# Patient Record
Sex: MALE | Race: WHITE | NOT HISPANIC OR LATINO | Employment: FULL TIME | URBAN - NONMETROPOLITAN AREA
[De-identification: names, ages, dates, MRNs, and addresses within clinical notes are randomized per-mention and may not be internally consistent; named-entity substitution may affect disease eponyms.]

---

## 2023-12-21 ENCOUNTER — OFFICE VISIT (OUTPATIENT)
Dept: PRIMARY CARE | Facility: CLINIC | Age: 48
End: 2023-12-21
Payer: COMMERCIAL

## 2023-12-21 VITALS
DIASTOLIC BLOOD PRESSURE: 68 MMHG | SYSTOLIC BLOOD PRESSURE: 122 MMHG | HEART RATE: 82 BPM | BODY MASS INDEX: 29.25 KG/M2 | HEIGHT: 73 IN | OXYGEN SATURATION: 98 % | WEIGHT: 220.7 LBS

## 2023-12-21 DIAGNOSIS — M70.22 OLECRANON BURSITIS, LEFT ELBOW: Primary | ICD-10-CM

## 2023-12-21 PROCEDURE — 1036F TOBACCO NON-USER: CPT | Performed by: FAMILY MEDICINE

## 2023-12-21 PROCEDURE — 99213 OFFICE O/P EST LOW 20 MIN: CPT | Performed by: FAMILY MEDICINE

## 2023-12-21 ASSESSMENT — PATIENT HEALTH QUESTIONNAIRE - PHQ9
1. LITTLE INTEREST OR PLEASURE IN DOING THINGS: NOT AT ALL
SUM OF ALL RESPONSES TO PHQ9 QUESTIONS 1 AND 2: 0
2. FEELING DOWN, DEPRESSED OR HOPELESS: NOT AT ALL

## 2023-12-21 ASSESSMENT — ENCOUNTER SYMPTOMS
WOUND: 0
NUMBNESS: 0
ARTHRALGIAS: 0

## 2023-12-21 NOTE — PROGRESS NOTES
"Subjective   Patient ID: Yon Lawrence is a 48 y.o. male who presents for Elbow Pain (LT [id number] SWELLING).    Elbow Pain  Pertinent negatives include no arthralgias, numbness or rash.      No known injury but he has some swelling is gone down to the last week of the left olecranon bursa.  On exam freely movable with very little fluid mostly soft tissue swelling, no redness.    Since it is going down at this point I do not think we will have him take anything.  Ibuprofen as needed.  If the swelling does get a little bit worse, recommend 400 mg with food twice a day for couple weeks to see if it helps.    Review of Systems   Musculoskeletal:  Negative for arthralgias.   Skin:  Negative for rash and wound.   Neurological:  Negative for numbness.       Objective   /68   Pulse 82   Ht 1.854 m (6' 1\")   Wt 100 kg (220 lb 11.2 oz)   SpO2 98%   BMI 29.12 kg/m²     Physical Exam  Constitutional:       Appearance: Normal appearance.   Skin:     General: Skin is warm and dry.   Neurological:      General: No focal deficit present.      Mental Status: He is alert and oriented to person, place, and time.   Psychiatric:         Mood and Affect: Mood normal.         Behavior: Behavior normal.         Judgment: Judgment normal.         Assessment/Plan   Problem List Items Addressed This Visit    None  Visit Diagnoses         Codes    Olecranon bursitis, left elbow    -  Primary M70.22               "

## 2024-02-19 ENCOUNTER — OFFICE VISIT (OUTPATIENT)
Dept: PRIMARY CARE | Facility: CLINIC | Age: 49
End: 2024-02-19
Payer: COMMERCIAL

## 2024-02-19 VITALS
HEIGHT: 73 IN | HEART RATE: 97 BPM | SYSTOLIC BLOOD PRESSURE: 128 MMHG | OXYGEN SATURATION: 98 % | BODY MASS INDEX: 27.69 KG/M2 | DIASTOLIC BLOOD PRESSURE: 72 MMHG | WEIGHT: 208.9 LBS

## 2024-02-19 DIAGNOSIS — R10.12 LUQ ABDOMINAL PAIN: Primary | ICD-10-CM

## 2024-02-19 PROCEDURE — 1036F TOBACCO NON-USER: CPT | Performed by: FAMILY MEDICINE

## 2024-02-19 PROCEDURE — 99213 OFFICE O/P EST LOW 20 MIN: CPT | Performed by: FAMILY MEDICINE

## 2024-02-19 ASSESSMENT — ENCOUNTER SYMPTOMS
CONSTIPATION: 0
VOMITING: 0
HEMATURIA: 0
PALPITATIONS: 0
SLEEP DISTURBANCE: 0
DIFFICULTY URINATING: 0
DYSURIA: 0
SHORTNESS OF BREATH: 0
NAUSEA: 0
COUGH: 0
BACK PAIN: 0
CHILLS: 0
FEVER: 0
FATIGUE: 0
FREQUENCY: 0
DIARRHEA: 0
BLOOD IN STOOL: 0
ABDOMINAL PAIN: 1

## 2024-02-19 NOTE — PROGRESS NOTES
"Subjective   Patient ID: Yon Lawrence is a 48 y.o. male who presents for Abdominal Pain.    HPI   Started with more of a dull ache in the upper part of his belly right and left side little periumbilical.  That lasted for about a week.  Then a week ago he started getting more of an off-and-on sharp pain in the left upper quadrant abdomen just below the rib cage.  No rash in that area no mid back pain.  No change in the pain with eating urination or bowel movement.  It does hurt a little bit worse if he takes a deep breath, it feels better if he leans forward and his belly and feels worse if he stretches out his back.  Has not tried any over-the-counter medicines to help.  No change in urination or bowel movement.  No dysphagia or heartburn.    On exam lungs are clear no tenderness to palpation in the mid back or the chest wall.  Mild tenderness to palpation mostly in the left upper quadrant, but a little epigastric right upper quadrant and periumbilical.  No rebound pain.  No CVA tenderness bilaterally.    No aggravating or relieving factors, do wonder more about a pulled muscle in the abdominal wall possibly something with the lower part of the rib cage.  With food he will take ibuprofen every day 2 or 3 times a day with a maximum of 4.  See if that gets better over time.  If not better in 2 weeks, he will call.    Review of Systems   Constitutional:  Negative for chills, fatigue and fever.   Respiratory:  Negative for cough and shortness of breath.    Cardiovascular:  Negative for chest pain and palpitations.   Gastrointestinal:  Positive for abdominal pain. Negative for blood in stool, constipation, diarrhea, nausea and vomiting.   Genitourinary:  Negative for difficulty urinating, dysuria, frequency and hematuria.   Musculoskeletal:  Negative for back pain.   Skin:  Negative for rash.   Psychiatric/Behavioral:  Negative for sleep disturbance.        Objective   /72   Pulse 97   Ht 1.854 m (6' 1\")   Wt " 94.8 kg (208 lb 14.4 oz)   SpO2 98%   BMI 27.56 kg/m²     Physical Exam  Constitutional:       Appearance: Normal appearance.   HENT:      Head: Normocephalic and atraumatic.   Cardiovascular:      Rate and Rhythm: Normal rate and regular rhythm.      Heart sounds: Normal heart sounds.   Pulmonary:      Effort: Pulmonary effort is normal.      Breath sounds: Normal breath sounds.   Skin:     General: Skin is warm and dry.   Neurological:      General: No focal deficit present.      Mental Status: He is alert and oriented to person, place, and time.   Psychiatric:         Mood and Affect: Mood normal.         Behavior: Behavior normal.         Thought Content: Thought content normal.         Judgment: Judgment normal.         Assessment/Plan   Problem List Items Addressed This Visit    None  Visit Diagnoses         Codes    LUQ abdominal pain    -  Primary R10.12

## 2024-03-14 ENCOUNTER — OFFICE VISIT (OUTPATIENT)
Dept: PRIMARY CARE | Facility: CLINIC | Age: 49
End: 2024-03-14
Payer: COMMERCIAL

## 2024-03-14 VITALS
OXYGEN SATURATION: 96 % | BODY MASS INDEX: 27.83 KG/M2 | DIASTOLIC BLOOD PRESSURE: 68 MMHG | HEIGHT: 73 IN | HEART RATE: 88 BPM | SYSTOLIC BLOOD PRESSURE: 132 MMHG | WEIGHT: 210 LBS

## 2024-03-14 DIAGNOSIS — R10.12 LUQ ABDOMINAL PAIN: Primary | ICD-10-CM

## 2024-03-14 PROCEDURE — 99213 OFFICE O/P EST LOW 20 MIN: CPT | Performed by: FAMILY MEDICINE

## 2024-03-14 PROCEDURE — 1036F TOBACCO NON-USER: CPT | Performed by: FAMILY MEDICINE

## 2024-03-14 RX ORDER — PREDNISONE 10 MG/1
TABLET ORAL
Qty: 30 TABLET | Refills: 1 | Status: SHIPPED | OUTPATIENT
Start: 2024-03-14 | End: 2024-03-25

## 2024-03-14 ASSESSMENT — ENCOUNTER SYMPTOMS
ABDOMINAL PAIN: 1
CONSTIPATION: 0
HEMATURIA: 0
FREQUENCY: 0
PALPITATIONS: 0
BLOOD IN STOOL: 0
DIARRHEA: 0
SHORTNESS OF BREATH: 0
DIFFICULTY URINATING: 0
COUGH: 0

## 2024-03-14 ASSESSMENT — PATIENT HEALTH QUESTIONNAIRE - PHQ9
1. LITTLE INTEREST OR PLEASURE IN DOING THINGS: NOT AT ALL
2. FEELING DOWN, DEPRESSED OR HOPELESS: NOT AT ALL
SUM OF ALL RESPONSES TO PHQ9 QUESTIONS 1 AND 2: 0

## 2024-03-14 NOTE — PROGRESS NOTES
"Subjective   Patient ID: Yon Lawrence is a 48 y.o. male who presents for Abdominal Pain (LUQ x2mo).    Abdominal Pain  Pertinent negatives include no constipation, diarrhea, frequency or hematuria.   Send pain but things are little bit better with consistently using ibuprofen.  But still every once in a while hurts some when he takes a deep breath and sometimes when he is walking he can feel it.  No change with eating bowel movements or urination.  No blood in the urine no dysuria or urgency, no blood in the stool no persistent constipation or diarrhea.  No rash on the skin or his mid back.    Exam is benign.    Ibuprofen as needed  Prednisone taper with a refill  If he feels the pain is still a problem in the next 2 to 4 weeks, call order some labs CT scan of the abdomen and UA.    Review of Systems   Respiratory:  Negative for cough and shortness of breath.    Cardiovascular:  Negative for chest pain and palpitations.   Gastrointestinal:  Positive for abdominal pain. Negative for blood in stool, constipation and diarrhea.   Genitourinary:  Negative for difficulty urinating, frequency, hematuria and urgency.   Skin:  Negative for rash.       Objective   /68   Pulse 88   Ht 1.854 m (6' 1\")   Wt 95.3 kg (210 lb)   SpO2 96%   BMI 27.71 kg/m²     Physical Exam  Constitutional:       Appearance: Normal appearance.   Abdominal:      Palpations: Abdomen is soft.      Tenderness: There is no abdominal tenderness.   Skin:     General: Skin is warm and dry.   Neurological:      General: No focal deficit present.      Mental Status: He is alert and oriented to person, place, and time.   Psychiatric:         Mood and Affect: Mood normal.         Behavior: Behavior normal.         Judgment: Judgment normal.         Assessment/Plan   Problem List Items Addressed This Visit             ICD-10-CM    LUQ abdominal pain - Primary R10.12    Relevant Medications    predniSONE (Deltasone) 10 mg tablet          "

## 2024-04-22 ENCOUNTER — TELEPHONE (OUTPATIENT)
Dept: PRIMARY CARE | Facility: CLINIC | Age: 49
End: 2024-04-22
Payer: COMMERCIAL

## 2024-04-22 DIAGNOSIS — R10.12 LUQ ABDOMINAL PAIN: Primary | ICD-10-CM

## 2024-04-22 NOTE — TELEPHONE ENCOUNTER
PT NOTIFIED SCHED NURSE VISIT FOR URINE AND WILL GET LABS DONE 4/24/24.  SENT CT TO [facility] FOR SCHEDULING

## 2024-04-22 NOTE — TELEPHONE ENCOUNTER
CALLING IN STATING ABD PAIN IS NOT ANY BETTER.  LAST OV NOTE STATES POSSIBLE LABS, UA, AND CT.  PLEASE ADVISE.

## 2024-04-24 ENCOUNTER — LAB (OUTPATIENT)
Dept: LAB | Facility: LAB | Age: 49
End: 2024-04-24
Payer: COMMERCIAL

## 2024-04-24 ENCOUNTER — CLINICAL SUPPORT (OUTPATIENT)
Dept: PRIMARY CARE | Facility: CLINIC | Age: 49
End: 2024-04-24
Payer: COMMERCIAL

## 2024-04-24 ENCOUNTER — TELEPHONE (OUTPATIENT)
Dept: PRIMARY CARE | Facility: CLINIC | Age: 49
End: 2024-04-24

## 2024-04-24 DIAGNOSIS — R10.12 LUQ ABDOMINAL PAIN: ICD-10-CM

## 2024-04-24 LAB
ALBUMIN SERPL BCP-MCNC: 3.7 G/DL (ref 3.4–5)
ALP SERPL-CCNC: 113 U/L (ref 33–120)
ALT SERPL W P-5'-P-CCNC: 17 U/L (ref 10–52)
ANION GAP SERPL CALC-SCNC: 13 MMOL/L (ref 10–20)
AST SERPL W P-5'-P-CCNC: 21 U/L (ref 9–39)
BILIRUB SERPL-MCNC: 0.8 MG/DL (ref 0–1.2)
BUN SERPL-MCNC: 12 MG/DL (ref 6–23)
CALCIUM SERPL-MCNC: 9.5 MG/DL (ref 8.6–10.3)
CHLORIDE SERPL-SCNC: 100 MMOL/L (ref 98–107)
CO2 SERPL-SCNC: 30 MMOL/L (ref 21–32)
CREAT SERPL-MCNC: 0.83 MG/DL (ref 0.5–1.3)
EGFRCR SERPLBLD CKD-EPI 2021: >90 ML/MIN/1.73M*2
ERYTHROCYTE [DISTWIDTH] IN BLOOD BY AUTOMATED COUNT: 16.3 % (ref 11.5–14.5)
GLUCOSE SERPL-MCNC: 89 MG/DL (ref 74–99)
HCT VFR BLD AUTO: 37.5 % (ref 41–52)
HGB BLD-MCNC: 11.2 G/DL (ref 13.5–17.5)
LIPASE SERPL-CCNC: 22 U/L (ref 9–82)
MCH RBC QN AUTO: 23.6 PG (ref 26–34)
MCHC RBC AUTO-ENTMCNC: 29.9 G/DL (ref 32–36)
MCV RBC AUTO: 79 FL (ref 80–100)
NRBC BLD-RTO: 0 /100 WBCS (ref 0–0)
PLATELET # BLD AUTO: 626 X10*3/UL (ref 150–450)
POC APPEARANCE, URINE: CLEAR
POC BILIRUBIN, URINE: NEGATIVE
POC BLOOD, URINE: NEGATIVE
POC COLOR, URINE: YELLOW
POC GLUCOSE, URINE: NEGATIVE MG/DL
POC KETONES, URINE: NEGATIVE MG/DL
POC LEUKOCYTES, URINE: NEGATIVE
POC NITRITE,URINE: NEGATIVE
POC PH, URINE: 7 PH
POC PROTEIN, URINE: NEGATIVE MG/DL
POC SPECIFIC GRAVITY, URINE: 1.01
POC UROBILINOGEN, URINE: 1 EU/DL
POTASSIUM SERPL-SCNC: 4.7 MMOL/L (ref 3.5–5.3)
PROT SERPL-MCNC: 6.8 G/DL (ref 6.4–8.2)
RBC # BLD AUTO: 4.75 X10*6/UL (ref 4.5–5.9)
SODIUM SERPL-SCNC: 138 MMOL/L (ref 136–145)
WBC # BLD AUTO: 30.7 X10*3/UL (ref 4.4–11.3)

## 2024-04-24 PROCEDURE — 80053 COMPREHEN METABOLIC PANEL: CPT

## 2024-04-24 PROCEDURE — 81003 URINALYSIS AUTO W/O SCOPE: CPT | Performed by: FAMILY MEDICINE

## 2024-04-24 PROCEDURE — 83690 ASSAY OF LIPASE: CPT

## 2024-04-24 PROCEDURE — 36415 COLL VENOUS BLD VENIPUNCTURE: CPT

## 2024-04-24 PROCEDURE — 85027 COMPLETE CBC AUTOMATED: CPT

## 2024-04-24 NOTE — PROGRESS NOTES
Patient brought a urine sample into the office today for LUQ AB PAIN. AASHISH ran urine test strip for POCT UA. Results forwarded to PCP.

## 2024-04-24 NOTE — TELEPHONE ENCOUNTER
----- Message from Pedro Bazzi MD sent at 4/24/2024  9:21 AM EDT -----  Notify urinalysis is clear.  ----- Message -----  From: Iva Prakash MA  Sent: 4/24/2024   9:08 AM EDT  To: Pedro Bazzi MD

## 2024-04-25 ENCOUNTER — TELEPHONE (OUTPATIENT)
Dept: PRIMARY CARE | Facility: CLINIC | Age: 49
End: 2024-04-25
Payer: COMMERCIAL

## 2024-04-25 NOTE — TELEPHONE ENCOUNTER
----- Message from Pedro Bazzi MD sent at 4/25/2024  8:18 AM EDT -----  Notify labs are okay except for some mild anemia.  Will see what the results of the CT scan show.

## 2024-04-30 ENCOUNTER — HOSPITAL ENCOUNTER (OUTPATIENT)
Dept: RADIOLOGY | Facility: HOSPITAL | Age: 49
Discharge: HOME | End: 2024-04-30
Payer: COMMERCIAL

## 2024-04-30 DIAGNOSIS — R10.12 LUQ ABDOMINAL PAIN: ICD-10-CM

## 2024-04-30 PROCEDURE — A9698 NON-RAD CONTRAST MATERIALNOC: HCPCS | Performed by: FAMILY MEDICINE

## 2024-04-30 PROCEDURE — 74160 CT ABDOMEN W/CONTRAST: CPT

## 2024-04-30 PROCEDURE — 2550000001 HC RX 255 CONTRASTS: Performed by: FAMILY MEDICINE

## 2024-04-30 PROCEDURE — 74160 CT ABDOMEN W/CONTRAST: CPT | Performed by: RADIOLOGY

## 2024-04-30 RX ADMIN — IOHEXOL 75 ML: 350 INJECTION, SOLUTION INTRAVENOUS at 17:30

## 2024-04-30 RX ADMIN — IOHEXOL 500 ML: 12 SOLUTION ORAL at 17:30

## 2024-05-02 ENCOUNTER — PREP FOR PROCEDURE (OUTPATIENT)
Dept: SURGERY | Facility: CLINIC | Age: 49
End: 2024-05-02

## 2024-05-02 ENCOUNTER — LAB (OUTPATIENT)
Dept: LAB | Facility: LAB | Age: 49
End: 2024-05-02
Payer: COMMERCIAL

## 2024-05-02 ENCOUNTER — OFFICE VISIT (OUTPATIENT)
Dept: SURGERY | Facility: CLINIC | Age: 49
End: 2024-05-02
Payer: COMMERCIAL

## 2024-05-02 VITALS
WEIGHT: 204 LBS | HEIGHT: 73 IN | DIASTOLIC BLOOD PRESSURE: 80 MMHG | SYSTOLIC BLOOD PRESSURE: 118 MMHG | BODY MASS INDEX: 27.04 KG/M2

## 2024-05-02 DIAGNOSIS — R19.02 ABDOMINAL MASS, LEFT UPPER QUADRANT: ICD-10-CM

## 2024-05-02 DIAGNOSIS — R93.89 ABNORMAL CT SCAN: Primary | ICD-10-CM

## 2024-05-02 PROCEDURE — 36415 COLL VENOUS BLD VENIPUNCTURE: CPT

## 2024-05-02 PROCEDURE — 99203 OFFICE O/P NEW LOW 30 MIN: CPT | Performed by: SURGERY

## 2024-05-02 PROCEDURE — 1036F TOBACCO NON-USER: CPT | Performed by: SURGERY

## 2024-05-02 PROCEDURE — 82378 CARCINOEMBRYONIC ANTIGEN: CPT

## 2024-05-02 RX ORDER — ONDANSETRON HYDROCHLORIDE 2 MG/ML
4 INJECTION, SOLUTION INTRAVENOUS ONCE AS NEEDED
Status: CANCELLED | OUTPATIENT
Start: 2024-05-02

## 2024-05-02 RX ORDER — SODIUM CHLORIDE 9 MG/ML
100 INJECTION, SOLUTION INTRAVENOUS CONTINUOUS
Status: CANCELLED | OUTPATIENT
Start: 2024-05-02

## 2024-05-02 NOTE — H&P (VIEW-ONLY)
General Surgery Consultation    Patient: Yon Lawrence  : 1975  MRN: [id number]  Date of Consultation: 24    Referring Primary Care Provider: Pedro Bazzi MD    Chief Complaint: Abnormal CT scan    History of Present Illness: Yon Lawrence is a 48 y.o. old male seen at the request of Dr. Bazzi for evaluation for colonoscopy.  The patient has been having left upper quadrant abdominal pain over the past couple months.  It is a dull constant pain.  Over the same timeframe he has had a couple episodes of night sweats and unintentional weight loss.  When he saw Dr. Bazzi in December he weighed 220 pounds, today he weighed 204 pounds in our office. He has decreased appetite.  CT scan was obtained in workup for this.  This showed a large mass in the left upper quadrant suspected to be arising from the distal transverse colon.  He also had mesenteric adenopathy as well as hypodensities in the liver concerning for metastatic disease.  He has never had a colonoscopy.  He has no family history of colon or rectal cancer.  He has bowel movements every morning that he describes as soft but formed.  He is not on any stool softeners, fiber supplements, or laxatives.  He denies seeing any blood in the stool or any dark black bowel movements. He is not on any blood thinners or antiplatelet agents.    Medical History:  Abdominal pain    Surgical History:  No previous abdominal surgery  Meniscectomy    Home Medications:  No home medications    Allergies:  No Known Allergies    Family History:   Mother with diabetes.  Father with hypertension.  No family history of colon or rectal cancer or inflammatory bowel disease.  Maternal and paternal aunts with history of breast cancer.    Social History:  .  Non-smoker.  No alcohol or drug use.    ROS:  Constitutional: + Night sweats a couple times over the past few months.  Unintentional weight loss, fatigue  Cardiovascular: No chest pain  Respiratory: No cough  "or shortness of breath  Gastrointestinal: + Left upper quadrant abdominal pain.  No blood in the stool or dark black bowel movements.  Genitourinary: + Regular nighttime urination  Musculoskeletal: no weakness or swelling  Integumentary: no rashes  Neurological: no confusion  Endocrine: no heat or cold intolerance  Heme/Lymph: no easy bruising or bleeding    Objective:  /80   Ht 1.85 m (6' 0.84\")   Wt 92.5 kg (204 lb)   BMI 27.04 kg/m²     Physical Exam:  Constitutional: No acute distress, conversant, pleasant, accompanied by his wife  Neurologic: alert and oriented  Psych: appropriate affect  Ears, Nose, Mouth and Throat: mucus membranes moist  Pulmonary: No labored breathing  Cardiovascular: Regular rate and rhythm  Abdomen: soft, non-distended, BMI 27, mildly tender in left upper quadrant, no surgical scars  Musculoskeletal: Moves all extremities, no edema  Skin: no jaundice    Labs:  Labs from 4/24/2024 reviewed: WBC 30.7, Hgb 11.2, Plts 626, LFTs within normal limits    Imaging:  CT abdomen and pelvis from 4/30/2024 reviewed: Large necrotic mass occupying the left upper quadrant of the abdomen, likely arising from the distal transverse colon although difficult to be certain without oral/rectal contrast.  Further evaluation of the colon either with colonoscopy or barium enema is recommended to evaluate the status of the distal transverse colon in relation to the mass.  Multiple mesenteric nodules, consistent with metastatic lesions.  Stranding of the fat around the mass with nodules.  Multiple liver mass lesions consistent with metastasis.    Assessment and Plan: Yon Lawrence is a 48 y.o. old male with abnormal CT scan, concerning for a mass in the distal transverse colon with metastatic disease to the liver.  I have recommended diagnostic colonoscopy.  We discussed the risks of this procedure.  This included risks of bleeding, perforation, missed polyps, incomplete colonoscopy, and potential need " for additional procedures pending findings.  The patient was agreeable to proceed.  Bowel prep instructions were reviewed and all questions were answered.  The patient is scheduled for colonoscopy on 5/6/24.  We will also obtain a CEA.    Alba Asif MD  5/2/2024

## 2024-05-02 NOTE — LETTER
May 2, 2024     Pedro Bazzi MD  [address]  [facility] [address]    Patient: Vladimir Lawrence   YOB: 1975   Date of Visit: 2024       Dear Dr. Pedro Bazzi MD:    Thank you for referring Vladimir Lawrence to me for evaluation. Below are my notes for this consultation.  If you have questions, please do not hesitate to call me. I look forward to following your patient along with you.       Sincerely,     Alba Asif MD      CC: No Recipients  ______________________________________________________________________________________    General Surgery Consultation    Patient: Yon Lawrence  : 1975  MRN: [id number]  Date of Consultation: 24    Referring Primary Care Provider: Pedro Bazzi MD    Chief Complaint: Abnormal CT scan    History of Present Illness: Yon Lawrence is a 48 y.o. old male seen at the request of Dr. Bazzi for evaluation for colonoscopy.  The patient has been having left upper quadrant abdominal pain over the past couple months.  It is a dull constant pain.  Over the same timeframe he has had a couple episodes of night sweats and unintentional weight loss.  When he saw Dr. Bazzi in December he weighed 220 pounds, today he weighed 204 pounds in our office. He has decreased appetite.  CT scan was obtained in workup for this.  This showed a large mass in the left upper quadrant suspected to be arising from the distal transverse colon.  He also had mesenteric adenopathy as well as hypodensities in the liver concerning for metastatic disease.  He has never had a colonoscopy.  He has no family history of colon or rectal cancer.  He has bowel movements every morning that he describes as soft but formed.  He is not on any stool softeners, fiber supplements, or laxatives.  He denies seeing any blood in the stool or any dark black bowel movements. He is not on any blood thinners or antiplatelet agents.    Medical History:  Abdominal pain    Surgical History:  No  "previous abdominal surgery  Meniscectomy    Home Medications:  No home medications    Allergies:  No Known Allergies    Family History:   Mother with diabetes.  Father with hypertension.  No family history of colon or rectal cancer or inflammatory bowel disease.  Maternal and paternal aunts with history of breast cancer.    Social History:  .  Non-smoker.  No alcohol or drug use.    ROS:  Constitutional: + Night sweats a couple times over the past few months.  Unintentional weight loss, fatigue  Cardiovascular: No chest pain  Respiratory: No cough or shortness of breath  Gastrointestinal: + Left upper quadrant abdominal pain.  No blood in the stool or dark black bowel movements.  Genitourinary: + Regular nighttime urination  Musculoskeletal: no weakness or swelling  Integumentary: no rashes  Neurological: no confusion  Endocrine: no heat or cold intolerance  Heme/Lymph: no easy bruising or bleeding    Objective:  /80   Ht 1.85 m (6' 0.84\")   Wt 92.5 kg (204 lb)   BMI 27.04 kg/m²     Physical Exam:  Constitutional: No acute distress, conversant, pleasant, accompanied by his wife  Neurologic: alert and oriented  Psych: appropriate affect  Ears, Nose, Mouth and Throat: mucus membranes moist  Pulmonary: No labored breathing  Cardiovascular: Regular rate and rhythm  Abdomen: soft, non-distended, BMI 27, mildly tender in left upper quadrant, no surgical scars  Musculoskeletal: Moves all extremities, no edema  Skin: no jaundice    Labs:  Labs from 4/24/2024 reviewed: WBC 30.7, Hgb 11.2, Plts 626, LFTs within normal limits    Imaging:  CT abdomen and pelvis from 4/30/2024 reviewed: Large necrotic mass occupying the left upper quadrant of the abdomen, likely arising from the distal transverse colon although difficult to be certain without oral/rectal contrast.  Further evaluation of the colon either with colonoscopy or barium enema is recommended to evaluate the status of the distal transverse colon in " relation to the mass.  Multiple mesenteric nodules, consistent with metastatic lesions.  Stranding of the fat around the mass with nodules.  Multiple liver mass lesions consistent with metastasis.    Assessment and Plan: Yon Lawrence is a 48 y.o. old male with abnormal CT scan, concerning for a mass in the distal transverse colon with metastatic disease to the liver.  I have recommended diagnostic colonoscopy.  We discussed the risks of this procedure.  This included risks of bleeding, perforation, missed polyps, incomplete colonoscopy, and potential need for additional procedures pending findings.  The patient was agreeable to proceed.  Bowel prep instructions were reviewed and all questions were answered.  The patient is scheduled for colonoscopy on 5/6/24.  We will also obtain a CEA.    Alba Asif MD  5/2/2024

## 2024-05-02 NOTE — PROGRESS NOTES
General Surgery Consultation    Patient: Yon Lawrence  : 1975  MRN: [id number]  Date of Consultation: 24    Referring Primary Care Provider: Pedro Bazzi MD    Chief Complaint: Abnormal CT scan    History of Present Illness: Yon Lawrence is a 48 y.o. old male seen at the request of Dr. Bazzi for evaluation for colonoscopy.  The patient has been having left upper quadrant abdominal pain over the past couple months.  It is a dull constant pain.  Over the same timeframe he has had a couple episodes of night sweats and unintentional weight loss.  When he saw Dr. Bazzi in December he weighed 220 pounds, today he weighed 204 pounds in our office. He has decreased appetite.  CT scan was obtained in workup for this.  This showed a large mass in the left upper quadrant suspected to be arising from the distal transverse colon.  He also had mesenteric adenopathy as well as hypodensities in the liver concerning for metastatic disease.  He has never had a colonoscopy.  He has no family history of colon or rectal cancer.  He has bowel movements every morning that he describes as soft but formed.  He is not on any stool softeners, fiber supplements, or laxatives.  He denies seeing any blood in the stool or any dark black bowel movements. He is not on any blood thinners or antiplatelet agents.    Medical History:  Abdominal pain    Surgical History:  No previous abdominal surgery  Meniscectomy    Home Medications:  No home medications    Allergies:  No Known Allergies    Family History:   Mother with diabetes.  Father with hypertension.  No family history of colon or rectal cancer or inflammatory bowel disease.  Maternal and paternal aunts with history of breast cancer.    Social History:  .  Non-smoker.  No alcohol or drug use.    ROS:  Constitutional: + Night sweats a couple times over the past few months.  Unintentional weight loss, fatigue  Cardiovascular: No chest pain  Respiratory: No cough  "or shortness of breath  Gastrointestinal: + Left upper quadrant abdominal pain.  No blood in the stool or dark black bowel movements.  Genitourinary: + Regular nighttime urination  Musculoskeletal: no weakness or swelling  Integumentary: no rashes  Neurological: no confusion  Endocrine: no heat or cold intolerance  Heme/Lymph: no easy bruising or bleeding    Objective:  /80   Ht 1.85 m (6' 0.84\")   Wt 92.5 kg (204 lb)   BMI 27.04 kg/m²     Physical Exam:  Constitutional: No acute distress, conversant, pleasant, accompanied by his wife  Neurologic: alert and oriented  Psych: appropriate affect  Ears, Nose, Mouth and Throat: mucus membranes moist  Pulmonary: No labored breathing  Cardiovascular: Regular rate and rhythm  Abdomen: soft, non-distended, BMI 27, mildly tender in left upper quadrant, no surgical scars  Musculoskeletal: Moves all extremities, no edema  Skin: no jaundice    Labs:  Labs from 4/24/2024 reviewed: WBC 30.7, Hgb 11.2, Plts 626, LFTs within normal limits    Imaging:  CT abdomen and pelvis from 4/30/2024 reviewed: Large necrotic mass occupying the left upper quadrant of the abdomen, likely arising from the distal transverse colon although difficult to be certain without oral/rectal contrast.  Further evaluation of the colon either with colonoscopy or barium enema is recommended to evaluate the status of the distal transverse colon in relation to the mass.  Multiple mesenteric nodules, consistent with metastatic lesions.  Stranding of the fat around the mass with nodules.  Multiple liver mass lesions consistent with metastasis.    Assessment and Plan: Yon Lawrence is a 48 y.o. old male with abnormal CT scan, concerning for a mass in the distal transverse colon with metastatic disease to the liver.  I have recommended diagnostic colonoscopy.  We discussed the risks of this procedure.  This included risks of bleeding, perforation, missed polyps, incomplete colonoscopy, and potential need " for additional procedures pending findings.  The patient was agreeable to proceed.  Bowel prep instructions were reviewed and all questions were answered.  The patient is scheduled for colonoscopy on 5/6/24.  We will also obtain a CEA.    Alba Asif MD  5/2/2024

## 2024-05-03 ENCOUNTER — DOCUMENTATION (OUTPATIENT)
Dept: SURGERY | Facility: CLINIC | Age: 49
End: 2024-05-03
Payer: COMMERCIAL

## 2024-05-03 LAB — CEA SERPL-MCNC: 6.7 UG/L

## 2024-05-03 NOTE — PROGRESS NOTES
Notified patient of  Colonoscopy   scheduled on 5-6-24 .Instructions reviewed. Advised patient [facility] will contact them 24 hours before surgery with arrival time. Pt voices understanding. Yumiko Patricio MA.

## 2024-05-06 ENCOUNTER — HOSPITAL ENCOUNTER (OUTPATIENT)
Dept: GASTROENTEROLOGY | Facility: CLINIC | Age: 49
Setting detail: OUTPATIENT SURGERY
Discharge: HOME | End: 2024-05-06
Payer: COMMERCIAL

## 2024-05-06 VITALS
WEIGHT: 194.22 LBS | HEIGHT: 73 IN | TEMPERATURE: 97.6 F | BODY MASS INDEX: 25.74 KG/M2 | RESPIRATION RATE: 20 BRPM | SYSTOLIC BLOOD PRESSURE: 123 MMHG | DIASTOLIC BLOOD PRESSURE: 81 MMHG | OXYGEN SATURATION: 99 % | HEART RATE: 87 BPM

## 2024-05-06 DIAGNOSIS — R93.89 ABNORMAL CT SCAN: ICD-10-CM

## 2024-05-06 DIAGNOSIS — K63.89 MASS OF COLON: ICD-10-CM

## 2024-05-06 PROCEDURE — 81288 MLH1 GENE: CPT | Performed by: SURGERY

## 2024-05-06 PROCEDURE — 99152 MOD SED SAME PHYS/QHP 5/>YRS: CPT | Performed by: SURGERY

## 2024-05-06 PROCEDURE — 88305 TISSUE EXAM BY PATHOLOGIST: CPT | Performed by: PATHOLOGY

## 2024-05-06 PROCEDURE — G0452 MOLECULAR PATHOLOGY INTERPR: HCPCS | Performed by: SURGERY

## 2024-05-06 PROCEDURE — 7100000009 HC PHASE TWO TIME - INITIAL BASE CHARGE

## 2024-05-06 PROCEDURE — 99153 MOD SED SAME PHYS/QHP EA: CPT | Performed by: SURGERY

## 2024-05-06 PROCEDURE — 88342 IMHCHEM/IMCYTCHM 1ST ANTB: CPT | Performed by: PATHOLOGY

## 2024-05-06 PROCEDURE — 45380 COLONOSCOPY AND BIOPSY: CPT | Performed by: SURGERY

## 2024-05-06 PROCEDURE — 88341 IMHCHEM/IMCYTCHM EA ADD ANTB: CPT | Performed by: PATHOLOGY

## 2024-05-06 PROCEDURE — 3700000013 HC SEDATION LEVEL 5+ TIME - EACH ADDITIONAL 15 MINUTES

## 2024-05-06 PROCEDURE — 3700000012 HC SEDATION LEVEL 5+ TIME - INITIAL 15 MINUTES 5/> YEARS

## 2024-05-06 PROCEDURE — 2720000007 HC OR 272 NO HCPCS

## 2024-05-06 PROCEDURE — 2500000004 HC RX 250 GENERAL PHARMACY W/ HCPCS (ALT 636 FOR OP/ED): Performed by: SURGERY

## 2024-05-06 PROCEDURE — 7100000010 HC PHASE TWO TIME - EACH INCREMENTAL 1 MINUTE

## 2024-05-06 PROCEDURE — 45381 COLONOSCOPY SUBMUCOUS NJX: CPT | Performed by: SURGERY

## 2024-05-06 PROCEDURE — 88341 IMHCHEM/IMCYTCHM EA ADD ANTB: CPT | Mod: TC,SAMLAB | Performed by: SURGERY

## 2024-05-06 RX ORDER — FENTANYL CITRATE 50 UG/ML
INJECTION, SOLUTION INTRAMUSCULAR; INTRAVENOUS AS NEEDED
Status: COMPLETED | OUTPATIENT
Start: 2024-05-06 | End: 2024-05-06

## 2024-05-06 RX ORDER — MIDAZOLAM HYDROCHLORIDE 5 MG/ML
INJECTION, SOLUTION INTRAMUSCULAR; INTRAVENOUS AS NEEDED
Status: COMPLETED | OUTPATIENT
Start: 2024-05-06 | End: 2024-05-06

## 2024-05-06 RX ORDER — SODIUM CHLORIDE 9 MG/ML
100 INJECTION, SOLUTION INTRAVENOUS CONTINUOUS
Status: DISCONTINUED | OUTPATIENT
Start: 2024-05-06 | End: 2024-05-07 | Stop reason: HOSPADM

## 2024-05-06 RX ADMIN — MIDAZOLAM HYDROCHLORIDE 2 MG: 5 INJECTION, SOLUTION INTRAMUSCULAR; INTRAVENOUS at 09:57

## 2024-05-06 RX ADMIN — FENTANYL CITRATE 50 MCG: 50 INJECTION, SOLUTION INTRAMUSCULAR; INTRAVENOUS at 09:43

## 2024-05-06 RX ADMIN — MIDAZOLAM HYDROCHLORIDE 2 MG: 5 INJECTION, SOLUTION INTRAMUSCULAR; INTRAVENOUS at 09:50

## 2024-05-06 RX ADMIN — MIDAZOLAM HYDROCHLORIDE 4 MG: 5 INJECTION, SOLUTION INTRAMUSCULAR; INTRAVENOUS at 09:43

## 2024-05-06 RX ADMIN — SODIUM CHLORIDE 100 ML/HR: 9 INJECTION, SOLUTION INTRAVENOUS at 09:11

## 2024-05-06 RX ADMIN — FENTANYL CITRATE 25 MCG: 50 INJECTION, SOLUTION INTRAMUSCULAR; INTRAVENOUS at 09:54

## 2024-05-06 RX ADMIN — GLUCAGON HYDROCHLORIDE 1 MG: KIT at 09:45

## 2024-05-06 ASSESSMENT — PAIN - FUNCTIONAL ASSESSMENT
PAIN_FUNCTIONAL_ASSESSMENT: 0-10

## 2024-05-06 ASSESSMENT — PAIN SCALES - GENERAL

## 2024-05-06 ASSESSMENT — COLUMBIA-SUICIDE SEVERITY RATING SCALE - C-SSRS
1. IN THE PAST MONTH, HAVE YOU WISHED YOU WERE DEAD OR WISHED YOU COULD GO TO SLEEP AND NOT WAKE UP?: NO
6. HAVE YOU EVER DONE ANYTHING, STARTED TO DO ANYTHING, OR PREPARED TO DO ANYTHING TO END YOUR LIFE?: NO
2. HAVE YOU ACTUALLY HAD ANY THOUGHTS OF KILLING YOURSELF?: NO

## 2024-05-06 NOTE — DISCHARGE INSTRUCTIONS
Patient Instructions after a Colonoscopy      The anesthetics, sedatives or narcotics which were given to you today will be acting in your body for the next 24 hours, so you might feel a little sleepy or groggy.  This feeling should slowly wear off. Carefully read and follow the instructions.     You received sedation today:  - Do not drive or operate any machinery or power tools of any kind.   - No alcoholic beverages today, not even beer or wine.  - Do not make any important decisions or sign any legal documents.  - No over the counter medications that contain alcohol or that may cause drowsiness.  - Do not make any important decisions or sign any legal documents.    While it is common to experience mild to moderate abdominal distention, gas, or belching after your procedure, if any of these symptoms occur following discharge from the GI Lab or within one week of having your procedure, call the [facility] to be advised whether a visit to your nearest Urgent Care or Emergency Department is indicated.  Take this paper with you if you go.     - If you develop an allergic reaction to the medications that were given during your procedure such as difficulty breathing, rash, hives, severe nausea, vomiting or lightheadedness.  - If you experience chest pain, shortness of breath, severe abdominal pain, fevers and chills.  -If you develop signs and symptoms of bleeding such as blood in your spit, if your stools turn black, tarry, or bloody  - If you have not urinated within 8 hours following your procedure.  - If your IV site becomes painful, red, inflamed, or looks infected.    If you received a biopsy/polypectomy/sphincterotomy the following instructions apply below:    __ Do not use Aspirin containing products, non-steroidal medications or anti-coagulants for one week following your procedure. (Examples of these types of medications are: Advil, Arthrotec, Aleve, Coumadin, Ecotrin, Heparin, Ibuprofen,  Indocin, Motrin, Naprosyn, Nuprin, Plavix, Vioxx, and Voltarin, or their generic forms.  This list is not all-inclusive.  Check with your physician or pharmacist before resuming medications.)   __ Eat a soft diet today.  Avoid foods that are poorly digested for the next 24 hours.  These foods would include: nuts, beans, lettuce, red meats, and fried foods. Start with liquids and advance your diet as tolerated, gradually work up to eating solids.   __ Do not have a Barium Study or Enema for one week.    Your physician recommends the additional following instructions:    -You have a contact number available for emergencies. The signs and symptoms of potential delayed complications were discussed with you. You may return to normal activities tomorrow.  -Resume your previous diet.  -Continue your present medications.   -We are waiting for your pathology results.  -Your physician has recommended a repeat colonoscopy (date to be determined after pending pathology results are reviewed) for surveillance based on pathology results.  -The findings and recommendations have been discussed with you.  -The findings and recommendations were discussed with your family.  - Please see Medication Reconciliation Form for new medication/medications prescribed.       If you experience any problems or have any questions following discharge from the GI Lab, please call:        Nurse Signature                                                                        Date___________________                                                                            Patient/Responsible Party Signature                                        Date___________________

## 2024-05-06 NOTE — PROGRESS NOTES
"CARO Lawrence \"Vladimir\" is a 48 y.o. male who was referred by Dr. Asif for distal transverse colon mass with mets to liver. He was having dull constant LUQ pain for a couple months, which led Dr. Bazzi to request a CT AP scan. He was then referred to Dr. Asif for colon mass with metastatic lesions to liver found on CT. He is s/p colonoscopy with Dr. Asif on 5/6. Path pending.     He had 5/10 abdominal pain in the LUQ. The is pain intermittent daily. He tried Advil and steroids, which did not work. A CT was obtained that showed a mass. He obtained a colonoscopy, which showed a colonic mass. He has a bm daily. No c/o hematochezia or melena. No c/o n/v. Since April he has become more fatigued. He does not have much of an appetite. He does have night sweats occasionally. He has lost weight without trying about 20-30lbs. No c/o dysuria or hematuria.  Has two children, aged 14 and 18.  Eldest child graduating from high school 6/1 and party to follow 6/2.    CEA 5/3/24: 6.7    CT chest 5/7/24: 1. No acute intrathoracic pathology.  2. No evidence to suggest thoracic metastatic disease within limitations of mildly motion degraded examination.    CT a/p 4/22/24: 1. Large necrotic mass, occupying the left upper quadrant of the abdomen, likely arising from the distal transverse colon although difficult to be certain without oral/rectal contrast. Further evaluation of the colon either with colonoscopy or barium enema recommended to evaluate the status of the distal transverse colon in relation to the mass.  2. Multiple mesenteric nodules, consistent with metastatic lesions. Stranding of the fat around the mass with nodules.  3. Multiple liver mass lesions consistent with metastasis.    Colonoscopy 5/6/24 ([facility]): Polyp measuring smaller than 5 mm in the proximal rectum; performed cold forceps biopsy with piecemeal removal  Diverticulosis in the sigmoid colon (very mild)  Malignant-appearing mass measuring 10 cm in " the distal transverse colon, covering the whole circumference; bleeding occurred after intervention; performed cold forceps biopsy  Path Pending.    Non-smoker/No ETOH/No Illicit drug use  PMH: Unremarkable  PSH: Left and Right Menisectomy; no prior abdominal surgical history  No family history of CRC or IBD  Family Hx: Both aunts had breast cancer  Employment:     No past medical history on file.    Past Surgical History:   Procedure Laterality Date    OTHER SURGICAL HISTORY  06/07/2022    Meniscectomy       No Known Allergies    Review of Systems   Constitutional:  Positive for appetite change, fatigue and unexpected weight change. Negative for activity change, chills, diaphoresis and fever.   Respiratory:  Negative for cough, chest tightness and shortness of breath.    Cardiovascular:  Negative for chest pain, palpitations and leg swelling.   Gastrointestinal:  Negative for abdominal pain, anal bleeding, blood in stool, constipation, diarrhea, nausea, rectal pain and vomiting.   Genitourinary:  Negative for difficulty urinating, dysuria and hematuria.   Neurological:  Negative for dizziness, weakness and light-headedness.   All other systems reviewed and are negative.      Physical Exam  Vitals reviewed. Exam conducted with a chaperone present.   Constitutional:       Appearance: Normal appearance.   HENT:      Head: Normocephalic.   Eyes:      Pupils: Pupils are equal, round, and reactive to light.   Cardiovascular:      Rate and Rhythm: Normal rate and regular rhythm.      Pulses: Normal pulses.      Heart sounds: Normal heart sounds. No murmur heard.  Pulmonary:      Effort: Pulmonary effort is normal. No respiratory distress.      Breath sounds: Normal breath sounds. No stridor. No wheezing, rhonchi or rales.   Chest:      Chest wall: No tenderness.   Abdominal:      General: There is no distension.      Palpations: Abdomen is soft. There is no mass.      Tenderness: There is abdominal  tenderness.      Hernia: No hernia is present.      Comments: Tenderness in the epigastrium and LUQ. He has some fullness in the LUQ.  No inguinal adenopathy.   Musculoskeletal:         General: No swelling or deformity. Normal range of motion.      Cervical back: Normal range of motion.      Right lower leg: No edema.      Left lower leg: No edema.   Lymphadenopathy:      Cervical: No cervical adenopathy.   Skin:     General: Skin is warm and dry.      Capillary Refill: Capillary refill takes less than 2 seconds.   Neurological:      General: No focal deficit present.      Mental Status: He is alert and oriented to person, place, and time. Mental status is at baseline.   Psychiatric:         Mood and Affect: Mood normal.         Behavior: Behavior normal.         Thought Content: Thought content normal.         Judgment: Judgment normal.         Assessment and Plan:   #Distal transverse colon mass with associated adenopathy, large, highly concerning for malignancy  #Hepatic lesions concerning for metastatic disease  -  Await biopsy results  -  Referral to medical oncology  -  Referral to genetics  -  Repeat CT A/P with IV contrast  -  ? Diversion prior to initiation of chemotherapy; no current symptoms of obstruction, will await repeat CT A/P results    Gautam Mckeon MD   5/14/2024  10:30 AM

## 2024-05-07 ENCOUNTER — TELEPHONE (OUTPATIENT)
Dept: SURGERY | Facility: CLINIC | Age: 49
End: 2024-05-07
Payer: COMMERCIAL

## 2024-05-07 ENCOUNTER — HOSPITAL ENCOUNTER (OUTPATIENT)
Dept: RADIOLOGY | Facility: HOSPITAL | Age: 49
Discharge: HOME | End: 2024-05-07
Payer: COMMERCIAL

## 2024-05-07 ENCOUNTER — APPOINTMENT (OUTPATIENT)
Dept: RADIOLOGY | Facility: HOSPITAL | Age: 49
End: 2024-05-07
Payer: COMMERCIAL

## 2024-05-07 DIAGNOSIS — K63.89 COLONIC MASS: ICD-10-CM

## 2024-05-07 PROCEDURE — 2550000001 HC RX 255 CONTRASTS: Performed by: SURGERY

## 2024-05-07 PROCEDURE — 71260 CT THORAX DX C+: CPT

## 2024-05-07 RX ADMIN — IOHEXOL 68 ML: 350 INJECTION, SOLUTION INTRAVENOUS at 15:48

## 2024-05-07 NOTE — TELEPHONE ENCOUNTER
Left message to see how patient was doing after colonscopy. Dr. Asif will call patient with pathology in 2-3 weeks.    Spoke to patient after colon. Pt denies fever, chills, nausea, and vomiting. Pt had no questions at this time.  Provided office number to patient for any questions.

## 2024-05-14 ENCOUNTER — OFFICE VISIT (OUTPATIENT)
Dept: SURGERY | Facility: CLINIC | Age: 49
End: 2024-05-14
Payer: COMMERCIAL

## 2024-05-14 VITALS
HEIGHT: 73 IN | WEIGHT: 194 LBS | DIASTOLIC BLOOD PRESSURE: 77 MMHG | BODY MASS INDEX: 25.71 KG/M2 | SYSTOLIC BLOOD PRESSURE: 122 MMHG | HEART RATE: 92 BPM

## 2024-05-14 DIAGNOSIS — K63.89 MASS OF COLON: ICD-10-CM

## 2024-05-14 PROCEDURE — 99204 OFFICE O/P NEW MOD 45 MIN: CPT | Performed by: STUDENT IN AN ORGANIZED HEALTH CARE EDUCATION/TRAINING PROGRAM

## 2024-05-14 ASSESSMENT — ENCOUNTER SYMPTOMS
UNEXPECTED WEIGHT CHANGE: 1
NAUSEA: 0
FATIGUE: 1
DIZZINESS: 0
DIFFICULTY URINATING: 0
DIAPHORESIS: 0
COUGH: 0
RECTAL PAIN: 0
VOMITING: 0
FEVER: 0
CONSTIPATION: 0
ABDOMINAL PAIN: 0
LIGHT-HEADEDNESS: 0
ANAL BLEEDING: 0
ACTIVITY CHANGE: 0
PALPITATIONS: 0
HEMATURIA: 0
DIARRHEA: 0
CHEST TIGHTNESS: 0
DYSURIA: 0
BLOOD IN STOOL: 0
APPETITE CHANGE: 1
WEAKNESS: 0
SHORTNESS OF BREATH: 0
CHILLS: 0

## 2024-05-16 ENCOUNTER — HOSPITAL ENCOUNTER (OUTPATIENT)
Dept: RADIOLOGY | Facility: HOSPITAL | Age: 49
Discharge: HOME | End: 2024-05-16
Payer: COMMERCIAL

## 2024-05-16 DIAGNOSIS — K63.89 MASS OF COLON: ICD-10-CM

## 2024-05-16 PROCEDURE — A9698 NON-RAD CONTRAST MATERIALNOC: HCPCS | Performed by: STUDENT IN AN ORGANIZED HEALTH CARE EDUCATION/TRAINING PROGRAM

## 2024-05-16 PROCEDURE — 74177 CT ABD & PELVIS W/CONTRAST: CPT | Performed by: RADIOLOGY

## 2024-05-16 PROCEDURE — 2550000001 HC RX 255 CONTRASTS: Performed by: STUDENT IN AN ORGANIZED HEALTH CARE EDUCATION/TRAINING PROGRAM

## 2024-05-16 PROCEDURE — 74177 CT ABD & PELVIS W/CONTRAST: CPT

## 2024-05-16 RX ADMIN — IOHEXOL 70 ML: 350 INJECTION, SOLUTION INTRAVENOUS at 14:48

## 2024-05-16 RX ADMIN — IOHEXOL 500 ML: 12 SOLUTION ORAL at 14:56

## 2024-05-20 ENCOUNTER — LAB (OUTPATIENT)
Dept: LAB | Facility: CLINIC | Age: 49
End: 2024-05-20
Payer: COMMERCIAL

## 2024-05-20 ENCOUNTER — OFFICE VISIT (OUTPATIENT)
Dept: HEMATOLOGY/ONCOLOGY | Facility: CLINIC | Age: 49
End: 2024-05-20
Payer: COMMERCIAL

## 2024-05-20 VITALS
OXYGEN SATURATION: 97 % | RESPIRATION RATE: 18 BRPM | TEMPERATURE: 98.6 F | SYSTOLIC BLOOD PRESSURE: 116 MMHG | DIASTOLIC BLOOD PRESSURE: 79 MMHG | WEIGHT: 193.12 LBS | HEART RATE: 101 BPM | BODY MASS INDEX: 25.48 KG/M2

## 2024-05-20 DIAGNOSIS — C18.4 ADENOCARCINOMA OF TRANSVERSE COLON (MULTI): Primary | ICD-10-CM

## 2024-05-20 DIAGNOSIS — K63.89 MASS OF COLON: ICD-10-CM

## 2024-05-20 DIAGNOSIS — C18.4 ADENOCARCINOMA OF TRANSVERSE COLON (MULTI): ICD-10-CM

## 2024-05-20 LAB
ALBUMIN SERPL BCP-MCNC: 3.3 G/DL (ref 3.4–5)
ALP SERPL-CCNC: 223 U/L (ref 33–120)
ALT SERPL W P-5'-P-CCNC: 36 U/L (ref 10–52)
ANION GAP SERPL CALC-SCNC: 14 MMOL/L (ref 10–20)
AST SERPL W P-5'-P-CCNC: 39 U/L (ref 9–39)
BASOPHILS # BLD AUTO: 0.04 X10*3/UL (ref 0–0.1)
BASOPHILS NFR BLD AUTO: 0.1 %
BILIRUB SERPL-MCNC: 0.7 MG/DL (ref 0–1.2)
BUN SERPL-MCNC: 13 MG/DL (ref 6–23)
CALCIUM SERPL-MCNC: 8.7 MG/DL (ref 8.6–10.6)
CEA SERPL-MCNC: 19.1 UG/L
CHLORIDE SERPL-SCNC: 97 MMOL/L (ref 98–107)
CO2 SERPL-SCNC: 27 MMOL/L (ref 21–32)
CREAT SERPL-MCNC: 0.69 MG/DL (ref 0.5–1.3)
EGFRCR SERPLBLD CKD-EPI 2021: >90 ML/MIN/1.73M*2
EOSINOPHIL # BLD AUTO: 0.04 X10*3/UL (ref 0–0.7)
EOSINOPHIL NFR BLD AUTO: 0.1 %
ERYTHROCYTE [DISTWIDTH] IN BLOOD BY AUTOMATED COUNT: 15.5 % (ref 11.5–14.5)
GLUCOSE SERPL-MCNC: 87 MG/DL (ref 74–99)
HBV CORE AB SER QL: NONREACTIVE
HBV SURFACE AB SER-ACNC: 14.8 MIU/ML
HBV SURFACE AG SERPL QL IA: NONREACTIVE
HCT VFR BLD AUTO: 30.9 % (ref 41–52)
HGB BLD-MCNC: 9.8 G/DL (ref 13.5–17.5)
IMM GRANULOCYTES # BLD AUTO: 0.06 X10*3/UL (ref 0–0.7)
IMM GRANULOCYTES NFR BLD AUTO: 0.2 % (ref 0–0.9)
INR PPP: 1.1 (ref 0.9–1.1)
LYMPHOCYTES # BLD AUTO: 10.57 X10*3/UL (ref 1.2–4.8)
LYMPHOCYTES NFR BLD AUTO: 36.1 %
MCH RBC QN AUTO: 24 PG (ref 26–34)
MCHC RBC AUTO-ENTMCNC: 31.7 G/DL (ref 32–36)
MCV RBC AUTO: 76 FL (ref 80–100)
MONOCYTES # BLD AUTO: 1.87 X10*3/UL (ref 0.1–1)
MONOCYTES NFR BLD AUTO: 6.4 %
NEUTROPHILS # BLD AUTO: 16.74 X10*3/UL (ref 1.2–7.7)
NEUTROPHILS NFR BLD AUTO: 57.1 %
NRBC BLD-RTO: ABNORMAL /100{WBCS}
PLATELET # BLD AUTO: 555 X10*3/UL (ref 150–450)
POTASSIUM SERPL-SCNC: 3.8 MMOL/L (ref 3.5–5.3)
PROT SERPL-MCNC: 6.4 G/DL (ref 6.4–8.2)
PROTHROMBIN TIME: 12.4 SECONDS (ref 9.8–12.8)
RBC # BLD AUTO: 4.08 X10*6/UL (ref 4.5–5.9)
SODIUM SERPL-SCNC: 134 MMOL/L (ref 136–145)
WBC # BLD AUTO: 29.3 X10*3/UL (ref 4.4–11.3)

## 2024-05-20 PROCEDURE — 86704 HEP B CORE ANTIBODY TOTAL: CPT

## 2024-05-20 PROCEDURE — 99205 OFFICE O/P NEW HI 60 MIN: CPT | Performed by: INTERNAL MEDICINE

## 2024-05-20 PROCEDURE — 99215 OFFICE O/P EST HI 40 MIN: CPT | Performed by: INTERNAL MEDICINE

## 2024-05-20 PROCEDURE — 36415 COLL VENOUS BLD VENIPUNCTURE: CPT

## 2024-05-20 PROCEDURE — 80053 COMPREHEN METABOLIC PANEL: CPT

## 2024-05-20 PROCEDURE — 87340 HEPATITIS B SURFACE AG IA: CPT

## 2024-05-20 PROCEDURE — 82378 CARCINOEMBRYONIC ANTIGEN: CPT

## 2024-05-20 PROCEDURE — 1036F TOBACCO NON-USER: CPT | Performed by: INTERNAL MEDICINE

## 2024-05-20 PROCEDURE — 85025 COMPLETE CBC W/AUTO DIFF WBC: CPT

## 2024-05-20 PROCEDURE — 85610 PROTHROMBIN TIME: CPT

## 2024-05-20 PROCEDURE — 86706 HEP B SURFACE ANTIBODY: CPT

## 2024-05-20 RX ORDER — HEPARIN SODIUM,PORCINE/PF 10 UNIT/ML
50 SYRINGE (ML) INTRAVENOUS AS NEEDED
Status: CANCELLED | OUTPATIENT
Start: 2024-05-20

## 2024-05-20 RX ORDER — HEPARIN 100 UNIT/ML
500 SYRINGE INTRAVENOUS AS NEEDED
Status: CANCELLED | OUTPATIENT
Start: 2024-05-20

## 2024-05-20 ASSESSMENT — PAIN SCALES - GENERAL: PAINLEVEL: 4

## 2024-05-20 NOTE — PROGRESS NOTES
"MEDICAL ONCOLOGY INITIAL OUTPATIENT CONSULTATION NOTE  [facility], Gastrointestinal Oncology    Patient Name:  Yon Lawrence  MRN:  [id number]  :  1975    Referring Provider: Gautam Mckeon MD  Care Team: Patient Care Team:  Pedro Bazzi MD as PCP - General  Pedro Bazzi MD as PCP - [facility] ACO PCP  Billie Finn MD as Consulting Physician (Hematology and Oncology)  Date of Service: 2024     IDENTIFYING DATA:   Cancer Staging   Adenocarcinoma of transverse colon (Multi)  Staging form: Colon and Rectum, AJCC 8th Edition  - Clinical: Stage IVC (cTX, cNX, cM1c) - Signed by Billie Finn MD on 2024    Yon Lawrence is a 48 y.o.-year-old with metastatic distal transverse colon adenocarcinoma    HISTORY OF PRESENT ILLNESS:  Yon Lawrence \"Vladimir\" is a 48 y.o. 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     The patient reports that he was well until 2024, when he first noted pain in the left upper abdomen. He presented to his PCP in February and was recommended to try ibuprofen, which was not helpful. He was then given a trial of steroids, which helped with the pain transiently but did not resolve the problem. He was then ordered for blood work, urinalysis and CT.    CBC 24 showed leukocytosis with WBC 30.7 and mild anemia with hgb 11.2, thrombocytosis with plts 626. CT abdomen/pelvis 24 showed a large necrotic mass occupying the left upper quadrant of the abdomen, likely arising from the distal transverse colon, as well as multiple mesenteric nodules consistent with metastatic lesions and multiple liver mass lesions consistent with metastasis. CEA 24 was 6.7.    Colonoscopy 24 ([facility]) showed malignant-appearing mass measuring 10cm in the distal transverse colon, covering the whole circumference, as well as polyp measuring smaller than 5mm in the " proximal rectum removed piecemeal and diverticulosis in the sigmoid colon. Surgical pathology demonstrated invasive adenocarcinoma, MMR pending. Rectal polyp was hyperplastic polyp.    At the time of initial consultation on 5/20/24, patient reports he is feeling overall ok, continuing to work full time. He reports significantly decreased appetite and lost 20-25 pounds over the past few months, but no pain/cramping with eating. The previously noted L upper quadrant pain is not worsening. He does note increased fatigue, slept all day on Saturday and naps for longer than usual, though napping is part of his usual routine. He is passing normal bowel movements and reports no blood in stool.    ONCOLOGY HISTORY:  4/30/24: CT abdomen/pelvis showed a large necrotic mass occupying the left upper quadrant of the abdomen, likely arising from the distal transverse colon, as well as multiple mesenteric nodules consistent with metastatic lesions and multiple liver mass lesions consistent with metastasis  5/6/24: colonoscopy with malignant distal transverse colon mass measuring 10cm, invasive adenocarcinoma, MMR IHC pending  5/7/24: CT chest with no intrathoracic metastasis  5/16/24: CT abdomen/pelvis with stable to mild interval enlargement of the distal transverse colonic mass measuring 10.3 x 9.3 x 8.8 cm with invasion of and fistulization with an adjacent loop of jejunum with passage of oral contrast from the jejunum into the colon at the level of the mass. Mass noted to abut the anterior abdominal wall without evidence of direct invasion, mesenteric stranding and nodularity concerning for peritoneal carcinomatosis, increase in size and number of diffuse liver metastases from 4/30/24    PAST MEDICAL HISTORY:  No past medical history on file.    PAST SURGICAL HISTORY:  Past Surgical History:   Procedure Laterality Date    COLONOSCOPY  05/06/2024    DR VALDEZ    OTHER SURGICAL HISTORY  06/07/2022    Meniscectomy        ALLERGIES:  No Known Allergies    MEDICATIONS:  No current outpatient medications    SOCIAL HISTORY:  Social History     Tobacco Use    Smoking status: Never    Smokeless tobacco: Never   Substance Use Topics    Alcohol use: Never     Social History     Social History Narrative    Lives with wife and 2 children. Works as a  at [employer]. Coaches basketball over the summer.          FAMILY HISTORY:  Family History   Problem Relation Name Age of Onset    Diabetes type II Mother      Hypertension Father      Heart disease Father      Other (MCAD) Daughter      Stroke Paternal Grandfather          REVIEW OF SYSTEMS:  10-pt ROS reviewed and negative except as mentioned above.    PERFORMANCE STATUS:  Karnofsky Performance Score/ECO, Able to carry on normal activity; minor signs or symptoms of disease (ECOG equivalent 0)    PHYSICAL EXAMINATION:  /79   Pulse 101   Temp 37 °C (98.6 °F)   Resp 18   Wt 87.6 kg (193 lb 2 oz)   SpO2 97%   BMI 25.48 kg/m²    Wt Readings from Last 5 Encounters:   24 87.6 kg (193 lb 2 oz)   24 88 kg (194 lb)   24 88.1 kg (194 lb 3.6 oz)   24 92.5 kg (204 lb)   24 95.3 kg (210 lb)     GEN: Well-appearing, no acute distress, breathing comfortably on room air.  HEENT: Moist mucous membranes, EOMI intact.   LYMPH: No cervical or supraclavicular adenopathy  LUNGS: Clear to auscultation bilaterally, no wheezing.  CV: Regular rate and rhythm.  ABD: Firm to palpation at L upper quadrant, non-tender, non-distended.    EXT: Warm and well-perfused, no lower extremity edema.  NEURO: Grossly intact. No focal deficits.  SKIN: No rashes  PSYCH: Appropriate mood and affect    PATHOLOGY:     Surgical pathology 24:  FINAL DIAGNOSIS   A. COLON - TRANSVERSE, MASS BIOPSY:   Invasive adenocarcinoma     MMR studies pending; separate report will follow     : The images have been reviewed at the GI consensus  conference on May 15, 2024 and diagnosis of adenocarcinoma is confirmed.     B. RECTUM POLYP, POLYPECTOMY:   Hyperplastic polyp       NGS: pending    IMAGING DATA:   CT abdomen/pelvis 5/16/24:  IMPRESSION:  1.  Stable to mild interval enlargement of the distal transverse colonic mass, measuring 10.3 x 9.3 x 8.8 cm. There is invasion of and fistulization with an adjacent loop of jejunum, with passage of oral contrast from the jejunum into the colon at the level of the mass. The mass abuts the anterior abdominal wall without evidence of direct invasion. No evidence of bowel obstruction.  2. Mesenteric stranding and nodularity adjacent to the mass concerning for peritoneal carcinomatosis.  3. Increase in size and number of diffuse liver metastases from 04/30/2024.    CT chest 5/7/24:  IMPRESSION:  1. No acute intrathoracic pathology.  2. No evidence to suggest thoracic metastatic disease within limitations of mildly motion degraded examination.    LABORATORY DATA:    Last CBC w. Diff.:    Lab Results   Component Value Date/Time    WBC 29.3 (H) 05/20/2024 1117    NRBC  05/20/2024 1117      Comment:      Not Measured    RBC 4.08 (L) 05/20/2024 1117    HGB 9.8 (L) 05/20/2024 1117    HCT 30.9 (L) 05/20/2024 1117    MCV 76 (L) 05/20/2024 1117    MCH 24.0 (L) 05/20/2024 1117    MCHC 31.7 (L) 05/20/2024 1117    RDW 15.5 (H) 05/20/2024 1117     (H) 05/20/2024 1117    NEUTOPHILPCT 57.1 05/20/2024 1117    IGPCT 0.2 05/20/2024 1117    LYMPHOPCT 36.1 05/20/2024 1117    MONOPCT 6.4 05/20/2024 1117    EOSPCT 0.1 05/20/2024 1117    BASOPCT 0.1 05/20/2024 1117    NEUTROABS 16.74 (H) 05/20/2024 1117    IGABSOL 0.06 05/20/2024 1117    LYMPHSABS 10.57 (H) 05/20/2024 1117    MONOSABS 1.87 (H) 05/20/2024 1117    EOSABS 0.04 05/20/2024 1117    BASOSABS 0.04 05/20/2024 1117     Last CMP:     Lab Results   Component Value Date/Time    GLUCOSE 87 05/20/2024 1117     (L) 05/20/2024 1117    K 3.8 05/20/2024 1117    CL 97 (L)  05/20/2024 1117    CO2 27 05/20/2024 1117    ANIONGAP 14 05/20/2024 1117    BUN 13 05/20/2024 1117    CREATININE 0.69 05/20/2024 1117    EGFR >90 05/20/2024 1117    CALCIUM 8.7 05/20/2024 1117    ALBUMIN 3.3 (L) 05/20/2024 1117    ALKPHOS 223 (H) 05/20/2024 1117    PROT 6.4 05/20/2024 1117    AST 39 05/20/2024 1117    BILITOT 0.7 05/20/2024 1117    ALT 36 05/20/2024 1117       Carcinoembryonic AG   Date/Time Value Ref Range Status   05/20/2024 11:17 AM 19.1 ug/L Final   05/02/2024 04:04 PM 6.7 ug/L Final           All pertinent pathology, imaging, and labs were personally reviewed and interpreted in clinic. Findings as per HPI and EMR.    ASSESSMENT:  Yon Lawrence is a 48 y.o. male with Adenocarcinoma of transverse colon (Multi), Clinical: Stage IVC (cTX, cNX, cM1c).      Reviewed the diagnosis of colon adenocarcinoma and the above history with the patient.  Explained that the liver is the most common site of metastatic colorectal cancer involvement and 20 to 34% of patients will present with synchronous liver metastases, which is thought to be a more disseminated disease. I explained that in general, metastatic colon cancer is treatable, not curable. However, we will aim for a long-term response to treatment. I explained that I would recommend starting with systemic therapy and reserve local therapies (radiation, surgery) for symptom control or for management of residual treated disease depending on response to systemic therapy.    With regard to systemic therapy, standard-of-care can include 5-FU and oxaliplatin and/or irinotecan +/- anti-VEGF or anti-EGFR therapy. Although the TRIBE2 study did show prolonged PFS with FOLFOXIRI/bevacizumab compared with FOLFOX/latonia, grade 3-4 diarrhea and neutropenia occurred more frequently in the FOLFOXIRI group. This study showed notably increased toxicity with a 3-drug regimen with only a few months PFS benefit. Given that the patient also has evidence of fistulization  between the colon and jejunum at the level of the mass, I am hesitant to add irinotecan or bevacizumab at this time due to increased risk of diarrhea and bowel perforation, respectively.  Recommend likely starting with FOLFOX, though I would await MMR IHC results. I explained that a small fraction of patients with metastatic colon cancer have mismatch repair deficiency, but in those cases I recommend starting with immune checkpoint blockade.    In addition, reviewed the role of tumor molecular testing with regard to treatment decisions. Based on the reported location by imaging and colonoscopy, this is a distal transverse colon mass, likely right-sided and would likely not be amenable to anti-EGFR therapy. However, will also send molecular profiling, as the presence of SAMIRA/NEYDA mutation would further indicate that EGFR inhibitors are contraindicated.     Regarding FOLFOX, we discussed that this regimen includes 5-FU and oxaliplatin and is given every 2 weeks. It requires placement of a Mediport with use of infusional 5-FU given continuously for 48 hours for each cycle. Side effects of this regimen include, but are not limited to side effects related to Mediport placement, fatigue, anemia, thrombocytopenia, increased risk of infection, increased risk of bleeding, neuropathy, cold sensitivity, allergic reactions, and coronary vasospasm. Neuropathy can be permanent, and oxaliplatin is often discontinued or dose reduced.     Given that the patient has evidence of peritoneal disease, I would not recommend liver-directed therapy with HAIP at this time. We can reconsider based on response to treatment.    PLAN:  #Adenocarcinoma of transverse colon, stage IVC:  - CBC, CMP, CEA, hepatitis B serologies  - Send Tempus xT and xF  - Signatera ctDNA monitoring to help assess response to treatment  - Follow up MMR IHC  - Recommend FOLFOX to start 6/3/24 after mediport placement - will consider addition of bevacizumab or irinotecan  depending on molecular profiling and surgical plan, likely avoid anti-EGFR due to tumor location; await MMR results before starting treatment, as dMMR would indicate role for first-line immune checkpoint blockade   - Restaging after 4 cycles of FOLFOX   - Follow up with Dr. 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

## 2024-05-20 NOTE — PROGRESS NOTES
New patient here who was referred by Dr Mckeon. He did go over pathology results with him. Patient states he has been tired a lot more and since January has lost around 20 lbs. Patient states he is eating, however just not as much as he normally eats. Gave new patient information, phone tree, disease binder,

## 2024-05-22 ENCOUNTER — TUMOR BOARD CONFERENCE (OUTPATIENT)
Dept: HEMATOLOGY/ONCOLOGY | Facility: HOSPITAL | Age: 49
End: 2024-05-22
Payer: COMMERCIAL

## 2024-05-22 DIAGNOSIS — C18.4 ADENOCARCINOMA OF TRANSVERSE COLON (MULTI): Primary | ICD-10-CM

## 2024-05-22 NOTE — TUMOR BOARD NOTE
MULTIDISCIPLINARY GI TUMOR BOARD CONFERENCE NOTE  Yon Lawrence was presented at GI Tumor Board Conference  Conference date: 5/22/2024  Presenting Provider(s): Dr. Gautam Mckeon  Present at Conference: Medical Oncology, Radiation Oncology, Surgical Oncology, Radiology, and Pathology Representatives  Conference Review Type: Radiology Review and Pathology Review     Impression:  Yon Lawrence is a 48 y.o. 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    (05/2024) CT AP - Stable to mild interval enlargement of the distal transverse colonic mass, measuring 10.3 x 9.3 x 8.8 cm. There is invasion of and fistulization with an adjacent loop of jejunum, with passage of oral contrast from the jejunum into the colon at the level of the mass. The mass abuts the anterior abdominal wall without evidence of direct invasion. No evidence of bowel obstruction. Mesenteric stranding and nodularity adjacent to the mass concerning for peritoneal carcinomatosis. Increase in size and number of diffuse liver metastases from 04/30/2024.    (05/2024) Path - Colon Biopsy: Invasive adenocarcinoma. Rectum Polyp: Hyperplastic polyp.    Tumor Board Stage: cTX cNX cM1c   Mismatch Repair Status: pending    National Guidelines discussed: Yes  Recommendations: Proceed with systemic therapy once MMR status in confirmed.         Disclaimer  [facility] tumor board recommendations represent the consensus opinion of physicians present at a weekly patient care conference. The treating [facility] physician is not always present, and many of the physicians formulating the recommendation have not personally seen or examined the patient under discussion. It is understood that the treating [facility] physician considers the expertise of the Tumor Board Recommendation in formulating his/her plan for the patient. However, in many situations, based on individualized patient considerations, a different plan is determined by  the treating physician to be the optimal medical management.    Scribe Attestation  By signing my name below, I, Alta Bhagat   attest that this documentation has been prepared under the direction and in the presence of GASTROINTESTINAL TUMOR BOARD.

## 2024-05-28 ENCOUNTER — PREP FOR PROCEDURE (OUTPATIENT)
Dept: SURGERY | Facility: CLINIC | Age: 49
End: 2024-05-28

## 2024-05-28 ENCOUNTER — OFFICE VISIT (OUTPATIENT)
Dept: SURGERY | Facility: CLINIC | Age: 49
End: 2024-05-28
Payer: COMMERCIAL

## 2024-05-28 VITALS
WEIGHT: 193 LBS | HEART RATE: 96 BPM | DIASTOLIC BLOOD PRESSURE: 70 MMHG | BODY MASS INDEX: 25.58 KG/M2 | HEIGHT: 73 IN | SYSTOLIC BLOOD PRESSURE: 110 MMHG

## 2024-05-28 DIAGNOSIS — C18.4 ADENOCARCINOMA OF TRANSVERSE COLON (MULTI): ICD-10-CM

## 2024-05-28 DIAGNOSIS — C18.4 MALIGNANT NEOPLASM OF TRANSVERSE COLON (MULTI): Primary | ICD-10-CM

## 2024-05-28 PROCEDURE — 99213 OFFICE O/P EST LOW 20 MIN: CPT | Performed by: SURGERY

## 2024-05-28 PROCEDURE — 1036F TOBACCO NON-USER: CPT | Performed by: SURGERY

## 2024-05-28 RX ORDER — CEFAZOLIN SODIUM 2 G/100ML
2 INJECTION, SOLUTION INTRAVENOUS ONCE
Status: CANCELLED | OUTPATIENT
Start: 2024-05-28 | End: 2024-05-28

## 2024-05-28 NOTE — PROGRESS NOTES
"General Surgery Follow-up Visit    Patient: Yon Lawrence  : 1975  MRN: [id number]  Date of Visit: 24    Chief Complaint: Metastatic colon cancer    History of Present Illness: Yon Lawrence is a 48 y.o. old male with metastatic adenocarcinoma of the distal transverse colon.  He has plans to initiate chemotherapy on 6/3/24.  Port placement has been requested.  He has no previous port.  He has no previous central venous line.  He has no history of trauma or radiation to the upper chest or neck.  He is right handed.  He is not on any blood thinners or antiplatelet agents.    Medical History:  Metastatic colon cancer     Surgical History:  Colonoscopy, 24  Meniscectomy     Home Medications:  No home medications     Allergies:  No Known Allergies     Family History:   Mother with diabetes.  Father with hypertension.  No family history of colon or rectal cancer or inflammatory bowel disease.  Maternal and paternal aunts with history of breast cancer.     Social History:  .  Non-smoker.  No alcohol or drug use.     ROS:  Constitutional: + Night sweats a couple times over the past few months.  Unintentional weight loss, fatigue  Cardiovascular: No chest pain  Respiratory: No cough or shortness of breath  Gastrointestinal: + Left upper quadrant abdominal pain.  No blood in the stool or dark black bowel movements.  Genitourinary: + Regular nighttime urination  Musculoskeletal: no weakness or swelling  Integumentary: no rashes  Neurological: no confusion  Endocrine: no heat or cold intolerance  Heme/Lymph: no easy bruising or bleeding    Objective:  /70   Pulse 96   Ht 1.854 m (6' 1\")   Wt 87.5 kg (193 lb)   BMI 25.46 kg/m²     Physical Exam:  Constitutional: No acute distress, conversant, pleasant  Neurologic: alert and oriented  Psych: appropriate affect  Ears, Nose, Mouth and Throat: mucus membranes moist  Pulmonary: No labored breathing  Cardiovascular: Regular rate and rhythm, no " scars on the upper chest or neck  Abdomen: Nondistended, BMI 25  Musculoskeletal: Moves all extremities, no edema  Skin: no jaundice    Labs:  Labs from 5/20/24 reviewed: WBC 29.3, Hgb 9.8, Plts 555, CEA 19.2, INR 1.1     Imaging:  CT abdomen and pelvis from 4/30/2024 reviewed: Large necrotic mass occupying the left upper quadrant of the abdomen, likely arising from the distal transverse colon although difficult to be certain without oral/rectal contrast.  Further evaluation of the colon either with colonoscopy or barium enema is recommended to evaluate the status of the distal transverse colon in relation to the mass.  Multiple mesenteric nodules, consistent with metastatic lesions.  Stranding of the fat around the mass with nodules.  Multiple liver mass lesions consistent with metastasis.    Assessment and Plan: Yon Lawrence is a 48 y.o. old male with metastatic colon cancer in need of port placement for chemotherapy.  Risks, benefits, and alternatives of port placement were discussed with the patient. This included risk of bleeding, infection, pneumothorax, port malfunction, catheter occlusion, DVT, and possible need for subsequent revision or removal. The patient is scheduled for subclavian port placement under MAC on 5/31/24. We will attempt port placement on the left side, but the patient is aware and agreeable that if unsuccessful in acheiving venous access, placement on the contralateral side may be required.    Alba Asif MD  5/28/2024

## 2024-05-28 NOTE — H&P (VIEW-ONLY)
"General Surgery Follow-up Visit    Patient: Yon Lawrence  : 1975  MRN: [id number]  Date of Visit: 24    Chief Complaint: Metastatic colon cancer    History of Present Illness: Yon Lawrence is a 48 y.o. old male with metastatic adenocarcinoma of the distal transverse colon.  He has plans to initiate chemotherapy on 6/3/24.  Port placement has been requested.  He has no previous port.  He has no previous central venous line.  He has no history of trauma or radiation to the upper chest or neck.  He is right handed.  He is not on any blood thinners or antiplatelet agents.    Medical History:  Metastatic colon cancer     Surgical History:  Colonoscopy, 24  Meniscectomy     Home Medications:  No home medications     Allergies:  No Known Allergies     Family History:   Mother with diabetes.  Father with hypertension.  No family history of colon or rectal cancer or inflammatory bowel disease.  Maternal and paternal aunts with history of breast cancer.     Social History:  .  Non-smoker.  No alcohol or drug use.     ROS:  Constitutional: + Night sweats a couple times over the past few months.  Unintentional weight loss, fatigue  Cardiovascular: No chest pain  Respiratory: No cough or shortness of breath  Gastrointestinal: + Left upper quadrant abdominal pain.  No blood in the stool or dark black bowel movements.  Genitourinary: + Regular nighttime urination  Musculoskeletal: no weakness or swelling  Integumentary: no rashes  Neurological: no confusion  Endocrine: no heat or cold intolerance  Heme/Lymph: no easy bruising or bleeding    Objective:  /70   Pulse 96   Ht 1.854 m (6' 1\")   Wt 87.5 kg (193 lb)   BMI 25.46 kg/m²     Physical Exam:  Constitutional: No acute distress, conversant, pleasant  Neurologic: alert and oriented  Psych: appropriate affect  Ears, Nose, Mouth and Throat: mucus membranes moist  Pulmonary: No labored breathing  Cardiovascular: Regular rate and rhythm, no " scars on the upper chest or neck  Abdomen: Nondistended, BMI 25  Musculoskeletal: Moves all extremities, no edema  Skin: no jaundice    Labs:  Labs from 5/20/24 reviewed: WBC 29.3, Hgb 9.8, Plts 555, CEA 19.2, INR 1.1     Imaging:  CT abdomen and pelvis from 4/30/2024 reviewed: Large necrotic mass occupying the left upper quadrant of the abdomen, likely arising from the distal transverse colon although difficult to be certain without oral/rectal contrast.  Further evaluation of the colon either with colonoscopy or barium enema is recommended to evaluate the status of the distal transverse colon in relation to the mass.  Multiple mesenteric nodules, consistent with metastatic lesions.  Stranding of the fat around the mass with nodules.  Multiple liver mass lesions consistent with metastasis.    Assessment and Plan: Yon Lawrence is a 48 y.o. old male with metastatic colon cancer in need of port placement for chemotherapy.  Risks, benefits, and alternatives of port placement were discussed with the patient. This included risk of bleeding, infection, pneumothorax, port malfunction, catheter occlusion, DVT, and possible need for subsequent revision or removal. The patient is scheduled for subclavian port placement under MAC on 5/31/24. We will attempt port placement on the left side, but the patient is aware and agreeable that if unsuccessful in acheiving venous access, placement on the contralateral side may be required.    Alba Asif MD  5/28/2024

## 2024-05-29 ENCOUNTER — LAB (OUTPATIENT)
Dept: LAB | Facility: LAB | Age: 49
End: 2024-05-29
Payer: COMMERCIAL

## 2024-05-29 ENCOUNTER — ANESTHESIA EVENT (OUTPATIENT)
Dept: OPERATING ROOM | Facility: HOSPITAL | Age: 49
End: 2024-05-29
Payer: COMMERCIAL

## 2024-05-29 DIAGNOSIS — C18.4 ADENOCARCINOMA OF TRANSVERSE COLON (MULTI): ICD-10-CM

## 2024-05-29 LAB
ALBUMIN SERPL BCP-MCNC: 3.2 G/DL (ref 3.4–5)
ALP SERPL-CCNC: 255 U/L (ref 33–120)
ALT SERPL W P-5'-P-CCNC: 41 U/L (ref 10–52)
ANION GAP SERPL CALC-SCNC: 11 MMOL/L (ref 10–20)
AST SERPL W P-5'-P-CCNC: 49 U/L (ref 9–39)
BASOPHILS # BLD AUTO: 0.12 X10*3/UL (ref 0–0.1)
BASOPHILS NFR BLD AUTO: 0.4 %
BILIRUB SERPL-MCNC: 0.7 MG/DL (ref 0–1.2)
BUN SERPL-MCNC: 12 MG/DL (ref 6–23)
CALCIUM SERPL-MCNC: 8.3 MG/DL (ref 8.6–10.3)
CHLORIDE SERPL-SCNC: 96 MMOL/L (ref 98–107)
CO2 SERPL-SCNC: 28 MMOL/L (ref 21–32)
CREAT SERPL-MCNC: 0.59 MG/DL (ref 0.5–1.3)
EGFRCR SERPLBLD CKD-EPI 2021: >90 ML/MIN/1.73M*2
EOSINOPHIL # BLD AUTO: 0.2 X10*3/UL (ref 0–0.7)
EOSINOPHIL NFR BLD AUTO: 0.7 %
ERYTHROCYTE [DISTWIDTH] IN BLOOD BY AUTOMATED COUNT: 15.9 % (ref 11.5–14.5)
GLUCOSE SERPL-MCNC: 98 MG/DL (ref 74–99)
HCT VFR BLD AUTO: 27.8 % (ref 41–52)
HGB BLD-MCNC: 8.3 G/DL (ref 13.5–17.5)
IMM GRANULOCYTES # BLD AUTO: 0.14 X10*3/UL (ref 0–0.7)
IMM GRANULOCYTES NFR BLD AUTO: 0.5 % (ref 0–0.9)
LYMPHOCYTES # BLD AUTO: 8.53 X10*3/UL (ref 1.2–4.8)
LYMPHOCYTES NFR BLD AUTO: 31.5 %
MCH RBC QN AUTO: 23.4 PG (ref 26–34)
MCHC RBC AUTO-ENTMCNC: 29.9 G/DL (ref 32–36)
MCV RBC AUTO: 79 FL (ref 80–100)
MONOCYTES # BLD AUTO: 1.43 X10*3/UL (ref 0.1–1)
MONOCYTES NFR BLD AUTO: 5.3 %
NEUTROPHILS # BLD AUTO: 16.67 X10*3/UL (ref 1.2–7.7)
NEUTROPHILS NFR BLD AUTO: 61.6 %
NRBC BLD-RTO: 0 /100 WBCS (ref 0–0)
PLATELET # BLD AUTO: 639 X10*3/UL (ref 150–450)
POTASSIUM SERPL-SCNC: 4.1 MMOL/L (ref 3.5–5.3)
PROT SERPL-MCNC: 6.1 G/DL (ref 6.4–8.2)
RBC # BLD AUTO: 3.54 X10*6/UL (ref 4.5–5.9)
SODIUM SERPL-SCNC: 131 MMOL/L (ref 136–145)
WBC # BLD AUTO: 27.1 X10*3/UL (ref 4.4–11.3)

## 2024-05-29 PROCEDURE — 85025 COMPLETE CBC W/AUTO DIFF WBC: CPT

## 2024-05-29 PROCEDURE — 36415 COLL VENOUS BLD VENIPUNCTURE: CPT

## 2024-05-29 PROCEDURE — 80053 COMPREHEN METABOLIC PANEL: CPT

## 2024-05-30 ENCOUNTER — EDUCATION (OUTPATIENT)
Dept: HEMATOLOGY/ONCOLOGY | Facility: CLINIC | Age: 49
End: 2024-05-30
Payer: COMMERCIAL

## 2024-05-30 ENCOUNTER — TELEPHONE (OUTPATIENT)
Dept: HEMATOLOGY/ONCOLOGY | Facility: CLINIC | Age: 49
End: 2024-05-30
Payer: COMMERCIAL

## 2024-05-30 DIAGNOSIS — C18.4 ADENOCARCINOMA OF TRANSVERSE COLON (MULTI): Primary | ICD-10-CM

## 2024-05-30 LAB
LAB AP ASR DISCLAIMER: NORMAL
LABORATORY COMMENT REPORT: NORMAL
PATH REPORT.ADDENDUM SPEC: NORMAL
PATH REPORT.FINAL DX SPEC: NORMAL
PATH REPORT.GROSS SPEC: NORMAL
PATH REPORT.TOTAL CANCER: NORMAL

## 2024-05-30 PROCEDURE — 88381 MICRODISSECTION MANUAL: CPT | Mod: TC,SAMLAB | Performed by: SURGERY

## 2024-05-30 PROCEDURE — 88381 MICRODISSECTION MANUAL: CPT | Performed by: PATHOLOGY

## 2024-05-30 RX ORDER — DIPHENHYDRAMINE HYDROCHLORIDE 50 MG/ML
50 INJECTION INTRAMUSCULAR; INTRAVENOUS AS NEEDED
Status: CANCELLED | OUTPATIENT
Start: 2024-06-03

## 2024-05-30 RX ORDER — ALBUTEROL SULFATE 0.83 MG/ML
3 SOLUTION RESPIRATORY (INHALATION) AS NEEDED
Status: CANCELLED | OUTPATIENT
Start: 2024-06-03

## 2024-05-30 RX ORDER — FAMOTIDINE 10 MG/ML
20 INJECTION INTRAVENOUS ONCE AS NEEDED
Status: CANCELLED | OUTPATIENT
Start: 2024-06-03

## 2024-05-30 RX ORDER — EPINEPHRINE 0.3 MG/.3ML
0.3 INJECTION SUBCUTANEOUS EVERY 5 MIN PRN
Status: CANCELLED | OUTPATIENT
Start: 2024-06-03

## 2024-05-30 RX ORDER — PROCHLORPERAZINE MALEATE 10 MG
10 TABLET ORAL EVERY 6 HOURS PRN
Qty: 30 TABLET | Refills: 5 | Status: SHIPPED | OUTPATIENT
Start: 2024-05-30

## 2024-05-30 RX ORDER — PROCHLORPERAZINE MALEATE 10 MG
10 TABLET ORAL EVERY 6 HOURS PRN
Status: CANCELLED | OUTPATIENT
Start: 2024-06-03

## 2024-05-30 RX ORDER — PROCHLORPERAZINE EDISYLATE 5 MG/ML
10 INJECTION INTRAMUSCULAR; INTRAVENOUS EVERY 6 HOURS PRN
Status: CANCELLED | OUTPATIENT
Start: 2024-06-03

## 2024-05-30 RX ORDER — LIDOCAINE AND PRILOCAINE 25; 25 MG/G; MG/G
CREAM TOPICAL ONCE
Qty: 30 G | Refills: 1 | Status: SHIPPED | OUTPATIENT
Start: 2024-05-30 | End: 2024-05-30

## 2024-05-30 NOTE — PREPROCEDURE INSTRUCTIONS
No outpatient medications have been marked as taking for the 5/31/24 encounter (Hospital Encounter).                       NPO Instructions:    Nothing to eat or drink after midnight    Additional Instructions:     Will need  home, arrive on 2nd floor at hospital at 6 am Friday may 31

## 2024-05-30 NOTE — TELEPHONE ENCOUNTER
Spoke with patient to review updated pathology results. 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 Based on this finding, I would recommend first-line immune checkpoint blockade rather than chemotherapy with FOLFOX. I explained that we are still awaiting further testing (MLH1 promoter hypermethylation, BRAF status) that will help determine whether he is likely to have an underlying genetic predisposition (Rashid syndrome) to colon cancer. He has already been referred to genetics and will require genetic counseling regardless of the above results.    Reviewed side effects of pembrolizumab in detail, including but not limited to diarrhea, skin rash, colitis, thyroiditis, and rare but life-threatening immune-related adverse events including but not limited to myocarditis, pneumonitis, encephalitis, hypophysitis. Emphasized recommendation to call with any new symptoms.    Reviewed recent laboratory results demonstrating hemoglobin 8.3, down from 9.8 approximately 10 days ago. Explained that he is likely having ongoing blood loss even without overt blood in the stool and that he may require a transfusion in the coming weeks. Recommend repeat blood count in 1-2 weeks - ordered for 6/10/24 with type and screen. Patient confirms he would accept transfusion if needed, though would need consent signed at that time. Reviewed signs/symptoms of anemia including shortness of breath, chest tightness, fatigue, pallor.    Patient expressed understanding of the above discussion and recommendations. He is in agreement with proceeding with Pembrolizumab on Monday 6/3/24.

## 2024-05-31 ENCOUNTER — APPOINTMENT (OUTPATIENT)
Dept: RADIOLOGY | Facility: HOSPITAL | Age: 49
End: 2024-05-31
Payer: COMMERCIAL

## 2024-05-31 ENCOUNTER — ANESTHESIA (OUTPATIENT)
Dept: OPERATING ROOM | Facility: HOSPITAL | Age: 49
End: 2024-05-31
Payer: COMMERCIAL

## 2024-05-31 ENCOUNTER — HOSPITAL ENCOUNTER (OUTPATIENT)
Facility: HOSPITAL | Age: 49
Setting detail: OUTPATIENT SURGERY
Discharge: HOME | End: 2024-05-31
Attending: SURGERY | Admitting: SURGERY
Payer: COMMERCIAL

## 2024-05-31 VITALS
RESPIRATION RATE: 17 BRPM | OXYGEN SATURATION: 97 % | HEIGHT: 73 IN | DIASTOLIC BLOOD PRESSURE: 71 MMHG | BODY MASS INDEX: 25.39 KG/M2 | HEART RATE: 77 BPM | WEIGHT: 191.58 LBS | TEMPERATURE: 97.4 F | SYSTOLIC BLOOD PRESSURE: 110 MMHG

## 2024-05-31 DIAGNOSIS — C18.4 MALIGNANT NEOPLASM OF TRANSVERSE COLON (MULTI): Primary | ICD-10-CM

## 2024-05-31 PROCEDURE — 2500000005 HC RX 250 GENERAL PHARMACY W/O HCPCS: Performed by: ANESTHESIOLOGY

## 2024-05-31 PROCEDURE — 3600000007 HC OR TIME - EACH INCREMENTAL 1 MINUTE - PROCEDURE LEVEL TWO: Performed by: SURGERY

## 2024-05-31 PROCEDURE — 71045 X-RAY EXAM CHEST 1 VIEW: CPT | Mod: 59

## 2024-05-31 PROCEDURE — 2500000005 HC RX 250 GENERAL PHARMACY W/O HCPCS: Performed by: SURGERY

## 2024-05-31 PROCEDURE — 2500000004 HC RX 250 GENERAL PHARMACY W/ HCPCS (ALT 636 FOR OP/ED): Mod: JZ | Performed by: SURGERY

## 2024-05-31 PROCEDURE — 7100000009 HC PHASE TWO TIME - INITIAL BASE CHARGE: Performed by: SURGERY

## 2024-05-31 PROCEDURE — 3700000002 HC GENERAL ANESTHESIA TIME - EACH INCREMENTAL 1 MINUTE: Performed by: SURGERY

## 2024-05-31 PROCEDURE — 71045 X-RAY EXAM CHEST 1 VIEW: CPT | Performed by: RADIOLOGY

## 2024-05-31 PROCEDURE — 7100000010 HC PHASE TWO TIME - EACH INCREMENTAL 1 MINUTE: Performed by: SURGERY

## 2024-05-31 PROCEDURE — 2780000003 HC OR 278 NO HCPCS: Performed by: SURGERY

## 2024-05-31 PROCEDURE — C1788 PORT, INDWELLING, IMP: HCPCS | Performed by: SURGERY

## 2024-05-31 PROCEDURE — 36561 INSERT TUNNELED CV CATH: CPT | Performed by: SURGERY

## 2024-05-31 PROCEDURE — 3600000002 HC OR TIME - INITIAL BASE CHARGE - PROCEDURE LEVEL TWO: Performed by: SURGERY

## 2024-05-31 PROCEDURE — 3700000001 HC GENERAL ANESTHESIA TIME - INITIAL BASE CHARGE: Performed by: SURGERY

## 2024-05-31 PROCEDURE — 2720000007 HC OR 272 NO HCPCS: Performed by: SURGERY

## 2024-05-31 PROCEDURE — 2500000004 HC RX 250 GENERAL PHARMACY W/ HCPCS (ALT 636 FOR OP/ED): Performed by: ANESTHESIOLOGY

## 2024-05-31 DEVICE — POWERPORT CLEARVUE IMPLANTABLE PORT WITH ATTACHABLE 8F POLYURETHANE OPEN-ENDED SINGLE-LUMEN VENOUS CATHETER INTERMEDIATE KIT
Type: IMPLANTABLE DEVICE | Site: CHEST | Status: FUNCTIONAL
Brand: POWERPORT CLEARVUE

## 2024-05-31 RX ORDER — SODIUM CHLORIDE, SODIUM LACTATE, POTASSIUM CHLORIDE, CALCIUM CHLORIDE 600; 310; 30; 20 MG/100ML; MG/100ML; MG/100ML; MG/100ML
50 INJECTION, SOLUTION INTRAVENOUS CONTINUOUS
Status: DISCONTINUED | OUTPATIENT
Start: 2024-05-31 | End: 2024-05-31 | Stop reason: HOSPADM

## 2024-05-31 RX ORDER — BUPIVACAINE HYDROCHLORIDE 2.5 MG/ML
INJECTION, SOLUTION INFILTRATION; PERINEURAL AS NEEDED
Status: DISCONTINUED | OUTPATIENT
Start: 2024-05-31 | End: 2024-05-31 | Stop reason: HOSPADM

## 2024-05-31 RX ORDER — LIDOCAINE HYDROCHLORIDE 10 MG/ML
INJECTION INFILTRATION; PERINEURAL AS NEEDED
Status: DISCONTINUED | OUTPATIENT
Start: 2024-05-31 | End: 2024-05-31 | Stop reason: HOSPADM

## 2024-05-31 RX ORDER — MIDAZOLAM HYDROCHLORIDE 1 MG/ML
INJECTION INTRAMUSCULAR; INTRAVENOUS AS NEEDED
Status: DISCONTINUED | OUTPATIENT
Start: 2024-05-31 | End: 2024-05-31

## 2024-05-31 RX ORDER — CEFAZOLIN SODIUM 2 G/100ML
2 INJECTION, SOLUTION INTRAVENOUS ONCE
Status: COMPLETED | OUTPATIENT
Start: 2024-05-31 | End: 2024-05-31

## 2024-05-31 RX ORDER — MIDAZOLAM HYDROCHLORIDE 1 MG/ML
2 INJECTION INTRAMUSCULAR; INTRAVENOUS ONCE
Status: COMPLETED | OUTPATIENT
Start: 2024-05-31 | End: 2024-05-31

## 2024-05-31 RX ORDER — OXYCODONE HYDROCHLORIDE 5 MG/1
5 TABLET ORAL EVERY 4 HOURS PRN
Status: DISCONTINUED | OUTPATIENT
Start: 2024-05-31 | End: 2024-05-31 | Stop reason: HOSPADM

## 2024-05-31 RX ORDER — ONDANSETRON HYDROCHLORIDE 2 MG/ML
4 INJECTION, SOLUTION INTRAVENOUS ONCE
Status: COMPLETED | OUTPATIENT
Start: 2024-05-31 | End: 2024-05-31

## 2024-05-31 RX ORDER — FAMOTIDINE 10 MG/ML
20 INJECTION INTRAVENOUS ONCE
Status: COMPLETED | OUTPATIENT
Start: 2024-05-31 | End: 2024-05-31

## 2024-05-31 RX ORDER — FENTANYL CITRATE 50 UG/ML
INJECTION, SOLUTION INTRAMUSCULAR; INTRAVENOUS AS NEEDED
Status: DISCONTINUED | OUTPATIENT
Start: 2024-05-31 | End: 2024-05-31

## 2024-05-31 RX ORDER — PROPOFOL 10 MG/ML
INJECTION, EMULSION INTRAVENOUS CONTINUOUS PRN
Status: DISCONTINUED | OUTPATIENT
Start: 2024-05-31 | End: 2024-05-31

## 2024-05-31 RX ORDER — SODIUM CHLORIDE 9 MG/ML
INJECTION INTRAMUSCULAR; INTRAVENOUS; SUBCUTANEOUS AS NEEDED
Status: DISCONTINUED | OUTPATIENT
Start: 2024-05-31 | End: 2024-05-31 | Stop reason: HOSPADM

## 2024-05-31 RX ORDER — HEPARIN SODIUM (PORCINE) LOCK FLUSH IV SOLN 100 UNIT/ML 100 UNIT/ML
SOLUTION INTRAVENOUS AS NEEDED
Status: DISCONTINUED | OUTPATIENT
Start: 2024-05-31 | End: 2024-05-31 | Stop reason: HOSPADM

## 2024-05-31 RX ORDER — ONDANSETRON HYDROCHLORIDE 2 MG/ML
4 INJECTION, SOLUTION INTRAVENOUS ONCE AS NEEDED
Status: DISCONTINUED | OUTPATIENT
Start: 2024-05-31 | End: 2024-05-31 | Stop reason: HOSPADM

## 2024-05-31 RX ORDER — SODIUM CHLORIDE, SODIUM LACTATE, POTASSIUM CHLORIDE, CALCIUM CHLORIDE 600; 310; 30; 20 MG/100ML; MG/100ML; MG/100ML; MG/100ML
100 INJECTION, SOLUTION INTRAVENOUS CONTINUOUS
Status: DISCONTINUED | OUTPATIENT
Start: 2024-05-31 | End: 2024-05-31 | Stop reason: HOSPADM

## 2024-05-31 RX ORDER — ACETAMINOPHEN 325 MG/1
975 TABLET ORAL ONCE
Status: COMPLETED | OUTPATIENT
Start: 2024-05-31 | End: 2024-05-31

## 2024-05-31 RX ADMIN — FENTANYL CITRATE 100 MCG: 50 INJECTION, SOLUTION INTRAMUSCULAR; INTRAVENOUS at 07:38

## 2024-05-31 RX ADMIN — PROPOFOL 75 MCG/KG/MIN: 10 INJECTION, EMULSION INTRAVENOUS at 07:38

## 2024-05-31 RX ADMIN — ACETAMINOPHEN 975 MG: 325 TABLET ORAL at 06:47

## 2024-05-31 RX ADMIN — CEFAZOLIN SODIUM 2 G: 2 INJECTION, SOLUTION INTRAVENOUS at 07:15

## 2024-05-31 RX ADMIN — Medication 10 MG: at 07:38

## 2024-05-31 RX ADMIN — MIDAZOLAM HYDROCHLORIDE 1 MG: 1 INJECTION, SOLUTION INTRAMUSCULAR; INTRAVENOUS at 07:46

## 2024-05-31 RX ADMIN — FAMOTIDINE 20 MG: 10 INJECTION, SOLUTION INTRAVENOUS at 06:49

## 2024-05-31 RX ADMIN — SODIUM CHLORIDE, POTASSIUM CHLORIDE, SODIUM LACTATE AND CALCIUM CHLORIDE 50 ML/HR: 600; 310; 30; 20 INJECTION, SOLUTION INTRAVENOUS at 06:44

## 2024-05-31 RX ADMIN — ONDANSETRON 4 MG: 2 INJECTION INTRAMUSCULAR; INTRAVENOUS at 06:49

## 2024-05-31 RX ADMIN — MIDAZOLAM HYDROCHLORIDE 1 MG: 1 INJECTION, SOLUTION INTRAMUSCULAR; INTRAVENOUS at 07:38

## 2024-05-31 RX ADMIN — MIDAZOLAM HYDROCHLORIDE 2 MG: 1 INJECTION, SOLUTION INTRAMUSCULAR; INTRAVENOUS at 07:14

## 2024-05-31 RX ADMIN — Medication 10 MG: at 07:47

## 2024-05-31 ASSESSMENT — PAIN SCALES - GENERAL
PAINLEVEL_OUTOF10: 0 - NO PAIN
PAINLEVEL_OUTOF10: 2
PAINLEVEL_OUTOF10: 0 - NO PAIN
PAIN_LEVEL: 0
PAINLEVEL_OUTOF10: 0 - NO PAIN

## 2024-05-31 ASSESSMENT — PAIN - FUNCTIONAL ASSESSMENT
PAIN_FUNCTIONAL_ASSESSMENT: 0-10

## 2024-05-31 NOTE — DISCHARGE INSTRUCTIONS
"How to care for a port    The Basics  Written by the doctors and editors at [facility]  What is a port? -- A port is a type of central line, which is a special kind of IV. Sample brand names for ports include Port-a-Cath, BardPort, PowerPort, Sowmbv-c-Dhnk, and Mediport. The medical term for a port is a \"totally implanted venous access device.\"  Ports have a small, hollow \"reservoir\" that is attached to a thin, flexible tube called a \"catheter\" (figure 1). Both the reservoir and the catheter are under the skin. The catheter goes into a large blood vessel that leads to the heart. The reservoir is a round disc that is covered with a special silicone. The silicone allows the port to be used over and over.  The medical staff can \"access\" the port using a special type of needle. The needle goes through your skin and into the reservoir (figure 2). The needle can stay in for a week at a time and will be covered with a clear bandage and taped in place. When the port is not being accessed, all you will see is a small bump under your skin.  A port can stay in place for months or years until your treatment is no longer needed.  How do I care for myself at home? -- Ask the doctor or nurse what you should do when you go home. Make sure that you understand exactly what you need to do to care for yourself. Ask questions if there is anything you do not understand.  You should also:  ? Know what kind of port you have and how to care for it. Before you go home, your doctor or nurse will talk to you about this. They will teach you how to change the dressing, flush the port, \"access\" the port (put a needle in), and \"de-access\" the port (take a needle out). They will also make sure that you have the supplies you need. Having a port increases your risk of infection. This is why it's so important to take care of it.  ? Ask a family member or friend to help you care for the port, if needed. Some people have a home health nurse come to their " home to help with this.  Will the port always be accessed? -- When you are at home, there might be times when the port is still accessed. This means that it still has a needle in place, for example, to give medicine.  When the port is accessed:  ? Do not get the port wet while the needle is in place. Take a sponge bath or keep the dressing covered when showering.  ? Keep the dressing clean and dry. This helps keep the needle secure and in place.  When the port is not accessed:  ? You can shower, take a bath, or swim in a pool.  ? You do not need to have a dressing over the port. It will look like a small bump under your skin.  ? Wear clothes that do not rub on the port.  ? You can do many of your normal activities. Talk to your doctor about what activities are safe for you.  ? Flush the port before and after giving medicines and blood draws. Some doctors want you to flush the port at least 1 time each month. Other doctors might want you to flush the port at least every 3 to 4 months. Talk to your doctor about how often you need to do this.  How do I change the dressing on the port? -- When the port is accessed, it will have a clear dressing to hold the needle in place. Always change the dressing right away if it is wet, loose, or dirty. This is to help prevent infections.  To change the dressing:  ? Gather the supplies, and place them on a clean workspace. Your supplies might come in a kit. You need clean gloves, sterile gloves, masks, chlorhexidine swab sticks or wipes, tape, and sterile dressings. Your doctor might also give you skin preparation wipes.  ? Always wash your hands well with soap and water before touching the needle and tubing. This will help avoid spreading germs. Everyone should wear a mask during the dressing change, including the person with the port. Another option is to have the person with the port keep their head turned away while the dressing is being changed. Wear clean gloves when taking  off the old dressing.  ? Take off the old dressing. Do not use scissors or sharp tools. They could cut the extension tubing that is connected to the needle. Do not pull on the tubing when taking off the dressing.  ? Check the skin where the needle enters for swelling, drainage, or redness.  ? Wash your hands again. Put on sterile gloves.  ? Clean the skin with a chlorhexidine swab or wipe using a back-and-forth scrubbing motion for 30 seconds. Start where the needle enters the skin, and clean away from this point. Clean the outside of the tubing and the entire area where the new dressing will go, about a 4-inch (10 cm) square. Let the cleaned area air dry for at least 30 seconds or until completely dry. Do not fan, wave, or blow on the skin to help it dry faster.  ? Your doctor might give you a small sponge to go around the needle where it goes into the port. Put this sponge on the skin so it circles the needle. Make sure that it is right-side up as marked on the sponge. In some cases, the sponge is a part of the dressing.  ? You might have been given skin preparation wipes to use. If so, once the cleaned area is dry, use the wipe around the port where the new dressing will go. Let the skin dry completely before putting on the dressing. This protects the skin by helping prevent irritation from changing the dressing. It also helps the dressing stick.  ? Lay the tubing on the skin in a loose [place] if it is long, or in a straight line if it is short. It should not lie on top of where the port is under the skin or lie on itself. Put the dressing on all sides of the tubing. Make sure that you cover where the needle goes into the skin. The dressing should be snug but not stretched or it will pull on the skin. Most dressings are clear and will stick to the skin. If it is not sticking, put medical tape on the edges only to hold it in place. If you often have trouble with the dressing not sticking to your skin, talk to your  doctor or nurse about the dressing you use or the way you are changing your dressing. They will help you get the dressing to stick better.  ? Throw away the old dressing, mask, and used swabs and wipes.  ? Remove the gloves.  ? Wash your hands with soap and water.  How do I access and flush the port? -- The port should be flushed before and after each use with a sterile fluid. Also, be sure to follow your doctor's instructions about flushing the port when it is not being used. This helps keep the port and catheter from getting blocked.  To flush the port, you need to access it. Your doctor will tell you how long you can have the port accessed before you need to change the needle. You might be able to leave an access needle in place for up to a week at a time.  To access and flush the port:  ? Gather the supplies, and place them on a clean workspace. You will need:  ? Clean gloves  ? A mask for the person who is accessing the port, and a mask for the person with the port  ? Chlorhexidine swab sticks  ? Alcohol wipes  ? A special needle to access the port connected to tubing - This needle is bent in the middle and connected to a short piece of clear tubing.  ? 10-mL syringes filled with the fluid to flush the catheter - Your doctor might order heparin, normal saline, or a special flush for you to use. Your doctor might have you use a smaller amount of fluid to flush the line if you are caring for a child with a port.  ? Injection cap  ? Numbing cream, if your doctor gave you this.  ? Wash your hands with soap and water.  ? Everyone should wear a mask when accessing the port. This includes the person with the port. Another option is to have the person with the port keep their head turned away.  ? Put on clean gloves, and take off the old dressing if there is one. Throw it away.  ? Wash your hands with soap and water.  ? If your doctor gave you a numbing cream, patch, or spray, put it on the skin over the port. Follow  the instructions for how long to wait for it to work. Then, remove the cream or patch, and wipe the skin with clean gauze.  ? Put on sterile gloves. Clean the skin over the port with a chlorhexidine swab using a back-and-forth scrubbing motion for 30 seconds. Let the cleaned area air dry for at least 30 seconds or until completely dry. Do not fan, wave, or blow on the skin to help it dry faster.  ? Connect the injection cap to the tubing. Fill the injection cap, tubing, and needle with flush solution. Clamp the tubing, and leave the syringe attached to the tubing.  ? Hold the edges of the port in place with your thumb and index finger of the hand that you do not write with. Do not touch the skin where you will insert the needle, because it is clean.  ? Insert the needle at a 90° angle from the skin in the center of the port (figure 2). Push the needle in firmly until it touches the back of the port. Do not push more once you reach the back of the port.  ? Unclamp the tubing, and pull back on the syringe to check for a blood return. You do not need to fill the syringe with blood. Pull back just enough to check that the blood is there.  ? If there is a blood return, flush slowly and steadily until the syringe is almost empty. Clamp the tubing before you get to the very end of the flush solution, when there is less than 1 mL in the syringe.  ? If there is not a blood return, reinsert the needle and try again. If there is still no blood return, start over with a new needle. If you cannot get a blood return with a new needle, call your doctor.  ? If you are leaving the needle in place, remove the syringe. Cover the needle and tubing with a clear dressing.  ? If you are only flushing the port, remove the needle. Apply gentle pressure with a clean gauze to stop any bleeding.  ? Throw away any used swabs, wipes, or other supplies. Throw away the needle and syringe in a hard plastic container (picture 1).  ? Wash your hands  "with soap and water.  How do I flush the port and de-access it? -- When you do not need to have any medicines or fluids going into the port, the needle can be removed. This is called \"de-accessing\" the port. Flush the port before you do this.  To flush and de-access the port:  ? Gather the supplies, and place them on a clean workspace. You will need:  ? A mask for the person who is accessing the port, and a mask for the person with the port  ? Clean gloves  ? Alcohol wipe  ? 10-mL syringes filled with the fluid to flush the catheter. Your doctor might order heparin, normal saline, or a special flush solution for you to use. Your doctor might have you use a smaller amount of fluid to flush the line if you are caring for a child with a port.  ? Wash your hands with soap and water.  ? Wearing clean gloves, scrub the injection cap with an alcohol wipe for 15 seconds, and let dry. Do not fan, wave, or blow on the cap to help it dry faster.  ? Attach the flush syringe to the injection cap. Unclamp the tubing, and pull back on the syringe to check for a blood return.  ? If there is a blood return, flush slowly and steadily until the syringe is almost empty. Clamp the tubing before you get to the very end of the flush solution, when there is less than 1 mL in the syringe. Remove the syringe.  ? If there is not a blood return, reposition the needle and try again. If there is still no blood return, start over with a new needle. If you are not able to get a blood return with a new needle, call your doctor.  ? After flushing the port, put on the mask. Also, have the person with the port wear a mask or keep their head turned away while the port is de-accessed.  ? Remove the clear dressing.  ? Hold the port steady with your thumb and index fingers of 1 hand. With your other hand, remove the port needle by pulling straight up. Be careful not to stick your fingers or skin with the needle. Most needles have a safety catch or cover. " Your nurse will show you how to cover the needle when it is out.  ? Throw away the needle and syringe in a hard plastic container (picture 1).  ? Apply gentle pressure with a clean gauze to stop any bleeding.  ? Throw away your mask, used swabs, wipes, and other materials.  ? Wash your hands with soap and water.  What else should I know? -- Some tips for while you have a port:  ? Shoulder seatbelts can rub on the port site. Fold a washcloth, and place it between the seatbelt and the port.  ? Avoid contact sports or rough play.  ? If the port is accessed, keep the dressing covered when showering. Change the dressing if it gets wet or dirty, or becomes loose.  ? Check the skin over the port each day for signs of infection.  When should I call the doctor? -- Call for advice if:  ? You have symptoms of infection. These include a fever of 100.4°F (38°C) or higher, chills, or redness, drainage, warmth, stinging, or pain where the port needle goes into your skin.  ? You have problems with the port site:  ? The site starts bleeding and does not stop with gentle pressure.  ? The site becomes more red, or the skin over or around the port breaks open.  ? You develop a rash, bumps, itching, or irritation where the dressing goes onto the skin.  ? There is drainage or pus from the port site.  ? You have problems with the port:  ? The port seems to have moved under your skin.  ? You are not able to get the needle into the port.  ? You have pain, irritation, or swelling around the port site when you flush the port.  ? Your arm is swollen.  ? You are not able to get the medicines or flush solution through the port.  ? You are not able to get a blood return from the port.  ? You have any concerns about the port.  ? You have sudden shortness of breath or chest pain.  ? You hear a swishing sound in 1 of your ears when you flush the port.  ? You notice changes in your face, jaw, neck, chest, or upper arm, like:  ? Swelling  ? Change in  skin color  ? Veins are swelling or sticking out more  All topics are updated as new evidence becomes available and our peer review process is complete.  This topic retrieved from [employer] on: May 30, 2024.  Topic 898470 Version 4.0  Release: 32.5.3 - C32.150  © 2024 UpToDate, Inc. and/or its affiliates. All rights reserved.  figure 1: Port    figure 2: Accessing a port    picture 1: Sharps disposal containers    Consumer Information Use and Disclaimer  Disclaimer: This generalized information is a limited summary of diagnosis, treatment, and/or medication information. It is not meant to be comprehensive and should be used as a tool to help the user understand and/or assess potential diagnostic and treatment options. It does NOT include all information about conditions, treatments, medications, side effects, or risks that may apply to a specific patient. It is not intended to be medical advice or a substitute for the medical advice, diagnosis, or treatment of a health care provider based on the health care provider's examination and assessment of a patient's specific and unique circumstances. Patients must speak with a health care provider for complete information about their health, medical questions, and treatment options, including any risks or benefits regarding use of medications. This information does not endorse any treatments or medications as safe, effective, or approved for treating a specific patient. UpToDate, Inc. and its affiliates disclaim any warranty or liability relating to this information or the use thereof.The use of this information is governed by the Terms of Use, available at https://www.wolterskluwer.com/en/know/clinical-effectiveness-terms. 2024© UpToDate, Inc. and its affiliates and/or licensors. All rights reserved.  © 2024 UpToDate, Inc. and/or its affiliates. All rights reserved.

## 2024-05-31 NOTE — OP NOTE
[facility]   Operative Report    Patient: Yon Lawrence  : 1975  MRN: [id number]    Date of Operation: 24    Pre-Operative Diagnosis: Colon cancer  Post-Operative Diagnosis: Colon cancer  Procedure: Placement of tunneled central venous catheter with subcutaneous port    Surgeon: Alba Asif MD  Assistant: n/a    Anesthesia: MAC  Findings: Normal anatomy  EBL: 10ml  Case Type: Clean  Disposition: PACU    Indication: Patient is a 48 y.o. male with metastatic colon cancer necessitating port placement to initiate chemotherapy. Risks and benefits were discussed and the patient was agreeable to proceed with surgery.    Description: The patient was brought to the operating room and placed in supine position with the left arm tucked and shoulder roll in place. IV sedation was given. The patient was placed in Trendelenburg position. The patient's chest and neck were prepped and draped. Following administration of local anesthetic, an introducer needle was utilized to access the left subclavian vein, confirmed by dark, non-pulsatile venous return.  This was achieved on the first attempt. Using Seldinger technique, a wire was passed and intravenous position was confirmed with fluoroscopy. An incision was made inferior to the left clavicle and a subcutaneous pocket for the port was created by blunt dissection and cautery. A small skin incision was made around the guidewire and an 8-Fr sheath was advanced over the wire under fluoroscopic guidance. The wire and dilator were removed and an 8-Fr vascular catheter was introduced through the sheath. The peel-away sheath was then removed.  The catheter was tunneled to the port site. The depth of the catheter was optimized under fluoroscopic guidance so that the tip was located at the SVC-right atrium junction, after which it was cut at 21 cm and attached to the port with the locking collar. The port was sutured to the floor of the pocket using 2-0  Prolene using three point fixation. The port was accessed with a [place] needle with good venous return. It also flushed easily. Heparin was injected. Skin was closed in two layers using 3-0 and then 4-0 Vicryl. Steri strips and a sterile dressing were placed. The patient tolerated the procedure well and was transferred to PACU in stable condition. Confirmatory x-ray was ordered.    Alba Asif MD  5/31/2024

## 2024-05-31 NOTE — ANESTHESIA PREPROCEDURE EVALUATION
"Patient: Yon Lawrence \"Vladimir\"    Procedure Information       Date/Time: 05/31/24 0730    Procedure: Placement of Central Venous Catheter with Subcutaneous Port (Chest)    Location: [facility] OR 01 / [facility] OR    Surgeons: Alba Asif MD            Relevant Problems   Anesthesia (within normal limits)      Cardiac (within normal limits)      Pulmonary (within normal limits)      Neuro (within normal limits)      GI   (+) Adenocarcinoma of transverse colon (Multi)   (+) Malignant neoplasm of transverse colon (Multi)      /Renal (within normal limits)      Liver   (+) Adenocarcinoma of transverse colon (Multi)   (+) Malignant neoplasm of transverse colon (Multi)      Endocrine (within normal limits)      Hematology (within normal limits)      Musculoskeletal (within normal limits)      HEENT (within normal limits)      ID (within normal limits)      Skin (within normal limits)      GYN (within normal limits)     Problem list reviewed.  Clinical information reviewed:   Tobacco  Allergies  Meds   Med Hx  Surg Hx   Fam Hx  Soc Hx        NPO Detail:  NPO/Void Status  Carbohydrate Drink Given Prior to Surgery? : N  Date of Last Liquid: 05/30/24  Time of Last Liquid: 2130  Date of Last Solid: 05/30/24  Time of Last Solid: 2130  Last Intake Type: Clear fluids; Food  Time of Last Void: 0530         Physical Exam    Airway  Mallampati: II  TM distance: >3 FB  Neck ROM: full     Cardiovascular   Rhythm: regular  Rate: normal     Dental   Comments: Intact   Pulmonary - normal exam     Abdominal            Anesthesia Plan    History of general anesthesia?: yes  History of complications of general anesthesia?: no    ASA 2     MAC     intravenous induction   Anesthetic plan and risks discussed with patient.    MAC discussed with Patient.  Plan and process discussed for anesthesia for procedure.  Risks and benefits discussed.  All questions answered with patient.  The patient understands plan and wishes to proceed. " Discussed use of LA and GA back up.

## 2024-05-31 NOTE — ANESTHESIA POSTPROCEDURE EVALUATION
"Patient: Yon Lawrence \"Vladimir\"    Procedure Summary       Date: 05/31/24 Room / Location: [facility] OR 01 / Virtual [facility] OR    Anesthesia Start: 0734 Anesthesia Stop: 0834    Procedure: Placement of Central Venous Catheter with Subcutaneous Port (Chest) Diagnosis:       Malignant neoplasm of transverse colon (Multi)      (Malignant neoplasm of transverse colon (Multi) [C18.4])    Surgeons: Alba Asif MD Responsible Provider: Magnus Ramirez DO    Anesthesia Type: MAC ASA Status: 2            Anesthesia Type: MAC    Vitals Value Taken Time   /68 05/31/24 0831   Temp 36.3 °C (97.4 °F) 05/31/24 0831   Pulse 80 05/31/24 0831   Resp 16 05/31/24 0831   SpO2 96 % 05/31/24 0831       Anesthesia Post Evaluation    Patient location during evaluation: bedside  Patient participation: complete - patient participated  Level of consciousness: awake  Pain score: 0  Pain management: adequate  Multimodal analgesia pain management approach  Airway patency: patent  Cardiovascular status: acceptable  Respiratory status: acceptable  Hydration status: acceptable  Postoperative Nausea and Vomiting: none  Comments: Easily awakens.  VSS.  Denies pain or nausea.    Awake and tolerated well.  Very thankful for care provided today.    No notable events documented.    "

## 2024-06-01 LAB — UH GNO TEMPUS PORTAL: NORMAL

## 2024-06-03 ENCOUNTER — APPOINTMENT (OUTPATIENT)
Dept: HEMATOLOGY/ONCOLOGY | Facility: CLINIC | Age: 49
End: 2024-06-03
Payer: COMMERCIAL

## 2024-06-03 ENCOUNTER — TELEPHONE (OUTPATIENT)
Dept: SURGERY | Facility: CLINIC | Age: 49
End: 2024-06-03
Payer: COMMERCIAL

## 2024-06-03 ENCOUNTER — INFUSION (OUTPATIENT)
Dept: HEMATOLOGY/ONCOLOGY | Facility: CLINIC | Age: 49
End: 2024-06-03
Payer: COMMERCIAL

## 2024-06-03 VITALS
BODY MASS INDEX: 25.07 KG/M2 | RESPIRATION RATE: 16 BRPM | TEMPERATURE: 97.5 F | SYSTOLIC BLOOD PRESSURE: 105 MMHG | WEIGHT: 190.04 LBS | DIASTOLIC BLOOD PRESSURE: 67 MMHG | HEART RATE: 91 BPM | OXYGEN SATURATION: 95 %

## 2024-06-03 DIAGNOSIS — C18.4 ADENOCARCINOMA OF TRANSVERSE COLON (MULTI): Primary | ICD-10-CM

## 2024-06-03 PROCEDURE — 2500000004 HC RX 250 GENERAL PHARMACY W/ HCPCS (ALT 636 FOR OP/ED): Performed by: INTERNAL MEDICINE

## 2024-06-03 PROCEDURE — 96413 CHEMO IV INFUSION 1 HR: CPT

## 2024-06-03 RX ORDER — HEPARIN 100 UNIT/ML
500 SYRINGE INTRAVENOUS AS NEEDED
Status: CANCELLED | OUTPATIENT
Start: 2024-06-03

## 2024-06-03 RX ORDER — PROCHLORPERAZINE EDISYLATE 5 MG/ML
10 INJECTION INTRAMUSCULAR; INTRAVENOUS EVERY 6 HOURS PRN
Status: DISCONTINUED | OUTPATIENT
Start: 2024-06-03 | End: 2024-06-03 | Stop reason: HOSPADM

## 2024-06-03 RX ORDER — HEPARIN SODIUM,PORCINE/PF 10 UNIT/ML
50 SYRINGE (ML) INTRAVENOUS AS NEEDED
Status: CANCELLED | OUTPATIENT
Start: 2024-06-03

## 2024-06-03 RX ORDER — HEPARIN 100 UNIT/ML
500 SYRINGE INTRAVENOUS AS NEEDED
Status: DISCONTINUED | OUTPATIENT
Start: 2024-06-03 | End: 2024-06-03 | Stop reason: HOSPADM

## 2024-06-03 RX ORDER — PROCHLORPERAZINE MALEATE 5 MG
10 TABLET ORAL EVERY 6 HOURS PRN
Status: DISCONTINUED | OUTPATIENT
Start: 2024-06-03 | End: 2024-06-03 | Stop reason: HOSPADM

## 2024-06-03 RX ADMIN — SODIUM CHLORIDE 200 MG: 9 INJECTION, SOLUTION INTRAVENOUS at 12:36

## 2024-06-03 RX ADMIN — Medication 500 UNITS: at 13:27

## 2024-06-03 ASSESSMENT — ENCOUNTER SYMPTOMS
DEPRESSION: 0
LOSS OF SENSATION IN FEET: 0
OCCASIONAL FEELINGS OF UNSTEADINESS: 0

## 2024-06-03 ASSESSMENT — COLUMBIA-SUICIDE SEVERITY RATING SCALE - C-SSRS
2. HAVE YOU ACTUALLY HAD ANY THOUGHTS OF KILLING YOURSELF?: NO
1. IN THE PAST MONTH, HAVE YOU WISHED YOU WERE DEAD OR WISHED YOU COULD GO TO SLEEP AND NOT WAKE UP?: NO
6. HAVE YOU EVER DONE ANYTHING, STARTED TO DO ANYTHING, OR PREPARED TO DO ANYTHING TO END YOUR LIFE?: NO

## 2024-06-03 ASSESSMENT — PAIN SCALES - GENERAL: PAINLEVEL: 0-NO PAIN

## 2024-06-03 NOTE — TELEPHONE ENCOUNTER
Spoke to patient after Placement of Central Venous Catheter with Subcutaneous Port surgery. Pt denies fever, chills, nausea, and vomiting. Pt had no questions at this time.  Provided office number to patient for any questions.

## 2024-06-05 ENCOUNTER — APPOINTMENT (OUTPATIENT)
Dept: HEMATOLOGY/ONCOLOGY | Facility: CLINIC | Age: 49
End: 2024-06-05
Payer: COMMERCIAL

## 2024-06-05 LAB
ELECTRONICALLY SIGNED BY: NORMAL
MLH1 METHYLATION RESULT: NORMAL

## 2024-06-06 LAB — UH GNO TEMPUS PORTAL: NORMAL

## 2024-06-08 LAB — UH GNO TEMPUS PORTAL: NORMAL

## 2024-06-10 ENCOUNTER — INFUSION (OUTPATIENT)
Dept: HEMATOLOGY/ONCOLOGY | Facility: CLINIC | Age: 49
End: 2024-06-10
Payer: COMMERCIAL

## 2024-06-10 VITALS
HEIGHT: 72 IN | RESPIRATION RATE: 18 BRPM | OXYGEN SATURATION: 99 % | HEART RATE: 99 BPM | TEMPERATURE: 96.8 F | SYSTOLIC BLOOD PRESSURE: 100 MMHG | BODY MASS INDEX: 25.17 KG/M2 | WEIGHT: 185.85 LBS | DIASTOLIC BLOOD PRESSURE: 63 MMHG

## 2024-06-10 DIAGNOSIS — C18.4 ADENOCARCINOMA OF TRANSVERSE COLON (MULTI): ICD-10-CM

## 2024-06-10 LAB
ABO GROUP (TYPE) IN BLOOD: NORMAL
ANTIBODY SCREEN: NORMAL
BASOPHILS # BLD MANUAL: 0 X10*3/UL (ref 0–0.1)
BASOPHILS NFR BLD MANUAL: 0 %
EOSINOPHIL # BLD MANUAL: 0 X10*3/UL (ref 0–0.7)
EOSINOPHIL NFR BLD MANUAL: 0 %
ERYTHROCYTE [DISTWIDTH] IN BLOOD BY AUTOMATED COUNT: 15.4 % (ref 11.5–14.5)
HCT VFR BLD AUTO: 26.3 % (ref 41–52)
HGB BLD-MCNC: 8 G/DL (ref 13.5–17.5)
HYPOCHROMIA BLD QL SMEAR: ABNORMAL
IMM GRANULOCYTES # BLD AUTO: 0.23 X10*3/UL (ref 0–0.7)
IMM GRANULOCYTES NFR BLD AUTO: 0.7 % (ref 0–0.9)
LYMPHOCYTES # BLD MANUAL: 6.3 X10*3/UL (ref 1.2–4.8)
LYMPHOCYTES NFR BLD MANUAL: 20 %
MCH RBC QN AUTO: 23.1 PG (ref 26–34)
MCHC RBC AUTO-ENTMCNC: 30.4 G/DL (ref 32–36)
MCV RBC AUTO: 76 FL (ref 80–100)
MONOCYTES # BLD MANUAL: 0 X10*3/UL (ref 0.1–1)
MONOCYTES NFR BLD MANUAL: 0 %
NEUTROPHILS # BLD MANUAL: 23 X10*3/UL (ref 1.2–7.7)
NEUTS BAND # BLD MANUAL: 0.32 X10*3/UL (ref 0–0.7)
NEUTS BAND NFR BLD MANUAL: 1 %
NEUTS SEG # BLD MANUAL: 22.68 X10*3/UL (ref 1.2–7)
NEUTS SEG NFR BLD MANUAL: 72 %
NRBC BLD-RTO: 0 /100 WBCS (ref 0–0)
OVALOCYTES BLD QL SMEAR: ABNORMAL
PLATELET # BLD AUTO: 629 X10*3/UL (ref 150–450)
RBC # BLD AUTO: 3.46 X10*6/UL (ref 4.5–5.9)
RBC MORPH BLD: ABNORMAL
RH FACTOR (ANTIGEN D): NORMAL
TOTAL CELLS COUNTED BLD: 100
VARIANT LYMPHS # BLD MANUAL: 2.21 X10*3/UL (ref 0–0.5)
VARIANT LYMPHS NFR BLD: 7 %
WBC # BLD AUTO: 31.5 X10*3/UL (ref 4.4–11.3)

## 2024-06-10 PROCEDURE — 85007 BL SMEAR W/DIFF WBC COUNT: CPT

## 2024-06-10 PROCEDURE — 85027 COMPLETE CBC AUTOMATED: CPT

## 2024-06-10 PROCEDURE — 36591 DRAW BLOOD OFF VENOUS DEVICE: CPT

## 2024-06-10 PROCEDURE — 86900 BLOOD TYPING SEROLOGIC ABO: CPT

## 2024-06-10 PROCEDURE — 2500000004 HC RX 250 GENERAL PHARMACY W/ HCPCS (ALT 636 FOR OP/ED): Performed by: INTERNAL MEDICINE

## 2024-06-10 PROCEDURE — 86850 RBC ANTIBODY SCREEN: CPT

## 2024-06-10 RX ORDER — HEPARIN SODIUM,PORCINE/PF 10 UNIT/ML
50 SYRINGE (ML) INTRAVENOUS AS NEEDED
OUTPATIENT
Start: 2024-06-10

## 2024-06-10 RX ORDER — HEPARIN 100 UNIT/ML
500 SYRINGE INTRAVENOUS AS NEEDED
OUTPATIENT
Start: 2024-06-10

## 2024-06-10 RX ORDER — HEPARIN 100 UNIT/ML
500 SYRINGE INTRAVENOUS AS NEEDED
Status: DISCONTINUED | OUTPATIENT
Start: 2024-06-10 | End: 2024-06-10 | Stop reason: HOSPADM

## 2024-06-10 RX ADMIN — Medication 500 UNITS: at 08:28

## 2024-06-10 ASSESSMENT — PAIN SCALES - GENERAL: PAINLEVEL: 0-NO PAIN

## 2024-06-14 ENCOUNTER — APPOINTMENT (OUTPATIENT)
Dept: HEMATOLOGY/ONCOLOGY | Facility: CLINIC | Age: 49
End: 2024-06-14
Payer: COMMERCIAL

## 2024-06-17 ENCOUNTER — LAB (OUTPATIENT)
Dept: LAB | Facility: CLINIC | Age: 49
End: 2024-06-17
Payer: COMMERCIAL

## 2024-06-17 ENCOUNTER — OFFICE VISIT (OUTPATIENT)
Dept: HEMATOLOGY/ONCOLOGY | Facility: CLINIC | Age: 49
End: 2024-06-17
Payer: COMMERCIAL

## 2024-06-17 ENCOUNTER — APPOINTMENT (OUTPATIENT)
Dept: LAB | Facility: LAB | Age: 49
End: 2024-06-17
Payer: COMMERCIAL

## 2024-06-17 ENCOUNTER — DOCUMENTATION (OUTPATIENT)
Dept: CASE MANAGEMENT | Facility: HOSPITAL | Age: 49
End: 2024-06-17

## 2024-06-17 ENCOUNTER — APPOINTMENT (OUTPATIENT)
Dept: HEMATOLOGY/ONCOLOGY | Facility: CLINIC | Age: 49
End: 2024-06-17
Payer: COMMERCIAL

## 2024-06-17 VITALS
HEART RATE: 102 BPM | WEIGHT: 181.44 LBS | BODY MASS INDEX: 24.34 KG/M2 | DIASTOLIC BLOOD PRESSURE: 75 MMHG | SYSTOLIC BLOOD PRESSURE: 114 MMHG | OXYGEN SATURATION: 100 % | TEMPERATURE: 97.5 F | RESPIRATION RATE: 18 BRPM

## 2024-06-17 DIAGNOSIS — R35.0 URINARY FREQUENCY: ICD-10-CM

## 2024-06-17 DIAGNOSIS — C18.4 ADENOCARCINOMA OF TRANSVERSE COLON (MULTI): Primary | ICD-10-CM

## 2024-06-17 DIAGNOSIS — C18.4 ADENOCARCINOMA OF TRANSVERSE COLON (MULTI): ICD-10-CM

## 2024-06-17 LAB
ABO GROUP (TYPE) IN BLOOD: NORMAL
ALBUMIN SERPL BCP-MCNC: 3.1 G/DL (ref 3.4–5)
ALP SERPL-CCNC: 335 U/L (ref 33–120)
ALT SERPL W P-5'-P-CCNC: 61 U/L (ref 10–52)
ANION GAP SERPL CALC-SCNC: 15 MMOL/L (ref 10–20)
ANTIBODY SCREEN: NORMAL
APPEARANCE UR: ABNORMAL
AST SERPL W P-5'-P-CCNC: 60 U/L (ref 9–39)
BASOPHILS # BLD AUTO: 0.07 X10*3/UL (ref 0–0.1)
BASOPHILS NFR BLD AUTO: 0.2 %
BILIRUB SERPL-MCNC: 0.6 MG/DL (ref 0–1.2)
BILIRUB UR STRIP.AUTO-MCNC: ABNORMAL MG/DL
BUN SERPL-MCNC: 12 MG/DL (ref 6–23)
CALCIUM SERPL-MCNC: 8.4 MG/DL (ref 8.6–10.6)
CHLORIDE SERPL-SCNC: 95 MMOL/L (ref 98–107)
CO2 SERPL-SCNC: 27 MMOL/L (ref 21–32)
COLOR UR: YELLOW
CREAT SERPL-MCNC: 0.57 MG/DL (ref 0.5–1.3)
EGFRCR SERPLBLD CKD-EPI 2021: >90 ML/MIN/1.73M*2
EOSINOPHIL # BLD AUTO: 0.09 X10*3/UL (ref 0–0.7)
EOSINOPHIL NFR BLD AUTO: 0.3 %
ERYTHROCYTE [DISTWIDTH] IN BLOOD BY AUTOMATED COUNT: 15.4 % (ref 11.5–14.5)
FERRITIN SERPL-MCNC: 291 NG/ML (ref 20–300)
GLUCOSE SERPL-MCNC: 106 MG/DL (ref 74–99)
GLUCOSE UR STRIP.AUTO-MCNC: NEGATIVE MG/DL
HCT VFR BLD AUTO: 28.9 % (ref 41–52)
HGB BLD-MCNC: 8.9 G/DL (ref 13.5–17.5)
IMM GRANULOCYTES # BLD AUTO: 0.09 X10*3/UL (ref 0–0.7)
IMM GRANULOCYTES NFR BLD AUTO: 0.3 % (ref 0–0.9)
IRON SATN MFR SERPL: 5 % (ref 25–45)
IRON SERPL-MCNC: 12 UG/DL (ref 35–150)
KETONES UR STRIP.AUTO-MCNC: NEGATIVE MG/DL
LEUKOCYTE ESTERASE UR QL STRIP.AUTO: NEGATIVE
LYMPHOCYTES # BLD AUTO: 10.4 X10*3/UL (ref 1.2–4.8)
LYMPHOCYTES NFR BLD AUTO: 35.7 %
MCH RBC QN AUTO: 23.3 PG (ref 26–34)
MCHC RBC AUTO-ENTMCNC: 30.8 G/DL (ref 32–36)
MCV RBC AUTO: 76 FL (ref 80–100)
MONOCYTES # BLD AUTO: 1.52 X10*3/UL (ref 0.1–1)
MONOCYTES NFR BLD AUTO: 5.2 %
NEUTROPHILS # BLD AUTO: 16.94 X10*3/UL (ref 1.2–7.7)
NEUTROPHILS NFR BLD AUTO: 58.3 %
NITRITE UR QL STRIP.AUTO: NEGATIVE
NRBC BLD-RTO: ABNORMAL /100{WBCS}
PH UR STRIP.AUTO: 5 [PH]
PLATELET # BLD AUTO: 791 X10*3/UL (ref 150–450)
POTASSIUM SERPL-SCNC: 4.5 MMOL/L (ref 3.5–5.3)
PROT SERPL-MCNC: 5.8 G/DL (ref 6.4–8.2)
PROT UR STRIP.AUTO-MCNC: ABNORMAL MG/DL
RBC # BLD AUTO: 3.82 X10*6/UL (ref 4.5–5.9)
RBC # UR STRIP.AUTO: NEGATIVE /UL
RBC #/AREA URNS AUTO: NORMAL /HPF
RH FACTOR (ANTIGEN D): NORMAL
SODIUM SERPL-SCNC: 132 MMOL/L (ref 136–145)
SP GR UR STRIP.AUTO: 1.03
TIBC SERPL-MCNC: 243 UG/DL (ref 240–445)
UIBC SERPL-MCNC: 231 UG/DL (ref 110–370)
UROBILINOGEN UR STRIP.AUTO-MCNC: >=8 MG/DL
WBC # BLD AUTO: 29.1 X10*3/UL (ref 4.4–11.3)
WBC #/AREA URNS AUTO: NORMAL /HPF

## 2024-06-17 PROCEDURE — 99214 OFFICE O/P EST MOD 30 MIN: CPT | Performed by: INTERNAL MEDICINE

## 2024-06-17 PROCEDURE — 82746 ASSAY OF FOLIC ACID SERUM: CPT

## 2024-06-17 PROCEDURE — 82728 ASSAY OF FERRITIN: CPT

## 2024-06-17 PROCEDURE — 84443 ASSAY THYROID STIM HORMONE: CPT

## 2024-06-17 PROCEDURE — 84075 ASSAY ALKALINE PHOSPHATASE: CPT

## 2024-06-17 PROCEDURE — 83540 ASSAY OF IRON: CPT

## 2024-06-17 PROCEDURE — 81001 URINALYSIS AUTO W/SCOPE: CPT

## 2024-06-17 PROCEDURE — 82607 VITAMIN B-12: CPT

## 2024-06-17 PROCEDURE — 82533 TOTAL CORTISOL: CPT

## 2024-06-17 PROCEDURE — 86901 BLOOD TYPING SEROLOGIC RH(D): CPT

## 2024-06-17 PROCEDURE — 82024 ASSAY OF ACTH: CPT

## 2024-06-17 PROCEDURE — 85025 COMPLETE CBC W/AUTO DIFF WBC: CPT

## 2024-06-17 PROCEDURE — 82378 CARCINOEMBRYONIC ANTIGEN: CPT

## 2024-06-17 RX ORDER — PROCHLORPERAZINE EDISYLATE 5 MG/ML
10 INJECTION INTRAMUSCULAR; INTRAVENOUS EVERY 6 HOURS PRN
OUTPATIENT
Start: 2024-06-24

## 2024-06-17 RX ORDER — FAMOTIDINE 10 MG/ML
20 INJECTION INTRAVENOUS ONCE AS NEEDED
OUTPATIENT
Start: 2024-06-24

## 2024-06-17 RX ORDER — DIPHENHYDRAMINE HYDROCHLORIDE 50 MG/ML
50 INJECTION INTRAMUSCULAR; INTRAVENOUS AS NEEDED
OUTPATIENT
Start: 2024-06-24

## 2024-06-17 RX ORDER — ALBUTEROL SULFATE 0.83 MG/ML
3 SOLUTION RESPIRATORY (INHALATION) AS NEEDED
OUTPATIENT
Start: 2024-06-24

## 2024-06-17 RX ORDER — EPINEPHRINE 0.3 MG/.3ML
0.3 INJECTION SUBCUTANEOUS EVERY 5 MIN PRN
OUTPATIENT
Start: 2024-06-24

## 2024-06-17 RX ORDER — PROCHLORPERAZINE MALEATE 10 MG
10 TABLET ORAL EVERY 6 HOURS PRN
OUTPATIENT
Start: 2024-06-24

## 2024-06-17 ASSESSMENT — ENCOUNTER SYMPTOMS
DEPRESSION: 0
LOSS OF SENSATION IN FEET: 0
OCCASIONAL FEELINGS OF UNSTEADINESS: 0

## 2024-06-17 ASSESSMENT — PAIN SCALES - GENERAL: PAINLEVEL: 3

## 2024-06-17 NOTE — PROGRESS NOTES
Patient is a 48 year old  male recently diagnosed with Adenocarcinoma Stage IV. During his appointment with MD., patient requested a medical note to cancel his cruise. LETICIA wrote note and sent it to patient via email. Will meet with patient next appointment. LETICIA left message with patient too.

## 2024-06-17 NOTE — PROGRESS NOTES
MEDICAL ONCOLOGY INITIAL OUTPATIENT CONSULTATION NOTE  [facility], Gastrointestinal Oncology    Patient Name:  Yon Lawrence  MRN:  [id number]  :  1975    Referring Provider: Billie Finn MD  Care Team: Patient Care Team:  Pedro Bazzi MD as PCP - General  Pedro Bazzi MD as PCP - [facility] PCP  Billie Finn MD as Consulting Physician (Hematology and Oncology)  Date of Service: 2024     IDENTIFYING DATA:   Cancer Staging   Adenocarcinoma of transverse colon (Multi)  Staging form: Colon and Rectum, AJCC 8th Edition  - Clinical: Stage IVC (cTX, cNX, cM1c) - Signed by Billie Finn MD on 2024    Yon Lawrence is a 48 y.o.-year-old with metastatic distal transverse colon adenocarcinoma, dMMR, MLH1 promoter hypermethylation detected    INTERVAL HISTORY:  Patient was seen for initial consultation on 24, received C1 pembrolizumab on 6/3/24, and presents for follow up in anticipation of C2 scheduled for next week 24.     Patient reports that he has been feeling about the same since last visit. He was able to celebrate his daughter's graduation ~24 prior to starting treatment. He has been feeling more fatigued, particularly after standing or being active for prolonged period of time. However, he was able to do yard work with some breaks.     He continues to have intermittent pain at the left upper quadrant, but since starting treatment he did have at least one day with no pain at all. His appetite is variable such that some days he can eat a lot (like 3 slices of pizza) while other days he does not feel hungry and does not eat much. His weight is 181 lbs today compared with 185 lbs one week ago. His baseline weight is ~215 lbs.    He does report that he is urinating large volumes of urine frequently throughout the night even though he stops drinking liquids before 8pm.    ONCOLOGY HISTORY:  24: CT abdomen/pelvis showed a large necrotic mass  occupying the left upper quadrant of the abdomen, likely arising from the distal transverse colon, as well as multiple mesenteric nodules consistent with metastatic lesions and multiple liver mass lesions consistent with metastasis  5/6/24: colonoscopy with malignant distal transverse colon mass measuring 10cm, invasive adenocarcinoma, MMR IHC pending  5/7/24: CT chest with no intrathoracic metastasis  5/16/24: CT abdomen/pelvis with stable to mild interval enlargement of the distal transverse colonic mass measuring 10.3 x 9.3 x 8.8 cm with invasion of and fistulization with an adjacent loop of jejunum with passage of oral contrast from the jejunum into the colon at the level of the mass. Mass noted to abut the anterior abdominal wall without evidence of direct invasion, mesenteric stranding and nodularity concerning for peritoneal carcinomatosis, increase in size and number of diffuse liver metastases from 4/30/24  REGIMEN #1: pembrolizumab  6/3/24: C1D1 pembrolizumab    PAST MEDICAL HISTORY:  No past medical history on file.    PAST SURGICAL HISTORY:  Past Surgical History:   Procedure Laterality Date    COLONOSCOPY  05/06/2024    DR VALDEZ    OTHER SURGICAL HISTORY  06/07/2022    Meniscectomy    TUNNELED VENOUS PORT PLACEMENT  05/31/2024    PLACEMENT OF TUNNELED CENTR VENOUS CATHETER W/SQ PORT/ DR VALDEZ       ALLERGIES:  No Known Allergies    MEDICATIONS:  Current Outpatient Medications   Medication Instructions    prochlorperazine (COMPAZINE) 10 mg, oral, Every 6 hours PRN       SOCIAL HISTORY:  Social History     Tobacco Use    Smoking status: Never    Smokeless tobacco: Never   Substance Use Topics    Alcohol use: Never     Social History     Social History Narrative    Lives with wife and 2 children. Works as a  at [employer]. Coaches basketball over the summer.          FAMILY HISTORY:  Family History   Problem Relation Name Age of Onset    Diabetes type II Mother       Hypertension Father      Heart disease Father          CABG    Macular degeneration Father      Ulcers Father      Breast cancer Mother's Sister  50 - 59    Cancer Father's Sister          rare cancer unknown type    Cancer Paternal Grandmother      Stroke Paternal Grandfather      Other (MCAD) Daughter      No Known Problems Son          REVIEW OF SYSTEMS:  10-pt ROS reviewed and negative except as mentioned above.    PERFORMANCE STATUS:  Karnofsky Performance Score/ECO, Able to carry on normal activity; minor signs or symptoms of disease (ECOG equivalent 0)    PHYSICAL EXAMINATION:  /75   Pulse 102   Temp 36.4 °C (97.5 °F)   Resp 18   Wt 82.3 kg (181 lb 7 oz)   SpO2 100%   BMI 24.34 kg/m²    Wt Readings from Last 5 Encounters:   24 82.3 kg (181 lb 7 oz)   06/10/24 84.3 kg (185 lb 13.6 oz)   24 86.2 kg (190 lb 0.6 oz)   24 86.9 kg (191 lb 9.3 oz)   24 87.5 kg (193 lb)     GEN: Well-appearing, thin, no acute distress, breathing comfortably on room air.  HEENT: Moist mucous membranes, EOMI intact.   LYMPH: No cervical or supraclavicular adenopathy  ABD: Firm to palpation at L upper quadrant, non-tender, non-distended.    EXT: Warm and well-perfused, no lower extremity edema.  NEURO: Grossly intact. No focal deficits.  SKIN: No rashes  PSYCH: Appropriate mood and affect    PATHOLOGY:     Surgical pathology 24:  FINAL DIAGNOSIS   A. COLON - TRANSVERSE, MASS BIOPSY:   Invasive adenocarcinoma     MMR studies pending; separate report will follow     : The images have been reviewed at the GI consensus conference on May 15, 2024 and diagnosis of adenocarcinoma is confirmed.  Dr. MONROE Asif has been notified via secure chat on 5/15/2024 at 11:51 am     B. RECTUM POLYP, POLYPECTOMY:   Hyperplastic polyp   Electronically signed by Servando Lawton MD on 5/15/2024 at 1155        By the signature on this report, the individual or group listed as making the Final  Interpretation/Diagnosis certifies that they have reviewed this case.    Addendum            MISMATCH REPAIR PROTEIN EXPRESSION                 Protein:  Result                MLH-1:   Expression Absent                                            PMS-2:  Expression Absent                                      MSH-2:  Expression Present                                   MSH-6:  Expression Present                                       Neoplasm with combined loss of MLH-1/PMS-2 mismatch repair protein expression.     Assessment for BRAF mutation or methylation of the MLH1 promoter status has been ordered.     The loss of MLH-2/PMS-2 protein expression has been identified (normal internal controls stain appropriately).  Loss of expression of the MLH-1 gene and hence protein expression, is often associated with the concurrent loss of protein expression in PMS-2.     Reference Range: A result is reported as Expression Present if any tumor nuclei are stained positive.     MLH1 Promoter Methylation Result       Specimen:  FFPE [id number]  Estimated Tumor Content: 40%        RESULT: Positive for MLH1 Promoter Methylation       NGS:   ** Copied from [address] [place] [address] on 18-Jun-2024 12:01PM by Billie Finn **    Test Name: Wiziva (XT.V4)  Laboratory Name: [facility]  Specimen Source: Colon, transverse  Collection Date: 06-May-2024  Cancer Type: Invasive adenocarcinoma  Report Id: [id number]    Result Interpretation:  * Lab Notes: Tumor Percentage: 30%  * Germline Notes: No potential germline variants were found in the limited set of genes on which we report.    Molecular Findings:  * Gene: BRCA2, Variant: Frameshift - LOF, Potentially Actionable, p.I605fs (Allele Frequency - 7.9%)  * Gene: [place], Variant: Frameshift - LOF, Potentially Actionable, p.F357fs (Allele Frequency - 7.6%)  * Gene: PIK3CA, Variant: Missense variant (exon 1) - GOF, Potentially Actionable, p.R88Q (Allele Frequency - 6.5%)  * Gene: PIK3CA, Variant:  Missense variant (exon 20) - GOF, Potentially Actionable, p.S9485L (Allele Frequency - 6.4%)  * Gene: CTNNB1, Variant: Missense variant - GOF, Biologically Relevant, p.T41A (Allele Frequency - 28.5%)  * Gene: MLH1, Variant: Frameshift - LOF, Biologically Relevant, p.R9fs (Allele Frequency - 26.4%)  * Gene: MSH3, Variant: Frameshift - LOF, Biologically Relevant, p.K383fs (Allele Frequency - 16.2%)  * Gene: HNF1A, Variant: Frameshift - LOF, Biologically Relevant, p.P291fs (Allele Frequency - 10.3%)  * Gene: PIK3R2, Variant: Missense variant - GOF, Biologically Relevant, p.G373R (Allele Frequency - 8.8%)  * Gene: PTEN, Variant: Frameshift - LOF, Biologically Relevant, p.K267fs (Allele Frequency - 8.6%)  * Gene: PTEN, Variant: Frameshift - LOF, Biologically Relevant, p.C250fs (Allele Frequency - 6%)  * Gene: FBXW7, Variant: Stop gain - LOF, Biologically Relevant, p.R278*, Nonsense (Allele Frequency - 8.5%)  * Gene: PTCH1, Variant: Frameshift - LOF, Biologically Relevant, p.N4403ik (Allele Frequency - 8.3%)  * Gene: PTCH1, Variant: Stop gain - LOF, Biologically Relevant, p.Q728*, Nonsense (Allele Frequency - 6.6%)  * Gene: CNP0AG4, Variant: Stop gain - LOF, Biologically Relevant, p.R216*, Nonsense (Allele Frequency - 8.1%)  * Gene: CHD2, Variant: Frameshift - LOF, Biologically Relevant, p.V175fs (Allele Frequency - 7.9%)  * Gene: PIK3R1, Variant: Frameshift - LOF, Biologically Relevant, p.S460fs (Allele Frequency - 7.7%)  * Gene: PMS2, Variant: Frameshift - LOF, Biologically Relevant, p.D414fs (Allele Frequency - 7.6%)  * Gene: NOTCH1, Variant: Frameshift - GOF, Biologically Relevant, p.G7501jz (Allele Frequency - 7.6%)  * Gene: APC, Variant: Frameshift - LOF, Biologically Relevant, p.A759fs (Allele Frequency - 7.5%)  * Gene: TP53, Variant: Frameshift - LOF, Biologically Relevant, p.P152fs (Allele Frequency - 7.4%)  * Gene: ARID1A, Variant: Frameshift - LOF, Biologically Relevant, p.M7034ls (Allele Frequency - 7.3%)  *  Gene: ZFHX3, Variant: Frameshift - LOF, Biologically Relevant, p.D121fs (Allele Frequency - 6.8%)  * Gene: INPP4B, Variant: Frameshift - LOF, Biologically Relevant, p.N299fs (Allele Frequency - 6.5%)  * Gene: NBN, Variant: Frameshift - LOF, Biologically Relevant, p.R466fs (Allele Frequency - 6.5%)  * Gene: ACVR1B, Variant: Stop gain - LOF, Biologically Relevant, p.R485*, Nonsense (Allele Frequency - 6.5%)  * Gene: MAP2K4, Variant: Frameshift - LOF, Biologically Relevant, p.N233fs (Allele Frequency - 6.2%)  * Gene: NF1, Variant: Stop gain - LOF, Biologically Relevant, p.*, Nonsense (Allele Frequency - 6.2%)  * Biomarker: Microsatellite Instability, Status: high  * Biomarker: Tumor Mutation Dixon (51.6 Muts/Mb, Percentile: 99)  * Disease associated genes/variants with no reportable findings:     * BRAF    * KRAS    * NRAS  * 80 variants of unknown significance    ** End of copied information **    IMAGING DATA:   CT abdomen/pelvis 5/16/24:  IMPRESSION:  1.  Stable to mild interval enlargement of the distal transverse colonic mass, measuring 10.3 x 9.3 x 8.8 cm. There is invasion of and fistulization with an adjacent loop of jejunum, with passage of oral contrast from the jejunum into the colon at the level of the mass. The mass abuts the anterior abdominal wall without evidence of direct invasion. No evidence of bowel obstruction.  2. Mesenteric stranding and nodularity adjacent to the mass concerning for peritoneal carcinomatosis.  3. Increase in size and number of diffuse liver metastases from 04/30/2024.    LABORATORY DATA:    Last CBC w. Diff.:    Lab Results   Component Value Date/Time    WBC 29.1 (H) 06/17/2024 1245    NRBC  06/17/2024 1245      Comment:      Not Measured    RBC 3.82 (L) 06/17/2024 1245    HGB 8.9 (L) 06/17/2024 1245    HCT 28.9 (L) 06/17/2024 1245    MCV 76 (L) 06/17/2024 1245    MCH 23.3 (L) 06/17/2024 1245    MCHC 30.8 (L) 06/17/2024 1245    RDW 15.4 (H) 06/17/2024 1245     (H)  06/17/2024 1245    NEUTOPHILPCT 58.3 06/17/2024 1245    IGPCT 0.3 06/17/2024 1245    LYMPHOPCT 35.7 06/17/2024 1245    LYMPHOPCT 20.0 06/10/2024 0828    MONOPCT 5.2 06/17/2024 1245    MONOPCT 0.0 06/10/2024 0828    EOSPCT 0.3 06/17/2024 1245    EOSPCT 0.0 06/10/2024 0828    BASOPCT 0.2 06/17/2024 1245    BASOPCT 0.0 06/10/2024 0828    NEUTROABS 16.94 (H) 06/17/2024 1245    IGABSOL 0.09 06/17/2024 1245    LYMPHSABS 10.40 (H) 06/17/2024 1245    MONOSABS 1.52 (H) 06/17/2024 1245    EOSABS 0.09 06/17/2024 1245    EOSABS 0.00 06/10/2024 0828    BASOSABS 0.07 06/17/2024 1245    BASOSABS 0.00 06/10/2024 0828     Last CMP:     Lab Results   Component Value Date/Time    GLUCOSE 106 (H) 06/17/2024 1244     (L) 06/17/2024 1244    K 4.5 06/17/2024 1244    CL 95 (L) 06/17/2024 1244    CO2 27 06/17/2024 1244    ANIONGAP 15 06/17/2024 1244    BUN 12 06/17/2024 1244    CREATININE 0.57 06/17/2024 1244    EGFR >90 06/17/2024 1244    CALCIUM 8.4 (L) 06/17/2024 1244    ALBUMIN 3.1 (L) 06/17/2024 1244    ALKPHOS 335 (H) 06/17/2024 1244    PROT 5.8 (L) 06/17/2024 1244    AST 60 (H) 06/17/2024 1244    BILITOT 0.6 06/17/2024 1244    ALT 61 (H) 06/17/2024 1244       Carcinoembryonic AG   Date/Time Value Ref Range Status   05/20/2024 11:17 AM 19.1 ug/L Final   05/02/2024 04:04 PM 6.7 ug/L Final     Signatera:  5/20/24: 538.67 MTM/mL    All pertinent pathology, imaging, and labs were personally reviewed and interpreted in clinic. Findings as per HPI and EMR.    ASSESSMENT:  Yon Lawrence is a 48 y.o. male with Adenocarcinoma of transverse colon (Multi), Clinical: Stage IVC (cTX, cNX, cM1c).      Reviewed the diagnosis of colon adenocarcinoma and the above history with the patient.  Explained that the liver is the most common site of metastatic colorectal cancer involvement and 20 to 34% of patients will present with synchronous liver metastases, which is thought to be a more disseminated disease. I explained that in general,  metastatic colon cancer is treatable, not curable. However, we will aim for a long-term response to treatment. I explained that I would recommend starting with systemic therapy and reserve local therapies (radiation, surgery) for symptom control or for management of residual treated disease depending on response to systemic therapy.    With regard to systemic therapy, standard-of-care can include 5-FU and oxaliplatin and/or irinotecan +/- anti-VEGF or anti-EGFR therapy. However, since initial consultation, IHC revealed patient's tumor is dMMR with MLH1 and PMS2 loss and this was confirmed by NGS showing microsatellite instability and high tumor mutation burden as above. Spoke with patient in the interim since last visit and recommended single agent PD-1 blockade with pembrolizumab in place of FOLFOX as first-line therapy. Reviewed side effects of pembrolizumab in detail, including but not limited to diarrhea, skin rash, colitis, thyroiditis, and rare but life-threatening immune-related adverse events including but not limited to myocarditis, pneumonitis, encephalitis, hypophysitis. Emphasized recommendation to call with any new symptoms.     Explained at 6/17/24 follow up that we would anticipate it would take at least 2-3 cycles to see symptoms improve from the treatment. Would recommend restaging scan after 4 cycles.     With regard to travel, patient and wife asking about safety of traveling to [place] and [place]. Explained my main concern at this point is the risk of unexpected change in his health due to risk of colonic perforation and existing fistula. Emphasized importance of knowing where to seek emergency medical care if they are traveling out of town. With regard to the treatment with pembrolizumab, there is no specific contraindication to travel.      PLAN:  #Adenocarcinoma of transverse colon, stage IVC, dMMR, MLH1 promoter hypermethylation detected:  - [facility] reviewed as above  - Ian  ctDNA monitoring to help assess response to treatment  - MMR IHC shows dMMR with MLH1 and PMS2 loss, +MLH1 promoter hypermethylation - explained low likelihood of Rashid syndrome but would still recommend genetics referral given young age at diagnosis  - Restaging CT CAP after C4  - If inadequate response after 4 cycles, consider ipilimumab/nivolumab combination immune checkpoint blockade  - If no response after the above treatments, would then consider systemic chemotherapy  - Treatment at [place] in 1 week 6/24/24 for C2 pembrolizumab  - Telemedicine follow up with C3 at [place] 7/15/24    #Anemia:  - Most likely related to chronic ongoing blood loss from primary tumor   - Folate and vitamin B12 normal on 6/17/24  - Transferrin saturation low at 5% with normal ferritin 291, overall likely mixed picture of iron deficiency and anemia of chronic inflammation-- given mixed results and hemoglobin improved to 8.9, hold off on IV iron for     #Elevated alk phos, AST, ALT:  - Given the patient's overall burden of disease, with predominant alk phos elevation suggestive of an infiltrative process, suspect this is related to disease progression as patient only just started treatment 2 weeks ago  - Continue to monitor closely as the differential diagnosis includes immune-related adverse event (less likely based on pattern and timing) and vascular obstruction  - Repeat LFTs in 1 week at time of C2 pembrolizumab    #Frequent nocturnal urination:  - Urinalysis shows no evidence of UTI though significant bilirubin in the urine  - Referral to urology     #Leukocytosis:  - 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 Mckeon and at tumor board    #Early onset colorectal cancer:  - [facility] for germline testing due to young age at diagnosis, already referred for genetic counseling  - Recommend screening of  first-degree relatives with colonoscopy 10 years prior to the patient's age of diagnosis (no later than age 36)  - Patient declined referral to Young Adult Oncology program/oncofertility at this time      Billie Finn MD  Gastrointestinal Medical Oncologist   6/17/2024

## 2024-06-17 NOTE — PROGRESS NOTES
Patient here for FVU prior to cycle 2. Patient states he did well with cycle 1 he had diarrhea for 1 day and it resolved on its own. Will draw signatera today  Brenda KIM

## 2024-06-18 LAB
CEA SERPL-MCNC: 52.4 UG/L
CORTIS SERPL-MCNC: 26.6 UG/DL (ref 2.5–20)
FOLATE SERPL-MCNC: 17.4 NG/ML
HOLD SPECIMEN: NORMAL
SCAN RESULT: NORMAL
TSH SERPL-ACNC: 2.12 MIU/L (ref 0.44–3.98)
VIT B12 SERPL-MCNC: 436 PG/ML (ref 211–911)

## 2024-06-19 ENCOUNTER — APPOINTMENT (OUTPATIENT)
Dept: HEMATOLOGY/ONCOLOGY | Facility: CLINIC | Age: 49
End: 2024-06-19
Payer: COMMERCIAL

## 2024-06-19 LAB — ACTH PLAS-MCNC: 30.8 PG/ML (ref 7.2–63.3)

## 2024-06-20 ENCOUNTER — INFUSION (OUTPATIENT)
Dept: HEMATOLOGY/ONCOLOGY | Facility: CLINIC | Age: 49
End: 2024-06-20
Payer: COMMERCIAL

## 2024-06-20 VITALS
OXYGEN SATURATION: 100 % | RESPIRATION RATE: 18 BRPM | WEIGHT: 179.9 LBS | TEMPERATURE: 97.3 F | BODY MASS INDEX: 24.13 KG/M2 | DIASTOLIC BLOOD PRESSURE: 71 MMHG | HEART RATE: 102 BPM | SYSTOLIC BLOOD PRESSURE: 110 MMHG

## 2024-06-20 DIAGNOSIS — C18.4 ADENOCARCINOMA OF TRANSVERSE COLON (MULTI): Primary | ICD-10-CM

## 2024-06-20 LAB
ALBUMIN SERPL BCP-MCNC: 2.9 G/DL (ref 3.4–5)
ALP SERPL-CCNC: 274 U/L (ref 33–120)
ALT SERPL W P-5'-P-CCNC: 45 U/L (ref 10–52)
ANION GAP SERPL CALC-SCNC: 12 MMOL/L (ref 10–20)
AST SERPL W P-5'-P-CCNC: 50 U/L (ref 9–39)
BASOPHILS # BLD AUTO: 0.11 X10*3/UL (ref 0–0.1)
BASOPHILS NFR BLD AUTO: 0.3 %
BILIRUB DIRECT SERPL-MCNC: 0.2 MG/DL (ref 0–0.3)
BILIRUB SERPL-MCNC: 0.6 MG/DL (ref 0–1.2)
BUN SERPL-MCNC: 11 MG/DL (ref 6–23)
CALCIUM SERPL-MCNC: 8 MG/DL (ref 8.6–10.3)
CHLORIDE SERPL-SCNC: 98 MMOL/L (ref 98–107)
CO2 SERPL-SCNC: 26 MMOL/L (ref 21–32)
CREAT SERPL-MCNC: 0.53 MG/DL (ref 0.5–1.3)
EGFRCR SERPLBLD CKD-EPI 2021: >90 ML/MIN/1.73M*2
EOSINOPHIL # BLD AUTO: 0.08 X10*3/UL (ref 0–0.7)
EOSINOPHIL NFR BLD AUTO: 0.2 %
ERYTHROCYTE [DISTWIDTH] IN BLOOD BY AUTOMATED COUNT: 15.5 % (ref 11.5–14.5)
GLUCOSE SERPL-MCNC: 114 MG/DL (ref 74–99)
HCT VFR BLD AUTO: 27.2 % (ref 41–52)
HGB BLD-MCNC: 8.3 G/DL (ref 13.5–17.5)
IMM GRANULOCYTES # BLD AUTO: 0.24 X10*3/UL (ref 0–0.7)
IMM GRANULOCYTES NFR BLD AUTO: 0.7 % (ref 0–0.9)
LYMPHOCYTES # BLD AUTO: 11.69 X10*3/UL (ref 1.2–4.8)
LYMPHOCYTES NFR BLD AUTO: 36.2 %
MCH RBC QN AUTO: 22.9 PG (ref 26–34)
MCHC RBC AUTO-ENTMCNC: 30.5 G/DL (ref 32–36)
MCV RBC AUTO: 75 FL (ref 80–100)
MONOCYTES # BLD AUTO: 1.64 X10*3/UL (ref 0.1–1)
MONOCYTES NFR BLD AUTO: 5.1 %
NEUTROPHILS # BLD AUTO: 18.54 X10*3/UL (ref 1.2–7.7)
NEUTROPHILS NFR BLD AUTO: 57.5 %
NRBC BLD-RTO: 0 /100 WBCS (ref 0–0)
OVALOCYTES BLD QL SMEAR: NORMAL
PLATELET # BLD AUTO: 761 X10*3/UL (ref 150–450)
POTASSIUM SERPL-SCNC: 4.3 MMOL/L (ref 3.5–5.3)
PROT SERPL-MCNC: 5.5 G/DL (ref 6.4–8.2)
RBC # BLD AUTO: 3.63 X10*6/UL (ref 4.5–5.9)
RBC MORPH BLD: NORMAL
SODIUM SERPL-SCNC: 132 MMOL/L (ref 136–145)
WBC # BLD AUTO: 32.3 X10*3/UL (ref 4.4–11.3)

## 2024-06-20 PROCEDURE — 82248 BILIRUBIN DIRECT: CPT

## 2024-06-20 PROCEDURE — 80053 COMPREHEN METABOLIC PANEL: CPT

## 2024-06-20 PROCEDURE — 36591 DRAW BLOOD OFF VENOUS DEVICE: CPT

## 2024-06-20 PROCEDURE — 85025 COMPLETE CBC W/AUTO DIFF WBC: CPT

## 2024-06-20 PROCEDURE — 2500000004 HC RX 250 GENERAL PHARMACY W/ HCPCS (ALT 636 FOR OP/ED)

## 2024-06-20 RX ORDER — HEPARIN 100 UNIT/ML
500 SYRINGE INTRAVENOUS AS NEEDED
OUTPATIENT
Start: 2024-06-20

## 2024-06-20 RX ORDER — HEPARIN 100 UNIT/ML
500 SYRINGE INTRAVENOUS AS NEEDED
Status: DISCONTINUED | OUTPATIENT
Start: 2024-06-20 | End: 2024-06-20 | Stop reason: HOSPADM

## 2024-06-20 RX ORDER — HEPARIN SODIUM,PORCINE/PF 10 UNIT/ML
50 SYRINGE (ML) INTRAVENOUS AS NEEDED
OUTPATIENT
Start: 2024-06-20

## 2024-06-20 RX ORDER — HEPARIN 100 UNIT/ML
SYRINGE INTRAVENOUS
Status: COMPLETED
Start: 2024-06-20 | End: 2024-06-20

## 2024-06-20 ASSESSMENT — PAIN SCALES - GENERAL: PAINLEVEL: 2

## 2024-06-21 ENCOUNTER — APPOINTMENT (OUTPATIENT)
Dept: HEMATOLOGY/ONCOLOGY | Facility: CLINIC | Age: 49
End: 2024-06-21
Payer: COMMERCIAL

## 2024-06-21 ENCOUNTER — APPOINTMENT (OUTPATIENT)
Dept: HEMATOLOGY/ONCOLOGY | Facility: HOSPITAL | Age: 49
End: 2024-06-21
Payer: COMMERCIAL

## 2024-06-24 ENCOUNTER — INFUSION (OUTPATIENT)
Dept: HEMATOLOGY/ONCOLOGY | Facility: CLINIC | Age: 49
End: 2024-06-24
Payer: COMMERCIAL

## 2024-06-24 VITALS
TEMPERATURE: 97.3 F | SYSTOLIC BLOOD PRESSURE: 114 MMHG | OXYGEN SATURATION: 99 % | WEIGHT: 179.01 LBS | RESPIRATION RATE: 18 BRPM | HEART RATE: 107 BPM | BODY MASS INDEX: 24.01 KG/M2 | DIASTOLIC BLOOD PRESSURE: 74 MMHG

## 2024-06-24 DIAGNOSIS — D50.0 IRON DEFICIENCY ANEMIA DUE TO CHRONIC BLOOD LOSS: ICD-10-CM

## 2024-06-24 DIAGNOSIS — D50.0 IRON DEFICIENCY ANEMIA DUE TO CHRONIC BLOOD LOSS: Primary | ICD-10-CM

## 2024-06-24 DIAGNOSIS — C18.4 ADENOCARCINOMA OF TRANSVERSE COLON (MULTI): ICD-10-CM

## 2024-06-24 PROBLEM — D64.9 ANEMIA: Status: ACTIVE | Noted: 2024-06-24

## 2024-06-24 PROCEDURE — 96413 CHEMO IV INFUSION 1 HR: CPT

## 2024-06-24 PROCEDURE — 2500000004 HC RX 250 GENERAL PHARMACY W/ HCPCS (ALT 636 FOR OP/ED): Mod: JZ | Performed by: INTERNAL MEDICINE

## 2024-06-24 PROCEDURE — 2500000004 HC RX 250 GENERAL PHARMACY W/ HCPCS (ALT 636 FOR OP/ED)

## 2024-06-24 RX ORDER — HEPARIN SODIUM,PORCINE/PF 10 UNIT/ML
50 SYRINGE (ML) INTRAVENOUS AS NEEDED
Status: CANCELLED | OUTPATIENT
Start: 2024-06-24

## 2024-06-24 RX ORDER — FAMOTIDINE 10 MG/ML
20 INJECTION INTRAVENOUS ONCE AS NEEDED
Status: CANCELLED | OUTPATIENT
Start: 2024-06-24

## 2024-06-24 RX ORDER — EPINEPHRINE 0.3 MG/.3ML
0.3 INJECTION SUBCUTANEOUS EVERY 5 MIN PRN
Status: CANCELLED | OUTPATIENT
Start: 2024-06-24

## 2024-06-24 RX ORDER — FAMOTIDINE 10 MG/ML
20 INJECTION INTRAVENOUS ONCE AS NEEDED
Status: CANCELLED | OUTPATIENT
Start: 2024-06-25

## 2024-06-24 RX ORDER — DIPHENHYDRAMINE HYDROCHLORIDE 50 MG/ML
50 INJECTION INTRAMUSCULAR; INTRAVENOUS AS NEEDED
Status: CANCELLED | OUTPATIENT
Start: 2024-06-25

## 2024-06-24 RX ORDER — HEPARIN 100 UNIT/ML
500 SYRINGE INTRAVENOUS AS NEEDED
Status: DISCONTINUED | OUTPATIENT
Start: 2024-06-24 | End: 2024-06-24 | Stop reason: HOSPADM

## 2024-06-24 RX ORDER — HEPARIN 100 UNIT/ML
SYRINGE INTRAVENOUS
Status: COMPLETED
Start: 2024-06-24 | End: 2024-06-24

## 2024-06-24 RX ORDER — HEPARIN 100 UNIT/ML
500 SYRINGE INTRAVENOUS AS NEEDED
Status: CANCELLED | OUTPATIENT
Start: 2024-06-24

## 2024-06-24 RX ORDER — ALBUTEROL SULFATE 0.83 MG/ML
3 SOLUTION RESPIRATORY (INHALATION) AS NEEDED
Status: CANCELLED | OUTPATIENT
Start: 2024-06-24

## 2024-06-24 RX ORDER — ALBUTEROL SULFATE 0.83 MG/ML
3 SOLUTION RESPIRATORY (INHALATION) AS NEEDED
Status: CANCELLED | OUTPATIENT
Start: 2024-06-25

## 2024-06-24 RX ORDER — DIPHENHYDRAMINE HYDROCHLORIDE 50 MG/ML
50 INJECTION INTRAMUSCULAR; INTRAVENOUS AS NEEDED
Status: CANCELLED | OUTPATIENT
Start: 2024-06-24

## 2024-06-24 RX ORDER — EPINEPHRINE 0.3 MG/.3ML
0.3 INJECTION SUBCUTANEOUS EVERY 5 MIN PRN
Status: CANCELLED | OUTPATIENT
Start: 2024-06-25

## 2024-06-24 ASSESSMENT — PAIN SCALES - GENERAL: PAINLEVEL: 0-NO PAIN

## 2024-06-25 ENCOUNTER — INFUSION (OUTPATIENT)
Dept: HEMATOLOGY/ONCOLOGY | Facility: CLINIC | Age: 49
End: 2024-06-25
Payer: COMMERCIAL

## 2024-06-25 VITALS — DIASTOLIC BLOOD PRESSURE: 74 MMHG | HEART RATE: 96 BPM | SYSTOLIC BLOOD PRESSURE: 116 MMHG

## 2024-06-25 DIAGNOSIS — C18.4 ADENOCARCINOMA OF TRANSVERSE COLON (MULTI): ICD-10-CM

## 2024-06-25 DIAGNOSIS — D50.0 IRON DEFICIENCY ANEMIA DUE TO CHRONIC BLOOD LOSS: ICD-10-CM

## 2024-06-25 LAB
AP SUMMARY REPORT: NORMAL
SCAN RESULT: NORMAL

## 2024-06-25 PROCEDURE — 2500000004 HC RX 250 GENERAL PHARMACY W/ HCPCS (ALT 636 FOR OP/ED): Performed by: INTERNAL MEDICINE

## 2024-06-25 PROCEDURE — 96374 THER/PROPH/DIAG INJ IV PUSH: CPT

## 2024-06-25 RX ORDER — HEPARIN 100 UNIT/ML
500 SYRINGE INTRAVENOUS AS NEEDED
Status: DISCONTINUED | OUTPATIENT
Start: 2024-06-25 | End: 2024-06-25 | Stop reason: HOSPADM

## 2024-06-25 RX ORDER — FAMOTIDINE 10 MG/ML
20 INJECTION INTRAVENOUS ONCE AS NEEDED
Status: CANCELLED | OUTPATIENT
Start: 2024-06-26

## 2024-06-25 RX ORDER — HEPARIN 100 UNIT/ML
500 SYRINGE INTRAVENOUS AS NEEDED
Status: CANCELLED | OUTPATIENT
Start: 2024-06-25

## 2024-06-25 RX ORDER — HEPARIN SODIUM,PORCINE/PF 10 UNIT/ML
50 SYRINGE (ML) INTRAVENOUS AS NEEDED
Status: CANCELLED | OUTPATIENT
Start: 2024-06-25

## 2024-06-25 RX ORDER — EPINEPHRINE 0.3 MG/.3ML
0.3 INJECTION SUBCUTANEOUS EVERY 5 MIN PRN
Status: CANCELLED | OUTPATIENT
Start: 2024-06-26

## 2024-06-25 RX ORDER — ALBUTEROL SULFATE 0.83 MG/ML
3 SOLUTION RESPIRATORY (INHALATION) AS NEEDED
Status: CANCELLED | OUTPATIENT
Start: 2024-06-26

## 2024-06-25 RX ORDER — DIPHENHYDRAMINE HYDROCHLORIDE 50 MG/ML
50 INJECTION INTRAMUSCULAR; INTRAVENOUS AS NEEDED
Status: CANCELLED | OUTPATIENT
Start: 2024-06-26

## 2024-06-25 ASSESSMENT — PAIN SCALES - GENERAL: PAINLEVEL: 0-NO PAIN

## 2024-06-27 ENCOUNTER — INFUSION (OUTPATIENT)
Dept: HEMATOLOGY/ONCOLOGY | Facility: CLINIC | Age: 49
End: 2024-06-27
Payer: COMMERCIAL

## 2024-06-27 VITALS
SYSTOLIC BLOOD PRESSURE: 108 MMHG | BODY MASS INDEX: 24.13 KG/M2 | WEIGHT: 179.9 LBS | OXYGEN SATURATION: 100 % | TEMPERATURE: 97.2 F | DIASTOLIC BLOOD PRESSURE: 76 MMHG | RESPIRATION RATE: 18 BRPM | HEART RATE: 96 BPM

## 2024-06-27 DIAGNOSIS — C18.4 ADENOCARCINOMA OF TRANSVERSE COLON (MULTI): ICD-10-CM

## 2024-06-27 DIAGNOSIS — D50.0 IRON DEFICIENCY ANEMIA DUE TO CHRONIC BLOOD LOSS: ICD-10-CM

## 2024-06-27 PROCEDURE — 96374 THER/PROPH/DIAG INJ IV PUSH: CPT

## 2024-06-27 PROCEDURE — 2500000004 HC RX 250 GENERAL PHARMACY W/ HCPCS (ALT 636 FOR OP/ED): Performed by: INTERNAL MEDICINE

## 2024-06-27 PROCEDURE — 2500000004 HC RX 250 GENERAL PHARMACY W/ HCPCS (ALT 636 FOR OP/ED)

## 2024-06-27 RX ORDER — DIPHENHYDRAMINE HYDROCHLORIDE 50 MG/ML
50 INJECTION INTRAMUSCULAR; INTRAVENOUS AS NEEDED
OUTPATIENT
Start: 2024-06-28

## 2024-06-27 RX ORDER — ALBUTEROL SULFATE 0.83 MG/ML
3 SOLUTION RESPIRATORY (INHALATION) AS NEEDED
OUTPATIENT
Start: 2024-06-28

## 2024-06-27 RX ORDER — HEPARIN 100 UNIT/ML
500 SYRINGE INTRAVENOUS AS NEEDED
Status: DISCONTINUED | OUTPATIENT
Start: 2024-06-27 | End: 2024-06-27 | Stop reason: HOSPADM

## 2024-06-27 RX ORDER — EPINEPHRINE 0.3 MG/.3ML
0.3 INJECTION SUBCUTANEOUS EVERY 5 MIN PRN
OUTPATIENT
Start: 2024-06-28

## 2024-06-27 RX ORDER — HEPARIN 100 UNIT/ML
500 SYRINGE INTRAVENOUS AS NEEDED
OUTPATIENT
Start: 2024-06-27

## 2024-06-27 RX ORDER — HEPARIN SODIUM,PORCINE/PF 10 UNIT/ML
50 SYRINGE (ML) INTRAVENOUS AS NEEDED
OUTPATIENT
Start: 2024-06-27

## 2024-06-27 RX ORDER — FAMOTIDINE 10 MG/ML
20 INJECTION INTRAVENOUS ONCE AS NEEDED
OUTPATIENT
Start: 2024-06-28

## 2024-06-27 ASSESSMENT — PAIN SCALES - GENERAL: PAINLEVEL: 0-NO PAIN

## 2024-06-28 ENCOUNTER — APPOINTMENT (OUTPATIENT)
Dept: HEMATOLOGY/ONCOLOGY | Facility: CLINIC | Age: 49
End: 2024-06-28
Payer: COMMERCIAL

## 2024-07-01 ENCOUNTER — INFUSION (OUTPATIENT)
Dept: HEMATOLOGY/ONCOLOGY | Facility: CLINIC | Age: 49
End: 2024-07-01
Payer: COMMERCIAL

## 2024-07-01 ENCOUNTER — OFFICE VISIT (OUTPATIENT)
Dept: HEMATOLOGY/ONCOLOGY | Facility: CLINIC | Age: 49
End: 2024-07-01
Payer: COMMERCIAL

## 2024-07-01 ENCOUNTER — APPOINTMENT (OUTPATIENT)
Dept: HEMATOLOGY/ONCOLOGY | Facility: CLINIC | Age: 49
End: 2024-07-01
Payer: COMMERCIAL

## 2024-07-01 VITALS
HEART RATE: 104 BPM | RESPIRATION RATE: 18 BRPM | OXYGEN SATURATION: 99 % | WEIGHT: 178.79 LBS | TEMPERATURE: 97.9 F | DIASTOLIC BLOOD PRESSURE: 70 MMHG | BODY MASS INDEX: 23.98 KG/M2 | SYSTOLIC BLOOD PRESSURE: 114 MMHG

## 2024-07-01 VITALS
DIASTOLIC BLOOD PRESSURE: 76 MMHG | OXYGEN SATURATION: 97 % | RESPIRATION RATE: 18 BRPM | SYSTOLIC BLOOD PRESSURE: 113 MMHG | BODY MASS INDEX: 23.77 KG/M2 | WEIGHT: 177.25 LBS | TEMPERATURE: 97.9 F | HEART RATE: 98 BPM

## 2024-07-01 DIAGNOSIS — C18.4 ADENOCARCINOMA OF TRANSVERSE COLON (MULTI): Primary | ICD-10-CM

## 2024-07-01 DIAGNOSIS — D50.0 IRON DEFICIENCY ANEMIA DUE TO CHRONIC BLOOD LOSS: ICD-10-CM

## 2024-07-01 DIAGNOSIS — C18.4 ADENOCARCINOMA OF TRANSVERSE COLON (MULTI): ICD-10-CM

## 2024-07-01 LAB — SCAN RESULT: NORMAL

## 2024-07-01 PROCEDURE — 99214 OFFICE O/P EST MOD 30 MIN: CPT | Performed by: INTERNAL MEDICINE

## 2024-07-01 PROCEDURE — 96374 THER/PROPH/DIAG INJ IV PUSH: CPT

## 2024-07-01 PROCEDURE — 2500000004 HC RX 250 GENERAL PHARMACY W/ HCPCS (ALT 636 FOR OP/ED): Performed by: INTERNAL MEDICINE

## 2024-07-01 RX ORDER — DIPHENHYDRAMINE HYDROCHLORIDE 50 MG/ML
50 INJECTION INTRAMUSCULAR; INTRAVENOUS AS NEEDED
Status: CANCELLED | OUTPATIENT
Start: 2024-07-04

## 2024-07-01 RX ORDER — ALBUTEROL SULFATE 0.83 MG/ML
3 SOLUTION RESPIRATORY (INHALATION) AS NEEDED
Status: CANCELLED | OUTPATIENT
Start: 2024-07-04

## 2024-07-01 RX ORDER — DIPHENHYDRAMINE HYDROCHLORIDE 50 MG/ML
50 INJECTION INTRAMUSCULAR; INTRAVENOUS AS NEEDED
OUTPATIENT
Start: 2024-07-12

## 2024-07-01 RX ORDER — PROCHLORPERAZINE EDISYLATE 5 MG/ML
10 INJECTION INTRAMUSCULAR; INTRAVENOUS EVERY 6 HOURS PRN
OUTPATIENT
Start: 2024-07-12

## 2024-07-01 RX ORDER — EPINEPHRINE 0.3 MG/.3ML
0.3 INJECTION SUBCUTANEOUS EVERY 5 MIN PRN
OUTPATIENT
Start: 2024-07-12

## 2024-07-01 RX ORDER — FAMOTIDINE 10 MG/ML
20 INJECTION INTRAVENOUS ONCE AS NEEDED
Status: CANCELLED | OUTPATIENT
Start: 2024-07-04

## 2024-07-01 RX ORDER — FAMOTIDINE 10 MG/ML
20 INJECTION INTRAVENOUS ONCE AS NEEDED
OUTPATIENT
Start: 2024-07-12

## 2024-07-01 RX ORDER — HEPARIN 100 UNIT/ML
500 SYRINGE INTRAVENOUS AS NEEDED
Status: DISCONTINUED | OUTPATIENT
Start: 2024-07-01 | End: 2024-07-01 | Stop reason: HOSPADM

## 2024-07-01 RX ORDER — PROCHLORPERAZINE MALEATE 10 MG
10 TABLET ORAL EVERY 6 HOURS PRN
OUTPATIENT
Start: 2024-07-12

## 2024-07-01 RX ORDER — ALBUTEROL SULFATE 0.83 MG/ML
3 SOLUTION RESPIRATORY (INHALATION) AS NEEDED
OUTPATIENT
Start: 2024-07-12

## 2024-07-01 RX ORDER — EPINEPHRINE 0.3 MG/.3ML
0.3 INJECTION SUBCUTANEOUS EVERY 5 MIN PRN
Status: CANCELLED | OUTPATIENT
Start: 2024-07-04

## 2024-07-01 RX ORDER — HEPARIN 100 UNIT/ML
500 SYRINGE INTRAVENOUS AS NEEDED
Status: CANCELLED | OUTPATIENT
Start: 2024-07-01

## 2024-07-01 RX ORDER — HEPARIN SODIUM,PORCINE/PF 10 UNIT/ML
50 SYRINGE (ML) INTRAVENOUS AS NEEDED
Status: CANCELLED | OUTPATIENT
Start: 2024-07-01

## 2024-07-01 ASSESSMENT — PAIN SCALES - GENERAL
PAINLEVEL: 2
PAINLEVEL: 2

## 2024-07-01 NOTE — PROGRESS NOTES
MEDICAL ONCOLOGY INITIAL OUTPATIENT CONSULTATION NOTE  [facility], Gastrointestinal Oncology    Patient Name:  Yon Lawrence  MRN:  [id number]  :  1975    Referring Provider: Billie Finn MD  Care Team: Patient Care Team:  Pedro Bazzi MD as PCP - General  Pedro Bazzi MD as PCP - [facility] ACO PCP  Billie Finn MD as Consulting Physician (Hematology and Oncology)  Date of Service: 2024     IDENTIFYING DATA:   Cancer Staging   Adenocarcinoma of transverse colon (Multi)  Staging form: Colon and Rectum, AJCC 8th Edition  - Clinical: Stage IVC (cTX, cNX, cM1c) - Signed by Billie Finn MD on 2024    Yon Lawrence is a 48 y.o.-year-old with metastatic distal transverse colon adenocarcinoma, dMMR, MLH1 promoter hypermethylation detected    INTERVAL HISTORY:  Patient received C2 pembrolizumab on 24, and presents for follow up in anticipation of C3 on 24.     Since last visit, started on IV iron supplementation due to mixed iron deficiency anemia/anemia of chronic disease. He states he has been experiencing more energy compared with prior. He did have a few episodes of diarrhea starting on 24, but only once per day, watery/liquid stool. Total of 3-4 times, most recently yesterday and this morning but no more than once/day. He is not sure if it is related to something he ate.    Continues to experience intermittent LUQ pain, same location with slightly increased intensity yesterday 24, but now improved as of this morning without intervention.    ONCOLOGY HISTORY:  24: CT abdomen/pelvis showed a large necrotic mass occupying the left upper quadrant of the abdomen, likely arising from the distal transverse colon, as well as multiple mesenteric nodules consistent with metastatic lesions and multiple liver mass lesions consistent with metastasis  24: colonoscopy with malignant distal transverse colon mass measuring 10cm, invasive  adenocarcinoma, MMR IHC pending  5/7/24: CT chest with no intrathoracic metastasis  5/16/24: CT abdomen/pelvis with stable to mild interval enlargement of the distal transverse colonic mass measuring 10.3 x 9.3 x 8.8 cm with invasion of and fistulization with an adjacent loop of jejunum with passage of oral contrast from the jejunum into the colon at the level of the mass. Mass noted to abut the anterior abdominal wall without evidence of direct invasion, mesenteric stranding and nodularity concerning for peritoneal carcinomatosis, increase in size and number of diffuse liver metastases from 4/30/24    REGIMEN #1: pembrolizumab  6/3/24: C1D1 pembrolizumab  6/24/24: C2D1 pembrolizumab    PAST MEDICAL HISTORY:  No past medical history on file.    PAST SURGICAL HISTORY:  Past Surgical History:   Procedure Laterality Date    COLONOSCOPY  05/06/2024    DR VALDEZ    OTHER SURGICAL HISTORY  06/07/2022    Meniscectomy    TUNNELED VENOUS PORT PLACEMENT  05/31/2024    PLACEMENT OF TUNNELED CENTR VENOUS CATHETER W/SQ PORT/ DR VALDEZ       ALLERGIES:  No Known Allergies    MEDICATIONS:  Current Outpatient Medications   Medication Instructions    prochlorperazine (COMPAZINE) 10 mg, oral, Every 6 hours PRN       SOCIAL HISTORY:  Social History     Tobacco Use    Smoking status: Never    Smokeless tobacco: Never   Substance Use Topics    Alcohol use: Never     Social History     Social History Narrative    Lives with wife and 2 children. Works as a  at [employer]. Coaches basketball over the summer.          FAMILY HISTORY:  Family History   Problem Relation Name Age of Onset    Diabetes type II Mother      Hypertension Father      Heart disease Father          CABG    Macular degeneration Father      Ulcers Father      Breast cancer Mother's Sister  50 - 59    Cancer Father's Sister          rare cancer unknown type    Cancer Paternal Grandmother      Stroke Paternal Grandfather      Other  (MCAD) Daughter      No Known Problems Son          REVIEW OF SYSTEMS:  10-pt ROS reviewed and negative except as mentioned above.    PERFORMANCE STATUS:  Karnofsky Performance Score/ECO, Able to carry on normal activity; minor signs or symptoms of disease (ECOG equivalent 0)    PHYSICAL EXAMINATION:  /76   Pulse 98   Temp 36.6 °C (97.9 °F)   Resp 18   Wt 80.4 kg (177 lb 4 oz)   SpO2 97%   BMI 23.77 kg/m²    Wt Readings from Last 5 Encounters:   24 80.4 kg (177 lb 4 oz)   24 81.6 kg (179 lb 14.3 oz)   24 81.2 kg (179 lb 0.2 oz)   24 81.6 kg (179 lb 14.3 oz)   24 82.3 kg (181 lb 7 oz)     GEN: Well-appearing, thin, no acute distress, breathing comfortably on room air.  HEENT: Moist mucous membranes, EOMI intact.   LYMPH: No cervical or supraclavicular adenopathy  ABD: Firm to palpation at L upper quadrant, non-tender, non-distended.    EXT: Warm and well-perfused, no lower extremity edema.  NEURO: Grossly intact. No focal deficits.  SKIN: No rashes  PSYCH: Appropriate mood and affect    PATHOLOGY:     Surgical pathology 24:  FINAL DIAGNOSIS   A. COLON - TRANSVERSE, MASS BIOPSY:   Invasive adenocarcinoma     MMR studies pending; separate report will follow     : The images have been reviewed at the GI consensus conference on May 15, 2024 and diagnosis of adenocarcinoma is confirmed.  Dr. MONROE Asif has been notified via secure chat on 5/15/2024 at 11:51 am     B. RECTUM POLYP, POLYPECTOMY:   Hyperplastic polyp   Electronically signed by Servando Lawton MD on 5/15/2024 at 1155        By the signature on this report, the individual or group listed as making the Final Interpretation/Diagnosis certifies that they have reviewed this case.    Addendum            MISMATCH REPAIR PROTEIN EXPRESSION                 Protein:  Result                MLH-1:   Expression Absent                                            PMS-2:  Expression Absent                                       MSH-2:  Expression Present                                   MSH-6:  Expression Present                                       Neoplasm with combined loss of MLH-1/PMS-2 mismatch repair protein expression.     Assessment for BRAF mutation or methylation of the MLH1 promoter status has been ordered.     The loss of MLH-2/PMS-2 protein expression has been identified (normal internal controls stain appropriately).  Loss of expression of the MLH-1 gene and hence protein expression, is often associated with the concurrent loss of protein expression in PMS-2.     Reference Range: A result is reported as Expression Present if any tumor nuclei are stained positive.     MLH1 Promoter Methylation Result       Specimen:  FFPE [id number]  Estimated Tumor Content: 40%        RESULT: Positive for MLH1 Promoter Methylation       NGS:   ** Copied from [address] [place] [address] on 18-Jun-2024 12:01PM by Billie Finn **    Test Name: Seeo (XT.V4)  Laboratory Name: [facility]  Specimen Source: Colon, transverse  Collection Date: 06-May-2024  Cancer Type: Invasive adenocarcinoma  Report Id: [id number]    Result Interpretation:  * Lab Notes: Tumor Percentage: 30%  * Germline Notes: No potential germline variants were found in the limited set of genes on which we report.    Molecular Findings:  * Gene: BRCA2, Variant: Frameshift - LOF, Potentially Actionable, p.I605fs (Allele Frequency - 7.9%)  * Gene: [place]STON, Variant: Frameshift - LOF, Potentially Actionable, p.F357fs (Allele Frequency - 7.6%)  * Gene: PIK3CA, Variant: Missense variant (exon 1) - GOF, Potentially Actionable, p.R88Q (Allele Frequency - 6.5%)  * Gene: PIK3CA, Variant: Missense variant (exon 20) - GOF, Potentially Actionable, p.Y1530N (Allele Frequency - 6.4%)  * Gene: CTNNB1, Variant: Missense variant - GOF, Biologically Relevant, p.T41A (Allele Frequency - 28.5%)  * Gene: MLH1, Variant: Frameshift - LOF, Biologically Relevant, p.R9fs (Allele Frequency -  26.4%)  * Gene: MSH3, Variant: Frameshift - LOF, Biologically Relevant, p.K383fs (Allele Frequency - 16.2%)  * Gene: HNF1A, Variant: Frameshift - LOF, Biologically Relevant, p.P291fs (Allele Frequency - 10.3%)  * Gene: PIK3R2, Variant: Missense variant - GOF, Biologically Relevant, p.G373R (Allele Frequency - 8.8%)  * Gene: PTEN, Variant: Frameshift - LOF, Biologically Relevant, p.K267fs (Allele Frequency - 8.6%)  * Gene: PTEN, Variant: Frameshift - LOF, Biologically Relevant, p.C250fs (Allele Frequency - 6%)  * Gene: FBXW7, Variant: Stop gain - LOF, Biologically Relevant, p.R278*, Nonsense (Allele Frequency - 8.5%)  * Gene: PTCH1, Variant: Frameshift - LOF, Biologically Relevant, p.J6150gj (Allele Frequency - 8.3%)  * Gene: PTCH1, Variant: Stop gain - LOF, Biologically Relevant, p.Q728*, Nonsense (Allele Frequency - 6.6%)  * Gene: AGE4AT0, Variant: Stop gain - LOF, Biologically Relevant, p.R216*, Nonsense (Allele Frequency - 8.1%)  * Gene: CHD2, Variant: Frameshift - LOF, Biologically Relevant, p.V175fs (Allele Frequency - 7.9%)  * Gene: PIK3R1, Variant: Frameshift - LOF, Biologically Relevant, p.S460fs (Allele Frequency - 7.7%)  * Gene: PMS2, Variant: Frameshift - LOF, Biologically Relevant, p.D414fs (Allele Frequency - 7.6%)  * Gene: NOTCH1, Variant: Frameshift - GOF, Biologically Relevant, p.Y9731da (Allele Frequency - 7.6%)  * Gene: APC, Variant: Frameshift - LOF, Biologically Relevant, p.A759fs (Allele Frequency - 7.5%)  * Gene: TP53, Variant: Frameshift - LOF, Biologically Relevant, p.P152fs (Allele Frequency - 7.4%)  * Gene: ARID1A, Variant: Frameshift - LOF, Biologically Relevant, p.C5128iv (Allele Frequency - 7.3%)  * Gene: ZFHX3, Variant: Frameshift - LOF, Biologically Relevant, p.D121fs (Allele Frequency - 6.8%)  * Gene: INPP4B, Variant: Frameshift - LOF, Biologically Relevant, p.N299fs (Allele Frequency - 6.5%)  * Gene: NBN, Variant: Frameshift - LOF, Biologically Relevant, p.R466fs (Allele Frequency -  6.5%)  * Gene: ACVR1B, Variant: Stop gain - LOF, Biologically Relevant, p.R485*, Nonsense (Allele Frequency - 6.5%)  * Gene: MAP2K4, Variant: Frameshift - LOF, Biologically Relevant, p.N233fs (Allele Frequency - 6.2%)  * Gene: NF1, Variant: Stop gain - LOF, Biologically Relevant, p.*, Nonsense (Allele Frequency - 6.2%)  * Biomarker: Microsatellite Instability, Status: high  * Biomarker: Tumor Mutation Brutus (51.6 Muts/Mb, Percentile: 99)  * Disease associated genes/variants with no reportable findings:     * BRAF    * KRAS    * NRAS  * 80 variants of unknown significance    ** End of copied information **    IMAGING DATA:   CT abdomen/pelvis 5/16/24:  IMPRESSION:  1.  Stable to mild interval enlargement of the distal transverse colonic mass, measuring 10.3 x 9.3 x 8.8 cm. There is invasion of and fistulization with an adjacent loop of jejunum, with passage of oral contrast from the jejunum into the colon at the level of the mass. The mass abuts the anterior abdominal wall without evidence of direct invasion. No evidence of bowel obstruction.  2. Mesenteric stranding and nodularity adjacent to the mass concerning for peritoneal carcinomatosis.  3. Increase in size and number of diffuse liver metastases from 04/30/2024.    LABORATORY DATA:    Last CBC w. Diff.:    Lab Results   Component Value Date/Time    WBC 32.3 (H) 06/20/2024 0923    NRBC 0.0 06/20/2024 0923    RBC 3.63 (L) 06/20/2024 0923    HGB 8.3 (L) 06/20/2024 0923    HCT 27.2 (L) 06/20/2024 0923    MCV 75 (L) 06/20/2024 0923    MCH 22.9 (L) 06/20/2024 0923    MCHC 30.5 (L) 06/20/2024 0923    RDW 15.5 (H) 06/20/2024 0923     (H) 06/20/2024 0923    NEUTOPHILPCT 57.5 06/20/2024 0923    IGPCT 0.7 06/20/2024 0923    LYMPHOPCT 36.2 06/20/2024 0923    LYMPHOPCT 20.0 06/10/2024 0828    MONOPCT 5.1 06/20/2024 0923    MONOPCT 0.0 06/10/2024 0828    EOSPCT 0.2 06/20/2024 0923    EOSPCT 0.0 06/10/2024 0828    BASOPCT 0.3 06/20/2024 0923    BASOPCT 0.0  06/10/2024 0828    NEUTROABS 18.54 (H) 06/20/2024 0923    IGABSOL 0.24 06/20/2024 0923    LYMPHSABS 11.69 (H) 06/20/2024 0923    MONOSABS 1.64 (H) 06/20/2024 0923    EOSABS 0.08 06/20/2024 0923    EOSABS 0.00 06/10/2024 0828    BASOSABS 0.11 (H) 06/20/2024 0923    BASOSABS 0.00 06/10/2024 0828     Last CMP:     Lab Results   Component Value Date/Time    GLUCOSE 114 (H) 06/20/2024 0923     (L) 06/20/2024 0923    K 4.3 06/20/2024 0923    CL 98 06/20/2024 0923    CO2 26 06/20/2024 0923    ANIONGAP 12 06/20/2024 0923    BUN 11 06/20/2024 0923    CREATININE 0.53 06/20/2024 0923    EGFR >90 06/20/2024 0923    CALCIUM 8.0 (L) 06/20/2024 0923    ALBUMIN 2.9 (L) 06/20/2024 0923    ALKPHOS 274 (H) 06/20/2024 0923    PROT 5.5 (L) 06/20/2024 0923    AST 50 (H) 06/20/2024 0923    BILITOT 0.6 06/20/2024 0923    ALT 45 06/20/2024 0923       Carcinoembryonic AG   Date/Time Value Ref Range Status   06/17/2024 12:44 PM 52.4 ug/L Final   05/20/2024 11:17 AM 19.1 ug/L Final   05/02/2024 04:04 PM 6.7 ug/L Final     Signatera:  5/20/24: 538.67 MTM/mL    All pertinent pathology, imaging, and labs were personally reviewed and interpreted in clinic. Findings as per HPI and EMR.    ASSESSMENT:  Yon Lawrence is a 48 y.o. male with Adenocarcinoma of transverse colon (Multi), Clinical: Stage IVC (cTX, cNX, cM1c).      Reviewed the diagnosis of colon adenocarcinoma and the above history with the patient.  Explained that the liver is the most common site of metastatic colorectal cancer involvement and 20 to 34% of patients will present with synchronous liver metastases, which is thought to be a more disseminated disease. I explained that in general, metastatic colon cancer is treatable, not curable. However, we will aim for a long-term response to treatment. I explained that I would recommend starting with systemic therapy and reserve local therapies (radiation, surgery) for symptom control or for management of residual treated disease  depending on response to systemic therapy.    With regard to systemic therapy, standard-of-care can include 5-FU and oxaliplatin and/or irinotecan +/- anti-VEGF or anti-EGFR therapy. However, since initial consultation, IHC revealed patient's tumor is dMMR with MLH1 and PMS2 loss and this was confirmed by NGS showing microsatellite instability and high tumor mutation burden as above. Spoke with patient in the interim since last visit and recommended single agent PD-1 blockade with pembrolizumab in place of FOLFOX as first-line therapy. Reviewed side effects of pembrolizumab in detail, including but not limited to diarrhea, skin rash, colitis, thyroiditis, and rare but life-threatening immune-related adverse events including but not limited to myocarditis, pneumonitis, encephalitis, hypophysitis. Emphasized recommendation to call with any new symptoms.     Explained at 6/17/24 follow up that we would anticipate it would take at least 2-3 cycles to see symptoms improve from the treatment. Would recommend restaging scan after 4 cycles.     With regard to travel, patient and wife asking about safety of traveling to [place] and [place]. Explained my main concern at this point is the risk of unexpected change in his health due to risk of colonic perforation and existing fistula. Emphasized importance of knowing where to seek emergency medical care if they are traveling out of town. With regard to the treatment with pembrolizumab, there is no specific contraindication to travel.      PLAN:  #Adenocarcinoma of transverse colon, stage IVC, dMMR, MLH1 promoter hypermethylation detected, no BRAF mut detected:  - [facility] reviewed as above  - Sign[facility] ctDNA monitoring to help assess response to treatment  - MMR IHC shows dMMR with MLH1 and PMS2 loss, +MLH1 promoter hypermethylation - explained low likelihood of Rashid syndrome but would still recommend genetics referral given young age at diagnosis  - Telemedicine  follow up with C3 at [place] 7/15/24  - Restaging CT CAP after C4 - schedule for ~8/21/24 with scan review 8/23 or 8/27 with Dr. Carrera  - If inadequate response after 4 cycles, consider ipilimumab/nivolumab combination immune checkpoint blockade  - If no response after single or dual checkpoint blockade, would then consider systemic chemotherapy    #Diarrhea:   - Suspect disease- and/or diet-related given only 1 episode per day  - Discussed signs/symptoms that could be concerning for immune-related colitis (increased frequency of bowel movements per day, associated blood in stool, worsening abdominal pain)  - Discussed signs/symptoms that could be concerning for bowel perforation - fevers, worsening leukocytosis, worsening abdominal pain    #Abdominal pain:  - LUQ pain with slight increased intensity compared with prior, ok to take acetaminophen 1000mg q6-8 hours as needed  - Patient to call if pain worsens    #Anemia:  - Most likely related to chronic ongoing blood loss from primary tumor   - Folate and vitamin B12 normal on 6/17/24  - Transferrin saturation low at 5% with normal ferritin 291, overall likely mixed picture of iron deficiency and anemia of chronic inflammation--although mixed results and initially held off on iron supplementation, hgb back down to 8.3 on 6/20/24, decision made to proceed with IV iron 200mg x5  - Increased energy and improved color as of 7/1/24    #Elevated alk phos, AST, ALT:  - Given the patient's overall burden of disease, with predominant alk phos elevation suggestive of an infiltrative process, suspect this is related to disease progression as patient only just started treatment less than 6 weeks ago  - Continue to monitor closely as the differential diagnosis includes immune-related adverse event (less likely based on pattern and timing) and vascular obstruction  - LFTs improving as of 6/20/24 with alk phos 274 from 335, AST 50 from 60 and ALT 45 from 61    #Frequent  nocturnal urination:  - Urinalysis shows no evidence of UTI though significant bilirubin in the urine  - Referral to urology     #Leukocytosis:  - 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 Mckeon and at tumor board    #Early onset colorectal cancer:  - [facility] for germline testing due to young age at diagnosis, already referred for genetic counseling - virtual October 2024  - Recommend screening of first-degree relatives with colonoscopy 10 years prior to the patient's age of diagnosis (no later than age 36)  - Patient declined referral to Young Adult Oncology program/oncofertility at this time      Billie Finn MD  Gastrointestinal Medical Oncologist   7/1/2024

## 2024-07-03 ENCOUNTER — TELEPHONE (OUTPATIENT)
Dept: HEMATOLOGY/ONCOLOGY | Facility: HOSPITAL | Age: 49
End: 2024-07-03

## 2024-07-03 ENCOUNTER — APPOINTMENT (OUTPATIENT)
Dept: HEMATOLOGY/ONCOLOGY | Facility: CLINIC | Age: 49
End: 2024-07-03
Payer: COMMERCIAL

## 2024-07-03 ENCOUNTER — INFUSION (OUTPATIENT)
Dept: HEMATOLOGY/ONCOLOGY | Facility: CLINIC | Age: 49
End: 2024-07-03
Payer: COMMERCIAL

## 2024-07-03 VITALS
SYSTOLIC BLOOD PRESSURE: 114 MMHG | WEIGHT: 179.45 LBS | RESPIRATION RATE: 18 BRPM | OXYGEN SATURATION: 100 % | TEMPERATURE: 97.7 F | BODY MASS INDEX: 24.07 KG/M2 | DIASTOLIC BLOOD PRESSURE: 77 MMHG | HEART RATE: 100 BPM

## 2024-07-03 DIAGNOSIS — C18.4 ADENOCARCINOMA OF TRANSVERSE COLON (MULTI): ICD-10-CM

## 2024-07-03 DIAGNOSIS — D50.0 IRON DEFICIENCY ANEMIA DUE TO CHRONIC BLOOD LOSS: ICD-10-CM

## 2024-07-03 PROCEDURE — 2500000004 HC RX 250 GENERAL PHARMACY W/ HCPCS (ALT 636 FOR OP/ED)

## 2024-07-03 PROCEDURE — 2500000004 HC RX 250 GENERAL PHARMACY W/ HCPCS (ALT 636 FOR OP/ED): Performed by: INTERNAL MEDICINE

## 2024-07-03 PROCEDURE — 96374 THER/PROPH/DIAG INJ IV PUSH: CPT

## 2024-07-03 RX ORDER — HEPARIN 100 UNIT/ML
500 SYRINGE INTRAVENOUS AS NEEDED
Status: DISCONTINUED | OUTPATIENT
Start: 2024-07-03 | End: 2024-07-03 | Stop reason: HOSPADM

## 2024-07-03 RX ORDER — DIPHENHYDRAMINE HYDROCHLORIDE 50 MG/ML
50 INJECTION INTRAMUSCULAR; INTRAVENOUS AS NEEDED
Status: CANCELLED | OUTPATIENT
Start: 2024-07-04

## 2024-07-03 RX ORDER — HEPARIN 100 UNIT/ML
500 SYRINGE INTRAVENOUS AS NEEDED
Status: CANCELLED | OUTPATIENT
Start: 2024-07-03

## 2024-07-03 RX ORDER — HEPARIN SODIUM,PORCINE/PF 10 UNIT/ML
50 SYRINGE (ML) INTRAVENOUS AS NEEDED
Status: CANCELLED | OUTPATIENT
Start: 2024-07-03

## 2024-07-03 RX ORDER — FAMOTIDINE 10 MG/ML
20 INJECTION INTRAVENOUS ONCE AS NEEDED
Status: CANCELLED | OUTPATIENT
Start: 2024-07-04

## 2024-07-03 RX ORDER — EPINEPHRINE 0.3 MG/.3ML
0.3 INJECTION SUBCUTANEOUS EVERY 5 MIN PRN
Status: CANCELLED | OUTPATIENT
Start: 2024-07-04

## 2024-07-03 RX ORDER — ALBUTEROL SULFATE 0.83 MG/ML
3 SOLUTION RESPIRATORY (INHALATION) AS NEEDED
Status: CANCELLED | OUTPATIENT
Start: 2024-07-04

## 2024-07-03 ASSESSMENT — PAIN SCALES - GENERAL: PAINLEVEL: 0-NO PAIN

## 2024-07-03 NOTE — TELEPHONE ENCOUNTER
Pt called and informed his 7/15 appointment was cancelled per Dr. Finn as it was rescheduled for 7/12.

## 2024-07-03 NOTE — TELEPHONE ENCOUNTER
Pt called regarding his appointment for infusion appointment 7/15 that he thought it would be cancelled. He would like to know if there is a reason that it is still in his MyChart. Please advise and I will call pt back. Thank you.

## 2024-07-05 ENCOUNTER — INFUSION (OUTPATIENT)
Dept: HEMATOLOGY/ONCOLOGY | Facility: CLINIC | Age: 49
End: 2024-07-05
Payer: COMMERCIAL

## 2024-07-05 VITALS
WEIGHT: 176.37 LBS | OXYGEN SATURATION: 99 % | DIASTOLIC BLOOD PRESSURE: 76 MMHG | RESPIRATION RATE: 18 BRPM | SYSTOLIC BLOOD PRESSURE: 116 MMHG | TEMPERATURE: 97 F | HEART RATE: 106 BPM | BODY MASS INDEX: 23.65 KG/M2

## 2024-07-05 DIAGNOSIS — C18.4 ADENOCARCINOMA OF TRANSVERSE COLON (MULTI): ICD-10-CM

## 2024-07-05 DIAGNOSIS — D50.0 IRON DEFICIENCY ANEMIA DUE TO CHRONIC BLOOD LOSS: ICD-10-CM

## 2024-07-05 PROCEDURE — 96374 THER/PROPH/DIAG INJ IV PUSH: CPT

## 2024-07-05 PROCEDURE — 2500000004 HC RX 250 GENERAL PHARMACY W/ HCPCS (ALT 636 FOR OP/ED)

## 2024-07-05 RX ORDER — HEPARIN 100 UNIT/ML
SYRINGE INTRAVENOUS
Status: COMPLETED
Start: 2024-07-05 | End: 2024-07-05

## 2024-07-05 RX ORDER — HEPARIN 100 UNIT/ML
500 SYRINGE INTRAVENOUS AS NEEDED
OUTPATIENT
Start: 2024-07-05

## 2024-07-05 RX ORDER — DIPHENHYDRAMINE HYDROCHLORIDE 50 MG/ML
50 INJECTION INTRAMUSCULAR; INTRAVENOUS AS NEEDED
OUTPATIENT
Start: 2024-07-07

## 2024-07-05 RX ORDER — HEPARIN 100 UNIT/ML
500 SYRINGE INTRAVENOUS AS NEEDED
Status: DISCONTINUED | OUTPATIENT
Start: 2024-07-05 | End: 2024-07-05 | Stop reason: HOSPADM

## 2024-07-05 RX ORDER — FAMOTIDINE 10 MG/ML
20 INJECTION INTRAVENOUS ONCE AS NEEDED
OUTPATIENT
Start: 2024-07-07

## 2024-07-05 RX ORDER — EPINEPHRINE 0.3 MG/.3ML
0.3 INJECTION SUBCUTANEOUS EVERY 5 MIN PRN
OUTPATIENT
Start: 2024-07-07

## 2024-07-05 RX ORDER — ALBUTEROL SULFATE 0.83 MG/ML
3 SOLUTION RESPIRATORY (INHALATION) AS NEEDED
OUTPATIENT
Start: 2024-07-07

## 2024-07-05 RX ORDER — HEPARIN SODIUM,PORCINE/PF 10 UNIT/ML
50 SYRINGE (ML) INTRAVENOUS AS NEEDED
OUTPATIENT
Start: 2024-07-05

## 2024-07-05 ASSESSMENT — PAIN SCALES - GENERAL: PAINLEVEL: 0-NO PAIN

## 2024-07-08 ENCOUNTER — INFUSION (OUTPATIENT)
Dept: HEMATOLOGY/ONCOLOGY | Facility: CLINIC | Age: 49
End: 2024-07-08
Payer: COMMERCIAL

## 2024-07-08 VITALS
HEART RATE: 99 BPM | SYSTOLIC BLOOD PRESSURE: 107 MMHG | OXYGEN SATURATION: 98 % | WEIGHT: 179.68 LBS | TEMPERATURE: 98.4 F | DIASTOLIC BLOOD PRESSURE: 73 MMHG | BODY MASS INDEX: 24.1 KG/M2 | RESPIRATION RATE: 18 BRPM

## 2024-07-08 DIAGNOSIS — C18.4 ADENOCARCINOMA OF TRANSVERSE COLON (MULTI): ICD-10-CM

## 2024-07-08 LAB
ALBUMIN SERPL BCP-MCNC: 2.7 G/DL (ref 3.4–5)
ALP SERPL-CCNC: 165 U/L (ref 33–120)
ALT SERPL W P-5'-P-CCNC: 16 U/L (ref 10–52)
ANION GAP SERPL CALC-SCNC: 12 MMOL/L (ref 10–20)
AST SERPL W P-5'-P-CCNC: 19 U/L (ref 9–39)
BASOPHILS # BLD AUTO: 0.1 X10*3/UL (ref 0–0.1)
BASOPHILS NFR BLD AUTO: 0.5 %
BILIRUB SERPL-MCNC: 0.4 MG/DL (ref 0–1.2)
BUN SERPL-MCNC: 15 MG/DL (ref 6–23)
CALCIUM SERPL-MCNC: 7.7 MG/DL (ref 8.6–10.3)
CHLORIDE SERPL-SCNC: 104 MMOL/L (ref 98–107)
CO2 SERPL-SCNC: 25 MMOL/L (ref 21–32)
CREAT SERPL-MCNC: 0.57 MG/DL (ref 0.5–1.3)
EGFRCR SERPLBLD CKD-EPI 2021: >90 ML/MIN/1.73M*2
EOSINOPHIL # BLD AUTO: 0.1 X10*3/UL (ref 0–0.7)
EOSINOPHIL NFR BLD AUTO: 0.5 %
ERYTHROCYTE [DISTWIDTH] IN BLOOD BY AUTOMATED COUNT: 18.6 % (ref 11.5–14.5)
GLUCOSE SERPL-MCNC: 111 MG/DL (ref 74–99)
HCT VFR BLD AUTO: 29.8 % (ref 41–52)
HGB BLD-MCNC: 9 G/DL (ref 13.5–17.5)
IMM GRANULOCYTES # BLD AUTO: 0.11 X10*3/UL (ref 0–0.7)
IMM GRANULOCYTES NFR BLD AUTO: 0.5 % (ref 0–0.9)
LYMPHOCYTES # BLD AUTO: 8.3 X10*3/UL (ref 1.2–4.8)
LYMPHOCYTES NFR BLD AUTO: 38.7 %
MCH RBC QN AUTO: 23.4 PG (ref 26–34)
MCHC RBC AUTO-ENTMCNC: 30.2 G/DL (ref 32–36)
MCV RBC AUTO: 78 FL (ref 80–100)
MONOCYTES # BLD AUTO: 0.94 X10*3/UL (ref 0.1–1)
MONOCYTES NFR BLD AUTO: 4.4 %
NEUTROPHILS # BLD AUTO: 11.91 X10*3/UL (ref 1.2–7.7)
NEUTROPHILS NFR BLD AUTO: 55.4 %
NRBC BLD-RTO: 0 /100 WBCS (ref 0–0)
PLATELET # BLD AUTO: 629 X10*3/UL (ref 150–450)
POTASSIUM SERPL-SCNC: 3.9 MMOL/L (ref 3.5–5.3)
PROT SERPL-MCNC: 5.2 G/DL (ref 6.4–8.2)
RBC # BLD AUTO: 3.84 X10*6/UL (ref 4.5–5.9)
SODIUM SERPL-SCNC: 137 MMOL/L (ref 136–145)
TSH SERPL-ACNC: 1.83 MIU/L (ref 0.44–3.98)
WBC # BLD AUTO: 21.5 X10*3/UL (ref 4.4–11.3)

## 2024-07-08 PROCEDURE — 82533 TOTAL CORTISOL: CPT | Mod: SAMLAB

## 2024-07-08 PROCEDURE — 36591 DRAW BLOOD OFF VENOUS DEVICE: CPT

## 2024-07-08 PROCEDURE — 82024 ASSAY OF ACTH: CPT

## 2024-07-08 PROCEDURE — 80053 COMPREHEN METABOLIC PANEL: CPT

## 2024-07-08 PROCEDURE — 84443 ASSAY THYROID STIM HORMONE: CPT

## 2024-07-08 PROCEDURE — 2500000004 HC RX 250 GENERAL PHARMACY W/ HCPCS (ALT 636 FOR OP/ED): Performed by: INTERNAL MEDICINE

## 2024-07-08 PROCEDURE — 82378 CARCINOEMBRYONIC ANTIGEN: CPT | Mod: SAMLAB

## 2024-07-08 PROCEDURE — 85025 COMPLETE CBC W/AUTO DIFF WBC: CPT

## 2024-07-08 RX ORDER — HEPARIN SODIUM,PORCINE/PF 10 UNIT/ML
50 SYRINGE (ML) INTRAVENOUS AS NEEDED
Status: CANCELLED | OUTPATIENT
Start: 2024-07-08

## 2024-07-08 RX ORDER — HEPARIN 100 UNIT/ML
500 SYRINGE INTRAVENOUS AS NEEDED
Status: CANCELLED | OUTPATIENT
Start: 2024-07-08

## 2024-07-08 RX ORDER — HEPARIN 100 UNIT/ML
500 SYRINGE INTRAVENOUS AS NEEDED
Status: DISCONTINUED | OUTPATIENT
Start: 2024-07-08 | End: 2024-07-08 | Stop reason: HOSPADM

## 2024-07-08 ASSESSMENT — PAIN SCALES - GENERAL: PAINLEVEL: 0-NO PAIN

## 2024-07-09 ENCOUNTER — TELEMEDICINE (OUTPATIENT)
Dept: HEMATOLOGY/ONCOLOGY | Facility: CLINIC | Age: 49
End: 2024-07-09
Payer: COMMERCIAL

## 2024-07-09 DIAGNOSIS — C18.4 ADENOCARCINOMA OF TRANSVERSE COLON (MULTI): Primary | ICD-10-CM

## 2024-07-09 LAB
CEA SERPL-MCNC: 6 UG/L
CORTIS AM PEAK SERPL-MSCNC: 10.7 UG/DL (ref 5–20)

## 2024-07-09 PROCEDURE — 99214 OFFICE O/P EST MOD 30 MIN: CPT | Performed by: NURSE PRACTITIONER

## 2024-07-09 NOTE — PROGRESS NOTES
MEDICAL ONCOLOGY INITIAL OUTPATIENT CONSULTATION NOTE  [facility], Gastrointestinal Oncology    Patient Name:  Yon Lawrence  MRN:  [id number]  :  1975    Referring Provider: Billie Finn MD  Care Team: Patient Care Team:  Pedro Bazzi MD as PCP - General  Pedro Bazzi MD as PCP - [facility]CO PCP  Billie Finn MD as Consulting Physician (Hematology and Oncology)  Date of Service: 2024     IDENTIFYING DATA:   Cancer Staging   Adenocarcinoma of transverse colon (Multi)  Staging form: Colon and Rectum, AJCC 8th Edition  - Clinical: Stage IVC (cTX, cNX, cM1c) - Signed by Billie Finn MD on 2024    Yon Lawrence is a 48 y.o.-year-old with metastatic distal transverse colon adenocarcinoma, dMMR, MLH1 promoter hypermethylation detected    Telehealth visit completed with patient.  Consent obtained for telephone visit.     INTERVAL HISTORY:    Patient received C2 pembrolizumab on 24, and presents for follow up in anticipation of C3 on 24.     -Reports since he last saw Dr. Finn on  he has not had any other bouts of diarrhea.    -No skin rashes.   -Appetite is much improved.  Wt is stable.   -Overall feeling much better over the last few weeks.  Energy level is up. -Able to cut grass, not needing to take naps.   -Slight pain in RLQ, also improved.     All other ROS reviewed & are negative      ONCOLOGY HISTORY:  24: CT abdomen/pelvis showed a large necrotic mass occupying the left upper quadrant of the abdomen, likely arising from the distal transverse colon, as well as multiple mesenteric nodules consistent with metastatic lesions and multiple liver mass lesions consistent with metastasis  24: colonoscopy with malignant distal transverse colon mass measuring 10cm, invasive adenocarcinoma, MMR IHC pending  24: CT chest with no intrathoracic metastasis  24: CT abdomen/pelvis with stable to mild interval enlargement of the distal  transverse colonic mass measuring 10.3 x 9.3 x 8.8 cm with invasion of and fistulization with an adjacent loop of jejunum with passage of oral contrast from the jejunum into the colon at the level of the mass. Mass noted to abut the anterior abdominal wall without evidence of direct invasion, mesenteric stranding and nodularity concerning for peritoneal carcinomatosis, increase in size and number of diffuse liver metastases from 4/30/24    REGIMEN #1: pembrolizumab  6/3/24: C1D1 pembrolizumab  6/24/24: C2D1 pembrolizumab    PAST MEDICAL HISTORY:  No past medical history on file.    PAST SURGICAL HISTORY:  Past Surgical History:   Procedure Laterality Date    COLONOSCOPY  05/06/2024    DR VALDEZ    OTHER SURGICAL HISTORY  06/07/2022    Meniscectomy    TUNNELED VENOUS PORT PLACEMENT  05/31/2024    PLACEMENT OF TUNNELED CENTR VENOUS CATHETER W/SQ PORT/ DR VALDEZ       ALLERGIES:  No Known Allergies    MEDICATIONS:  Current Outpatient Medications   Medication Instructions    prochlorperazine (COMPAZINE) 10 mg, oral, Every 6 hours PRN       SOCIAL HISTORY:  Social History     Tobacco Use    Smoking status: Never    Smokeless tobacco: Never   Substance Use Topics    Alcohol use: Never     Social History     Social History Narrative    Lives with wife and 2 children. Works as a  at [employer]. Coaches basketball over the summer.          FAMILY HISTORY:  Family History   Problem Relation Name Age of Onset    Diabetes type II Mother      Hypertension Father      Heart disease Father          CABG    Macular degeneration Father      Ulcers Father      Breast cancer Mother's Sister  50 - 59    Cancer Father's Sister          rare cancer unknown type    Cancer Paternal Grandmother      Stroke Paternal Grandfather      Other (MCAD) Daughter      No Known Problems Son          REVIEW OF SYSTEMS:  10-pt ROS reviewed and negative except as mentioned above.    PERFORMANCE STATUS:  Karnofsky  Performance Score/ECO, Able to carry on normal activity; minor signs or symptoms of disease (ECOG equivalent 0)    PHYSICAL EXAMINATION:  There were no vitals taken for this visit.   Wt Readings from Last 5 Encounters:   24 81.5 kg (179 lb 10.8 oz)   24 80 kg (176 lb 5.9 oz)   24 81.4 kg (179 lb 7.3 oz)   24 81.1 kg (178 lb 12.7 oz)   24 80.4 kg (177 lb 4 oz)     PE - deferred, telephone visit.     PATHOLOGY:     Surgical pathology 24:  FINAL DIAGNOSIS   A. COLON - TRANSVERSE, MASS BIOPSY:   Invasive adenocarcinoma     MMR studies pending; separate report will follow     : The images have been reviewed at the GI consensus conference on May 15, 2024 and diagnosis of adenocarcinoma is confirmed.  Dr. MONROE Asif has been notified via secure chat on 5/15/2024 at 11:51 am     B. RECTUM POLYP, POLYPECTOMY:   Hyperplastic polyp   Electronically signed by Servando Lawton MD on 5/15/2024 at 1155        By the signature on this report, the individual or group listed as making the Final Interpretation/Diagnosis certifies that they have reviewed this case.    Addendum            MISMATCH REPAIR PROTEIN EXPRESSION                 Protein:  Result                MLH-1:   Expression Absent                                            PMS-2:  Expression Absent                                      MSH-2:  Expression Present                                   MSH-6:  Expression Present                                       Neoplasm with combined loss of MLH-1/PMS-2 mismatch repair protein expression.     Assessment for BRAF mutation or methylation of the MLH1 promoter status has been ordered.     The loss of MLH-2/PMS-2 protein expression has been identified (normal internal controls stain appropriately).  Loss of expression of the MLH-1 gene and hence protein expression, is often associated with the concurrent loss of protein expression in PMS-2.     Reference Range: A result is  reported as Expression Present if any tumor nuclei are stained positive.     MLH1 Promoter Methylation Result       Specimen:  [id number]  Estimated Tumor Content: 40%        RESULT: Positive for MLH1 Promoter Methylation       NGS:   ** Copied from [address] [place] [address] on 18-Jun-2024 12:01PM by Billie Finn **    Test Name: IWT xT (XT.V4)  Laboratory Name: [facility]  Specimen Source: Colon, transverse  Collection Date: 06-May-2024  Cancer Type: Invasive adenocarcinoma  Report Id: [id number]    Result Interpretation:  * Lab Notes: Tumor Percentage: 30%  * Germline Notes: No potential germline variants were found in the limited set of genes on which we report.    Molecular Findings:  * Gene: BRCA2, Variant: Frameshift - LOF, Potentially Actionable, p.I605fs (Allele Frequency - 7.9%)  * Gene: [place]STON, Variant: Frameshift - LOF, Potentially Actionable, p.F357fs (Allele Frequency - 7.6%)  * Gene: PIK3CA, Variant: Missense variant (exon 1) - GOF, Potentially Actionable, p.R88Q (Allele Frequency - 6.5%)  * Gene: PIK3CA, Variant: Missense variant (exon 20) - GOF, Potentially Actionable, p.E8785X (Allele Frequency - 6.4%)  * Gene: CTNNB1, Variant: Missense variant - GOF, Biologically Relevant, p.T41A (Allele Frequency - 28.5%)  * Gene: MLH1, Variant: Frameshift - LOF, Biologically Relevant, p.R9fs (Allele Frequency - 26.4%)  * Gene: MSH3, Variant: Frameshift - LOF, Biologically Relevant, p.K383fs (Allele Frequency - 16.2%)  * Gene: HNF1A, Variant: Frameshift - LOF, Biologically Relevant, p.P291fs (Allele Frequency - 10.3%)  * Gene: PIK3R2, Variant: Missense variant - GOF, Biologically Relevant, p.G373R (Allele Frequency - 8.8%)  * Gene: PTEN, Variant: Frameshift - LOF, Biologically Relevant, p.K267fs (Allele Frequency - 8.6%)  * Gene: PTEN, Variant: Frameshift - LOF, Biologically Relevant, p.C250fs (Allele Frequency - 6%)  * Gene: FBXW7, Variant: Stop gain - LOF, Biologically Relevant, p.R278*, Nonsense (Allele  Frequency - 8.5%)  * Gene: PTCH1, Variant: Frameshift - LOF, Biologically Relevant, p.T9194rq (Allele Frequency - 8.3%)  * Gene: PTCH1, Variant: Stop gain - LOF, Biologically Relevant, p.Q728*, Nonsense (Allele Frequency - 6.6%)  * Gene: DUB3HD7, Variant: Stop gain - LOF, Biologically Relevant, p.R216*, Nonsense (Allele Frequency - 8.1%)  * Gene: CHD2, Variant: Frameshift - LOF, Biologically Relevant, p.V175fs (Allele Frequency - 7.9%)  * Gene: PIK3R1, Variant: Frameshift - LOF, Biologically Relevant, p.S460fs (Allele Frequency - 7.7%)  * Gene: PMS2, Variant: Frameshift - LOF, Biologically Relevant, p.D414fs (Allele Frequency - 7.6%)  * Gene: NOTCH1, Variant: Frameshift - GOF, Biologically Relevant, p.H9576bu (Allele Frequency - 7.6%)  * Gene: APC, Variant: Frameshift - LOF, Biologically Relevant, p.A759fs (Allele Frequency - 7.5%)  * Gene: TP53, Variant: Frameshift - LOF, Biologically Relevant, p.P152fs (Allele Frequency - 7.4%)  * Gene: ARID1A, Variant: Frameshift - LOF, Biologically Relevant, p.N0132ra (Allele Frequency - 7.3%)  * Gene: ZFHX3, Variant: Frameshift - LOF, Biologically Relevant, p.D121fs (Allele Frequency - 6.8%)  * Gene: INPP4B, Variant: Frameshift - LOF, Biologically Relevant, p.N299fs (Allele Frequency - 6.5%)  * Gene: NBN, Variant: Frameshift - LOF, Biologically Relevant, p.R466fs (Allele Frequency - 6.5%)  * Gene: ACVR1B, Variant: Stop gain - LOF, Biologically Relevant, p.R485*, Nonsense (Allele Frequency - 6.5%)  * Gene: MAP2K4, Variant: Frameshift - LOF, Biologically Relevant, p.N233fs (Allele Frequency - 6.2%)  * Gene: NF1, Variant: Stop gain - LOF, Biologically Relevant, p.*, Nonsense (Allele Frequency - 6.2%)  * Biomarker: Microsatellite Instability, Status: high  * Biomarker: Tumor Mutation Jefferson (51.6 Muts/Mb, Percentile: 99)  * Disease associated genes/variants with no reportable findings:     * BRAF    * KRAS    * NRAS  * 80 variants of unknown significance    ** End of copied  information **    IMAGING DATA:   CT abdomen/pelvis 5/16/24:  IMPRESSION:  1.  Stable to mild interval enlargement of the distal transverse colonic mass, measuring 10.3 x 9.3 x 8.8 cm. There is invasion of and fistulization with an adjacent loop of jejunum, with passage of oral contrast from the jejunum into the colon at the level of the mass. The mass abuts the anterior abdominal wall without evidence of direct invasion. No evidence of bowel obstruction.  2. Mesenteric stranding and nodularity adjacent to the mass concerning for peritoneal carcinomatosis.  3. Increase in size and number of diffuse liver metastases from 04/30/2024.    LABORATORY DATA:    Last CBC w. Diff.:    Lab Results   Component Value Date/Time    WBC 21.5 (H) 07/08/2024 1452    NRBC 0.0 07/08/2024 1452    RBC 3.84 (L) 07/08/2024 1452    HGB 9.0 (L) 07/08/2024 1452    HCT 29.8 (L) 07/08/2024 1452    MCV 78 (L) 07/08/2024 1452    MCH 23.4 (L) 07/08/2024 1452    MCHC 30.2 (L) 07/08/2024 1452    RDW 18.6 (H) 07/08/2024 1452     (H) 07/08/2024 1452    NEUTOPHILPCT 55.4 07/08/2024 1452    IGPCT 0.5 07/08/2024 1452    LYMPHOPCT 38.7 07/08/2024 1452    LYMPHOPCT 20.0 06/10/2024 0828    MONOPCT 4.4 07/08/2024 1452    MONOPCT 0.0 06/10/2024 0828    EOSPCT 0.5 07/08/2024 1452    EOSPCT 0.0 06/10/2024 0828    BASOPCT 0.5 07/08/2024 1452    BASOPCT 0.0 06/10/2024 0828    NEUTROABS 11.91 (H) 07/08/2024 1452    IGABSOL 0.11 07/08/2024 1452    LYMPHSABS 8.30 (H) 07/08/2024 1452    MONOSABS 0.94 07/08/2024 1452    EOSABS 0.10 07/08/2024 1452    EOSABS 0.00 06/10/2024 0828    BASOSABS 0.10 07/08/2024 1452    BASOSABS 0.00 06/10/2024 0828     Last CMP:     Lab Results   Component Value Date/Time    GLUCOSE 111 (H) 07/08/2024 1452     07/08/2024 1452    K 3.9 07/08/2024 1452     07/08/2024 1452    CO2 25 07/08/2024 1452    ANIONGAP 12 07/08/2024 1452    BUN 15 07/08/2024 1452    CREATININE 0.57 07/08/2024 1452    EGFR >90 07/08/2024 1452     CALCIUM 7.7 (L) 07/08/2024 1452    ALBUMIN 2.7 (L) 07/08/2024 1452    ALKPHOS 165 (H) 07/08/2024 1452    PROT 5.2 (L) 07/08/2024 1452    AST 19 07/08/2024 1452    BILITOT 0.4 07/08/2024 1452    ALT 16 07/08/2024 1452       Carcinoembryonic AG   Date/Time Value Ref Range Status   06/17/2024 12:44 PM 52.4 ug/L Final   05/20/2024 11:17 AM 19.1 ug/L Final   05/02/2024 04:04 PM 6.7 ug/L Final     Signatera:  5/20/24: 538.67 MTM/mL  6/17/24: 654.58 MTM/mL    All pertinent pathology, imaging, and labs were personally reviewed and interpreted in clinic. Findings as per HPI and EMR.    ASSESSMENT:  Yon Lawrence is a 48 y.o. male with Adenocarcinoma of transverse colon (Multi), Clinical: Stage IVC (cTX, cNX, cM1c).      Reviewed the diagnosis of colon adenocarcinoma and the above history with the patient.  Explained that the liver is the most common site of metastatic colorectal cancer involvement and 20 to 34% of patients will present with synchronous liver metastases, which is thought to be a more disseminated disease. I explained that in general, metastatic colon cancer is treatable, not curable. However, we will aim for a long-term response to treatment. I explained that I would recommend starting with systemic therapy and reserve local therapies (radiation, surgery) for symptom control or for management of residual treated disease depending on response to systemic therapy.    With regard to systemic therapy, standard-of-care can include 5-FU and oxaliplatin and/or irinotecan +/- anti-VEGF or anti-EGFR therapy. However, since initial consultation, IHC revealed patient's tumor is dMMR with MLH1 and PMS2 loss and this was confirmed by NGS showing microsatellite instability and high tumor mutation burden as above. Spoke with patient in the interim since last visit and recommended single agent PD-1 blockade with pembrolizumab in place of FOLFOX as first-line therapy. Reviewed side effects of pembrolizumab in detail,  including but not limited to diarrhea, skin rash, colitis, thyroiditis, and rare but life-threatening immune-related adverse events including but not limited to myocarditis, pneumonitis, encephalitis, hypophysitis. Emphasized recommendation to call with any new symptoms.     Explained at 6/17/24 follow up that we would anticipate it would take at least 2-3 cycles to see symptoms improve from the treatment. Would recommend restaging scan after 4 cycles.     With regard to travel, patient and wife asking about safety of traveling to [place] and [place]. Explained my main concern at this point is the risk of unexpected change in his health due to risk of colonic perforation and existing fistula. Emphasized importance of knowing where to seek emergency medical care if they are traveling out of town. With regard to the treatment with pembrolizumab, there is no specific contraindication to travel.      PLAN:  #Adenocarcinoma of transverse colon, stage IVC, dMMR, MLH1 promoter hypermethylation detected, no BRAF mut detected:  - [facility] reviewed as above  - Signatera ctDNA monitoring to help assess response to treatment  - MMR IHC shows dMMR with MLH1 and PMS2 loss, +MLH1 promoter hypermethylation - explained low likelihood of Rashid syndrome but would still recommend genetics referral given young age at diagnosis  - Labs OK for  C3 at [place] 7/12/24  Virtual visit prior to C4 on 8/6/24.  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     #Abdominal pain:  - LUQ pain much improved     #Anemia:  -  Most likely related to chronic ongoing blood loss from primary tumor   - Folate and vitamin B12 normal on 6/17/24  - Transferrin saturation low at 5% with normal ferritin 291, overall likely mixed picture of iron deficiency and anemia of chronic inflammation--although mixed results and initially held off on iron supplementation, hgb back down to 8.3 on 6/20/24, decision made to proceed with IV iron 200mg x5  - Increased energy and improved color as of 7/1/24    #Elevated alk phos, AST, ALT:  - Given the patient's overall burden of disease, with predominant alk phos elevation suggestive of an infiltrative process, suspect this is related to disease progression as patient only just started treatment less than 6 weeks ago  - Continue to monitor closely as the differential diagnosis includes immune-related adverse event (less likely based on pattern and timing) and vascular obstruction  - LFTs improving as of 7/12/24     #Frequent nocturnal urination:  - Urinalysis shows no evidence of UTI though significant bilirubin in the urine  - Referral to urology     #Leukocytosis:  - 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 Mckeon and at tumor board    #Early onset colorectal cancer:  - [facility] for germline testing due to young age at diagnosis, already referred for genetic counseling - virtual October 2024  - Recommend screening of first-degree relatives with colonoscopy 10 years prior to the patient's age of diagnosis (no later than age 36)  - Patient declined referral to Young Adult Oncology program/oncofertility at this time      FARIHA Teague-CNP    7/9/2024

## 2024-07-10 LAB — ACTH PLAS-MCNC: 66.9 PG/ML (ref 7.2–63.3)

## 2024-07-12 ENCOUNTER — APPOINTMENT (OUTPATIENT)
Dept: HEMATOLOGY/ONCOLOGY | Facility: CLINIC | Age: 49
End: 2024-07-12
Payer: COMMERCIAL

## 2024-07-12 ENCOUNTER — INFUSION (OUTPATIENT)
Dept: HEMATOLOGY/ONCOLOGY | Facility: CLINIC | Age: 49
End: 2024-07-12
Payer: COMMERCIAL

## 2024-07-12 VITALS
OXYGEN SATURATION: 99 % | TEMPERATURE: 97.3 F | HEART RATE: 83 BPM | BODY MASS INDEX: 23.92 KG/M2 | WEIGHT: 178.35 LBS | SYSTOLIC BLOOD PRESSURE: 98 MMHG | RESPIRATION RATE: 16 BRPM | DIASTOLIC BLOOD PRESSURE: 64 MMHG

## 2024-07-12 DIAGNOSIS — C18.4 ADENOCARCINOMA OF TRANSVERSE COLON (MULTI): ICD-10-CM

## 2024-07-12 PROCEDURE — 96413 CHEMO IV INFUSION 1 HR: CPT

## 2024-07-12 PROCEDURE — 2500000004 HC RX 250 GENERAL PHARMACY W/ HCPCS (ALT 636 FOR OP/ED): Mod: JZ | Performed by: INTERNAL MEDICINE

## 2024-07-12 RX ORDER — HEPARIN 100 UNIT/ML
500 SYRINGE INTRAVENOUS AS NEEDED
OUTPATIENT
Start: 2024-07-12

## 2024-07-12 RX ORDER — HEPARIN SODIUM,PORCINE/PF 10 UNIT/ML
50 SYRINGE (ML) INTRAVENOUS AS NEEDED
OUTPATIENT
Start: 2024-07-12

## 2024-07-12 RX ORDER — HEPARIN 100 UNIT/ML
500 SYRINGE INTRAVENOUS AS NEEDED
Status: DISCONTINUED | OUTPATIENT
Start: 2024-07-12 | End: 2024-07-12 | Stop reason: HOSPADM

## 2024-07-12 ASSESSMENT — PAIN SCALES - GENERAL: PAINLEVEL: 0-NO PAIN

## 2024-07-15 ENCOUNTER — APPOINTMENT (OUTPATIENT)
Dept: HEMATOLOGY/ONCOLOGY | Facility: CLINIC | Age: 49
End: 2024-07-15
Payer: COMMERCIAL

## 2024-07-16 ENCOUNTER — APPOINTMENT (OUTPATIENT)
Dept: HEMATOLOGY/ONCOLOGY | Facility: CLINIC | Age: 49
End: 2024-07-16
Payer: COMMERCIAL

## 2024-07-17 ENCOUNTER — APPOINTMENT (OUTPATIENT)
Dept: HEMATOLOGY/ONCOLOGY | Facility: CLINIC | Age: 49
End: 2024-07-17
Payer: COMMERCIAL

## 2024-07-29 ENCOUNTER — TELEPHONE (OUTPATIENT)
Dept: HEMATOLOGY/ONCOLOGY | Facility: HOSPITAL | Age: 49
End: 2024-07-29
Payer: COMMERCIAL

## 2024-07-29 DIAGNOSIS — C18.4 ADENOCARCINOMA OF TRANSVERSE COLON (MULTI): ICD-10-CM

## 2024-07-29 NOTE — TELEPHONE ENCOUNTER
Spoke with patient's wife. 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  I will get patient scheduled appropriately for end of August.   Wife agreeable to plan.

## 2024-07-31 ENCOUNTER — APPOINTMENT (OUTPATIENT)
Dept: UROLOGY | Facility: CLINIC | Age: 49
End: 2024-07-31
Payer: COMMERCIAL

## 2024-07-31 VITALS
SYSTOLIC BLOOD PRESSURE: 97 MMHG | DIASTOLIC BLOOD PRESSURE: 73 MMHG | BODY MASS INDEX: 23.2 KG/M2 | WEIGHT: 173 LBS | HEART RATE: 90 BPM

## 2024-07-31 DIAGNOSIS — R35.0 URINARY FREQUENCY: ICD-10-CM

## 2024-07-31 DIAGNOSIS — C18.4 ADENOCARCINOMA OF TRANSVERSE COLON (MULTI): Primary | ICD-10-CM

## 2024-07-31 DIAGNOSIS — R35.1 NOCTURIA: ICD-10-CM

## 2024-07-31 DIAGNOSIS — N40.0 BPH WITHOUT OBSTRUCTION/LOWER URINARY TRACT SYMPTOMS: ICD-10-CM

## 2024-07-31 LAB
POC APPEARANCE, URINE: CLEAR
POC BILIRUBIN, URINE: ABNORMAL
POC BLOOD, URINE: NEGATIVE
POC COLOR, URINE: YELLOW
POC GLUCOSE, URINE: NEGATIVE MG/DL
POC KETONES, URINE: ABNORMAL MG/DL
POC LEUKOCYTES, URINE: NEGATIVE
POC NITRITE,URINE: NEGATIVE
POC PH, URINE: 5.5 PH
POC PROTEIN, URINE: NEGATIVE MG/DL
POC SPECIFIC GRAVITY, URINE: >=1.03
POC UROBILINOGEN, URINE: 1 EU/DL

## 2024-07-31 PROCEDURE — 1036F TOBACCO NON-USER: CPT | Performed by: UROLOGY

## 2024-07-31 PROCEDURE — 81003 URINALYSIS AUTO W/O SCOPE: CPT | Performed by: UROLOGY

## 2024-07-31 PROCEDURE — 51798 US URINE CAPACITY MEASURE: CPT | Performed by: UROLOGY

## 2024-07-31 PROCEDURE — 99204 OFFICE O/P NEW MOD 45 MIN: CPT | Performed by: UROLOGY

## 2024-07-31 RX ORDER — ALFUZOSIN HYDROCHLORIDE 10 MG/1
10 TABLET, EXTENDED RELEASE ORAL DAILY
Qty: 30 TABLET | Refills: 11 | Status: SHIPPED | OUTPATIENT
Start: 2024-07-31 | End: 2025-07-31

## 2024-07-31 ASSESSMENT — ENCOUNTER SYMPTOMS
DIFFICULTY URINATING: 0
PSYCHIATRIC NEGATIVE: 1
EYES NEGATIVE: 1
NAUSEA: 0
SHORTNESS OF BREATH: 0
FEVER: 0
ALLERGIC/IMMUNOLOGIC NEGATIVE: 1
CHILLS: 0
COUGH: 0
ENDOCRINE NEGATIVE: 1

## 2024-08-05 ENCOUNTER — INFUSION (OUTPATIENT)
Dept: HEMATOLOGY/ONCOLOGY | Facility: CLINIC | Age: 49
End: 2024-08-05
Payer: COMMERCIAL

## 2024-08-05 VITALS
TEMPERATURE: 97.9 F | BODY MASS INDEX: 23.33 KG/M2 | RESPIRATION RATE: 18 BRPM | OXYGEN SATURATION: 98 % | WEIGHT: 173.94 LBS | HEART RATE: 83 BPM | SYSTOLIC BLOOD PRESSURE: 107 MMHG | DIASTOLIC BLOOD PRESSURE: 72 MMHG

## 2024-08-05 DIAGNOSIS — R35.1 NOCTURIA: ICD-10-CM

## 2024-08-05 DIAGNOSIS — C18.4 ADENOCARCINOMA OF TRANSVERSE COLON (MULTI): ICD-10-CM

## 2024-08-05 LAB
ALBUMIN SERPL BCP-MCNC: 2.3 G/DL (ref 3.4–5)
ALP SERPL-CCNC: 164 U/L (ref 33–120)
ALT SERPL W P-5'-P-CCNC: 31 U/L (ref 10–52)
ANION GAP SERPL CALC-SCNC: 10 MMOL/L (ref 10–20)
AST SERPL W P-5'-P-CCNC: 22 U/L (ref 9–39)
BASOPHILS # BLD MANUAL: 0 X10*3/UL (ref 0–0.1)
BASOPHILS NFR BLD MANUAL: 0 %
BILIRUB SERPL-MCNC: 0.4 MG/DL (ref 0–1.2)
BUN SERPL-MCNC: 19 MG/DL (ref 6–23)
BURR CELLS BLD QL SMEAR: ABNORMAL
CALCIUM SERPL-MCNC: 7.4 MG/DL (ref 8.6–10.3)
CEA SERPL-MCNC: 2.8 UG/L
CHLORIDE SERPL-SCNC: 102 MMOL/L (ref 98–107)
CO2 SERPL-SCNC: 25 MMOL/L (ref 21–32)
CREAT SERPL-MCNC: 0.61 MG/DL (ref 0.5–1.3)
EGFRCR SERPLBLD CKD-EPI 2021: >90 ML/MIN/1.73M*2
EOSINOPHIL # BLD MANUAL: 0 X10*3/UL (ref 0–0.7)
EOSINOPHIL NFR BLD MANUAL: 0 %
ERYTHROCYTE [DISTWIDTH] IN BLOOD BY AUTOMATED COUNT: 21.1 % (ref 11.5–14.5)
GLUCOSE SERPL-MCNC: 87 MG/DL (ref 74–99)
HCT VFR BLD AUTO: 35.1 % (ref 41–52)
HGB BLD-MCNC: 10.9 G/DL (ref 13.5–17.5)
IMM GRANULOCYTES # BLD AUTO: 0.06 X10*3/UL (ref 0–0.7)
IMM GRANULOCYTES NFR BLD AUTO: 0.3 % (ref 0–0.9)
LYMPHOCYTES # BLD MANUAL: 6.47 X10*3/UL (ref 1.2–4.8)
LYMPHOCYTES NFR BLD MANUAL: 33 %
MCH RBC QN AUTO: 24.5 PG (ref 26–34)
MCHC RBC AUTO-ENTMCNC: 31.1 G/DL (ref 32–36)
MCV RBC AUTO: 79 FL (ref 80–100)
MONOCYTES # BLD MANUAL: 1.37 X10*3/UL (ref 0.1–1)
MONOCYTES NFR BLD MANUAL: 7 %
NEUTROPHILS # BLD MANUAL: 11.56 X10*3/UL (ref 1.2–7.7)
NEUTS BAND # BLD MANUAL: 0.39 X10*3/UL (ref 0–0.7)
NEUTS BAND NFR BLD MANUAL: 2 %
NEUTS SEG # BLD MANUAL: 11.17 X10*3/UL (ref 1.2–7)
NEUTS SEG NFR BLD MANUAL: 57 %
NRBC BLD-RTO: 0 /100 WBCS (ref 0–0)
OVALOCYTES BLD QL SMEAR: ABNORMAL
PLATELET # BLD AUTO: 425 X10*3/UL (ref 150–450)
POTASSIUM SERPL-SCNC: 4 MMOL/L (ref 3.5–5.3)
PROT SERPL-MCNC: 4.4 G/DL (ref 6.4–8.2)
PSA SERPL-MCNC: 1 NG/ML
RBC # BLD AUTO: 4.44 X10*6/UL (ref 4.5–5.9)
RBC MORPH BLD: ABNORMAL
SODIUM SERPL-SCNC: 133 MMOL/L (ref 136–145)
TOTAL CELLS COUNTED BLD: 100
VARIANT LYMPHS # BLD MANUAL: 0.2 X10*3/UL (ref 0–0.5)
VARIANT LYMPHS NFR BLD: 1 %
WBC # BLD AUTO: 19.6 X10*3/UL (ref 4.4–11.3)

## 2024-08-05 PROCEDURE — 85027 COMPLETE CBC AUTOMATED: CPT

## 2024-08-05 PROCEDURE — 82378 CARCINOEMBRYONIC ANTIGEN: CPT | Mod: SAMLAB

## 2024-08-05 PROCEDURE — 36591 DRAW BLOOD OFF VENOUS DEVICE: CPT

## 2024-08-05 PROCEDURE — 84075 ASSAY ALKALINE PHOSPHATASE: CPT | Performed by: INTERNAL MEDICINE

## 2024-08-05 PROCEDURE — 85007 BL SMEAR W/DIFF WBC COUNT: CPT

## 2024-08-05 PROCEDURE — 2500000004 HC RX 250 GENERAL PHARMACY W/ HCPCS (ALT 636 FOR OP/ED)

## 2024-08-05 PROCEDURE — 84153 ASSAY OF PSA TOTAL: CPT

## 2024-08-05 RX ORDER — HEPARIN 100 UNIT/ML
500 SYRINGE INTRAVENOUS AS NEEDED
OUTPATIENT
Start: 2024-08-05

## 2024-08-05 RX ORDER — HEPARIN 100 UNIT/ML
SYRINGE INTRAVENOUS
Status: COMPLETED
Start: 2024-08-05 | End: 2024-08-05

## 2024-08-05 RX ORDER — HEPARIN 100 UNIT/ML
500 SYRINGE INTRAVENOUS AS NEEDED
Status: DISCONTINUED | OUTPATIENT
Start: 2024-08-05 | End: 2024-08-05 | Stop reason: HOSPADM

## 2024-08-05 RX ORDER — HEPARIN SODIUM,PORCINE/PF 10 UNIT/ML
50 SYRINGE (ML) INTRAVENOUS AS NEEDED
OUTPATIENT
Start: 2024-08-05

## 2024-08-05 ASSESSMENT — PAIN SCALES - GENERAL: PAINLEVEL: 0-NO PAIN

## 2024-08-06 ENCOUNTER — TELEMEDICINE (OUTPATIENT)
Dept: HEMATOLOGY/ONCOLOGY | Facility: CLINIC | Age: 49
End: 2024-08-06
Payer: COMMERCIAL

## 2024-08-06 ENCOUNTER — APPOINTMENT (OUTPATIENT)
Dept: HEMATOLOGY/ONCOLOGY | Facility: CLINIC | Age: 49
End: 2024-08-06
Payer: COMMERCIAL

## 2024-08-06 DIAGNOSIS — C18.4 ADENOCARCINOMA OF TRANSVERSE COLON (MULTI): ICD-10-CM

## 2024-08-06 PROCEDURE — 99215 OFFICE O/P EST HI 40 MIN: CPT | Performed by: NURSE PRACTITIONER

## 2024-08-06 NOTE — PROGRESS NOTES
MEDICAL ONCOLOGY INITIAL OUTPATIENT CONSULTATION NOTE  [facility], Gastrointestinal Oncology    Patient Name:  Yon Lawrence  MRN:  [id number]  :  1975    Referring Provider: Billie Finn MD  Care Team: Patient Care Team:  Pedro Bazzi MD as PCP - General  Pedro Bazzi MD as PCP - [facility] ACO PCP  Billie Finn MD as Consulting Physician (Hematology and Oncology)  Date of Service: 2024     IDENTIFYING DATA:   Cancer Staging   Adenocarcinoma of transverse colon (Multi)  Staging form: Colon and Rectum, AJCC 8th Edition  - Clinical: Stage IVC (cTX, cNX, cM1c) - Signed by Billie Finn MD on 2024    Yon Lawrence is a 49 y.o.-year-old with metastatic distal transverse colon adenocarcinoma, dMMR, MLH1 promoter hypermethylation detected    Telehealth visit completed with patient.  Consent obtained for telephone visit.     INTERVAL HISTORY:    Patient received C3 pembrolizumab on 24, and presents for follow up in anticipation of C4 on 24.     --new onset diarrhea - lasting 2 and 1/2 weeks - 6 - 7 episodes on worst day  + watery stools     - waking up at night with loose stooly   + wt loss of 6-7 lbs   + abdominal cramping   + fatigue   No fevers or chills     All other ROS reviewed & are negative      ONCOLOGY HISTORY:  24: CT abdomen/pelvis showed a large necrotic mass occupying the left upper quadrant of the abdomen, likely arising from the distal transverse colon, as well as multiple mesenteric nodules consistent with metastatic lesions and multiple liver mass lesions consistent with metastasis  24: colonoscopy with malignant distal transverse colon mass measuring 10cm, invasive adenocarcinoma, MMR IHC pending  24: CT chest with no intrathoracic metastasis  24: CT abdomen/pelvis with stable to mild interval enlargement of the distal transverse colonic mass measuring 10.3 x 9.3 x 8.8 cm with invasion of and fistulization with an adjacent  loop of jejunum with passage of oral contrast from the jejunum into the colon at the level of the mass. Mass noted to abut the anterior abdominal wall without evidence of direct invasion, mesenteric stranding and nodularity concerning for peritoneal carcinomatosis, increase in size and number of diffuse liver metastases from 4/30/24    REGIMEN #1: pembrolizumab  6/3/24: C1D1 pembrolizumab  6/24/24: C2D1 pembrolizumab  7/12/24:C3D1 pembrolizumab    PAST MEDICAL HISTORY:  No past medical history on file.    PAST SURGICAL HISTORY:  Past Surgical History:   Procedure Laterality Date    COLONOSCOPY  05/06/2024    DR VALDEZ    OTHER SURGICAL HISTORY  06/07/2022    Meniscectomy    TUNNELED VENOUS PORT PLACEMENT  05/31/2024    PLACEMENT OF TUNNELED CENTR VENOUS CATHETER W/SQ PORT/ DR VALDEZ       ALLERGIES:  No Known Allergies    MEDICATIONS:  Current Outpatient Medications   Medication Instructions    alfuzosin (UROXATRAL) 10 mg, oral, Daily, Do not crush, chew, or split.    prochlorperazine (COMPAZINE) 10 mg, oral, Every 6 hours PRN       SOCIAL HISTORY:  Social History     Tobacco Use    Smoking status: Never    Smokeless tobacco: Never   Substance Use Topics    Alcohol use: Never     Social History     Social History Narrative    Lives with wife and 2 children. Works as a  at [employer]. Coaches basketball over the summer.          FAMILY HISTORY:  Family History   Problem Relation Name Age of Onset    Diabetes type II Mother      Hypertension Father      Heart disease Father          CABG    Macular degeneration Father      Ulcers Father      Breast cancer Mother's Sister  50 - 59    Cancer Father's Sister          rare cancer unknown type    Cancer Paternal Grandmother      Stroke Paternal Grandfather      Other (MCAD) Daughter      No Known Problems Son          REVIEW OF SYSTEMS:  10-pt ROS reviewed and negative except as mentioned above.    PERFORMANCE STATUS:  Karnofsky  Performance Score/ECO, Able to carry on normal activity; minor signs or symptoms of disease (ECOG equivalent 0)    PHYSICAL EXAMINATION:  There were no vitals taken for this visit.   Wt Readings from Last 5 Encounters:   24 78.9 kg (173 lb 15.1 oz)   24 78.5 kg (173 lb)   24 80.9 kg (178 lb 5.6 oz)   24 81.5 kg (179 lb 10.8 oz)   24 80 kg (176 lb 5.9 oz)     PE - deferred, telephone visit.     PATHOLOGY:     Surgical pathology 24:  FINAL DIAGNOSIS   A. COLON - TRANSVERSE, MASS BIOPSY:   Invasive adenocarcinoma     MMR studies pending; separate report will follow     : The images have been reviewed at the GI consensus conference on May 15, 2024 and diagnosis of adenocarcinoma is confirmed.  Dr. MONROE Asif has been notified via secure chat on 5/15/2024 at 11:51 am     B. RECTUM POLYP, POLYPECTOMY:   Hyperplastic polyp   Electronically signed by Servando Lawton MD on 5/15/2024 at 1155        By the signature on this report, the individual or group listed as making the Final Interpretation/Diagnosis certifies that they have reviewed this case.    Addendum            MISMATCH REPAIR PROTEIN EXPRESSION                 Protein:  Result                MLH-1:   Expression Absent                                            PMS-2:  Expression Absent                                      MSH-2:  Expression Present                                   MSH-6:  Expression Present                                       Neoplasm with combined loss of MLH-1/PMS-2 mismatch repair protein expression.     Assessment for BRAF mutation or methylation of the MLH1 promoter status has been ordered.     The loss of MLH-2/PMS-2 protein expression has been identified (normal internal controls stain appropriately).  Loss of expression of the MLH-1 gene and hence protein expression, is often associated with the concurrent loss of protein expression in PMS-2.     Reference Range: A result is  reported as Expression Present if any tumor nuclei are stained positive.     MLH1 Promoter Methylation Result       Specimen:  [id number]  Estimated Tumor Content: 40%        RESULT: Positive for MLH1 Promoter Methylation       NGS:   ** Copied from [address] [place] [address] on 18-Jun-2024 12:01PM by Billie Finn **    Test Name: [place] xT (XT.V4)  Laboratory Name: [facility]  Specimen Source: Colon, transverse  Collection Date: 06-May-2024  Cancer Type: Invasive adenocarcinoma  Report Id: [id number]    Result Interpretation:  * Lab Notes: Tumor Percentage: 30%  * Germline Notes: No potential germline variants were found in the limited set of genes on which we report.    Molecular Findings:  * Gene: BRCA2, Variant: Frameshift - LOF, Potentially Actionable, p.I605fs (Allele Frequency - 7.9%)  * Gene: [place], Variant: Frameshift - LOF, Potentially Actionable, p.F357fs (Allele Frequency - 7.6%)  * Gene: PIK3CA, Variant: Missense variant (exon 1) - GOF, Potentially Actionable, p.R88Q (Allele Frequency - 6.5%)  * Gene: PIK3CA, Variant: Missense variant (exon 20) - GOF, Potentially Actionable, p.I6178I (Allele Frequency - 6.4%)  * Gene: CTNNB1, Variant: Missense variant - GOF, Biologically Relevant, p.T41A (Allele Frequency - 28.5%)  * Gene: MLH1, Variant: Frameshift - LOF, Biologically Relevant, p.R9fs (Allele Frequency - 26.4%)  * Gene: MSH3, Variant: Frameshift - LOF, Biologically Relevant, p.K383fs (Allele Frequency - 16.2%)  * Gene: HNF1A, Variant: Frameshift - LOF, Biologically Relevant, p.P291fs (Allele Frequency - 10.3%)  * Gene: PIK3R2, Variant: Missense variant - GOF, Biologically Relevant, p.G373R (Allele Frequency - 8.8%)  * Gene: PTEN, Variant: Frameshift - LOF, Biologically Relevant, p.K267fs (Allele Frequency - 8.6%)  * Gene: PTEN, Variant: Frameshift - LOF, Biologically Relevant, p.C250fs (Allele Frequency - 6%)  * Gene: FBXW7, Variant: Stop gain - LOF, Biologically Relevant, p.R278*, Nonsense (Allele  Frequency - 8.5%)  * Gene: PTCH1, Variant: Frameshift - LOF, Biologically Relevant, p.R4139go (Allele Frequency - 8.3%)  * Gene: PTCH1, Variant: Stop gain - LOF, Biologically Relevant, p.Q728*, Nonsense (Allele Frequency - 6.6%)  * Gene: KVQ7DM7, Variant: Stop gain - LOF, Biologically Relevant, p.R216*, Nonsense (Allele Frequency - 8.1%)  * Gene: CHD2, Variant: Frameshift - LOF, Biologically Relevant, p.V175fs (Allele Frequency - 7.9%)  * Gene: PIK3R1, Variant: Frameshift - LOF, Biologically Relevant, p.S460fs (Allele Frequency - 7.7%)  * Gene: PMS2, Variant: Frameshift - LOF, Biologically Relevant, p.D414fs (Allele Frequency - 7.6%)  * Gene: NOTCH1, Variant: Frameshift - GOF, Biologically Relevant, p.T7426va (Allele Frequency - 7.6%)  * Gene: APC, Variant: Frameshift - LOF, Biologically Relevant, p.A759fs (Allele Frequency - 7.5%)  * Gene: TP53, Variant: Frameshift - LOF, Biologically Relevant, p.P152fs (Allele Frequency - 7.4%)  * Gene: ARID1A, Variant: Frameshift - LOF, Biologically Relevant, p.J9381sq (Allele Frequency - 7.3%)  * Gene: ZFHX3, Variant: Frameshift - LOF, Biologically Relevant, p.D121fs (Allele Frequency - 6.8%)  * Gene: INPP4B, Variant: Frameshift - LOF, Biologically Relevant, p.N299fs (Allele Frequency - 6.5%)  * Gene: NBN, Variant: Frameshift - LOF, Biologically Relevant, p.R466fs (Allele Frequency - 6.5%)  * Gene: ACVR1B, Variant: Stop gain - LOF, Biologically Relevant, p.R485*, Nonsense (Allele Frequency - 6.5%)  * Gene: MAP2K4, Variant: Frameshift - LOF, Biologically Relevant, p.N233fs (Allele Frequency - 6.2%)  * Gene: NF1, Variant: Stop gain - LOF, Biologically Relevant, p.*, Nonsense (Allele Frequency - 6.2%)  * Biomarker: Microsatellite Instability, Status: high  * Biomarker: Tumor Mutation [place] (51.6 Muts/Mb, Percentile: 99)  * Disease associated genes/variants with no reportable findings:     * BRAF    * KRAS    * NRAS  * 80 variants of unknown significance    ** End of copied  information **    IMAGING DATA:   CT abdomen/pelvis 5/16/24:  IMPRESSION:  1.  Stable to mild interval enlargement of the distal transverse colonic mass, measuring 10.3 x 9.3 x 8.8 cm. There is invasion of and fistulization with an adjacent loop of jejunum, with passage of oral contrast from the jejunum into the colon at the level of the mass. The mass abuts the anterior abdominal wall without evidence of direct invasion. No evidence of bowel obstruction.  2. Mesenteric stranding and nodularity adjacent to the mass concerning for peritoneal carcinomatosis.  3. Increase in size and number of diffuse liver metastases from 04/30/2024.    LABORATORY DATA:    Last CBC w. Diff.:    Lab Results   Component Value Date/Time    WBC 19.6 (H) 08/05/2024 1103    NRBC 0.0 08/05/2024 1103    RBC 4.44 (L) 08/05/2024 1103    HGB 10.9 (L) 08/05/2024 1103    HCT 35.1 (L) 08/05/2024 1103    MCV 79 (L) 08/05/2024 1103    MCH 24.5 (L) 08/05/2024 1103    MCHC 31.1 (L) 08/05/2024 1103    RDW 21.1 (H) 08/05/2024 1103     08/05/2024 1103    NEUTOPHILPCT 55.4 07/08/2024 1452    IGPCT 0.3 08/05/2024 1103    LYMPHOPCT 33.0 08/05/2024 1103    MONOPCT 7.0 08/05/2024 1103    EOSPCT 0.0 08/05/2024 1103    BASOPCT 0.0 08/05/2024 1103    NEUTROABS 11.91 (H) 07/08/2024 1452    IGABSOL 0.06 08/05/2024 1103    LYMPHSABS 8.30 (H) 07/08/2024 1452    MONOSABS 0.94 07/08/2024 1452    EOSABS 0.00 08/05/2024 1103    BASOSABS 0.00 08/05/2024 1103     Last CMP:     Lab Results   Component Value Date/Time    GLUCOSE 87 08/05/2024 1103     (L) 08/05/2024 1103    K 4.0 08/05/2024 1103     08/05/2024 1103    CO2 25 08/05/2024 1103    ANIONGAP 10 08/05/2024 1103    BUN 19 08/05/2024 1103    CREATININE 0.61 08/05/2024 1103    EGFR >90 08/05/2024 1103    CALCIUM 7.4 (L) 08/05/2024 1103    ALBUMIN 2.3 (L) 08/05/2024 1103    ALKPHOS 164 (H) 08/05/2024 1103    PROT 4.4 (L) 08/05/2024 1103    AST 22 08/05/2024 1103    BILITOT 0.4 08/05/2024 1103    ALT  31 08/05/2024 1103       Carcinoembryonic AG   Date/Time Value Ref Range Status   08/05/2024 11:03 AM 2.8 ug/L Final   07/08/2024 02:52 PM 6.0 ug/L Final   06/17/2024 12:44 PM 52.4 ug/L Final   05/20/2024 11:17 AM 19.1 ug/L Final   05/02/2024 04:04 PM 6.7 ug/L Final     Signatera:  5/20/24: 538.67 MTM/mL  6/17/24: 654.58 MTM/mL  7/8/24 - signatera pending     All pertinent pathology, imaging, and labs were personally reviewed and interpreted in clinic. Findings as per HPI and EMR.    ASSESSMENT:  Yon Lawrence is a 49 y.o. male with Adenocarcinoma of transverse colon (Multi), Clinical: Stage IVC (cTX, cNX, cM1c).      Reviewed the diagnosis of colon adenocarcinoma and the above history with the patient.  Explained that the liver is the most common site of metastatic colorectal cancer involvement and 20 to 34% of patients will present with synchronous liver metastases, which is thought to be a more disseminated disease. I explained that in general, metastatic colon cancer is treatable, not curable. However, we will aim for a long-term response to treatment. I explained that I would recommend starting with systemic therapy and reserve local therapies (radiation, surgery) for symptom control or for management of residual treated disease depending on response to systemic therapy.    With regard to systemic therapy, standard-of-care can include 5-FU and oxaliplatin and/or irinotecan +/- anti-VEGF or anti-EGFR therapy. However, since initial consultation, IHC revealed patient's tumor is dMMR with MLH1 and PMS2 loss and this was confirmed by NGS showing microsatellite instability and high tumor mutation burden as above. Spoke with patient in the interim since last visit and recommended single agent PD-1 blockade with pembrolizumab in place of FOLFOX as first-line therapy. Reviewed side effects of pembrolizumab in detail, including but not limited to diarrhea, skin rash, colitis, thyroiditis, and rare but life-threatening  immune-related adverse events including but not limited to myocarditis, pneumonitis, encephalitis, hypophysitis. Emphasized recommendation to call with any new symptoms.     Explained at 6/17/24 follow up that we would anticipate it would take at least 2-3 cycles to see symptoms improve from the treatment. Would recommend restaging scan after 4 cycles.     With regard to travel, patient and wife asking about safety of traveling to [place] and [place]. Explained my main concern at this point is the risk of unexpected change in his health due to risk of colonic perforation and existing fistula. Emphasized importance of knowing where to seek emergency medical care if they are traveling out of town. With regard to the treatment with pembrolizumab, there is no specific contraindication to travel.      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  D/w Dr. Joya.  Hold C4 pembrolizumab and start prednisone 1mg/kg day with ppi prophylaxis.  He will call if symptoms not improving or worsening.    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)   - If inadequate response after 4 cycles, consider ipilimumab/nivolumab combination immune checkpoint blockade  - If no response after single or dual checkpoint blockade, would then consider systemic chemotherapy    #Abdominal pain:  - LUQ pain much improved     #Anemia:  - Most likely  related to chronic ongoing blood loss from primary tumor   - Folate and vitamin B12 normal on 6/17/24  - Transferrin saturation low at 5% with normal ferritin 291, overall likely mixed picture of iron deficiency and anemia of chronic inflammation--although mixed results and initially held off on iron supplementation, hgb back down to 8.3 on 6/20/24, decision made to proceed with IV iron 200mg x5  - Increased energy and improved color as of 7/1/24    #Elevated alk phos, AST, ALT:  - Given the patient's overall burden of disease, with predominant alk phos elevation suggestive of an infiltrative process, suspect this is related to disease progression as patient only just started treatment less than 6 weeks ago  - Continue to monitor closely as the differential diagnosis includes immune-related adverse event (less likely based on pattern and timing) and vascular obstruction  - LFTs improving as of 7/12/24     #Frequent nocturnal urination:  - Urinalysis shows no evidence of UTI though significant bilirubin in the urine  - Referral to urology     #Leukocytosis:  - 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 Mckeon and at tumor board    #Early onset colorectal cancer:  - [facility] for germline testing due to young age at diagnosis, already referred for genetic counseling - virtual October 2024  - Recommend screening of first-degree relatives with colonoscopy 10 years prior to the patient's age of diagnosis (no later than age 36)  - Patient declined referral to Young Adult Oncology program/oncofertility at this time      FARIHA Teague-CNP    8/6/2024

## 2024-08-07 DIAGNOSIS — K52.9 COLITIS, NONSPECIFIC: Primary | ICD-10-CM

## 2024-08-07 RX ORDER — PREDNISONE 50 MG/1
50 TABLET ORAL DAILY
Qty: 7 TABLET | Refills: 0 | Status: SHIPPED | OUTPATIENT
Start: 2024-08-07 | End: 2024-08-14

## 2024-08-07 RX ORDER — PREDNISONE 10 MG/1
30 TABLET ORAL DAILY
Qty: 21 TABLET | Refills: 0 | Status: SHIPPED | OUTPATIENT
Start: 2024-08-07 | End: 2024-08-14

## 2024-08-07 RX ORDER — LANSOPRAZOLE 30 MG/1
30 CAPSULE, DELAYED RELEASE ORAL
Qty: 30 CAPSULE | Refills: 0 | Status: SHIPPED | OUTPATIENT
Start: 2024-08-07 | End: 2024-09-06

## 2024-08-13 ENCOUNTER — TELEMEDICINE (OUTPATIENT)
Dept: HEMATOLOGY/ONCOLOGY | Facility: CLINIC | Age: 49
End: 2024-08-13
Payer: COMMERCIAL

## 2024-08-13 DIAGNOSIS — K52.9 COLITIS, NONSPECIFIC: ICD-10-CM

## 2024-08-13 DIAGNOSIS — K52.9 COLITIS: Primary | ICD-10-CM

## 2024-08-13 PROCEDURE — 99214 OFFICE O/P EST MOD 30 MIN: CPT | Performed by: NURSE PRACTITIONER

## 2024-08-13 RX ORDER — PREDNISONE 10 MG/1
TABLET ORAL
Qty: 28 TABLET | Refills: 0 | Status: SHIPPED | OUTPATIENT
Start: 2024-08-13 | End: 2024-08-20

## 2024-08-21 ENCOUNTER — APPOINTMENT (OUTPATIENT)
Dept: RADIOLOGY | Facility: HOSPITAL | Age: 49
End: 2024-08-21
Payer: COMMERCIAL

## 2024-08-22 ENCOUNTER — HOSPITAL ENCOUNTER (OUTPATIENT)
Dept: RADIOLOGY | Facility: HOSPITAL | Age: 49
Discharge: HOME | End: 2024-08-22
Payer: COMMERCIAL

## 2024-08-22 ENCOUNTER — APPOINTMENT (OUTPATIENT)
Dept: HEMATOLOGY/ONCOLOGY | Facility: CLINIC | Age: 49
End: 2024-08-22
Payer: COMMERCIAL

## 2024-08-22 DIAGNOSIS — C18.4 ADENOCARCINOMA OF TRANSVERSE COLON (MULTI): ICD-10-CM

## 2024-08-22 PROCEDURE — A9698 NON-RAD CONTRAST MATERIALNOC: HCPCS | Performed by: INTERNAL MEDICINE

## 2024-08-22 PROCEDURE — 74177 CT ABD & PELVIS W/CONTRAST: CPT

## 2024-08-22 PROCEDURE — 2550000001 HC RX 255 CONTRASTS: Performed by: INTERNAL MEDICINE

## 2024-08-23 ENCOUNTER — INFUSION (OUTPATIENT)
Dept: HEMATOLOGY/ONCOLOGY | Facility: CLINIC | Age: 49
End: 2024-08-23
Payer: COMMERCIAL

## 2024-08-23 VITALS
TEMPERATURE: 97.5 F | HEART RATE: 89 BPM | SYSTOLIC BLOOD PRESSURE: 92 MMHG | BODY MASS INDEX: 23.57 KG/M2 | WEIGHT: 175.71 LBS | OXYGEN SATURATION: 99 % | DIASTOLIC BLOOD PRESSURE: 67 MMHG | RESPIRATION RATE: 18 BRPM

## 2024-08-23 DIAGNOSIS — C18.4 ADENOCARCINOMA OF TRANSVERSE COLON (MULTI): ICD-10-CM

## 2024-08-23 LAB
ALBUMIN SERPL BCP-MCNC: 2 G/DL (ref 3.4–5)
ALP SERPL-CCNC: 182 U/L (ref 33–120)
ALT SERPL W P-5'-P-CCNC: 50 U/L (ref 10–52)
ANION GAP SERPL CALC-SCNC: 9 MMOL/L (ref 10–20)
AST SERPL W P-5'-P-CCNC: 22 U/L (ref 9–39)
BILIRUB SERPL-MCNC: 0.4 MG/DL (ref 0–1.2)
BUN SERPL-MCNC: 19 MG/DL (ref 6–23)
CALCIUM SERPL-MCNC: 6.9 MG/DL (ref 8.6–10.3)
CHLORIDE SERPL-SCNC: 99 MMOL/L (ref 98–107)
CO2 SERPL-SCNC: 27 MMOL/L (ref 21–32)
CREAT SERPL-MCNC: 0.83 MG/DL (ref 0.5–1.3)
EGFRCR SERPLBLD CKD-EPI 2021: >90 ML/MIN/1.73M*2
GLUCOSE SERPL-MCNC: 104 MG/DL (ref 74–99)
POTASSIUM SERPL-SCNC: 4.3 MMOL/L (ref 3.5–5.3)
PROT SERPL-MCNC: 3.9 G/DL (ref 6.4–8.2)
SODIUM SERPL-SCNC: 131 MMOL/L (ref 136–145)
TSH SERPL-ACNC: 3.03 MIU/L (ref 0.44–3.98)

## 2024-08-23 PROCEDURE — 84520 ASSAY OF UREA NITROGEN: CPT

## 2024-08-23 PROCEDURE — 84443 ASSAY THYROID STIM HORMONE: CPT

## 2024-08-23 PROCEDURE — 36591 DRAW BLOOD OFF VENOUS DEVICE: CPT

## 2024-08-23 PROCEDURE — 82533 TOTAL CORTISOL: CPT | Mod: SAMLAB

## 2024-08-23 PROCEDURE — 82024 ASSAY OF ACTH: CPT

## 2024-08-23 PROCEDURE — 2500000004 HC RX 250 GENERAL PHARMACY W/ HCPCS (ALT 636 FOR OP/ED): Performed by: INTERNAL MEDICINE

## 2024-08-23 RX ORDER — HEPARIN 100 UNIT/ML
500 SYRINGE INTRAVENOUS AS NEEDED
OUTPATIENT
Start: 2024-08-23

## 2024-08-23 RX ORDER — HEPARIN SODIUM,PORCINE/PF 10 UNIT/ML
50 SYRINGE (ML) INTRAVENOUS AS NEEDED
OUTPATIENT
Start: 2024-08-23

## 2024-08-23 RX ORDER — HEPARIN 100 UNIT/ML
500 SYRINGE INTRAVENOUS AS NEEDED
Status: DISCONTINUED | OUTPATIENT
Start: 2024-08-23 | End: 2024-08-23 | Stop reason: HOSPADM

## 2024-08-23 ASSESSMENT — PAIN SCALES - GENERAL: PAINLEVEL: 0-NO PAIN

## 2024-08-24 ENCOUNTER — HOSPITAL ENCOUNTER (EMERGENCY)
Facility: HOSPITAL | Age: 49
Discharge: HOME | End: 2024-08-24
Attending: EMERGENCY MEDICINE
Payer: COMMERCIAL

## 2024-08-24 VITALS
SYSTOLIC BLOOD PRESSURE: 92 MMHG | TEMPERATURE: 96.5 F | BODY MASS INDEX: 23.19 KG/M2 | DIASTOLIC BLOOD PRESSURE: 60 MMHG | HEIGHT: 73 IN | WEIGHT: 175 LBS | OXYGEN SATURATION: 97 % | HEART RATE: 68 BPM | RESPIRATION RATE: 18 BRPM

## 2024-08-24 DIAGNOSIS — Z45.2 ENCOUNTER FOR CARE RELATED TO VASCULAR ACCESS PORT: Primary | ICD-10-CM

## 2024-08-24 LAB — CORTIS AM PEAK SERPL-MSCNC: 15 UG/DL (ref 5–20)

## 2024-08-24 PROCEDURE — 99283 EMERGENCY DEPT VISIT LOW MDM: CPT

## 2024-08-24 ASSESSMENT — COLUMBIA-SUICIDE SEVERITY RATING SCALE - C-SSRS
6. HAVE YOU EVER DONE ANYTHING, STARTED TO DO ANYTHING, OR PREPARED TO DO ANYTHING TO END YOUR LIFE?: NO
2. HAVE YOU ACTUALLY HAD ANY THOUGHTS OF KILLING YOURSELF?: NO
1. IN THE PAST MONTH, HAVE YOU WISHED YOU WERE DEAD OR WISHED YOU COULD GO TO SLEEP AND NOT WAKE UP?: NO

## 2024-08-24 ASSESSMENT — PAIN - FUNCTIONAL ASSESSMENT: PAIN_FUNCTIONAL_ASSESSMENT: 0-10

## 2024-08-24 ASSESSMENT — PAIN SCALES - GENERAL
PAINLEVEL_OUTOF10: 0 - NO PAIN

## 2024-08-24 NOTE — ED PROVIDER NOTES
"HPI   Chief Complaint   Patient presents with    port issue     Pt states he had a CT done on Thursday, they called him today to tell him he has \"a clot\" in his implanted port.        Patient presents to the emergency department as directed from a phone call from radiology this morning.  Thursday he had a CAT scan performed to stage his colon cancer and there was the incidental finding of a clot from his embedded port.  The port was placed here at the end of May.  The patient does state that yesterday when he had lab work drawn as an outpatient they had some trouble drawing blood off of the port.  He presents now to be further evaluated for this issue      History provided by:  Patient   used: No            Patient History   Past Medical History:   Diagnosis Date    Cancer (Multi)     colon cancer     Past Surgical History:   Procedure Laterality Date    COLONOSCOPY  05/06/2024    DR VALDEZ    OTHER SURGICAL HISTORY  06/07/2022    Meniscectomy    TUNNELED VENOUS PORT PLACEMENT  05/31/2024    PLACEMENT OF TUNNELED CENTR VENOUS CATHETER W/SQ PORT/ DR VALDEZ     Family History   Problem Relation Name Age of Onset    Diabetes type II Mother      Hypertension Father      Heart disease Father          CABG    Macular degeneration Father      Ulcers Father      Breast cancer Mother's Sister  50 - 59    Cancer Father's Sister          rare cancer unknown type    Cancer Paternal Grandmother      Stroke Paternal Grandfather      Other (MCAD) Daughter      No Known Problems Son       Social History     Tobacco Use    Smoking status: Never    Smokeless tobacco: Never   Substance Use Topics    Alcohol use: Never    Drug use: Never       Physical Exam   ED Triage Vitals   Temperature Heart Rate Respirations BP   08/24/24 0829 08/24/24 0829 08/24/24 0829 08/24/24 0832   35.8 °C (96.5 °F) 87 20 91/63      Pulse Ox Temp Source Heart Rate Source Patient Position   08/24/24 0829 08/24/24 0829 -- --   95 % Oral      "   BP Location FiO2 (%)     -- --             Physical Exam  Vitals and nursing note reviewed.   Constitutional:       General: He is not in acute distress.     Appearance: Normal appearance. He is normal weight. He is not ill-appearing, toxic-appearing or diaphoretic.   HENT:      Head: Normocephalic and atraumatic.      Nose: Nose normal. No rhinorrhea.   Neck:      Comments: Trachea is midline  Cardiovascular:      Rate and Rhythm: Normal rate and regular rhythm.      Heart sounds: No murmur heard.  Pulmonary:      Effort: Pulmonary effort is normal.      Breath sounds: Normal breath sounds. No wheezing.   Abdominal:      General: Abdomen is flat. Bowel sounds are normal. There is no distension.      Palpations: Abdomen is soft.      Tenderness: There is no abdominal tenderness.   Musculoskeletal:         General: Normal range of motion.      Cervical back: Normal range of motion.   Skin:     General: Skin is warm and dry.      Findings: No rash.   Neurological:      General: No focal deficit present.      Mental Status: He is alert and oriented to person, place, and time. Mental status is at baseline.   Psychiatric:         Mood and Affect: Mood normal.         Behavior: Behavior normal.         Thought Content: Thought content normal.         Judgment: Judgment normal.           ED Course & MDM   Diagnoses as of 08/24/24 1059   Encounter for care related to vascular access port                 No data recorded     [place] Coma Scale Score: 15 (08/24/24 0829 : Judith Tian RN)                           Medical Decision Making  I discussed the patient case with Dr. Sippy, who agreed that the patient would be appropriate for anticoagulation and recommended I discussed with the patient's oncologist.  I spoke to Dr. Meade who is covering for the patient's oncologist and agrees with anticoagulation and outpatient follow-up.  Patient will be placed on Eliquis and instructed to follow-up with his private physician and  oncologist.  Return at anytime if worse.  Specifically directed return at any time if he has pain at the site of the catheter, neck or chest pain in that area, or any other concerning symptoms        Procedure  Procedures     Miles Carlos,   08/24/24 1100

## 2024-08-25 LAB — ACTH PLAS-MCNC: 4.6 PG/ML (ref 7.2–63.3)

## 2024-08-26 ENCOUNTER — INFUSION (OUTPATIENT)
Dept: HEMATOLOGY/ONCOLOGY | Facility: CLINIC | Age: 49
End: 2024-08-26
Payer: COMMERCIAL

## 2024-08-26 ENCOUNTER — TELEMEDICINE (OUTPATIENT)
Dept: HEMATOLOGY/ONCOLOGY | Facility: HOSPITAL | Age: 49
End: 2024-08-26
Payer: COMMERCIAL

## 2024-08-26 DIAGNOSIS — C18.4 ADENOCARCINOMA OF TRANSVERSE COLON (MULTI): ICD-10-CM

## 2024-08-26 DIAGNOSIS — M79.89 LEG SWELLING: Primary | ICD-10-CM

## 2024-08-26 PROCEDURE — 99215 OFFICE O/P EST HI 40 MIN: CPT | Performed by: STUDENT IN AN ORGANIZED HEALTH CARE EDUCATION/TRAINING PROGRAM

## 2024-08-26 NOTE — PROGRESS NOTES
MEDICAL ONCOLOGY INITIAL OUTPATIENT CONSULTATION NOTE  [facility], Gastrointestinal Oncology    Patient Name:  Yon Lawrence  MRN:  [id number]  :  1975    Referring Provider: Adry Carrera, *  Care Team: Patient Care Team:  Pedro Bazzi MD as PCP - General  Pedro Bazzi MD as PCP - [facility] ACO PCP  Billie Finn MD as Consulting Physician (Hematology and Oncology)  Date of Service: 2024     IDENTIFYING DATA:   Cancer Staging   Adenocarcinoma of transverse colon (Multi)  Staging form: Colon and Rectum, AJCC 8th Edition  - Clinical: Stage IVC (cTX, cNX, cM1c) - Signed by Billie Finn MD on 2024    Yon Lawrence is a 49 y.o.-year-old with metastatic distal transverse colon adenocarcinoma, dMMR, MLH1 promoter hypermethylation detected    Telehealth visit completed with patient.  Consent obtained for telephone visit.     INTERVAL HISTORY:    Patient received C3 pembrolizumab on 24. When he presented for follow-up for consideration of C4 on , he was having up to 6 episodes of diarrhea per day over approximately 1 week, no abdominal pain and no blood in stool. He was started on prednisone 1mg/kg/day, which he continued for approx 1 week, then tapered down. He states the steroids quickly stopped the diarrhea, and he has not had any recurrence of diarrhea since being off prednisone x1 week.     His energy level is very good, and he has no other symptoms.     All other ROS reviewed & are negative      ONCOLOGY HISTORY:  24: CT abdomen/pelvis showed a large necrotic mass occupying the left upper quadrant of the abdomen, likely arising from the distal transverse colon, as well as multiple mesenteric nodules consistent with metastatic lesions and multiple liver mass lesions consistent with metastasis  24: colonoscopy with malignant distal transverse colon mass measuring 10cm, invasive adenocarcinoma, MMR IHC pending  24: CT chest with no  intrathoracic metastasis  5/16/24: CT abdomen/pelvis with stable to mild interval enlargement of the distal transverse colonic mass measuring 10.3 x 9.3 x 8.8 cm with invasion of and fistulization with an adjacent loop of jejunum with passage of oral contrast from the jejunum into the colon at the level of the mass. Mass noted to abut the anterior abdominal wall without evidence of direct invasion, mesenteric stranding and nodularity concerning for peritoneal carcinomatosis, increase in size and number of diffuse liver metastases from 4/30/24    REGIMEN #1: pembrolizumab  6/3/24: C1D1 pembrolizumab  6/24/24: C2D1 pembrolizumab  7/12/24:C3D1 pembrolizumab    PAST MEDICAL HISTORY:  Past Medical History:   Diagnosis Date    Cancer (Multi)     colon cancer       PAST SURGICAL HISTORY:  Past Surgical History:   Procedure Laterality Date    COLONOSCOPY  05/06/2024    DR VALDEZ    OTHER SURGICAL HISTORY  06/07/2022    Meniscectomy    TUNNELED VENOUS PORT PLACEMENT  05/31/2024    PLACEMENT OF TUNNELED CENTR VENOUS CATHETER W/SQ PORT/ DR VALDEZ       ALLERGIES:  No Known Allergies    MEDICATIONS:  Current Outpatient Medications   Medication Instructions    alfuzosin (UROXATRAL) 10 mg, oral, Daily, Do not crush, chew, or split.    apixaban (Eliquis) 5 mg (74 tabs) tablet Take 2 tablets (10 mg) by mouth 2 times a day for 7 days, then take 1 tablet (5 mg) by mouth 2 times a day.    lansoprazole (PREVACID) 30 mg, oral, Daily before breakfast, Do not crush or chew.    prochlorperazine (COMPAZINE) 10 mg, oral, Every 6 hours PRN       SOCIAL HISTORY:  Social History     Tobacco Use    Smoking status: Never    Smokeless tobacco: Never   Substance Use Topics    Alcohol use: Never     Social History     Social History Narrative    Lives with wife and 2 children. Works as a  at [employer]. Coaches basketball over the summer.          FAMILY HISTORY:  Family History   Problem Relation Name Age of  Onset    Diabetes type II Mother      Hypertension Father      Heart disease Father          CABG    Macular degeneration Father      Ulcers Father      Breast cancer Mother's Sister  50 - 59    Cancer Father's Sister          rare cancer unknown type    Cancer Paternal Grandmother      Stroke Paternal Grandfather      Other (MCAD) Daughter      No Known Problems Son          REVIEW OF SYSTEMS:  10-pt ROS reviewed and negative except as mentioned above.    PERFORMANCE STATUS:  Karnofsky Performance Score/ECO, Able to carry on normal activity; minor signs or symptoms of disease (ECOG equivalent 0)    PHYSICAL EXAMINATION:  There were no vitals taken for this visit.   Wt Readings from Last 5 Encounters:   24 79.4 kg (175 lb)   24 79.7 kg (175 lb 11.3 oz)   24 78.9 kg (173 lb 15.1 oz)   24 78.5 kg (173 lb)   24 80.9 kg (178 lb 5.6 oz)     PE - deferred, telephone visit.     PATHOLOGY:     Surgical pathology 24:  FINAL DIAGNOSIS   A. COLON - TRANSVERSE, MASS BIOPSY:   Invasive adenocarcinoma     MMR studies pending; separate report will follow     : The images have been reviewed at the GI consensus conference on May 15, 2024 and diagnosis of adenocarcinoma is confirmed.  Dr. MONROE Asif has been notified via secure chat on 5/15/2024 at 11:51 am     B. RECTUM POLYP, POLYPECTOMY:   Hyperplastic polyp   Electronically signed by Servando Lawton MD on 5/15/2024 at 1155        By the signature on this report, the individual or group listed as making the Final Interpretation/Diagnosis certifies that they have reviewed this case.    Addendum            MISMATCH REPAIR PROTEIN EXPRESSION                 Protein:  Result                MLH-1:   Expression Absent                                            PMS-2:  Expression Absent                                      MSH-2:  Expression Present                                   MSH-6:  Expression Present                                        Neoplasm with combined loss of MLH-1/PMS-2 mismatch repair protein expression.     Assessment for BRAF mutation or methylation of the MLH1 promoter status has been ordered.     The loss of MLH-2/PMS-2 protein expression has been identified (normal internal controls stain appropriately).  Loss of expression of the MLH-1 gene and hence protein expression, is often associated with the concurrent loss of protein expression in PMS-2.     Reference Range: A result is reported as Expression Present if any tumor nuclei are stained positive.     MLH1 Promoter Methylation Result       Specimen:  FFPE [id number]  Estimated Tumor Content: 40%        RESULT: Positive for MLH1 Promoter Methylation       NGS:   ** Copied from [employer] on 18-Jun-2024 12:01PM by Billie Finn **    Test Name: [employer] (XT.V4)  Laboratory Name: [facility]  Specimen Source: Colon, transverse  Collection Date: 06-May-2024  Cancer Type: Invasive adenocarcinoma  Report Id: [id number]    Result Interpretation:  * Lab Notes: Tumor Percentage: 30%  * Germline Notes: No potential germline variants were found in the limited set of genes on which we report.    Molecular Findings:  * Gene: BRCA2, Variant: Frameshift - LOF, Potentially Actionable, p.I605fs (Allele Frequency - 7.9%)  * Gene: [place], Variant: Frameshift - LOF, Potentially Actionable, p.F357fs (Allele Frequency - 7.6%)  * Gene: PIK3CA, Variant: Missense variant (exon 1) - GOF, Potentially Actionable, p.R88Q (Allele Frequency - 6.5%)  * Gene: PIK3CA, Variant: Missense variant (exon 20) - GOF, Potentially Actionable, p.L5133O (Allele Frequency - 6.4%)  * Gene: CTNNB1, Variant: Missense variant - GOF, Biologically Relevant, p.T41A (Allele Frequency - 28.5%)  * Gene: MLH1, Variant: Frameshift - LOF, Biologically Relevant, p.R9fs (Allele Frequency - 26.4%)  * Gene: MSH3, Variant: Frameshift - LOF, Biologically Relevant, p.K383fs (Allele Frequency - 16.2%)  * Gene: HNF1A, Variant:  Frameshift - LOF, Biologically Relevant, p.P291fs (Allele Frequency - 10.3%)  * Gene: PIK3R2, Variant: Missense variant - GOF, Biologically Relevant, p.G373R (Allele Frequency - 8.8%)  * Gene: PTEN, Variant: Frameshift - LOF, Biologically Relevant, p.K267fs (Allele Frequency - 8.6%)  * Gene: PTEN, Variant: Frameshift - LOF, Biologically Relevant, p.C250fs (Allele Frequency - 6%)  * Gene: FBXW7, Variant: Stop gain - LOF, Biologically Relevant, p.R278*, Nonsense (Allele Frequency - 8.5%)  * Gene: PTCH1, Variant: Frameshift - LOF, Biologically Relevant, p.H3577hn (Allele Frequency - 8.3%)  * Gene: PTCH1, Variant: Stop gain - LOF, Biologically Relevant, p.Q728*, Nonsense (Allele Frequency - 6.6%)  * Gene: LLQ6JG2, Variant: Stop gain - LOF, Biologically Relevant, p.R216*, Nonsense (Allele Frequency - 8.1%)  * Gene: CHD2, Variant: Frameshift - LOF, Biologically Relevant, p.V175fs (Allele Frequency - 7.9%)  * Gene: PIK3R1, Variant: Frameshift - LOF, Biologically Relevant, p.S460fs (Allele Frequency - 7.7%)  * Gene: PMS2, Variant: Frameshift - LOF, Biologically Relevant, p.D414fs (Allele Frequency - 7.6%)  * Gene: NOTCH1, Variant: Frameshift - GOF, Biologically Relevant, p.O6920xa (Allele Frequency - 7.6%)  * Gene: APC, Variant: Frameshift - LOF, Biologically Relevant, p.A759fs (Allele Frequency - 7.5%)  * Gene: TP53, Variant: Frameshift - LOF, Biologically Relevant, p.P152fs (Allele Frequency - 7.4%)  * Gene: ARID1A, Variant: Frameshift - LOF, Biologically Relevant, p.M6434sj (Allele Frequency - 7.3%)  * Gene: ZFHX3, Variant: Frameshift - LOF, Biologically Relevant, p.D121fs (Allele Frequency - 6.8%)  * Gene: INPP4B, Variant: Frameshift - LOF, Biologically Relevant, p.N299fs (Allele Frequency - 6.5%)  * Gene: NBN, Variant: Frameshift - LOF, Biologically Relevant, p.R466fs (Allele Frequency - 6.5%)  * Gene: ACVR1B, Variant: Stop gain - LOF, Biologically Relevant, p.R485*, Nonsense (Allele Frequency - 6.5%)  * Gene: MAP2K4,  Variant: Frameshift - LOF, Biologically Relevant, p.N233fs (Allele Frequency - 6.2%)  * Gene: NF1, Variant: Stop gain - LOF, Biologically Relevant, p.*, Nonsense (Allele Frequency - 6.2%)  * Biomarker: Microsatellite Instability, Status: high  * Biomarker: Tumor Mutation [place] (51.6 Muts/Mb, Percentile: 99)  * Disease associated genes/variants with no reportable findings:     * BRAF    * KRAS    * NRAS  * 80 variants of unknown significance    ** End of copied information **    IMAGING DATA:   CT abdomen/pelvis 5/16/24:  IMPRESSION:  1.  Stable to mild interval enlargement of the distal transverse colonic mass, measuring 10.3 x 9.3 x 8.8 cm. There is invasion of and fistulization with an adjacent loop of jejunum, with passage of oral contrast from the jejunum into the colon at the level of the mass. The mass abuts the anterior abdominal wall without evidence of direct invasion. No evidence of bowel obstruction.  2. Mesenteric stranding and nodularity adjacent to the mass concerning for peritoneal carcinomatosis.  3. Increase in size and number of diffuse liver metastases from 04/30/2024.    CT C/A/P 8/22/24:  CHEST:  1. No evidence of intrathoracic metastasis.  2. Interval development of a long segment filling defect within the  SVC and superior aspect of the atrium, new since the CT of the chest  from 05/07/2024, concerning for thrombosis possibly secondary to the  presence of the catheter.      ABDOMEN-PELVIS:  1.  Interval decrease in the size of the annular irregular distal  transverse and proximal descending colon mass with improved  extraluminal invasion and surrounding soft tissue stranding.  Persistent fistulization with the adjacent small bowel loops again  noted.  2. Interval decrease in the size of several bilobar hypoenhancing  metastatic hepatic masses.  3. Slight interval decrease in the size of multiple subcentimeter  peritumoral mesenteric lymph nodes.    LABORATORY DATA:    Last CBC w. Diff.:     Lab Results   Component Value Date/Time    WBC 19.6 (H) 08/05/2024 1103    NRBC 0.0 08/05/2024 1103    RBC 4.44 (L) 08/05/2024 1103    HGB 10.9 (L) 08/05/2024 1103    HCT 35.1 (L) 08/05/2024 1103    MCV 79 (L) 08/05/2024 1103    MCH 24.5 (L) 08/05/2024 1103    MCHC 31.1 (L) 08/05/2024 1103    RDW 21.1 (H) 08/05/2024 1103     08/05/2024 1103    NEUTOPHILPCT 55.4 07/08/2024 1452    IGPCT 0.3 08/05/2024 1103    LYMPHOPCT 33.0 08/05/2024 1103    MONOPCT 7.0 08/05/2024 1103    EOSPCT 0.0 08/05/2024 1103    BASOPCT 0.0 08/05/2024 1103    NEUTROABS 11.91 (H) 07/08/2024 1452    IGABSOL 0.06 08/05/2024 1103    LYMPHSABS 8.30 (H) 07/08/2024 1452    MONOSABS 0.94 07/08/2024 1452    EOSABS 0.00 08/05/2024 1103    BASOSABS 0.00 08/05/2024 1103     Last CMP:     Lab Results   Component Value Date/Time    GLUCOSE 104 (H) 08/23/2024 1559     (L) 08/23/2024 1559    K 4.3 08/23/2024 1559    CL 99 08/23/2024 1559    CO2 27 08/23/2024 1559    ANIONGAP 9 (L) 08/23/2024 1559    BUN 19 08/23/2024 1559    CREATININE 0.83 08/23/2024 1559    EGFR >90 08/23/2024 1559    CALCIUM 6.9 (L) 08/23/2024 1559    ALBUMIN 2.0 (L) 08/23/2024 1559    ALKPHOS 182 (H) 08/23/2024 1559    PROT 3.9 (L) 08/23/2024 1559    AST 22 08/23/2024 1559    BILITOT 0.4 08/23/2024 1559    ALT 50 08/23/2024 1559       Carcinoembryonic AG   Date/Time Value Ref Range Status   08/05/2024 11:03 AM 2.8 ug/L Final   07/08/2024 02:52 PM 6.0 ug/L Final   06/17/2024 12:44 PM 52.4 ug/L Final   05/20/2024 11:17 AM 19.1 ug/L Final   05/02/2024 04:04 PM 6.7 ug/L Final     Signatera:  5/20/24: 538.67 MTM/mL  6/17/24: 654.58 MTM/mL  7/8/24 - signatera pending     All pertinent pathology, imaging, and labs were personally reviewed and interpreted in clinic. Findings as per HPI and EMR.    ASSESSMENT:  Yon Lawrence is a 49 y.o. male with Adenocarcinoma of transverse colon (Multi), Clinical: Stage IVC (cTX, cNX, cM1c).      Reviewed the diagnosis of colon adenocarcinoma  and the above history with the patient.  Explained that the liver is the most common site of metastatic colorectal cancer involvement and 20 to 34% of patients will present with synchronous liver metastases, which is thought to be a more disseminated disease. I explained that in general, metastatic colon cancer is treatable, not curable. However, we will aim for a long-term response to treatment. I explained that I would recommend starting with systemic therapy and reserve local therapies (radiation, surgery) for symptom control or for management of residual treated disease depending on response to systemic therapy.    With regard to systemic therapy, standard-of-care can include 5-FU and oxaliplatin and/or irinotecan +/- anti-VEGF or anti-EGFR therapy. However, since initial consultation, IHC revealed patient's tumor is dMMR with MLH1 and PMS2 loss and this was confirmed by NGS showing microsatellite instability and high tumor mutation burden as above. Spoke with patient in the interim since last visit and recommended single agent PD-1 blockade with pembrolizumab in place of FOLFOX as first-line therapy. Reviewed side effects of pembrolizumab in detail, including but not limited to diarrhea, skin rash, colitis, thyroiditis, and rare but life-threatening immune-related adverse events including but not limited to myocarditis, pneumonitis, encephalitis, hypophysitis. Emphasized recommendation to call with any new symptoms.     Explained at 6/17/24 follow up that we would anticipate it would take at least 2-3 cycles to see symptoms improve from the treatment. Would recommend restaging scan after 4 cycles.     With regard to travel, patient and wife asking about safety of traveling to [place] and [place]. Explained my main concern at this point is the risk of unexpected change in his health due to risk of colonic perforation and existing fistula. Emphasized importance of knowing where to seek emergency medical  care if they are traveling out of town. With regard to the treatment with pembrolizumab, there is no specific contraindication to travel.    8/26/24: I personally reviewed the images from the recent CT, which show decreased burden of disease in the colon and some decrease in tumor burden in the liver. There is still fistulization between the primary tumor and the small bowel. He is no longer having diarrhea, which resolved on prednisone 1mg/kg/day followed by a taper (approx 2 weeks total). I discussed with him that I would recommend holding immunohterapy for now pending additional workup, and will reach out to Dr. Ha Go in gastroenterology to assist in workup and prophylaxis in terms of re-introduction of pembrolizumab.     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  D/w Dr. Joya.  Hold C4 pembrolizumab and start prednisone 1mg/kg day with ppi prophylaxis.      8/13/24 - Diarrhea almost completely resolved - D/w Dr. Peterson -   Will start prednisone taper tomorrow - decrease dose by 10mg daily x 7 days.  Will be off prednisone in 1 week.  If diarrhea returns during the taper - will need to call the office for further clarifications on prednisone    8/26/24: Hold immunotherapy pending additional workup with Dr. Ha Go. Goal would be re-challenge of immunotherapy given partial but incomplete response after 3 cycles of pembrolizumab.       #Abdominal pain:  - LUQ pain much improved     #Anemia:  - Most likely related to chronic ongoing  blood loss from primary tumor   - Folate and vitamin B12 normal on 6/17/24  - Transferrin saturation low at 5% with normal ferritin 291, overall likely mixed picture of iron deficiency and anemia of chronic inflammation--although mixed results and initially held off on iron supplementation, hgb back down to 8.3 on 6/20/24, decision made to proceed with IV iron 200mg x5  - Increased energy and improved color as of 7/1/24    #Elevated alk phos, AST, ALT:  - Given the patient's overall burden of disease, with predominant alk phos elevation suggestive of an infiltrative process, suspect this is related to disease progression as patient only just started treatment less than 6 weeks ago  - Continue to monitor closely as the differential diagnosis includes immune-related adverse event (less likely based on pattern and timing) and vascular obstruction  - LFTs improving as of 7/12/24     #Frequent nocturnal urination:  - Urinalysis shows no evidence of UTI though significant bilirubin in the urine  - Referral to urology     #Leukocytosis:  - 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 Mckeon and at tumor board    #Early onset colorectal cancer:  - [facility] for germline testing due to young age at diagnosis, already referred for genetic counseling - virtual October 2024  - Recommend screening of first-degree relatives with colonoscopy 10 years prior to the patient's age of diagnosis (no later than age 36)  - Patient declined referral to Young Adult Oncology program/oncofertility at this time

## 2024-08-27 ENCOUNTER — APPOINTMENT (OUTPATIENT)
Dept: HEMATOLOGY/ONCOLOGY | Facility: CLINIC | Age: 49
End: 2024-08-27
Payer: COMMERCIAL

## 2024-08-27 ENCOUNTER — TELEPHONE (OUTPATIENT)
Dept: ADMISSION | Facility: HOSPITAL | Age: 49
End: 2024-08-27
Payer: COMMERCIAL

## 2024-08-27 NOTE — TELEPHONE ENCOUNTER
Pt states he did not get a call from Dr Carrera yesterday and therefore today's infusion appt was cancelled because the infusion team stated there were no orders in place.

## 2024-08-27 NOTE — TELEPHONE ENCOUNTER
Per Paz Quiroz CNP, Dr. Carrera attempted to call patient yesterday and left a voicemail. Awaiting updated orders.

## 2024-08-28 ENCOUNTER — APPOINTMENT (OUTPATIENT)
Dept: HEMATOLOGY/ONCOLOGY | Facility: CLINIC | Age: 49
End: 2024-08-28
Payer: COMMERCIAL

## 2024-08-30 ENCOUNTER — CLINICAL SUPPORT (OUTPATIENT)
Dept: URGENT CARE | Facility: CLINIC | Age: 49
End: 2024-08-30
Payer: COMMERCIAL

## 2024-08-30 VITALS
TEMPERATURE: 97.4 F | DIASTOLIC BLOOD PRESSURE: 70 MMHG | SYSTOLIC BLOOD PRESSURE: 102 MMHG | HEART RATE: 98 BPM | OXYGEN SATURATION: 98 % | RESPIRATION RATE: 14 BRPM

## 2024-08-30 DIAGNOSIS — S01.81XA CHIN LACERATION, INITIAL ENCOUNTER: Primary | ICD-10-CM

## 2024-08-30 PROCEDURE — 99213 OFFICE O/P EST LOW 20 MIN: CPT | Performed by: PHYSICIAN ASSISTANT

## 2024-08-30 NOTE — PROGRESS NOTES
"Subjective   Patient ID: Yon Lawrence \"Vladimir\" is a 49 y.o. male who presents for chin laceration  (Tripped and fell and hit his chin on the floor x 1 hour ago).  HPI  Presents for evaluation and treatment of   laceration.  The pt accidently lacerated the inferior chin just pta.  Pt was able to apply pressure and affect hemostasis.  No other lacerations as a result.  Pt does not take anti-platelet/coagulant medicine.  No change in ROM or gross sensation. No other complaints        Review of Systems    Constitutional:  See HPI   Integumentary: See HPI  Neurologic:  Alert and oriented X4, No numbness, No tingling.    All other systems are negative     Objective     /70 (BP Location: Left arm, Patient Position: Sitting)   Pulse 98   Temp 36.3 °C (97.4 °F) (Temporal)   Resp 14   SpO2 98%     Physical Exam    General:  Alert and oriented, No acute distress.    Eye:  Pupils are equal, round and reactive to light, Normal conjunctiva.    HENT:  Normocephalic,   Neck:  Supple    Respiratory: Respirations are non-labored   Musculoskeletal: Normal ROM and strength  Integumentary: Inferior chin with 1.5 cm linear laceration; laceration is full-thickness; no foreign body debris in the wound  neurologic:  Alert, Oriented, Normal sensory, Cranial Nerves II-XII are grossly intact  Psychiatric:  Cooperative, Appropriate mood & affect.Laceration Repair    Performed by: Jason Lepe PA-C  Authorized by: Jason Lepe PA-C    Consent:     Consent obtained:  Verbal    Consent given by:  Patient    Risks, benefits, and alternatives were discussed: yes    Universal protocol:     Patient identity confirmed:  Verbally with patient  Anesthesia:     Anesthesia method:  Local infiltration    Local anesthetic:  Lidocaine 1% WITH epi  Laceration details:     Location:  Face    Face location:  Chin  Pre-procedure details:     Preparation:  Patient was prepped and draped in usual sterile fashion  Treatment:     Area cleansed with:  " Chlorhexidine    Amount of cleaning:  Standard  Skin repair:     Repair method:  Sutures    Suture size:  4-0    Suture material:  Nylon    Suture technique:  Simple interrupted    Number of sutures:  3  Approximation:     Approximation:  Close  Repair type:     Repair type:  Simple  Post-procedure details:     Dressing:  Antibiotic ointment and adhesive bandage    Procedure completion:  Tolerated well, no immediate complications      Assessment/Plan   Chin laceration: Exam revealed full-thickness inferior chin laceration.  Good results with repair.  Wound care reviewed.  Suture removal in 5 to 7 days.  Patient's clinical presentation is otherwise unremarkable at this time. Patient is discharged with instructions to follow-up with primary care or seek emergency medical attention for worsening symptoms or any new concerns.  Problem List Items Addressed This Visit    None  Visit Diagnoses       Chin laceration, initial encounter    -  Primary            Final diagnoses:   [S01.81XA] Chin laceration, initial encounter

## 2024-08-30 NOTE — PATIENT INSTRUCTIONS
Keep covered for 24 hours and remove dressing.  Afterwards, wash daily with soap and water but do not submerge for 7 days.  Keep clean, dry, and open to air.  Do not use harsh soaps or chemicals.  Do not apply topical ointments.  Suture removal in 5 to 7 days.

## 2024-09-03 ENCOUNTER — LAB (OUTPATIENT)
Dept: LAB | Facility: LAB | Age: 49
End: 2024-09-03
Payer: COMMERCIAL

## 2024-09-03 DIAGNOSIS — C18.4 ADENOCARCINOMA OF TRANSVERSE COLON (MULTI): ICD-10-CM

## 2024-09-05 ENCOUNTER — NURSE TRIAGE (OUTPATIENT)
Dept: ADMISSION | Facility: HOSPITAL | Age: 49
End: 2024-09-05

## 2024-09-05 ENCOUNTER — OFFICE VISIT (OUTPATIENT)
Dept: URGENT CARE | Facility: CLINIC | Age: 49
End: 2024-09-05
Payer: COMMERCIAL

## 2024-09-05 VITALS
HEIGHT: 73 IN | HEART RATE: 78 BPM | DIASTOLIC BLOOD PRESSURE: 65 MMHG | RESPIRATION RATE: 14 BRPM | SYSTOLIC BLOOD PRESSURE: 92 MMHG | WEIGHT: 175 LBS | TEMPERATURE: 96.9 F | OXYGEN SATURATION: 97 % | BODY MASS INDEX: 23.19 KG/M2

## 2024-09-05 DIAGNOSIS — Z48.02 VISIT FOR SUTURE REMOVAL: Primary | ICD-10-CM

## 2024-09-05 NOTE — TELEPHONE ENCOUNTER
"Per the on-call fellow \"wrap with ace bandage, elevate but nothing much else\"  I called the patient back to report this and let him know that we can also wait for the team to weigh in tomorrow.    The patient is agreeable to trying the ace bandages above the knee however he has been using compression socks below the knee and elevating which does help. I advised that if he experiences pain, discoloration, tingling, numbness or increased weakness to go to ER. He is agreeable to that   "

## 2024-09-05 NOTE — PROGRESS NOTES
[facility] URGENT CARE  Alba LARA Julio Cesar, APRN-CNP     Visit Note - 9/5/2024 4:24 PM   This note was generated with voice recognition software and may contain errors including spelling, grammar, syntax, and misrecognization of what was dictated.    Patient: Yon Lawrence, MRN: [id number], 49 y.o., male   PCP: Pedro Bazzi MD  ------------------------------------  ALLERGIES: No Known Allergies     CURRENT MEDICATIONS:   Current Outpatient Medications   Medication Instructions    alfuzosin (UROXATRAL) 10 mg, oral, Daily, Do not crush, chew, or split.    apixaban (Eliquis) 5 mg (74 tabs) tablet Take 2 tablets (10 mg) by mouth 2 times a day for 7 days, then take 1 tablet (5 mg) by mouth 2 times a day.    lansoprazole (PREVACID) 30 mg, oral, Daily before breakfast, Do not crush or chew.    prochlorperazine (COMPAZINE) 10 mg, oral, Every 6 hours PRN     ------------------------------------  PAST MEDICAL HX:  Patient Active Problem List   Diagnosis    LUQ abdominal pain    Adenocarcinoma of transverse colon (Multi)    Anemia    BPH without obstruction/lower urinary tract symptoms      SURGICAL HX:  Past Surgical History:   Procedure Laterality Date    COLONOSCOPY  05/06/2024    DR VALDEZ    OTHER SURGICAL HISTORY  06/07/2022    Meniscectomy    TUNNELED VENOUS PORT PLACEMENT  05/31/2024    PLACEMENT OF TUNNELED CENTR VENOUS CATHETER W/SQ PORT/ DR VALDEZ      FAMILY HX:   No pertinent history.   SOCIAL HX:    reports that he has never smoked. He has never used smokeless tobacco. Employed - is a teacher.   ------------------------------------  CHIEF COMPLAINT:   Chief Complaint   Patient presents with    Suture / Staple Removal     Suture removal x 6 days      HISTORY OF PRESENT ILLNESS: The history was obtained from patient. Yon is a 49 y.o. male, who presents with a chief complaint of need for suture removal - had 3 sutures placed in chin after tripping and falling on 8/30/24; sutures were placed by  "another provider at this office. Patient denies any c/o - no residual redness, tenderness, swelling, or drainage from the area; denies any other constitutional c/o. Has been cleansing the area with soap and water.     REVIEW OF SYSTEMS:  10 systems reviewed negative with exception of history of present illness as listed above.    TODAY'S VITALS: BP 92/65   Pulse 78   Temp 36.1 °C (96.9 °F)   Resp 14   Ht 1.854 m (6' 1\")   Wt 79.4 kg (175 lb)   SpO2 97%   BMI 23.09 kg/m²   Reports BP has been running low - PCP aware/monitoring - has been asymptomatic.    PHYSICAL EXAMINATION:  General:  Pleasant male, alert and oriented, in no acute distress.   Neck:  Supple; no palpable lymphadenopathy  Respiratory:  Lungs are clear to auscultation, Respirations are non-labored, Breath sounds are equal, Symmetrical chest wall expansion.    Cardiovascular:  Normal rate, Regular rhythm. Normal S1S2. No m/r/g. + lower extremity edema.  Musculoskeletal:  Ambulates and moves all extremities easily and without discomfort.   Integumentary:  Pink, warm, dry. Inferior aspect of chin with a healing, sutured, linear lacerated area (approx 1.5 cm x 1mm) - very slight surrounding erythema noted, but wound edges well approximated (aside from tiny area of dehiscence at L side of laceration), and without any tenderness, crusting, drainage, or edema.   Neurologic:  Alert, Oriented, Normal sensory, Normal motor function.    Cognition and Speech:  Oriented, Speech clear and coherent.    Psychiatric:  Cooperative, Appropriate mood & affect.       PROCEDURE NOTE: Healing laceration on inferior aspect of chin noted to be well-approximated (aside from a tiny area of dehiscence to L corner of laceration), and without evidence of infection. Site cleansed with CHG 4% and water solution,, and 3 sutures were removed without complication. Laceration again cleansed. Laceration remained well approximated (aside from the small area of dehiscence noted " above, which appeared unchanged) throughout procedure. Recommended DSD but patient declined. Pt tolerated procedure well.     ------------------------------------  Medical Decision Making  LABORATORY or RADIOLOGICAL IMAGING ORDERS/RESULTS:   None    IMPRESSION/PLAN:  Course: Worsening; stable    1. Visit for suture removal  No red flags or evidence of complication; sutures removed in office without difficulty. Patient will be discharged home. Encouraged to keep clean/dry, to monitor closely for any s/sxs of infection; encouraged follow-up with primary care provider for any concerns. BP today was low and has notable lower extremity edema - patient reports has been unchanged and PCP is monitoring. Has not had any other s/sxs of hypotension, but urged to seek care immediately for any problems. Patient agreed with plan of care; questions were encouraged and answered.         FARIHA Zarate-CNP   Advanced Practice Provider  [facility] URGENT CARE

## 2024-09-05 NOTE — TELEPHONE ENCOUNTER
The patient reports that he has swelling in b/l legs, states the swelling is from his hips and all the way down. States that every day it gets worse and his legs feel heavy and weak. He elevates which does help (his legs look a lot less swollen in the AM), however he is unable to elevate at work. Reports still being able to put shoes on, indentation with finger is slight. Is newly on eliquis due to a clot at the port site. HE is scheduled for an ultrasound on 9/13 but he is wondering what he can do in the meantime since the heaviness/weakness/swelling continues to get worse daily. Please advise

## 2024-09-06 ENCOUNTER — HOSPITAL ENCOUNTER (OUTPATIENT)
Dept: VASCULAR MEDICINE | Facility: HOSPITAL | Age: 49
Discharge: HOME | End: 2024-09-06
Payer: COMMERCIAL

## 2024-09-06 DIAGNOSIS — R60.1 GENERALIZED EDEMA: ICD-10-CM

## 2024-09-06 DIAGNOSIS — I82.409 ACUTE DEEP VEIN THROMBOSIS (DVT) OF LOWER EXTREMITY, UNSPECIFIED LATERALITY, UNSPECIFIED VEIN (MULTI): Primary | ICD-10-CM

## 2024-09-06 DIAGNOSIS — M79.89 LEG SWELLING: ICD-10-CM

## 2024-09-06 PROCEDURE — 93970 EXTREMITY STUDY: CPT | Performed by: INTERNAL MEDICINE

## 2024-09-06 PROCEDURE — 93970 EXTREMITY STUDY: CPT

## 2024-09-06 RX ORDER — ENOXAPARIN SODIUM 100 MG/ML
80 INJECTION SUBCUTANEOUS EVERY 12 HOURS
Qty: 60 EACH | Refills: 3 | Status: SHIPPED | OUTPATIENT
Start: 2024-09-06

## 2024-09-06 NOTE — TELEPHONE ENCOUNTER
Patient called back in; he received the message and will head to [facility] for his doppler at 1pm.   No further questions or concerns at this time.

## 2024-09-06 NOTE — PROGRESS NOTES
Received call from patient that LE edema has worsened. Started 8/21, has been on apixaban BID without any missed doses since incidental mediport thrombus identified on 8/24/24. Ordered stat DVT ultrasound, which was positive for occlusive DVT in LLE in the peroneal vein. Unclear if this represents true anticoagulation failure or if this clot was present prior to the mediport thrombus was identified. Nevertheless with increased swelling, will change to Lovenox 80mg subcutaneous q12h and stat referral to vascular medicine.   RN performed teaching over the phone with patient and notified of the plan.

## 2024-09-06 NOTE — TELEPHONE ENCOUNTER
Patient notified of consult order placed to vascular medicine and switching from Eliquis to Lovenox per Dr Carrera. Patient education sheets sent on Lovenox and subcutaneous injections. Discussed with patient who verbalized understanding. Patient advised to return call with any questions or concerns.

## 2024-09-06 NOTE — TELEPHONE ENCOUNTER
Reached out to patient for more information per Dr Carrera. Patient states 8/21 first noticed foot swelling, was started on eliquis 8/24. Has taken BID with no missed doses. Noticed it started getting significantly worse over the past week. 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 MD updated.

## 2024-09-06 NOTE — TELEPHONE ENCOUNTER
Appointment has been rescheduled for today at main campus. Called Vladimir to confirm; left a voicemail with new appointment details and callback number.     Called and left a voicemail with Lora as well.

## 2024-09-09 ENCOUNTER — TELEPHONE (OUTPATIENT)
Dept: HEMATOLOGY/ONCOLOGY | Facility: HOSPITAL | Age: 49
End: 2024-09-09
Payer: COMMERCIAL

## 2024-09-09 NOTE — TELEPHONE ENCOUNTER
Patient states that he had a clot near his mediport and has since been put on blood thinners. He states that the [facility] in [place] would like an order placed that they may use his mediport on 9/16 for port flush/labs. Pt has virtual visit with Wendy Ivey CNP on 9/17 followed by infusion in [place].    Message sent to team.

## 2024-09-10 ENCOUNTER — TELEPHONE (OUTPATIENT)
Dept: GASTROENTEROLOGY | Facility: HOSPITAL | Age: 49
End: 2024-09-10
Payer: COMMERCIAL

## 2024-09-10 DIAGNOSIS — C18.4 ADENOCARCINOMA OF TRANSVERSE COLON (MULTI): Primary | ICD-10-CM

## 2024-09-10 DIAGNOSIS — E88.09 HYPOALBUMINEMIA: ICD-10-CM

## 2024-09-10 NOTE — TELEPHONE ENCOUNTER
Left voicemail for patient to return call to schedule a follow up visit with Dr. Go in one month.    Requested patient to call back [phone].

## 2024-09-10 NOTE — TELEPHONE ENCOUNTER
Pt called to follow up and is aware of plan for him to see vascular to assess his port.    He also received a notification yesterday that he needs to schedule a vascular US which he scheduled for 9/13.  He just had an US on 9/6.  Does he need to repeat?

## 2024-09-11 ENCOUNTER — TELEPHONE (OUTPATIENT)
Dept: GASTROENTEROLOGY | Facility: HOSPITAL | Age: 49
End: 2024-09-11
Payer: COMMERCIAL

## 2024-09-13 ENCOUNTER — HOSPITAL ENCOUNTER (OUTPATIENT)
Dept: RADIOLOGY | Facility: CLINIC | Age: 49
End: 2024-09-13
Payer: COMMERCIAL

## 2024-09-13 ENCOUNTER — APPOINTMENT (OUTPATIENT)
Dept: RADIOLOGY | Facility: CLINIC | Age: 49
End: 2024-09-13
Payer: COMMERCIAL

## 2024-09-13 ENCOUNTER — APPOINTMENT (OUTPATIENT)
Dept: VASCULAR MEDICINE | Facility: HOSPITAL | Age: 49
End: 2024-09-13
Payer: COMMERCIAL

## 2024-09-16 ENCOUNTER — INFUSION (OUTPATIENT)
Dept: HEMATOLOGY/ONCOLOGY | Facility: CLINIC | Age: 49
End: 2024-09-16
Payer: COMMERCIAL

## 2024-09-16 ENCOUNTER — TELEMEDICINE (OUTPATIENT)
Dept: HEMATOLOGY/ONCOLOGY | Facility: CLINIC | Age: 49
End: 2024-09-16
Payer: COMMERCIAL

## 2024-09-16 ENCOUNTER — TELEPHONE (OUTPATIENT)
Dept: ADMISSION | Facility: HOSPITAL | Age: 49
End: 2024-09-16

## 2024-09-16 VITALS
OXYGEN SATURATION: 99 % | WEIGHT: 190.48 LBS | BODY MASS INDEX: 25.13 KG/M2 | TEMPERATURE: 97.2 F | HEART RATE: 78 BPM | SYSTOLIC BLOOD PRESSURE: 96 MMHG | RESPIRATION RATE: 18 BRPM | DIASTOLIC BLOOD PRESSURE: 68 MMHG

## 2024-09-16 DIAGNOSIS — C18.4 ADENOCARCINOMA OF TRANSVERSE COLON (MULTI): Primary | ICD-10-CM

## 2024-09-16 DIAGNOSIS — R06.02 SHORTNESS OF BREATH: Primary | ICD-10-CM

## 2024-09-16 DIAGNOSIS — I82.629 DEEP VEIN THROMBOSIS (DVT) OF UPPER EXTREMITY, UNSPECIFIED CHRONICITY, UNSPECIFIED LATERALITY, UNSPECIFIED VEIN (MULTI): ICD-10-CM

## 2024-09-16 DIAGNOSIS — C18.4 ADENOCARCINOMA OF TRANSVERSE COLON (MULTI): ICD-10-CM

## 2024-09-16 LAB
ALBUMIN SERPL BCP-MCNC: <1.5 G/DL (ref 3.4–5)
ALP SERPL-CCNC: 184 U/L (ref 33–120)
ALT SERPL W P-5'-P-CCNC: 120 U/L (ref 10–52)
ANION GAP SERPL CALC-SCNC: 10 MMOL/L (ref 10–20)
AST SERPL W P-5'-P-CCNC: 22 U/L (ref 9–39)
BASOPHILS # BLD MANUAL: 0 X10*3/UL (ref 0–0.1)
BASOPHILS NFR BLD MANUAL: 0 %
BILIRUB SERPL-MCNC: 0.5 MG/DL (ref 0–1.2)
BUN SERPL-MCNC: 18 MG/DL (ref 6–23)
CALCIUM SERPL-MCNC: 5.6 MG/DL (ref 8.6–10.3)
CHLORIDE SERPL-SCNC: 101 MMOL/L (ref 98–107)
CO2 SERPL-SCNC: 23 MMOL/L (ref 21–32)
CREAT SERPL-MCNC: 0.63 MG/DL (ref 0.5–1.3)
EGFRCR SERPLBLD CKD-EPI 2021: >90 ML/MIN/1.73M*2
EOSINOPHIL # BLD MANUAL: 0 X10*3/UL (ref 0–0.7)
EOSINOPHIL NFR BLD MANUAL: 0 %
ERYTHROCYTE [DISTWIDTH] IN BLOOD BY AUTOMATED COUNT: 17.5 % (ref 11.5–14.5)
GLUCOSE SERPL-MCNC: 110 MG/DL (ref 74–99)
HCT VFR BLD AUTO: 29.7 % (ref 41–52)
HGB BLD-MCNC: 10 G/DL (ref 13.5–17.5)
HYPOCHROMIA BLD QL SMEAR: ABNORMAL
IMM GRANULOCYTES # BLD AUTO: 0.08 X10*3/UL (ref 0–0.7)
IMM GRANULOCYTES NFR BLD AUTO: 0.4 % (ref 0–0.9)
LYMPHOCYTES # BLD MANUAL: 7.67 X10*3/UL (ref 1.2–4.8)
LYMPHOCYTES NFR BLD MANUAL: 35 %
MCH RBC QN AUTO: 26.4 PG (ref 26–34)
MCHC RBC AUTO-ENTMCNC: 33.7 G/DL (ref 32–36)
MCV RBC AUTO: 78 FL (ref 80–100)
MONOCYTES # BLD MANUAL: 0.44 X10*3/UL (ref 0.1–1)
MONOCYTES NFR BLD MANUAL: 2 %
NEUTS SEG # BLD MANUAL: 13.8 X10*3/UL (ref 1.2–7)
NEUTS SEG NFR BLD MANUAL: 63 %
NRBC BLD-RTO: 0.1 /100 WBCS (ref 0–0)
PLATELET # BLD AUTO: 361 X10*3/UL (ref 150–450)
POTASSIUM SERPL-SCNC: 3.5 MMOL/L (ref 3.5–5.3)
PROT SERPL-MCNC: 3 G/DL (ref 6.4–8.2)
RBC # BLD AUTO: 3.79 X10*6/UL (ref 4.5–5.9)
RBC MORPH BLD: ABNORMAL
SODIUM SERPL-SCNC: 130 MMOL/L (ref 136–145)
TOTAL CELLS COUNTED BLD: 100
WBC # BLD AUTO: 21.9 X10*3/UL (ref 4.4–11.3)

## 2024-09-16 PROCEDURE — 99215 OFFICE O/P EST HI 40 MIN: CPT | Performed by: INTERNAL MEDICINE

## 2024-09-16 PROCEDURE — 82378 CARCINOEMBRYONIC ANTIGEN: CPT | Mod: SAMLAB

## 2024-09-16 PROCEDURE — 80053 COMPREHEN METABOLIC PANEL: CPT

## 2024-09-16 PROCEDURE — 85007 BL SMEAR W/DIFF WBC COUNT: CPT

## 2024-09-16 PROCEDURE — 36591 DRAW BLOOD OFF VENOUS DEVICE: CPT

## 2024-09-16 PROCEDURE — 2500000004 HC RX 250 GENERAL PHARMACY W/ HCPCS (ALT 636 FOR OP/ED): Performed by: INTERNAL MEDICINE

## 2024-09-16 PROCEDURE — 85027 COMPLETE CBC AUTOMATED: CPT

## 2024-09-16 RX ORDER — PROCHLORPERAZINE EDISYLATE 5 MG/ML
10 INJECTION INTRAMUSCULAR; INTRAVENOUS EVERY 6 HOURS PRN
OUTPATIENT
Start: 2024-09-18

## 2024-09-16 RX ORDER — HEPARIN SODIUM,PORCINE/PF 10 UNIT/ML
50 SYRINGE (ML) INTRAVENOUS AS NEEDED
OUTPATIENT
Start: 2024-09-16

## 2024-09-16 RX ORDER — EPINEPHRINE 0.3 MG/.3ML
0.3 INJECTION SUBCUTANEOUS EVERY 5 MIN PRN
OUTPATIENT
Start: 2024-09-18

## 2024-09-16 RX ORDER — PROCHLORPERAZINE MALEATE 10 MG
10 TABLET ORAL EVERY 6 HOURS PRN
OUTPATIENT
Start: 2024-09-18

## 2024-09-16 RX ORDER — FAMOTIDINE 10 MG/ML
20 INJECTION INTRAVENOUS ONCE AS NEEDED
OUTPATIENT
Start: 2024-09-18

## 2024-09-16 RX ORDER — HEPARIN 100 UNIT/ML
500 SYRINGE INTRAVENOUS AS NEEDED
OUTPATIENT
Start: 2024-09-16

## 2024-09-16 RX ORDER — HEPARIN 100 UNIT/ML
500 SYRINGE INTRAVENOUS AS NEEDED
Status: DISCONTINUED | OUTPATIENT
Start: 2024-09-16 | End: 2024-09-16 | Stop reason: HOSPADM

## 2024-09-16 RX ORDER — ALBUTEROL SULFATE 0.83 MG/ML
3 SOLUTION RESPIRATORY (INHALATION) AS NEEDED
OUTPATIENT
Start: 2024-09-18

## 2024-09-16 RX ORDER — DIPHENHYDRAMINE HYDROCHLORIDE 50 MG/ML
50 INJECTION INTRAMUSCULAR; INTRAVENOUS AS NEEDED
OUTPATIENT
Start: 2024-09-18

## 2024-09-16 ASSESSMENT — PAIN SCALES - GENERAL: PAINLEVEL: 0-NO PAIN

## 2024-09-16 NOTE — TELEPHONE ENCOUNTER
This RN called the patient to assess however he had already spoken to Dr. Finn (30 minutes prior).     We discussed going to ER is he experiences any confusion, heart arrythmia, shortness of breath, chest pressure, muscle weakness, he did already go over this with Dr. Finn however was very appreciative of the call and denied further questions or concerns at this time.

## 2024-09-16 NOTE — TELEPHONE ENCOUNTER
Received a page regarding a critical lab value on the patient   Calcium 5.6    Secure chat sent to on-call provider to advise.   Per both the on-call fellow and attending, the plan is to call the office in the morning if asymptomatic.

## 2024-09-16 NOTE — PROGRESS NOTES
MEDICAL ONCOLOGY INITIAL OUTPATIENT CONSULTATION NOTE  [facility], Gastrointestinal Oncology    Patient Name:  Yon Lawrence  MRN:  [id number]  :  1975    Referring Provider: No ref. provider found  Care Team: Patient Care Team:  Pedro Bazzi MD as PCP - General (Family Medicine)  Pedro Bazzi MD as PCP - [facility] ACO PCP  Billie Finn MD as Consulting Physician (Hematology and Oncology)  Adry Carrera MD PhD as Consulting Physician (Hematology and Oncology)  Date of Service: 2024     IDENTIFYING DATA:   Cancer Staging   Adenocarcinoma of transverse colon (Multi)  Staging form: Colon and Rectum, AJCC 8th Edition  - Clinical: Stage IVC (cTX, cNX, cM1c) - Signed by Billie Finn MD on 2024    Yon Lawrence is a 49 y.o.-year-old with metastatic distal transverse colon adenocarcinoma, dMMR, MLH1 promoter hypermethylation detected    Consent: The current visit is conducted remotely with video and audio. The patient agrees to the video conference.  Verbal consent was requested and obtained from patient on this date for a telehealth visit.      INTERVAL HISTORY:  Patient received C3 pembrolizumab on 24 and when he presented for follow up for consideration of C4 on 24, he reported up to 6 episodes/day of diarrhea and was started on prednisone 1mg/kg/day x1 week followed by taper with improvement in the diarrhea. Subsequent CT 24 showed decreased burden of disease in the colon, decreased tumor burden in the liver and persistent fistulization between the primary tumor and small bowel. Plan as of 24 follow up was to schedule the patient with gastroenterology and hold pembrolizumab.    At the time of follow up on 24, the patient reports that he is feeling unwell. He has become significantly weaker over the past few weeks and notices this more on his right side, which he feels may be related to the mediport-associated blood clot. He was previously  still working as a , but last week could not go to work Monday-Thursday and went in on Friday for only a half day which he found exhausting. He notes shortness of breath most notable when going up the stairs in his home. He has overall decreased exercise tolerance and feels he needs to push himself up even to get out of a chair. He states a friend checked his vitals over the weekend and his blood pressure was low in the 80s systolic with tachycardia and he feels his heart racing.    He states he is feeling slightly better today compared with over the weekend.       ONCOLOGY HISTORY:  4/30/24: CT abdomen/pelvis showed a large necrotic mass occupying the left upper quadrant of the abdomen, likely arising from the distal transverse colon, as well as multiple mesenteric nodules consistent with metastatic lesions and multiple liver mass lesions consistent with metastasis  5/6/24: colonoscopy with malignant distal transverse colon mass measuring 10cm, invasive adenocarcinoma, MMR IHC pending  5/7/24: CT chest with no intrathoracic metastasis  5/16/24: CT abdomen/pelvis with stable to mild interval enlargement of the distal transverse colonic mass measuring 10.3 x 9.3 x 8.8 cm with invasion of and fistulization with an adjacent loop of jejunum with passage of oral contrast from the jejunum into the colon at the level of the mass. Mass noted to abut the anterior abdominal wall without evidence of direct invasion, mesenteric stranding and nodularity concerning for peritoneal carcinomatosis, increase in size and number of diffuse liver metastases from 4/30/24    REGIMEN #1: pembrolizumab  6/3/24: C1D1 pembrolizumab  6/24/24: C2D1 pembrolizumab  7/12/24: C3D1 pembrolizumab  8/2024-ongoing: treatment on hold for diarrhea  9/18/24: plan for C4D1 pembrolizumab    PAST MEDICAL HISTORY:  Past Medical History:   Diagnosis Date    Cancer (Multi)     colon cancer       PAST SURGICAL HISTORY:  Past Surgical  History:   Procedure Laterality Date    COLONOSCOPY  2024    DR VALDEZ    OTHER SURGICAL HISTORY  2022    Meniscectomy    TUNNELED VENOUS PORT PLACEMENT  2024    PLACEMENT OF TUNNELED CENTR VENOUS CATHETER W/SQ PORT/ DR VALDEZ       ALLERGIES:  No Known Allergies    MEDICATIONS:  Current Outpatient Medications   Medication Instructions    alfuzosin (UROXATRAL) 10 mg, oral, Daily, Do not crush, chew, or split.    apixaban (Eliquis) 5 mg (74 tabs) tablet Take 2 tablets (10 mg) by mouth 2 times a day for 7 days, then take 1 tablet (5 mg) by mouth 2 times a day.    enoxaparin (LOVENOX) 80 mg, subcutaneous, Every 12 hours    lansoprazole (PREVACID) 30 mg, oral, Daily before breakfast, Do not crush or chew.    prochlorperazine (COMPAZINE) 10 mg, oral, Every 6 hours PRN       SOCIAL HISTORY:  Social History     Tobacco Use    Smoking status: Never    Smokeless tobacco: Never   Substance Use Topics    Alcohol use: Never     Social History     Social History Narrative    Lives with wife and 2 children. Works as a  at [employer]. Coaches basketball over the summer.          FAMILY HISTORY:  Family History   Problem Relation Name Age of Onset    Diabetes type II Mother      Hypertension Father      Heart disease Father          CABG    Macular degeneration Father      Ulcers Father      Breast cancer Mother's Sister  50 - 59    Cancer Father's Sister          rare cancer unknown type    Cancer Paternal Grandmother      Stroke Paternal Grandfather      Other (MCAD) Daughter      No Known Problems Son          REVIEW OF SYSTEMS:  10-pt ROS reviewed and negative except as mentioned above.    PERFORMANCE STATUS:  Karnofsky Performance Score/ECO, Able to carry on normal activity; minor signs or symptoms of disease (ECOG equivalent 0)    PHYSICAL EXAMINATION:  There were no vitals taken for this visit.   Wt Readings from Last 5 Encounters:   24 86.4 kg (190 lb 7.6  oz)   09/05/24 79.4 kg (175 lb)   08/24/24 79.4 kg (175 lb)   08/23/24 79.7 kg (175 lb 11.3 oz)   08/05/24 78.9 kg (173 lb 15.1 oz)     PE - deferred, telephone visit.     PATHOLOGY:     Surgical pathology 5/6/24:  FINAL DIAGNOSIS   A. COLON - TRANSVERSE, MASS BIOPSY:   Invasive adenocarcinoma     MMR studies pending; separate report will follow     : The images have been reviewed at the GI consensus conference on May 15, 2024 and diagnosis of adenocarcinoma is confirmed.  Dr. MONROE Asif has been notified via secure chat on 5/15/2024 at 11:51 am     B. RECTUM POLYP, POLYPECTOMY:   Hyperplastic polyp   Electronically signed by Servando Lawton MD on 5/15/2024 at 1155        By the signature on this report, the individual or group listed as making the Final Interpretation/Diagnosis certifies that they have reviewed this case.    Addendum            MISMATCH REPAIR PROTEIN EXPRESSION                 Protein:  Result                MLH-1:   Expression Absent                                            PMS-2:  Expression Absent                                      MSH-2:  Expression Present                                   MSH-6:  Expression Present                                       Neoplasm with combined loss of MLH-1/PMS-2 mismatch repair protein expression.     Assessment for BRAF mutation or methylation of the MLH1 promoter status has been ordered.     The loss of MLH-2/PMS-2 protein expression has been identified (normal internal controls stain appropriately).  Loss of expression of the MLH-1 gene and hence protein expression, is often associated with the concurrent loss of protein expression in PMS-2.     Reference Range: A result is reported as Expression Present if any tumor nuclei are stained positive.     MLH1 Promoter Methylation Result       Specimen:  [id number]  Estimated Tumor Content: 40%        RESULT: Positive for MLH1 Promoter Methylation       NGS:   ** Copied from [employer]  on 18-Jun-2024 12:01PM by Billie Finn **    Test Name: [facility] xT (XT.V4)  Laboratory Name: [facility]  Specimen Source: Colon, transverse  Collection Date: 06-May-2024  Cancer Type: Invasive adenocarcinoma  Report Id: [id number]    Result Interpretation:  * Lab Notes: Tumor Percentage: 30%  * Germline Notes: No potential germline variants were found in the limited set of genes on which we report.    Molecular Findings:  * Gene: BRCA2, Variant: Frameshift - LOF, Potentially Actionable, p.I605fs (Allele Frequency - 7.9%)  * Gene: [place], Variant: Frameshift - LOF, Potentially Actionable, p.F357fs (Allele Frequency - 7.6%)  * Gene: PIK3CA, Variant: Missense variant (exon 1) - GOF, Potentially Actionable, p.R88Q (Allele Frequency - 6.5%)  * Gene: PIK3CA, Variant: Missense variant (exon 20) - GOF, Potentially Actionable, p.M4541P (Allele Frequency - 6.4%)  * Gene: CTNNB1, Variant: Missense variant - GOF, Biologically Relevant, p.T41A (Allele Frequency - 28.5%)  * Gene: MLH1, Variant: Frameshift - LOF, Biologically Relevant, p.R9fs (Allele Frequency - 26.4%)  * Gene: MSH3, Variant: Frameshift - LOF, Biologically Relevant, p.K383fs (Allele Frequency - 16.2%)  * Gene: HNF1A, Variant: Frameshift - LOF, Biologically Relevant, p.P291fs (Allele Frequency - 10.3%)  * Gene: PIK3R2, Variant: Missense variant - GOF, Biologically Relevant, p.G373R (Allele Frequency - 8.8%)  * Gene: PTEN, Variant: Frameshift - LOF, Biologically Relevant, p.K267fs (Allele Frequency - 8.6%)  * Gene: PTEN, Variant: Frameshift - LOF, Biologically Relevant, p.C250fs (Allele Frequency - 6%)  * Gene: FBXW7, Variant: Stop gain - LOF, Biologically Relevant, p.R278*, Nonsense (Allele Frequency - 8.5%)  * Gene: PTCH1, Variant: Frameshift - LOF, Biologically Relevant, p.L4111po (Allele Frequency - 8.3%)  * Gene: PTCH1, Variant: Stop gain - LOF, Biologically Relevant, p.Q728*, Nonsense (Allele Frequency - 6.6%)  * Gene: RBQ2MX4, Variant: Stop gain - LOF,  Biologically Relevant, p.R216*, Nonsense (Allele Frequency - 8.1%)  * Gene: CHD2, Variant: Frameshift - LOF, Biologically Relevant, p.V175fs (Allele Frequency - 7.9%)  * Gene: PIK3R1, Variant: Frameshift - LOF, Biologically Relevant, p.S460fs (Allele Frequency - 7.7%)  * Gene: PMS2, Variant: Frameshift - LOF, Biologically Relevant, p.D414fs (Allele Frequency - 7.6%)  * Gene: NOTCH1, Variant: Frameshift - GOF, Biologically Relevant, p.J1482cv (Allele Frequency - 7.6%)  * Gene: APC, Variant: Frameshift - LOF, Biologically Relevant, p.A759fs (Allele Frequency - 7.5%)  * Gene: TP53, Variant: Frameshift - LOF, Biologically Relevant, p.P152fs (Allele Frequency - 7.4%)  * Gene: ARID1A, Variant: Frameshift - LOF, Biologically Relevant, p.B5683kz (Allele Frequency - 7.3%)  * Gene: ZFHX3, Variant: Frameshift - LOF, Biologically Relevant, p.D121fs (Allele Frequency - 6.8%)  * Gene: INPP4B, Variant: Frameshift - LOF, Biologically Relevant, p.N299fs (Allele Frequency - 6.5%)  * Gene: NBN, Variant: Frameshift - LOF, Biologically Relevant, p.R466fs (Allele Frequency - 6.5%)  * Gene: ACVR1B, Variant: Stop gain - LOF, Biologically Relevant, p.R485*, Nonsense (Allele Frequency - 6.5%)  * Gene: MAP2K4, Variant: Frameshift - LOF, Biologically Relevant, p.N233fs (Allele Frequency - 6.2%)  * Gene: NF1, Variant: Stop gain - LOF, Biologically Relevant, p.*, Nonsense (Allele Frequency - 6.2%)  * Biomarker: Microsatellite Instability, Status: high  * Biomarker: Tumor Mutation [place] (51.6 Muts/Mb, Percentile: 99)  * Disease associated genes/variants with no reportable findings:     * BRAF    * KRAS    * NRAS  * 80 variants of unknown significance    ** End of copied information **    IMAGING DATA:   CT abdomen/pelvis 5/16/24:  IMPRESSION:  1.  Stable to mild interval enlargement of the distal transverse colonic mass, measuring 10.3 x 9.3 x 8.8 cm. There is invasion of and fistulization with an adjacent loop of jejunum, with passage of  oral contrast from the jejunum into the colon at the level of the mass. The mass abuts the anterior abdominal wall without evidence of direct invasion. No evidence of bowel obstruction.  2. Mesenteric stranding and nodularity adjacent to the mass concerning for peritoneal carcinomatosis.  3. Increase in size and number of diffuse liver metastases from 04/30/2024.    CT C/A/P 8/22/24:  CHEST:  1. No evidence of intrathoracic metastasis.  2. Interval development of a long segment filling defect within the  SVC and superior aspect of the atrium, new since the CT of the chest  from 05/07/2024, concerning for thrombosis possibly secondary to the  presence of the catheter.      ABDOMEN-PELVIS:  1.  Interval decrease in the size of the annular irregular distal  transverse and proximal descending colon mass with improved  extraluminal invasion and surrounding soft tissue stranding.  Persistent fistulization with the adjacent small bowel loops again  noted.  2. Interval decrease in the size of several bilobar hypoenhancing  metastatic hepatic masses.  3. Slight interval decrease in the size of multiple subcentimeter  peritumoral mesenteric lymph nodes.    Lower extremity doppler 9/6/24:  CONCLUSIONS:  Right Lower Venous: No evidence of acute deep vein thrombus visualized in the right lower extremity. Cannot rule out thrombus in non-visualized posterior tibial and Peroneal veins due to swelling and edema.  Left Lower Venous: There is acute occlusive deep vein thrombosis visualized in the peroneal vein. Cannot rule out thrombus in non-visualized posterior tibial vein due to edema and swelling. The remainder of the left leg is negative for deep vein thrombosis.    LABORATORY DATA:    Last CBC w. Diff.:    Lab Results   Component Value Date/Time    WBC 21.9 (H) 09/16/2024 1506    NRBC 0.1 (H) 09/16/2024 1506    RBC 3.79 (L) 09/16/2024 1506    HGB 10.0 (L) 09/16/2024 1506    HCT 29.7 (L) 09/16/2024 1506    MCV 78 (L) 09/16/2024  1506    MCH 26.4 09/16/2024 1506    MCHC 33.7 09/16/2024 1506    RDW 17.5 (H) 09/16/2024 1506     09/16/2024 1506    NEUTOPHILPCT 55.4 07/08/2024 1452    IGPCT 0.4 09/16/2024 1506    LYMPHOPCT 35.0 09/16/2024 1506    MONOPCT 2.0 09/16/2024 1506    EOSPCT 0.0 09/16/2024 1506    BASOPCT 0.0 09/16/2024 1506    NEUTROABS 11.91 (H) 07/08/2024 1452    IGABSOL 0.08 09/16/2024 1506    LYMPHSABS 8.30 (H) 07/08/2024 1452    MONOSABS 0.94 07/08/2024 1452    EOSABS 0.00 09/16/2024 1506    BASOSABS 0.00 09/16/2024 1506     Last CMP:     Lab Results   Component Value Date/Time    GLUCOSE 110 (H) 09/16/2024 1506     (L) 09/16/2024 1506    K 3.5 09/16/2024 1506     09/16/2024 1506    CO2 23 09/16/2024 1506    ANIONGAP 10 09/16/2024 1506    BUN 18 09/16/2024 1506    CREATININE 0.63 09/16/2024 1506    EGFR >90 09/16/2024 1506    CALCIUM 5.6 (LL) 09/16/2024 1506    ALBUMIN <1.5 (L) 09/16/2024 1506    ALKPHOS 184 (H) 09/16/2024 1506    PROT 3.0 (L) 09/16/2024 1506    AST 22 09/16/2024 1506    BILITOT 0.5 09/16/2024 1506     (H) 09/16/2024 1506       Carcinoembryonic AG   Date/Time Value Ref Range Status   08/05/2024 11:03 AM 2.8 ug/L Final   07/08/2024 02:52 PM 6.0 ug/L Final   06/17/2024 12:44 PM 52.4 ug/L Final   05/20/2024 11:17 AM 19.1 ug/L Final   05/02/2024 04:04 PM 6.7 ug/L Final     Signatera:  5/20/24: 538.67 MTM/mL  6/17/24: 654.58 MTM/mL  7/8/24 - signatera pending     All pertinent pathology, imaging, and labs were personally reviewed and interpreted in clinic. Findings as per HPI and EMR.    ASSESSMENT:  Yon Lawrence is a 49 y.o. male with Adenocarcinoma of transverse colon (Multi), Clinical: Stage IVC (cTX, cNX, cM1c).      IHC revealed patient's tumor is dMMR with MLH1 and PMS2 loss and this was confirmed by NGS showing microsatellite instability and high tumor mutation burden as above. Recommended single agent PD-1 blockade with pembrolizumab in place of FOLFOX as first-line therapy.  Reviewed side effects of pembrolizumab in detail, including but not limited to diarrhea, skin rash, colitis, thyroiditis, and rare but life-threatening immune-related adverse events including but not limited to myocarditis, pneumonitis, encephalitis, hypophysitis. Emphasized recommendation to call with any new symptoms.     With regard to travel, patient and wife asked previously about safety of traveling to [place] and [place]. Explained my main concern at this point is the risk of unexpected change in his health due to risk of colonic perforation and existing fistula. Emphasized importance of knowing where to seek emergency medical care if they are traveling out of town. With regard to the treatment with pembrolizumab, there is no specific contraindication to travel.    CT 8/22/24 showed decreased burden of disease in the colon and some decrease in tumor burden in the liver. There is still fistulization between the primary tumor and the small bowel.     He is no longer having diarrhea, which resolved on prednisone 1mg/kg/day followed by a taper (approx 2 weeks total). Based on the increase of 4-6 stools per day over baseline, without abdominal pain or hematochezia, the patient had suspected grade 2 immune related diarrhea. However, discussed that the differential diagnosis includes infection, dietary change and/or increased diarrhea related to fistula and opening of the colon at the site of tumor response. Initially, decision was made to hold immunotherapy pending additional workup. However, the patient is increasingly symptomatic from his disease with worsening clinical parameters (energy, malaise, shortness of breath, anasarca) and laboratory parameters (hypoalbuminemia, elevated LFTs) as of 9/16/24. Suspect these changes are related to disease progression since patient has been off of treatment for greater than 2 months. We discussed the risks and benefits of proceeding with further immunotherapy.  Specifically explained that immune-related colitis can be life-threatening. We agreed to proceed with further PD-1 blockade as scheduled on 9/18/24, given that patient is also at risk of life-threatening complication from his disease at this point and diarrhea has resolved. Discussed case with Dr. Ha Go in gastroenterology, who will see the patient next week, agreed no prophylactic steroids indicated given that the diagnosis of immune-related diarrhea/colitis was not confirmed.    With regard to the patient's shortness of breath, borderline hypotension and weakness, recommended that the patient present to the ED today for workup including CT angiogram of the chest. However, patient states he is feeling slightly improved and would like to continue to monitor at home. Discussed risks/benefits and concern for life-threatening complications of remaining at home without treatment. With regard to the hypocalcemia noted on labs, corrected calcium is ~7.5-8.0 which is not severe - difficult to calculate exact calcium since albumin is below the lower limit of the assay.         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  D/w Dr. Joya.  Hold C4 pembrolizumab and start prednisone 1mg/kg day with ppi prophylaxis.      8/13/24 - Diarrhea almost completely resolved - D/w Dr. Peterson -   Will start prednisone taper tomorrow - decrease dose by 10mg daily x 7 days.  Will be off prednisone in 1 week.  If diarrhea returns during the taper - will need to  call the office for further clarifications on prednisone    8/26/24: Hold immunotherapy pending additional workup with Dr. Ha Go. Goal would be re-challenge of immunotherapy given partial but incomplete response after 3 cycles of pembrolizumab.     9/16/24: Plan to resume pembrolizumab on 9/18/24, discussed with Dr. Go who will see patient next week in clinic  - Return to clinic in 3 weeks for in-person evaluation at [facility] 10/7/24    #Hypoalbuminemia:  - Suspect combination of GI loss and malnutrition  - Encouraged increased protein intake, will also resume cancer-directed therapy as above    #Abdominal pain:  - LUQ pain much improved     #Anemia:  - Most likely related to chronic ongoing blood loss from primary tumor   - Folate and vitamin B12 normal on 6/17/24  - Transferrin saturation low at 5% with normal ferritin 291, overall likely mixed picture of iron deficiency and anemia of chronic inflammation--although mixed results and initially held off on iron supplementation, hgb back down to 8.3 on 6/20/24, decision made to proceed with IV iron 200mg x5  - Increased energy and improved color as of 7/1/24    #Elevated alk phos, AST, ALT:  - Given the patient's overall burden of disease, with predominant alk phos elevation suggestive of an infiltrative process, suspect this is related to disease progression as patient only just started treatment less than 6 weeks ago  - Continue to monitor closely as the differential diagnosis includes immune-related adverse event (less likely based on pattern and timing) and vascular obstruction  - LFTs improving as of 7/12/24     #Frequent nocturnal urination:  - Urinalysis shows no evidence of UTI though significant bilirubin in the urine  - Referral to urology     #Leukocytosis:  - 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 Mckeon and at tumor  board    #Early onset colorectal cancer:  - [facility] for germline testing due to young age at diagnosis, already referred for genetic counseling - virtual October 2024  - Recommend screening of first-degree relatives with colonoscopy 10 years prior to the patient's age of diagnosis (no later than age 36)  - Patient declined referral to Young Adult Oncology program/oncofertility at this time

## 2024-09-17 ENCOUNTER — APPOINTMENT (OUTPATIENT)
Dept: CARDIOLOGY | Facility: HOSPITAL | Age: 49
End: 2024-09-17
Payer: COMMERCIAL

## 2024-09-17 ENCOUNTER — CLINICAL SUPPORT (OUTPATIENT)
Dept: EMERGENCY MEDICINE | Facility: HOSPITAL | Age: 49
DRG: 441 | End: 2024-09-17
Payer: COMMERCIAL

## 2024-09-17 ENCOUNTER — HOSPITAL ENCOUNTER (INPATIENT)
Facility: HOSPITAL | Age: 49
DRG: 441 | End: 2024-09-17
Attending: STUDENT IN AN ORGANIZED HEALTH CARE EDUCATION/TRAINING PROGRAM | Admitting: INTERNAL MEDICINE
Payer: COMMERCIAL

## 2024-09-17 ENCOUNTER — APPOINTMENT (OUTPATIENT)
Dept: HEMATOLOGY/ONCOLOGY | Facility: CLINIC | Age: 49
End: 2024-09-17
Payer: COMMERCIAL

## 2024-09-17 ENCOUNTER — HOSPITAL ENCOUNTER (EMERGENCY)
Facility: HOSPITAL | Age: 49
Discharge: ANOTHER HOSPITAL | End: 2024-09-17
Attending: EMERGENCY MEDICINE
Payer: COMMERCIAL

## 2024-09-17 ENCOUNTER — APPOINTMENT (OUTPATIENT)
Dept: RADIOLOGY | Facility: HOSPITAL | Age: 49
End: 2024-09-17
Payer: COMMERCIAL

## 2024-09-17 VITALS
HEART RATE: 76 BPM | BODY MASS INDEX: 25.18 KG/M2 | WEIGHT: 190 LBS | RESPIRATION RATE: 17 BRPM | OXYGEN SATURATION: 100 % | HEIGHT: 73 IN | TEMPERATURE: 97 F | SYSTOLIC BLOOD PRESSURE: 87 MMHG | DIASTOLIC BLOOD PRESSURE: 55 MMHG

## 2024-09-17 DIAGNOSIS — R60.9 EDEMA, UNSPECIFIED TYPE: ICD-10-CM

## 2024-09-17 DIAGNOSIS — D64.9 ANEMIA, UNSPECIFIED TYPE: ICD-10-CM

## 2024-09-17 DIAGNOSIS — R60.0 LOCALIZED EDEMA: ICD-10-CM

## 2024-09-17 DIAGNOSIS — C18.9 ADENOCARCINOMA OF COLON (MULTI): ICD-10-CM

## 2024-09-17 DIAGNOSIS — I82.4Y9 DEEP VEIN THROMBOSIS (DVT) OF PROXIMAL LOWER EXTREMITY, UNSPECIFIED CHRONICITY, UNSPECIFIED LATERALITY (MULTI): ICD-10-CM

## 2024-09-17 DIAGNOSIS — K76.3: Primary | ICD-10-CM

## 2024-09-17 DIAGNOSIS — R77.0 LOW SERUM ALBUMIN: ICD-10-CM

## 2024-09-17 DIAGNOSIS — K76.3: ICD-10-CM

## 2024-09-17 DIAGNOSIS — I74.8 HEPATIC ARTERY THROMBOSIS (MULTI): ICD-10-CM

## 2024-09-17 DIAGNOSIS — I95.9 HYPOTENSION, UNSPECIFIED HYPOTENSION TYPE: ICD-10-CM

## 2024-09-17 DIAGNOSIS — E83.51 HYPOCALCEMIA: Primary | ICD-10-CM

## 2024-09-17 DIAGNOSIS — R06.00 DYSPNEA, UNSPECIFIED TYPE: ICD-10-CM

## 2024-09-17 DIAGNOSIS — R79.1 ELEVATED PARTIAL THROMBOPLASTIN TIME (PTT): ICD-10-CM

## 2024-09-17 DIAGNOSIS — J90 BILATERAL PLEURAL EFFUSION: ICD-10-CM

## 2024-09-17 DIAGNOSIS — K76.0 HEPATIC STEATOSIS: ICD-10-CM

## 2024-09-17 DIAGNOSIS — C18.4 ADENOCARCINOMA OF TRANSVERSE COLON (MULTI): Primary | ICD-10-CM

## 2024-09-17 LAB
ABO GROUP (TYPE) IN BLOOD: NORMAL
ALBUMIN SERPL BCP-MCNC: <1.5 G/DL (ref 3.4–5)
ALP SERPL-CCNC: 181 U/L (ref 33–120)
ALT SERPL W P-5'-P-CCNC: 120 U/L (ref 10–52)
ANION GAP BLDV CALCULATED.4IONS-SCNC: 7 MMOL/L (ref 10–25)
ANION GAP SERPL CALC-SCNC: 9 MMOL/L (ref 10–20)
ANTIBODY SCREEN: NORMAL
APTT PPP: 131 SECONDS (ref 27–38)
AST SERPL W P-5'-P-CCNC: 21 U/L (ref 9–39)
ATRIAL RATE: 441 BPM
BASE EXCESS BLDV CALC-SCNC: -1.4 MMOL/L (ref -2–3)
BASOPHILS # BLD AUTO: 0.02 X10*3/UL (ref 0–0.1)
BASOPHILS NFR BLD AUTO: 0.1 %
BILIRUB SERPL-MCNC: 0.5 MG/DL (ref 0–1.2)
BNP SERPL-MCNC: 50 PG/ML (ref 0–99)
BODY TEMPERATURE: 37 DEGREES CELSIUS
BUN SERPL-MCNC: 19 MG/DL (ref 6–23)
CA-I BLD-SCNC: 0.87 MMOL/L (ref 1.1–1.33)
CA-I BLDV-SCNC: 0.9 MMOL/L (ref 1.1–1.33)
CALCIUM SERPL-MCNC: 5.3 MG/DL (ref 8.6–10.3)
CARDIAC TROPONIN I PNL SERPL HS: 3 NG/L (ref 0–20)
CARDIAC TROPONIN I PNL SERPL HS: 3 NG/L (ref 0–20)
CEA SERPL-MCNC: 3.4 UG/L
CHLORIDE BLDV-SCNC: 102 MMOL/L (ref 98–107)
CHLORIDE SERPL-SCNC: 102 MMOL/L (ref 98–107)
CO2 SERPL-SCNC: 21 MMOL/L (ref 21–32)
CREAT SERPL-MCNC: 0.52 MG/DL (ref 0.5–1.3)
EGFRCR SERPLBLD CKD-EPI 2021: >90 ML/MIN/1.73M*2
EOSINOPHIL # BLD AUTO: 0 X10*3/UL (ref 0–0.7)
EOSINOPHIL NFR BLD AUTO: 0 %
ERYTHROCYTE [DISTWIDTH] IN BLOOD BY AUTOMATED COUNT: 17.4 % (ref 11.5–14.5)
GLUCOSE BLDV-MCNC: 82 MG/DL (ref 74–99)
GLUCOSE SERPL-MCNC: 120 MG/DL (ref 74–99)
HCO3 BLDV-SCNC: 22.7 MMOL/L (ref 22–26)
HCT VFR BLD AUTO: 28.1 % (ref 41–52)
HCT VFR BLD EST: 29 % (ref 41–52)
HGB BLD-MCNC: 9.6 G/DL (ref 13.5–17.5)
HGB BLDV-MCNC: 9.6 G/DL (ref 13.5–17.5)
HOLD SPECIMEN: NORMAL
IMM GRANULOCYTES # BLD AUTO: 0.07 X10*3/UL (ref 0–0.7)
IMM GRANULOCYTES NFR BLD AUTO: 0.3 % (ref 0–0.9)
INHALED O2 CONCENTRATION: 21 %
INR PPP: 1.4 (ref 0.9–1.1)
LACTATE BLDV-SCNC: 0.6 MMOL/L (ref 0.4–2)
LYMPHOCYTES # BLD AUTO: 8.65 X10*3/UL (ref 1.2–4.8)
LYMPHOCYTES NFR BLD AUTO: 42.8 %
MAGNESIUM SERPL-MCNC: 1.79 MG/DL (ref 1.6–2.4)
MCH RBC QN AUTO: 26.9 PG (ref 26–34)
MCHC RBC AUTO-ENTMCNC: 34.2 G/DL (ref 32–36)
MCV RBC AUTO: 79 FL (ref 80–100)
MONOCYTES # BLD AUTO: 0.56 X10*3/UL (ref 0.1–1)
MONOCYTES NFR BLD AUTO: 2.8 %
NEUTROPHILS # BLD AUTO: 10.91 X10*3/UL (ref 1.2–7.7)
NEUTROPHILS NFR BLD AUTO: 54 %
NRBC BLD-RTO: 0 /100 WBCS (ref 0–0)
OXYHGB MFR BLDV: 59.2 % (ref 45–75)
P AXIS: 38 DEGREES
P OFFSET: 187 MS
P ONSET: 146 MS
PCO2 BLDV: 35 MM HG (ref 41–51)
PH BLDV: 7.42 PH (ref 7.33–7.43)
PLATELET # BLD AUTO: 348 X10*3/UL (ref 150–450)
PO2 BLDV: 45 MM HG (ref 35–45)
POTASSIUM BLDV-SCNC: 3.3 MMOL/L (ref 3.5–5.3)
POTASSIUM SERPL-SCNC: 3 MMOL/L (ref 3.5–5.3)
PROT SERPL-MCNC: <3 G/DL (ref 6.4–8.2)
PROTHROMBIN TIME: 16.2 SECONDS (ref 9.8–12.8)
Q ONSET: 211 MS
QRS COUNT: 15 BEATS
QRS DURATION: 94 MS
QT INTERVAL: 372 MS
QTC CALCULATION(BAZETT): 467 MS
QTC FREDERICIA: 433 MS
R AXIS: 0 DEGREES
RBC # BLD AUTO: 3.57 X10*6/UL (ref 4.5–5.9)
RH FACTOR (ANTIGEN D): NORMAL
SAO2 % BLDV: 65 % (ref 45–75)
SARS-COV-2 RNA RESP QL NAA+PROBE: NOT DETECTED
SODIUM BLDV-SCNC: 128 MMOL/L (ref 136–145)
SODIUM SERPL-SCNC: 129 MMOL/L (ref 136–145)
T AXIS: 46 DEGREES
T OFFSET: 397 MS
UFH PPP CHRO-ACNC: 0.9 IU/ML
VENTRICULAR RATE: 95 BPM
WBC # BLD AUTO: 20.2 X10*3/UL (ref 4.4–11.3)

## 2024-09-17 PROCEDURE — 99285 EMERGENCY DEPT VISIT HI MDM: CPT | Performed by: STUDENT IN AN ORGANIZED HEALTH CARE EDUCATION/TRAINING PROGRAM

## 2024-09-17 PROCEDURE — 2500000004 HC RX 250 GENERAL PHARMACY W/ HCPCS (ALT 636 FOR OP/ED): Performed by: PHYSICIAN ASSISTANT

## 2024-09-17 PROCEDURE — 96365 THER/PROPH/DIAG IV INF INIT: CPT

## 2024-09-17 PROCEDURE — 2060000001 HC INTERMEDIATE ICU ROOM DAILY

## 2024-09-17 PROCEDURE — 84484 ASSAY OF TROPONIN QUANT: CPT | Performed by: PHYSICIAN ASSISTANT

## 2024-09-17 PROCEDURE — 85520 HEPARIN ASSAY: CPT | Performed by: STUDENT IN AN ORGANIZED HEALTH CARE EDUCATION/TRAINING PROGRAM

## 2024-09-17 PROCEDURE — 99222 1ST HOSP IP/OBS MODERATE 55: CPT

## 2024-09-17 PROCEDURE — 96366 THER/PROPH/DIAG IV INF ADDON: CPT

## 2024-09-17 PROCEDURE — 83880 ASSAY OF NATRIURETIC PEPTIDE: CPT | Performed by: PHYSICIAN ASSISTANT

## 2024-09-17 PROCEDURE — 71275 CT ANGIOGRAPHY CHEST: CPT

## 2024-09-17 PROCEDURE — 96367 TX/PROPH/DG ADDL SEQ IV INF: CPT

## 2024-09-17 PROCEDURE — 86901 BLOOD TYPING SEROLOGIC RH(D): CPT

## 2024-09-17 PROCEDURE — 74177 CT ABD & PELVIS W/CONTRAST: CPT

## 2024-09-17 PROCEDURE — 96361 HYDRATE IV INFUSION ADD-ON: CPT

## 2024-09-17 PROCEDURE — 2500000004 HC RX 250 GENERAL PHARMACY W/ HCPCS (ALT 636 FOR OP/ED): Mod: JZ | Performed by: PHARMACIST

## 2024-09-17 PROCEDURE — 84132 ASSAY OF SERUM POTASSIUM: CPT

## 2024-09-17 PROCEDURE — 82103 ALPHA-1-ANTITRYPSIN TOTAL: CPT | Performed by: NURSE PRACTITIONER

## 2024-09-17 PROCEDURE — 99222 1ST HOSP IP/OBS MODERATE 55: CPT | Performed by: SURGERY

## 2024-09-17 PROCEDURE — 2500000004 HC RX 250 GENERAL PHARMACY W/ HCPCS (ALT 636 FOR OP/ED): Mod: JZ

## 2024-09-17 PROCEDURE — 96374 THER/PROPH/DIAG INJ IV PUSH: CPT | Mod: 59

## 2024-09-17 PROCEDURE — P9045 ALBUMIN (HUMAN), 5%, 250 ML: HCPCS | Mod: JZ

## 2024-09-17 PROCEDURE — 2550000001 HC RX 255 CONTRASTS: Performed by: PHYSICIAN ASSISTANT

## 2024-09-17 PROCEDURE — 85025 COMPLETE CBC W/AUTO DIFF WBC: CPT | Performed by: PHYSICIAN ASSISTANT

## 2024-09-17 PROCEDURE — 80053 COMPREHEN METABOLIC PANEL: CPT | Performed by: PHYSICIAN ASSISTANT

## 2024-09-17 PROCEDURE — 85730 THROMBOPLASTIN TIME PARTIAL: CPT | Performed by: PHYSICIAN ASSISTANT

## 2024-09-17 PROCEDURE — 99285 EMERGENCY DEPT VISIT HI MDM: CPT

## 2024-09-17 PROCEDURE — 85610 PROTHROMBIN TIME: CPT | Performed by: PHYSICIAN ASSISTANT

## 2024-09-17 PROCEDURE — 99291 CRITICAL CARE FIRST HOUR: CPT | Mod: CS,25

## 2024-09-17 PROCEDURE — 93005 ELECTROCARDIOGRAM TRACING: CPT

## 2024-09-17 PROCEDURE — 87635 SARS-COV-2 COVID-19 AMP PRB: CPT | Performed by: PHYSICIAN ASSISTANT

## 2024-09-17 PROCEDURE — 82330 ASSAY OF CALCIUM: CPT | Performed by: EMERGENCY MEDICINE

## 2024-09-17 PROCEDURE — 83735 ASSAY OF MAGNESIUM: CPT | Performed by: PHYSICIAN ASSISTANT

## 2024-09-17 PROCEDURE — 93010 ELECTROCARDIOGRAM REPORT: CPT | Performed by: STUDENT IN AN ORGANIZED HEALTH CARE EDUCATION/TRAINING PROGRAM

## 2024-09-17 PROCEDURE — 96365 THER/PROPH/DIAG IV INF INIT: CPT | Mod: 59

## 2024-09-17 PROCEDURE — 99285 EMERGENCY DEPT VISIT HI MDM: CPT | Mod: CS,25

## 2024-09-17 PROCEDURE — 94762 N-INVAS EAR/PLS OXIMTRY CONT: CPT

## 2024-09-17 RX ORDER — CALCIUM GLUCONATE 98 MG/ML
1 INJECTION, SOLUTION INTRAVENOUS ONCE
Status: DISCONTINUED | OUTPATIENT
Start: 2024-09-17 | End: 2024-09-17

## 2024-09-17 RX ORDER — HEPARIN SODIUM 10000 [USP'U]/100ML
0-4500 INJECTION, SOLUTION INTRAVENOUS CONTINUOUS
Status: DISPENSED | OUTPATIENT
Start: 2024-09-17 | End: 2024-09-24

## 2024-09-17 RX ORDER — ALBUMIN HUMAN 50 G/1000ML
25 SOLUTION INTRAVENOUS ONCE
Status: COMPLETED | OUTPATIENT
Start: 2024-09-17 | End: 2024-09-17

## 2024-09-17 RX ORDER — CALCIUM GLUCONATE 20 MG/ML
1 INJECTION, SOLUTION INTRAVENOUS ONCE
Status: COMPLETED | OUTPATIENT
Start: 2024-09-17 | End: 2024-09-17

## 2024-09-17 RX ORDER — POTASSIUM CHLORIDE 14.9 MG/ML
20 INJECTION INTRAVENOUS
Status: COMPLETED | OUTPATIENT
Start: 2024-09-17 | End: 2024-09-17

## 2024-09-17 ASSESSMENT — ENCOUNTER SYMPTOMS
CHILLS: 0
FEVER: 0
ARTHRALGIAS: 0
SEIZURES: 0
EYE PAIN: 0
COLOR CHANGE: 0
VOMITING: 0
BACK PAIN: 0
PALPITATIONS: 0
DYSURIA: 0
ABDOMINAL PAIN: 0
COUGH: 0
SORE THROAT: 0
SHORTNESS OF BREATH: 1
HEMATURIA: 0

## 2024-09-17 ASSESSMENT — COLUMBIA-SUICIDE SEVERITY RATING SCALE - C-SSRS
2. HAVE YOU ACTUALLY HAD ANY THOUGHTS OF KILLING YOURSELF?: NO
1. IN THE PAST MONTH, HAVE YOU WISHED YOU WERE DEAD OR WISHED YOU COULD GO TO SLEEP AND NOT WAKE UP?: NO
2. HAVE YOU ACTUALLY HAD ANY THOUGHTS OF KILLING YOURSELF?: NO
6. HAVE YOU EVER DONE ANYTHING, STARTED TO DO ANYTHING, OR PREPARED TO DO ANYTHING TO END YOUR LIFE?: NO
1. IN THE PAST MONTH, HAVE YOU WISHED YOU WERE DEAD OR WISHED YOU COULD GO TO SLEEP AND NOT WAKE UP?: NO
6. HAVE YOU EVER DONE ANYTHING, STARTED TO DO ANYTHING, OR PREPARED TO DO ANYTHING TO END YOUR LIFE?: NO

## 2024-09-17 ASSESSMENT — PAIN - FUNCTIONAL ASSESSMENT
PAIN_FUNCTIONAL_ASSESSMENT: 0-10
PAIN_FUNCTIONAL_ASSESSMENT: 0-10

## 2024-09-17 ASSESSMENT — PAIN SCALES - GENERAL
PAINLEVEL_OUTOF10: 0 - NO PAIN
PAINLEVEL_OUTOF10: 0 - NO PAIN

## 2024-09-17 ASSESSMENT — LIFESTYLE VARIABLES
HAVE YOU EVER FELT YOU SHOULD CUT DOWN ON YOUR DRINKING: NO
TOTAL SCORE: 0
EVER HAD A DRINK FIRST THING IN THE MORNING TO STEADY YOUR NERVES TO GET RID OF A HANGOVER: NO
EVER FELT BAD OR GUILTY ABOUT YOUR DRINKING: NO
HAVE PEOPLE ANNOYED YOU BY CRITICIZING YOUR DRINKING: NO

## 2024-09-17 NOTE — CONSULTS
[facility]  TRANSPLANT SURGERY - HISTORY AND PHYSICAL / CONSULT    Patient Name: Yon Lawrence  MRN: [id number]  Admit Date: 917  : 1975  AGE: 49 y.o.   GENDER: male  ==============================================================================  TODAY'S ASSESSMENT AND PLAN OF CARE:  Yon Lawrence is a 49 year old male with metastatic colorectal cancer who presents as transfer for newly infarcted liver. Patient was due to start next cycle of Pembrolizumab . Patient primary symptoms are shortness of breath with no abdominal pain and labs notable for leukocytosis to 20.2, lactate of 0.6, Albumin <1.5; Alk phos 181; Total protein <3.0; AST 21, ALT: 120, and bilirubin of 0.5. Patient CT concerning for hepatic artery infarct with ischemic changes seen in liver in setting of metastatic colon cancer and prior DVT.     Recommendations:   -Given patient metastatic disease burden no surgical interventions are indicated at this time   -IR consultation for consideration of possible thrombectomy/evaluation of infarct   -Heparin drip pending above workup given new clots while on lovenox; may benefit from vascular medicine consultation this admission for evaluation of possible alternative anticoagulation   -Recommend engaging/admission to medical oncology for continued treatment of metastatic carcinoma and assessment of shortness of breath    Seen and d/w chief Dr. Avila. D/w attending Dr. Gilbert Hensley MD  PGY-2 Gen Surg  Transplant Surgery [id number]    ==============================================================================  CHIEF COMPLAINT/REASON FOR CONSULT:  Yon Lawrence is a 49 year old male with metastatic colorectal cancer who presents as transfer for newly infarcted liver.     Patient has had quickly developing oncologic history. 24 had CT showing large necrotic mass occupying the LUQ thought to be from transverse colon as well as  carcinomatosis. Patient had colonoscopy on 5/6/24 which confirmed invasive adenocarcinoma. 5/16/24 had CT confirming enlargement of transverse colon mass with adjacent fistulization with adjacent loop of jejunum. Patient was started on pembrolizumab and has undergone 4 cycles with plan for next cycle of C4D1 pembrolizumab 9/18.     Patient presented to ED today for concern of shortness of breath. While at OSH was also noted to have hypotension with sytolics in 80-90s. He had been given some fluid with mild responsiveness. Patient also had CT scans which showed moderate bilateral pleural effusions as well as liver ischemia likely secondary to hepatic artery thrombosis. Patient has also had recent DVTs which was previously treated with Eliquis but refractory and transitioned to Lovenox.     Patient denies any nausea, vomiting, chest pain, fevers, or chills. States still having bowel function and no dysuria. Denies any bloody bowel movements or abdominal pain. Patient states that he has gained approximately 15 pounds in the past 2-3 weeks and endorses having increased swelling of his bilateral lower extremities.     PAST MEDICAL HISTORY:   PMH:   Past Medical History:   Diagnosis Date    Cancer (Multi)     colon cancer     PSH:   Past Surgical History:   Procedure Laterality Date    COLONOSCOPY  05/06/2024    DR VALDEZ    OTHER SURGICAL HISTORY  06/07/2022    Meniscectomy    TUNNELED VENOUS PORT PLACEMENT  05/31/2024    PLACEMENT OF TUNNELED CENTR VENOUS CATHETER W/SQ PORT/ DR VALDEZ     FH:   Family History   Problem Relation Name Age of Onset    Diabetes type II Mother      Hypertension Father      Heart disease Father          CABG    Macular degeneration Father      Ulcers Father      Breast cancer Mother's Sister  50 - 59    Cancer Father's Sister          rare cancer unknown type    Cancer Paternal Grandmother      Stroke Paternal Grandfather      Other (MCAD) Daughter      No Known Problems Son       SOCIAL  "HISTORY:    Smoking:    Social History     Tobacco Use   Smoking Status Never   Smokeless Tobacco Never       Alcohol:    Social History     Substance and Sexual Activity   Alcohol Use Never       Drug use: Denies    MEDICATIONS:   Prior to Admission medications    Medication Sig Start Date End Date Taking? Authorizing Provider   alfuzosin (UroxatraL) 10 mg 24 hr tablet Take 1 tablet (10 mg) by mouth once daily. Do not crush, chew, or split.  Patient not taking: Reported on 9/5/2024 7/31/24 7/31/25  Chin Carson MD   apixaban (Eliquis) 5 mg (74 tabs) tablet Take 2 tablets (10 mg) by mouth 2 times a day for 7 days, then take 1 tablet (5 mg) by mouth 2 times a day.  Patient not taking: Reported on 9/16/2024 8/24/24   Miles Carlos,    enoxaparin (Lovenox) 80 mg/0.8 mL syringe Inject 0.8 mL (80 mg) under the skin every 12 hours. 9/6/24   Adry Carrera MD PhD   lansoprazole (Prevacid) 30 mg DR capsule Take 1 capsule (30 mg) by mouth once daily in the morning. Take before meals. Do not crush or chew. 8/7/24 9/6/24  FARIHA Teague-WIL   prochlorperazine (Compazine) 10 mg tablet Take 1 tablet (10 mg) by mouth every 6 hours if needed for nausea or vomiting.  Patient not taking: Reported on 8/30/2024 5/30/24   Billie Finn MD     ALLERGIES:   No Known Allergies    REVIEW OF SYSTEMS:  12 point ROS otherwise negative outside of HPI     PHYSICAL EXAM:  BP 96/57   Pulse 84   Temp 36.4 °C (97.5 °F) (Oral)   Resp 16   Ht 1.854 m (6' 1\")   Wt 87 kg (191 lb 12.8 oz)   SpO2 99%   BMI 25.30 kg/m²   Constitutional: NAD, A&Ox3   Head/Neck: NCAT  Eyes: Anicteric   Cardiovascular: Regular rate and rhythm per peripheral palpation   Respiratory: Breathing comfortably on RA with symmetric chest rise   Abdominal: Soft, non tender, non-distended without rebound or guarding   : Deferred   Ext: MAEx4  Psych: Appropriate mood and affect     IMAGING SUMMARY:   CT AP 9/17:   IMPRESSION:  Global abnormal " appearing liver is compatible with  ischemia/infarction and obscures the patient's known underlying metastatic lesions. 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    Patient's known tumor of the distal transverse colon has a associated mesenteric desmoplastic reaction and there are adjacent enlarged mesenteric lymph nodes consistent with locally advanced and locally metastatic disease.    CTA chest 9/17:  IMPRESSION:  1.  No pulmonary embolism is identified  2. Bilateral pleural effusions with dependent compressive atelectasis  3. CT abdomen and pelvis is reported separately.    LABS:  Results from last 7 days   Lab Units 09/17/24  1108 09/16/24  1506   WBC AUTO x10*3/uL 20.2* 21.9*   HEMOGLOBIN g/dL 9.6* 10.0*   HEMATOCRIT % 28.1* 29.7*   PLATELETS AUTO x10*3/uL 348 361   NEUTROS PCT AUTO % 54.0  --    LYMPHO PCT MAN %  --  35.0   LYMPHS PCT AUTO % 42.8  --    MONO PCT MAN %  --  2.0   MONOS PCT AUTO % 2.8  --    EOSINO PCT MAN %  --  0.0   EOS PCT AUTO % 0.0  --      Results from last 7 days   Lab Units 09/17/24  1108   APTT seconds 131*   INR  1.4*     Results from last 7 days   Lab Units 09/17/24  1108 09/16/24  1506   SODIUM mmol/L 129* 130*   POTASSIUM mmol/L 3.0* 3.5   CHLORIDE mmol/L 102 101   CO2 mmol/L 21 23   BUN mg/dL 19 18   CREATININE mg/dL 0.52 0.63   CALCIUM mg/dL 5.3* 5.6*   PROTEIN TOTAL g/dL <3.0* 3.0*   BILIRUBIN TOTAL mg/dL 0.5 0.5   ALK PHOS U/L 181* 184*   ALT U/L 120* 120*   AST U/L 21 22   GLUCOSE mg/dL 120* 110*     Results from last 7 days   Lab Units 09/17/24  1108 09/16/24  1506   BILIRUBIN TOTAL mg/dL 0.5 0.5             I have reviewed all laboratory and imaging results ordered/pertinent for this encounter.

## 2024-09-17 NOTE — ED TRIAGE NOTES
Pt to ed as transfer from [facility] with complaints of SOB, bilat leg swelling that has worsened since beginning of summer. Pt has colon ca that mets to liver, and since this dx he has been dealing with increased swelling in the legs. Pt also slightly hypotensive, afebrile, otherwise stable

## 2024-09-17 NOTE — SIGNIFICANT EVENT
IR was paged at 7:07 PM on 9/17/24.   Consult Indication: Possible hepatic artery thrombectomy versus tPA.     Assessment:  Patient is 49M with hx of metastatic colon adenocarcinoma who presented to OSH for shortness of breath and diffuse edema on 9/17/24. Also of note, patient had known history of DVT and was currently taking Lovenox as an outpatient (failed Eliquis).     Vitals significant for systolics in the 80s and 90s. Labs significant for: Calcium 5.3, Albumin <1.5, Alk phos 181, , AST 21, bilirubin 0.5, total protein <3.0, INR 1.4, WBC 20.2.    CT CAP showed bilateral pleural effusions and hepatic ischemic/infarction suspected to be secondary to hepatic artery thrombosis.     Patient was transferred to [facility] for further evaluation. Patient started on heparin.  Transplant surgery evaluated patient and  recommended no surgical interventions given metastatic disease burden and IR consult for consideration of possible thrombectomy.       Plan:   - Given the patient is hemodynamically stable and an extremely medical.ly complex patient, no emergent intervention recommended at this time.  - Continue to monitor vitals  - Continue heparin drip  - IR will formally evaluate in the AM.     Staffed with Dr. Kamran Day.     Rachel Cornejo MD  Diagnostic radiology PGY-3  Interventional radiology [id number]

## 2024-09-17 NOTE — ED PROVIDER NOTES
History of Present Illness   History provided by: Patient  Limitations to History: None  External Records Reviewed with Brief Summary:  ED note from [facility] which showed findings consistent with liver infarction.  Patient had calcium and potassium repleted as these were low at the outside hospital.  No further interventions were performed.    HPI:  Yon Lawrence is a 49 y.o. male with a past medical history of colon cancer on pembrolizumab as well as a previous DVT on Eliquis but was just recently switched to Lovenox that presents the emergency department as a transfer from [facility] for ACS consult after being found to have a liver infarct concerning for a hepatic artery thrombosis.  The patient was found to have elevated LFTs and was also hypocalcemic at the outside hospital.  He was mildly hypotensive with systolic blood pressures in the 90s and was treated with IV fluids there but no other interventions per ACS's recommendations.  On arrival to the emergency department here the patient denies any symptoms including chest pain, shortness of breath, dizziness/lightheadedness, abdominal pain, leg pain, fevers/chills or any other symptoms.    Physical Exam   Triage vitals:  T 36.4 °C (97.5 °F)  HR 84  BP 96/57  RR 16  O2 99 % None (Room air)    Vital signs reviewed in nursing triage note, EMR flow sheets, and at patient's bedside.   General: Awake, alert, in no acute distress  Eyes: Gaze conjugate.  No scleral icterus or injection  HENT: Normo-cephalic, atraumatic. No stridor. No rhinorrhea or epistaxis.  CV: Regular rate and rhythm. No murmurs appreciated.  2+ radial and DP pulses bilaterally.  Respiratory: Breathing non-labored, speaking in full sentences.  Lungs clear to auscultation bilaterally.  GI: Soft abdomen with no significant tenderness to palpation or evidence of peritonitis.  MSK/Extremities: No gross bony deformities. Moving all extremities.  Edema noted in the bilateral upper  and lower extremities.  Skin: Warm. Appropriate color  Neuro: Alert. Oriented. Face symmetric. Speech is fluent.  Gross strength and sensation intact in b/l UE and LEs  Psych: Appropriate mood and affect      Medical Decision Making & ED Course   Medical Decision Makin y.o. male with the above-stated past medical history that presents to the emergency department as a transfer from [facility] for transplant surgery evaluation after being found to have a liver infarction.  Upon arrival to the emergency department the patient was mildly hypotensive with a blood pressure of 96/57 but had otherwise stable vital signs.  History and physical exam are as above but notable for nontoxic-appearing patient in no acute distress.  Abdomen was nonsurgical with no significant tenderness to palpation.  CT imaging from the outside hospital was reviewed showing significant loss in liver parenchyma with infarction pattern consistent with hepatic artery thrombosis.  Labs were also reviewed with the patient's ALT slightly elevated at 120 but a normal AST and INR of 1.4.  The patient was also hypocalcemic and hypokalemic at the outside facility and these were repleted there.  There was no evidence of PE on the CT angiogram completed at the outside hospital.  Vascular surgery was consulted and recommended obtaining a VBG to evaluate for lactic acidosis as well as a type and screen.  Given the patient's failure on Eliquis and Lovenox, the patient will be placed on a heparin drip for his history of DVTs and new concern for possible hepatic artery thrombosis.  There was no surgical option per transplant surgery and prognosis was discussed with the patient.  They did recommend consulting interventional radiology for possible thrombectomy and/or catheter guided tPA for the concern of the hepatic artery thrombosis but given the patient's stability, they recommended no acute intervention and will evaluate the patient in the morning  for this.  The patient continued to have soft blood pressures with maps hovering just above 65 but this appears to be chronic for him as the past few weeks he has had blood pressures noted at this level.  The patient is asymptomatic but will give him albumin as his albumin level is extremely low and reevaluate his blood pressures.  Given his low blood pressures, the patient will be admitted to the stepdown unit and a bed request under Dr. Harrington was placed.  The patient was admitted in stable condition.    Differential diagnoses considered include but are not limited to: Hepatic artery thrombosis, liver infarction, acute liver failure, hypotension    ED Course:  ED Course as of 09/18/24 0027   Tue Sep 17, 2024   1819 Spoke with Susana Avila MD with transplant surgery who recommended additional IV fluids, type and screen as well as trending lactate.  Orders for this has been placed.  Patient's current blood pressure is 87/57 with a MAP of 66 but mentating appropriately.  Will continue to monitor [RS]   1819 EKG with NSR with HR 75 and occasional PACs, normal intervals. Low voltage. T wave flattening in inferior leads. No TWI or ST segments changes concerning for acute ischemia.  [SS]   1825 Attending summary:  50 y/o M with PMH metastatic colon ca on immunotherapy c/b DVT on anticoag (lovenox after failed eliquis) who is presenting from OSH for hepatic artery thrombosis. Was mildly hypotensive and given IVFs and electrolytes there. He is hypotensive to SBP 90s/50s however on chart review this is baseline. He is chronically ill but nontoxic and comfortable appearing. He has no complaints right now. Will obtain repeat labs and consult transplant surgery. Will give albumin for volume expansion and start heparin gtt to prevent progression of clot. Pt will require admission but further ED course depending on surg recs.  [SS]   1916 Further recommendations with transplant surgery include consulting IR for possible  thrombectomy versus catheter directed tPA of the hepatic artery occlusion.  No acute surgical intervention at this time as this appears to be more likely a chronic infarct.  Agree with albumin as well as the heparin drip given his failed treatment for [RS]   1932 Spoke with interventional radiologist resident who agrees to staff the patient with their attending to discuss possible interventions.  Will await their recommendations. [RS]   2231 Patient accepted to the stepdown unit in guarded condition secondary to his blood pressure although clinically stable.  Bed request under Dr. Harrington placed at this time. [RS]      ED Course User Index  [RS] Hernandez Power DO  [SS] Marguerite Leal MD         Diagnoses as of 09/18/24 0027   Infarction of liver (HHS-HCC)   Hypotension, unspecified hypotension type        Social Determinants of Health which Significantly Impact Care: None identified     EKG Independent Interpretation: EKG interpreted by myself. Please see ED Course for full interpretation.    Independent Result Review and Interpretation: Relevant laboratory and radiographic results were reviewed and independently interpreted by myself.  As necessary, they are commented on in the ED Course.    Chronic conditions affecting the patient's care: As documented in the HPI    The patient was discussed with the following consultants/services: Dr. Avila with transplant surgery who discussed with their attending and recommended no surgical intervention at this time.  Discussed with Dr. Cornejo with interventional radiology who also staffed with her attending and recommended no acute IR intervention at this time but will reevaluate the patient in the morning for possible thrombectomy versus catheter guided tPA.  Discussed the patient with Dr. Harrington with the ICU who accepted the patient to the stepdown unit for further management.    Care Considerations: As documented above in MDM    Disposition   As a result of their workup,  the patient will require admission to the hospital.  The patient was informed of his diagnosis.  The patient was given the opportunity to ask questions and I answered them. The patient agreed to be admitted to the hospital.    Procedures   Procedures    Patient seen and discussed with ED attending physician.    Hernandez Power DO   Emergency Medicine, PGY-2     Disclaimer: This note was dictated by speech recognition. Minor errors in transcription may be present.     [unfilled] ? SmartLinks last updated 9/18/2024 12:27 AM        Hernandez Power DO  Resident  09/18/24 0038

## 2024-09-17 NOTE — ED PROVIDER NOTES
Patient is a 49-year-old male who presents to the emergency room with a chief complaint of shortness of breath.  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  He states that his oncologist is Dr. Finn at [facility].  Patient states that he has noticed significant swelling in bilateral upper and lower extremities.  He states that he has gained about 15 pounds in the past 2 to 3 weeks.  He states that just prior to arrival he has developed shortness of breath with ambulation.  Patient has a known DVT, was previously on Eliquis and recently switched to Lovenox.  He denies any chest pain.  He denies any fever, chills, nausea or vomiting.  He states that he is scheduled for a CT scan of his chest abdomen pelvis at 1400 today.           Review of Systems   Constitutional:  Negative for chills and fever.   HENT:  Negative for ear pain and sore throat.    Eyes:  Negative for pain and visual disturbance.   Respiratory:  Positive for shortness of breath. Negative for cough.    Cardiovascular:  Positive for leg swelling. Negative for chest pain and palpitations.   Gastrointestinal:  Negative for abdominal pain and vomiting.   Genitourinary:  Negative for dysuria and hematuria.   Musculoskeletal:  Negative for arthralgias and back pain.   Skin:  Negative for color change and rash.   Neurological:  Negative for seizures and syncope.   All other systems reviewed and are negative.       Physical Exam  Vitals and nursing note reviewed.   Constitutional:       General: He is not in acute distress.     Appearance: He is well-developed.   HENT:      Head: Normocephalic and atraumatic.   Eyes:      Extraocular Movements: Extraocular movements intact.      Conjunctiva/sclera: Conjunctivae normal.   Cardiovascular:      Rate and Rhythm: Normal rate and regular rhythm.      Heart sounds: No murmur heard.     Comments: Edema noted to bilateral upper and lower extremities.  No facial edema  appreciated.  Pulmonary:      Effort: Pulmonary effort is normal. No respiratory distress.      Breath sounds: Normal breath sounds. No decreased breath sounds, wheezing, rhonchi or rales.   Chest:      Chest wall: No mass, deformity, tenderness, crepitus or edema.   Abdominal:      Palpations: Abdomen is soft.      Tenderness: There is no abdominal tenderness.   Musculoskeletal:         General: No swelling.      Cervical back: Neck supple.      Right lower leg: Edema present.      Left lower leg: Edema present.   Skin:     General: Skin is warm and dry.      Capillary Refill: Capillary refill takes less than 2 seconds.   Neurological:      General: No focal deficit present.      Mental Status: He is alert.   Psychiatric:         Mood and Affect: Mood normal.          Labs Reviewed   CBC WITH AUTO DIFFERENTIAL - Abnormal       Result Value    WBC 20.2 (*)     nRBC 0.0      RBC 3.57 (*)     Hemoglobin 9.6 (*)     Hematocrit 28.1 (*)     MCV 79 (*)     MCH 26.9      MCHC 34.2      RDW 17.4 (*)     Platelets 348      Neutrophils % 54.0      Immature Granulocytes %, Automated 0.3      Lymphocytes % 42.8      Monocytes % 2.8      Eosinophils % 0.0      Basophils % 0.1      Neutrophils Absolute 10.91 (*)     Immature Granulocytes Absolute, Automated 0.07      Lymphocytes Absolute 8.65 (*)     Monocytes Absolute 0.56      Eosinophils Absolute 0.00      Basophils Absolute 0.02     COMPREHENSIVE METABOLIC PANEL - Abnormal    Glucose 120 (*)     Sodium 129 (*)     Potassium 3.0 (*)     Chloride 102      Bicarbonate 21      Anion Gap 9 (*)     Urea Nitrogen 19      Creatinine 0.52      eGFR >90      Calcium 5.3 (*)     Albumin <1.5 (*)     Alkaline Phosphatase 181 (*)     Total Protein <3.0 (*)     AST 21      Bilirubin, Total 0.5       (*)    APTT - Abnormal    aPTT 131 (*)     Narrative:     The APTT is no longer used for monitoring Unfractionated Heparin Therapy. For monitoring Heparin Therapy, use the Heparin  Assay.   PROTIME-INR - Abnormal    Protime 16.2 (*)     INR 1.4 (*)    CALCIUM, IONIZED - Abnormal    POCT Calcium, Ionized 0.87 (*)    MAGNESIUM - Normal    Magnesium 1.79     SARS-COV-2 PCR - Normal    Coronavirus 2019, PCR Not Detected      Narrative:     This assay has received FDA Emergency Use Authorization (EUA) and is only authorized for the duration of time that circumstances exist to justify the authorization of the emergency use of in vitro diagnostic tests for the detection of SARS-CoV-2 virus and/or diagnosis of COVID-19 infection under section 564(b)(1) of the Act, 21 U.S.C. 360bbb-3(b)(1). This assay is an in vitro diagnostic nucleic acid amplification test for the qualitative detection of SARS-CoV-2 from nasopharyngeal specimens and has been validated for use at [facility]. Negative results do not preclude COVID-19 infections and should not be used as the sole basis for diagnosis, treatment, or other management decisions.     B-TYPE NATRIURETIC PEPTIDE - Normal    BNP 50      Narrative:        <100 pg/mL - Heart failure unlikely  100-299 pg/mL - Intermediate probability of acute heart                  failure exacerbation. Correlate with clinical                  context and patient history.    >=300 pg/mL - Heart Failure likely. Correlate with clinical                  context and patient history.    BNP testing is performed using different testing methodology at [facility] than at [facility]. Direct result comparisons should only be made within the same method.      SERIAL TROPONIN-INITIAL - Normal    Troponin I, High Sensitivity 3      Narrative:     Less than 99th percentile of normal range cutoff-  Female and children under 18 years old <14 ng/L; Male <21 ng/L: Negative  Repeat testing should be performed if clinically indicated.     Female and children under 18 years old 14-50 ng/L; Male 21-50 ng/L:  Consistent with possible cardiac damage and  possible increased clinical   risk. Serial measurements may help to assess extent of myocardial damage.     >50 ng/L: Consistent with cardiac damage, increased clinical risk and  myocardial infarction. Serial measurements may help assess extent of   myocardial damage.      NOTE: Children less than 1 year old may have higher baseline troponin   levels and results should be interpreted in conjunction with the overall   clinical context.     NOTE: Troponin I testing is performed using a different   testing methodology at [facility] than at other   [facility]. Direct result comparisons should only   be made within the same method.   SERIAL TROPONIN, 1 HOUR - Normal    Troponin I, High Sensitivity 3      Narrative:     Less than 99th percentile of normal range cutoff-  Female and children under 18 years old <14 ng/L; Male <21 ng/L: Negative  Repeat testing should be performed if clinically indicated.     Female and children under 18 years old 14-50 ng/L; Male 21-50 ng/L:  Consistent with possible cardiac damage and possible increased clinical   risk. Serial measurements may help to assess extent of myocardial damage.     >50 ng/L: Consistent with cardiac damage, increased clinical risk and  myocardial infarction. Serial measurements may help assess extent of   myocardial damage.      NOTE: Children less than 1 year old may have higher baseline troponin   levels and results should be interpreted in conjunction with the overall   clinical context.     NOTE: Troponin I testing is performed using a different   testing methodology at [facility] than at other   [facility]. Direct result comparisons should only   be made within the same method.   TROPONIN SERIES- (INITIAL, 1 HR)    Narrative:     The following orders were created for panel order Troponin I Series, High Sensitivity (0, 1 HR).  Procedure                               Abnormality         Status                     ---------                                -----------         ------                     Troponin I, High Sensiti...[[id number]]  Normal              Final result               Troponin, High Sensitivi...[[id number]]  Normal              Final result                 Please view results for these tests on the individual orders.        CT angio chest for pulmonary embolism   Final Result   1.  No pulmonary embolism is identified   2. Bilateral pleural effusions with dependent compressive atelectasis   3. CT abdomen and pelvis is reported separately.             MACRO:   None        Signed by: Cassy Petit 9/17/2024 12:36 PM   Dictation workstation:   [id number]      CT abdomen pelvis w IV contrast   Final Result   Global abnormal appearing liver is compatible with   ischemia/infarction and obscures the patient's known underlying   metastatic lesions. No portal or hepatic vein occlusion is noted in   the ischemia is most likely related to hepatic artery thrombosis   which appears to be present in branches to the right and left hepatic   lobes in the ivonne hepatis.        Patient's known tumor of the distal transverse colon has a associated   mesenteric desmoplastic reaction and there are adjacent enlarged   mesenteric lymph nodes consistent with locally advanced and locally   metastatic disease.        Pleural effusions, ascites and diffuse subcutaneous edema             MACRO:   Cassy Petit discussed the significance and urgency of this   critical finding by telephone with  FABIOLA MATOS on 9/17/2024   at 1:01 pm.  (**-RCF-**) Findings:  See findings.        Signed by: Cassy Petit 9/17/2024 1:02 PM   Dictation workstation:   [id number]           ECG 12 Lead    Performed by: Fabiola Matos PA-C  Authorized by: Fabiola Matos PA-C    ECG interpreted by ED Physician in the absence of a cardiologist: yes    Comments:      EKG shows sinus rhythm with PACs with a rate of 95 bpm.  No ST elevation.  QRS  duration is 94 ms.  Critical Care    Performed by: Avelina Matos PA-C  Authorized by: Randolph Ortiz DO    Critical care provider statement:     Critical care time (minutes):  60    Critical care time was exclusive of:  Separately billable procedures and treating other patients    Critical care was necessary to treat or prevent imminent or life-threatening deterioration of the following conditions:  Hepatic failure and circulatory failure    Critical care was time spent personally by me on the following activities:  Ordering and performing treatments and interventions, ordering and review of laboratory studies, ordering and review of radiographic studies, pulse oximetry, examination of patient, evaluation of patient's response to treatment and discussions with consultants    Care discussed with: accepting provider at another facility         Medical Decision Making  Patient is a 49-year-old male with a history of metastatic colon cancer who presents to the emergency room with a chief complaint of shortness of breath and diffuse edema.  He states that he has noticed edema throughout but recently prior to arrival became short of breath.  Patient has been hypotensive throughout his course.  His blood pressure has been in the 90s systolic.  He did drop into the 80s systolic and he was given a bolus of IV fluids.  CT scan of the chest abdomen pelvis performed which shows hepatic infarction, suspected to be from hepatic artery thrombosis.  Patient is currently anticoagulated on Lovenox, received his dose this morning.  He does have an elevated PTT of 131.  PT is 16.2 and INR is 1.4.  In addition his calcium is noted to be 5.3 with an ionized calcium of 0.87, albumin of less than 1.5, elevated alk phos of 181, elevated ALT of 120.  In addition patient also has bilateral pleural effusions.  No PE. ADT20 order was immediately placed after speaking with the radiologist about the patient's hepatic infarction.   Consulted with [facility] surgery.  I spoke with physician Dr. Mike Carson who states that patient needs to come to the emergency department for further treatment and care coordination.  At this time he does not recommend any further anticoagulation given that patient is already on Lovenox.  He also does not recommend any albumin at this time.  He reports that patient is extremely complex and further plan will be made once patient arrives to the emergency department.  He states that transplant surgery will likely be contacted when patient arrives to the emergency department.  He states to maintain patient's blood pressure, if needed can start on IV pressors, but states do not get his blood pressure too elevated as patient likely runs on the lower side.  I did speak with ED attending, Dr. Desai at [facility] who has accepted the patient to the emergency department. Patient was seen and evaluated by myself along with attending physician, Dr. Wilkerson who was actively involved in the patient's care    Amount and/or Complexity of Data Reviewed  Labs: ordered. Decision-making details documented in ED Course.  Radiology: ordered. Decision-making details documented in ED Course.  ECG/medicine tests: ordered and independent interpretation performed. Decision-making details documented in ED Course.  Discussion of management or test interpretation with external provider(s): Consulted with acute care surgery    Risk  Decision regarding hospitalization.         Diagnoses as of 09/17/24 1719   Hypocalcemia   Dyspnea, unspecified type   Bilateral pleural effusion   Edema, unspecified type   Elevated partial thromboplastin time (PTT)   Low serum albumin   Liver infarction ([facility]-HCC)   Hepatic artery thrombosis (Multi)                    Avelina Matos PA-C  09/17/24 6426

## 2024-09-18 ENCOUNTER — APPOINTMENT (OUTPATIENT)
Dept: VASCULAR MEDICINE | Facility: HOSPITAL | Age: 49
DRG: 441 | End: 2024-09-18
Payer: COMMERCIAL

## 2024-09-18 ENCOUNTER — APPOINTMENT (OUTPATIENT)
Dept: RADIOLOGY | Facility: HOSPITAL | Age: 49
End: 2024-09-18
Payer: COMMERCIAL

## 2024-09-18 ENCOUNTER — APPOINTMENT (OUTPATIENT)
Dept: HEMATOLOGY/ONCOLOGY | Facility: CLINIC | Age: 49
End: 2024-09-18
Payer: COMMERCIAL

## 2024-09-18 ENCOUNTER — APPOINTMENT (OUTPATIENT)
Dept: RADIOLOGY | Facility: HOSPITAL | Age: 49
DRG: 441 | End: 2024-09-18
Payer: COMMERCIAL

## 2024-09-18 LAB
A1AT SERPL NEPH-MCNC: 106 MG/DL (ref 84–218)
ALBUMIN SERPL BCP-MCNC: 1.9 G/DL (ref 3.4–5)
ALBUMIN SERPL BCP-MCNC: <1.5 G/DL (ref 3.4–5)
ALP SERPL-CCNC: 136 U/L (ref 33–120)
ALT SERPL W P-5'-P-CCNC: 93 U/L (ref 10–52)
ANION GAP SERPL CALC-SCNC: 13 MMOL/L (ref 10–20)
ANION GAP SERPL CALC-SCNC: 14 MMOL/L (ref 10–20)
APPEARANCE UR: CLEAR
APTT PPP: 125 SECONDS (ref 27–38)
AST SERPL W P-5'-P-CCNC: 19 U/L (ref 9–39)
BILIRUB DIRECT SERPL-MCNC: 0.3 MG/DL (ref 0–0.3)
BILIRUB SERPL-MCNC: 0.7 MG/DL (ref 0–1.2)
BILIRUB UR STRIP.AUTO-MCNC: NEGATIVE MG/DL
BUN SERPL-MCNC: 12 MG/DL (ref 6–23)
BUN SERPL-MCNC: 8 MG/DL (ref 6–23)
CA-I BLD-SCNC: 0.89 MMOL/L (ref 1.1–1.33)
CALCIUM SERPL-MCNC: 5.5 MG/DL (ref 8.6–10.6)
CALCIUM SERPL-MCNC: 6 MG/DL (ref 8.6–10.6)
CHLORIDE SERPL-SCNC: 107 MMOL/L (ref 98–107)
CHLORIDE SERPL-SCNC: 107 MMOL/L (ref 98–107)
CO2 SERPL-SCNC: 21 MMOL/L (ref 21–32)
CO2 SERPL-SCNC: 21 MMOL/L (ref 21–32)
COLOR UR: NORMAL
CREAT SERPL-MCNC: 0.4 MG/DL (ref 0.5–1.3)
CREAT SERPL-MCNC: 0.43 MG/DL (ref 0.5–1.3)
EGFRCR SERPLBLD CKD-EPI 2021: >90 ML/MIN/1.73M*2
EGFRCR SERPLBLD CKD-EPI 2021: >90 ML/MIN/1.73M*2
ERYTHROCYTE [DISTWIDTH] IN BLOOD BY AUTOMATED COUNT: 18.4 % (ref 11.5–14.5)
GLUCOSE BLD MANUAL STRIP-MCNC: 45 MG/DL (ref 74–99)
GLUCOSE BLD MANUAL STRIP-MCNC: 50 MG/DL (ref 74–99)
GLUCOSE BLD MANUAL STRIP-MCNC: 69 MG/DL (ref 74–99)
GLUCOSE SERPL-MCNC: 62 MG/DL (ref 74–99)
GLUCOSE SERPL-MCNC: 95 MG/DL (ref 74–99)
GLUCOSE UR STRIP.AUTO-MCNC: NORMAL MG/DL
HCT VFR BLD AUTO: 23.7 % (ref 41–52)
HGB BLD-MCNC: 8.1 G/DL (ref 13.5–17.5)
INR PPP: 1.4 (ref 0.9–1.1)
KETONES UR STRIP.AUTO-MCNC: NEGATIVE MG/DL
LEUKOCYTE ESTERASE UR QL STRIP.AUTO: NEGATIVE
MAGNESIUM SERPL-MCNC: 1.84 MG/DL (ref 1.6–2.4)
MCH RBC QN AUTO: 26.9 PG (ref 26–34)
MCHC RBC AUTO-ENTMCNC: 34.2 G/DL (ref 32–36)
MCV RBC AUTO: 79 FL (ref 80–100)
NITRITE UR QL STRIP.AUTO: NEGATIVE
NRBC BLD-RTO: 0 /100 WBCS (ref 0–0)
PH UR STRIP.AUTO: 6 [PH]
PHOSPHATE SERPL-MCNC: 2.1 MG/DL (ref 2.5–4.9)
PHOSPHATE SERPL-MCNC: 2.4 MG/DL (ref 2.5–4.9)
PLATELET # BLD AUTO: 285 X10*3/UL (ref 150–450)
POTASSIUM SERPL-SCNC: 3.1 MMOL/L (ref 3.5–5.3)
POTASSIUM SERPL-SCNC: 3.5 MMOL/L (ref 3.5–5.3)
PROT SERPL-MCNC: <3 G/DL (ref 6.4–8.2)
PROT UR STRIP.AUTO-MCNC: NEGATIVE MG/DL
PROTHROMBIN TIME: 15.6 SECONDS (ref 9.8–12.8)
RBC # BLD AUTO: 3.01 X10*6/UL (ref 4.5–5.9)
RBC # UR STRIP.AUTO: NEGATIVE /UL
SODIUM SERPL-SCNC: 138 MMOL/L (ref 136–145)
SODIUM SERPL-SCNC: 138 MMOL/L (ref 136–145)
SP GR UR STRIP.AUTO: 1.01
UFH PPP CHRO-ACNC: 0.6 IU/ML
UFH PPP CHRO-ACNC: 0.6 IU/ML
UFH PPP CHRO-ACNC: 0.9 IU/ML
UROBILINOGEN UR STRIP.AUTO-MCNC: NORMAL MG/DL
WBC # BLD AUTO: 14.5 X10*3/UL (ref 4.4–11.3)

## 2024-09-18 PROCEDURE — 76705 ECHO EXAM OF ABDOMEN: CPT | Performed by: RADIOLOGY

## 2024-09-18 PROCEDURE — 2500000004 HC RX 250 GENERAL PHARMACY W/ HCPCS (ALT 636 FOR OP/ED)

## 2024-09-18 PROCEDURE — 2500000002 HC RX 250 W HCPCS SELF ADMINISTERED DRUGS (ALT 637 FOR MEDICARE OP, ALT 636 FOR OP/ED): Performed by: NURSE PRACTITIONER

## 2024-09-18 PROCEDURE — 2500000005 HC RX 250 GENERAL PHARMACY W/O HCPCS: Performed by: NURSE PRACTITIONER

## 2024-09-18 PROCEDURE — 82248 BILIRUBIN DIRECT: CPT

## 2024-09-18 PROCEDURE — 85520 HEPARIN ASSAY: CPT

## 2024-09-18 PROCEDURE — 2500000004 HC RX 250 GENERAL PHARMACY W/ HCPCS (ALT 636 FOR OP/ED): Performed by: NURSE PRACTITIONER

## 2024-09-18 PROCEDURE — 2500000004 HC RX 250 GENERAL PHARMACY W/ HCPCS (ALT 636 FOR OP/ED): Mod: JZ | Performed by: NURSE PRACTITIONER

## 2024-09-18 PROCEDURE — 93975 VASCULAR STUDY: CPT | Performed by: RADIOLOGY

## 2024-09-18 PROCEDURE — 82330 ASSAY OF CALCIUM: CPT

## 2024-09-18 PROCEDURE — 81003 URINALYSIS AUTO W/O SCOPE: CPT | Performed by: NURSE PRACTITIONER

## 2024-09-18 PROCEDURE — 85610 PROTHROMBIN TIME: CPT

## 2024-09-18 PROCEDURE — 80069 RENAL FUNCTION PANEL: CPT | Mod: CCI

## 2024-09-18 PROCEDURE — 82947 ASSAY GLUCOSE BLOOD QUANT: CPT

## 2024-09-18 PROCEDURE — 84100 ASSAY OF PHOSPHORUS: CPT | Performed by: NURSE PRACTITIONER

## 2024-09-18 PROCEDURE — 93975 VASCULAR STUDY: CPT

## 2024-09-18 PROCEDURE — 2060000001 HC INTERMEDIATE ICU ROOM DAILY

## 2024-09-18 PROCEDURE — 85027 COMPLETE CBC AUTOMATED: CPT

## 2024-09-18 PROCEDURE — 80048 BASIC METABOLIC PNL TOTAL CA: CPT

## 2024-09-18 PROCEDURE — 2500000001 HC RX 250 WO HCPCS SELF ADMINISTERED DRUGS (ALT 637 FOR MEDICARE OP): Performed by: NURSE PRACTITIONER

## 2024-09-18 PROCEDURE — 83735 ASSAY OF MAGNESIUM: CPT

## 2024-09-18 PROCEDURE — P9045 ALBUMIN (HUMAN), 5%, 250 ML: HCPCS | Mod: JZ

## 2024-09-18 RX ORDER — POLYETHYLENE GLYCOL 3350 17 G/17G
17 POWDER, FOR SOLUTION ORAL DAILY PRN
Status: DISCONTINUED | OUTPATIENT
Start: 2024-09-18 | End: 2024-09-24 | Stop reason: HOSPADM

## 2024-09-18 RX ORDER — TALC
3 POWDER (GRAM) TOPICAL NIGHTLY PRN
Status: DISCONTINUED | OUTPATIENT
Start: 2024-09-18 | End: 2024-09-24 | Stop reason: HOSPADM

## 2024-09-18 RX ORDER — DEXTROSE 50 % IN WATER (D50W) INTRAVENOUS SYRINGE
12.5 ONCE
Status: COMPLETED | OUTPATIENT
Start: 2024-09-18 | End: 2024-09-18

## 2024-09-18 RX ORDER — MAGNESIUM SULFATE 1 G/100ML
1 INJECTION INTRAVENOUS ONCE
Status: COMPLETED | OUTPATIENT
Start: 2024-09-18 | End: 2024-09-18

## 2024-09-18 RX ORDER — POTASSIUM CHLORIDE 20 MEQ/1
40 TABLET, EXTENDED RELEASE ORAL ONCE
Status: COMPLETED | OUTPATIENT
Start: 2024-09-18 | End: 2024-09-18

## 2024-09-18 RX ORDER — CALCIUM GLUCONATE 20 MG/ML
2 INJECTION, SOLUTION INTRAVENOUS ONCE
Status: COMPLETED | OUTPATIENT
Start: 2024-09-18 | End: 2024-09-18

## 2024-09-18 RX ORDER — PANTOPRAZOLE SODIUM 40 MG/10ML
40 INJECTION, POWDER, LYOPHILIZED, FOR SOLUTION INTRAVENOUS
Status: DISCONTINUED | OUTPATIENT
Start: 2024-09-18 | End: 2024-09-19

## 2024-09-18 RX ORDER — ALBUMIN HUMAN 50 G/1000ML
25 SOLUTION INTRAVENOUS ONCE
Status: COMPLETED | OUTPATIENT
Start: 2024-09-18 | End: 2024-09-18

## 2024-09-18 SDOH — HEALTH STABILITY: MENTAL HEALTH
HOW OFTEN DO YOU NEED TO HAVE SOMEONE HELP YOU WHEN YOU READ INSTRUCTIONS, PAMPHLETS, OR OTHER WRITTEN MATERIAL FROM YOUR DOCTOR OR PHARMACY?: NEVER

## 2024-09-18 SDOH — SOCIAL STABILITY: SOCIAL INSECURITY: HAS ANYONE EVER THREATENED TO HURT YOUR FAMILY OR YOUR PETS?: NO

## 2024-09-18 SDOH — ECONOMIC STABILITY: INCOME INSECURITY: IN THE PAST 12 MONTHS, HAS THE ELECTRIC, GAS, OIL, OR WATER COMPANY THREATENED TO SHUT OFF SERVICE IN YOUR HOME?: NO

## 2024-09-18 SDOH — SOCIAL STABILITY: SOCIAL INSECURITY: ARE YOU OR HAVE YOU BEEN THREATENED OR ABUSED PHYSICALLY, EMOTIONALLY, OR SEXUALLY BY ANYONE?: NO

## 2024-09-18 SDOH — SOCIAL STABILITY: SOCIAL INSECURITY: WITHIN THE LAST YEAR, HAVE YOU BEEN AFRAID OF YOUR PARTNER OR EX-PARTNER?: NO

## 2024-09-18 SDOH — SOCIAL STABILITY: SOCIAL INSECURITY
WITHIN THE LAST YEAR, HAVE YOU BEEN KICKED, HIT, SLAPPED, OR OTHERWISE PHYSICALLY HURT BY YOUR PARTNER OR EX-PARTNER?: NO

## 2024-09-18 SDOH — SOCIAL STABILITY: SOCIAL INSECURITY: HAVE YOU HAD ANY THOUGHTS OF HARMING ANYONE ELSE?: NO

## 2024-09-18 SDOH — SOCIAL STABILITY: SOCIAL INSECURITY: WERE YOU ABLE TO COMPLETE ALL THE BEHAVIORAL HEALTH SCREENINGS?: YES

## 2024-09-18 SDOH — SOCIAL STABILITY: SOCIAL INSECURITY: ABUSE: ADULT

## 2024-09-18 SDOH — SOCIAL STABILITY: SOCIAL INSECURITY: ARE THERE ANY APPARENT SIGNS OF INJURIES/BEHAVIORS THAT COULD BE RELATED TO ABUSE/NEGLECT?: NO

## 2024-09-18 SDOH — SOCIAL STABILITY: SOCIAL INSECURITY: DOES ANYONE TRY TO KEEP YOU FROM HAVING/CONTACTING OTHER FRIENDS OR DOING THINGS OUTSIDE YOUR HOME?: NO

## 2024-09-18 SDOH — SOCIAL STABILITY: SOCIAL NETWORK: HOW OFTEN DO YOU GET TOGETHER WITH FRIENDS OR RELATIVES?: ONCE A WEEK

## 2024-09-18 SDOH — SOCIAL STABILITY: SOCIAL INSECURITY: DO YOU FEEL ANYONE HAS EXPLOITED OR TAKEN ADVANTAGE OF YOU FINANCIALLY OR OF YOUR PERSONAL PROPERTY?: NO

## 2024-09-18 SDOH — SOCIAL STABILITY: SOCIAL INSECURITY: WITHIN THE LAST YEAR, HAVE YOU BEEN HUMILIATED OR EMOTIONALLY ABUSED IN OTHER WAYS BY YOUR PARTNER OR EX-PARTNER?: NO

## 2024-09-18 SDOH — SOCIAL STABILITY: SOCIAL INSECURITY: HAVE YOU HAD THOUGHTS OF HARMING ANYONE ELSE?: NO

## 2024-09-18 SDOH — SOCIAL STABILITY: SOCIAL NETWORK
DO YOU BELONG TO ANY CLUBS OR ORGANIZATIONS SUCH AS CHURCH GROUPS UNIONS, FRATERNAL OR ATHLETIC GROUPS, OR SCHOOL GROUPS?: NO

## 2024-09-18 SDOH — ECONOMIC STABILITY: FOOD INSECURITY: WITHIN THE PAST 12 MONTHS, THE FOOD YOU BOUGHT JUST DIDN'T LAST AND YOU DIDN'T HAVE MONEY TO GET MORE.: NEVER TRUE

## 2024-09-18 SDOH — SOCIAL STABILITY: SOCIAL INSECURITY
WITHIN THE LAST YEAR, HAVE TO BEEN RAPED OR FORCED TO HAVE ANY KIND OF SEXUAL ACTIVITY BY YOUR PARTNER OR EX-PARTNER?: NO

## 2024-09-18 SDOH — ECONOMIC STABILITY: FOOD INSECURITY: WITHIN THE PAST 12 MONTHS, YOU WORRIED THAT YOUR FOOD WOULD RUN OUT BEFORE YOU GOT MONEY TO BUY MORE.: NEVER TRUE

## 2024-09-18 SDOH — SOCIAL STABILITY: SOCIAL NETWORK: IN A TYPICAL WEEK, HOW MANY TIMES DO YOU TALK ON THE PHONE WITH FAMILY, FRIENDS, OR NEIGHBORS?: ONCE A WEEK

## 2024-09-18 SDOH — SOCIAL STABILITY: SOCIAL NETWORK: HOW OFTEN DO YOU ATTEND CHURCH OR RELIGIOUS SERVICES?: MORE THAN 4 TIMES PER YEAR

## 2024-09-18 SDOH — SOCIAL STABILITY: SOCIAL INSECURITY: DO YOU FEEL UNSAFE GOING BACK TO THE PLACE WHERE YOU ARE LIVING?: NO

## 2024-09-18 SDOH — HEALTH STABILITY: MENTAL HEALTH
STRESS IS WHEN SOMEONE FEELS TENSE, NERVOUS, ANXIOUS, OR CAN'T SLEEP AT NIGHT BECAUSE THEIR MIND IS TROUBLED. HOW STRESSED ARE YOU?: NOT AT ALL

## 2024-09-18 SDOH — SOCIAL STABILITY: SOCIAL NETWORK: ARE YOU MARRIED, WIDOWED, DIVORCED, SEPARATED, NEVER MARRIED, OR LIVING WITH A PARTNER?: MARRIED

## 2024-09-18 SDOH — SOCIAL STABILITY: SOCIAL NETWORK: HOW OFTEN DO YOU ATTENT MEETINGS OF THE CLUB OR ORGANIZATION YOU BELONG TO?: NEVER

## 2024-09-18 ASSESSMENT — ACTIVITIES OF DAILY LIVING (ADL)
BATHING: INDEPENDENT
DRESSING YOURSELF: INDEPENDENT
TOILETING: INDEPENDENT
GROOMING: INDEPENDENT
HEARING - LEFT EAR: FUNCTIONAL
PATIENT'S MEMORY ADEQUATE TO SAFELY COMPLETE DAILY ACTIVITIES?: YES
WALKS IN HOME: INDEPENDENT
LACK_OF_TRANSPORTATION: NO
ADEQUATE_TO_COMPLETE_ADL: YES
FEEDING YOURSELF: INDEPENDENT
HEARING - RIGHT EAR: FUNCTIONAL
JUDGMENT_ADEQUATE_SAFELY_COMPLETE_DAILY_ACTIVITIES: YES

## 2024-09-18 ASSESSMENT — COLUMBIA-SUICIDE SEVERITY RATING SCALE - C-SSRS
6. HAVE YOU EVER DONE ANYTHING, STARTED TO DO ANYTHING, OR PREPARED TO DO ANYTHING TO END YOUR LIFE?: NO
1. IN THE PAST MONTH, HAVE YOU WISHED YOU WERE DEAD OR WISHED YOU COULD GO TO SLEEP AND NOT WAKE UP?: NO
2. HAVE YOU ACTUALLY HAD ANY THOUGHTS OF KILLING YOURSELF?: NO

## 2024-09-18 ASSESSMENT — PATIENT HEALTH QUESTIONNAIRE - PHQ9
2. FEELING DOWN, DEPRESSED OR HOPELESS: NOT AT ALL
1. LITTLE INTEREST OR PLEASURE IN DOING THINGS: NOT AT ALL
SUM OF ALL RESPONSES TO PHQ9 QUESTIONS 1 & 2: 0

## 2024-09-18 ASSESSMENT — ENCOUNTER SYMPTOMS
PALPITATIONS: 0
CHEST TIGHTNESS: 0
WHEEZING: 0
COUGH: 0
COLOR CHANGE: 0
ABDOMINAL PAIN: 0
SHORTNESS OF BREATH: 1
CONSTIPATION: 0
WEAKNESS: 1
APPETITE CHANGE: 0
VOMITING: 0
NERVOUS/ANXIOUS: 0
FEVER: 0
ABDOMINAL DISTENTION: 0
DIARRHEA: 0
CHILLS: 0
DIFFICULTY URINATING: 0

## 2024-09-18 ASSESSMENT — COGNITIVE AND FUNCTIONAL STATUS - GENERAL
PATIENT BASELINE BEDBOUND: NO
MOBILITY SCORE: 24
MOBILITY SCORE: 24
DAILY ACTIVITIY SCORE: 24
DAILY ACTIVITIY SCORE: 24

## 2024-09-18 ASSESSMENT — LIFESTYLE VARIABLES
HOW OFTEN DO YOU HAVE A DRINK CONTAINING ALCOHOL: NEVER
HOW OFTEN DO YOU HAVE 6 OR MORE DRINKS ON ONE OCCASION: NEVER
SKIP TO QUESTIONS 9-10: 1
AUDIT-C TOTAL SCORE: 0
SUBSTANCE_ABUSE_PAST_12_MONTHS: NO
AUDIT-C TOTAL SCORE: 0
HOW MANY STANDARD DRINKS CONTAINING ALCOHOL DO YOU HAVE ON A TYPICAL DAY: PATIENT DOES NOT DRINK
PRESCIPTION_ABUSE_PAST_12_MONTHS: NO

## 2024-09-18 ASSESSMENT — PAIN - FUNCTIONAL ASSESSMENT
PAIN_FUNCTIONAL_ASSESSMENT: 0-10

## 2024-09-18 ASSESSMENT — PAIN SCALES - GENERAL
PAINLEVEL_OUTOF10: 0 - NO PAIN

## 2024-09-18 NOTE — CARE PLAN
Problem: Fall/Injury  Goal: Not fall by end of shift  Outcome: Progressing  Goal: Be free from injury by end of the shift  Outcome: Progressing  Goal: Verbalize understanding of personal risk factors for fall in the hospital  Outcome: Progressing  Goal: Verbalize understanding of risk factor reduction measures to prevent injury from fall in the home  Outcome: Progressing  Goal: Use assistive devices by end of the shift  Outcome: Progressing  Goal: Pace activities to prevent fatigue by end of the shift  Outcome: Progressing     Problem: Safety - Adult  Goal: Free from fall injury  Outcome: Progressing   The patient's goals for the shift include pt will remain HDS for the shift    The clinical goals for the shift include pt will remain free from falls and injury.    Pt. admit to unit c/o increase swelling in legs and hypotension.  Ct abd/pelvis  showed hepatic artery thrombosis. Pt started on heparin drip@1400u/hr infusing. Last heparin assay was .9.  Eric 5.3 nand pt received calcium gluconate in ED. Bp are soft pt received 2 bolus and albumin. Pressures remain soft. Pt asymptomatic. P.ox 97 % on room air. Tele- sr with multifocal pvcs. Pt oob with standby assist due to a fall 1 month ago. Pt in bed resting no distress noted.

## 2024-09-18 NOTE — PROGRESS NOTES
".Medical Intensive Care - Daily Progress Note   Subjective    Yon Lawrence is a 49 y.o. year old male patient with PMH of metastatic Colon Ca on immunotherapy and DVT on Lovenox, admitted on 9/17/2024 with SOB and bilatal leg swelling, with imaging concerning for hepatic artery thrombosis.    Interval History:  Patient admitted to Step down from ED over night.  BP 80s/40s (at baseline) NPO since midnight for additional liver imaging or interventions     Meds    Scheduled medications  calcium gluconate, 2 g, intravenous, Once  magnesium sulfate, 1 g, intravenous, Once  pantoprazole, 40 mg, intravenous, Daily before breakfast      Continuous medications  heparin, 0-4,500 Units/hr, Last Rate: 1,200 Units/hr (09/18/24 0847)      PRN medications  PRN medications: heparin, melatonin, polyethylene glycol     Objective    Blood pressure 94/56, pulse 79, temperature 36 °C (96.8 °F), resp. rate 16, height 1.854 m (6' 1\"), weight 84.6 kg (186 lb 8 oz), SpO2 96%.     Physical Exam:  Constitutional: pt in NAD, alert and cooperative  Eyes: PERRL, EOMI, no icterus   ENMT: mucous membranes moist, no apparent injury, no lesions seen  Head/Neck: Neck supple, no apparent injury  Respiratory/Thorax: Lungs CTA bilaterally, non-labored breathing, no cough, on RA  Cardiovascular: Regular, rate and rhythm, no murmurs  Gastrointestinal: Nondistended, soft, non-tender, BS present x 4  : voiding  Musculoskeletal: ROM intact, no joint swelling, normal strength  Extremities: +2 BLE edema  Neurological: alert and oriented x 3, speech clear, follows commands appropriately, no focal deficits  Skin: Warm and dry, no lesions, no rashes       Intake/Output Summary (Last 24 hours) at 9/18/2024 1121  Last data filed at 9/18/2024 0915  Gross per 24 hour   Intake 600 ml   Output 850 ml   Net -250 ml     Labs:   Results from last 72 hours   Lab Units 09/18/24  0604 09/17/24  1108 09/16/24  1506   SODIUM mmol/L 138 129* 130*   POTASSIUM mmol/L 3.1* " "3.0* 3.5   CHLORIDE mmol/L 107 102 101   CO2 mmol/L 21 21 23   BUN mg/dL 12 19 18   CREATININE mg/dL 0.40* 0.52 0.63   GLUCOSE mg/dL 62* 120* 110*   CALCIUM mg/dL 5.5* 5.3* 5.6*   ANION GAP mmol/L 13 9* 10   EGFR mL/min/1.73m*2 >90 >90 >90   PHOSPHORUS mg/dL 2.4*  --   --       Results from last 72 hours   Lab Units 09/18/24  0604 09/17/24  1108 09/16/24  1506   WBC AUTO x10*3/uL 14.5* 20.2* 21.9*   HEMOGLOBIN g/dL 8.1* 9.6* 10.0*   HEMATOCRIT % 23.7* 28.1* 29.7*   PLATELETS AUTO x10*3/uL 285 348 361   NEUTROS PCT AUTO %  --  54.0  --    LYMPHO PCT MAN %  --   --  35.0   LYMPHS PCT AUTO %  --  42.8  --    MONO PCT MAN %  --   --  2.0   MONOS PCT AUTO %  --  2.8  --    EOSINO PCT MAN %  --   --  0.0   EOS PCT AUTO %  --  0.0  --           Results from last 72 hours   Lab Units 09/17/24  1827   POCT PH, VENOUS pH 7.42   POCT PCO2, VENOUS mm Hg 35*   POCT PO2, VENOUS mm Hg 45        Micro/ID:     No results found for: \"URINECULTURE\", \"BLOODCULT\", \"CSFCULTSMEAR\"    Summary of key imaging results from the last 24 hours    CT abdomen pelvis w IV contrast  9/17/2024     Global abnormal appearing liver is compatible with ischemia/infarction and obscures the patient's known underlying metastatic lesions. 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   Patient's known tumor of the distal transverse colon has a associated mesenteric desmoplastic reaction and there are adjacent enlarged mesenteric lymph nodes consistent with locally advanced and locally metastatic disease.   Pleural effusions, ascites and diffuse subcutaneous edema      CT angio chest for pulmonary embolism 9/17/2024  1.  No pulmonary embolism is identified   2. Bilateral pleural effusions with dependent compressive atelectasis   3. CT abdomen and pelvis is reported separately.         Vascular US " lower extremity venous duplex bilateral 2024  Right Lower Venous: No evidence of acute deep vein thrombus visualized in the right lower extremity. Cannot rule out thrombus in non-visualized posterior tibial and Peroneal veins due to swelling and edema.   Left Lower Venous: There is acute occlusive deep vein thrombosis visualized in the peroneal vein. Cannot rule out thrombus in non-visualized posterior tibial vein due to edema and swelling. The remainder of the left leg is negative for deep vein thrombosis.      Assessment and Plan     Assessment: Yon Lawrence is a 49 y.o. year old male patient admitted on 2024 with   SOB and LE edema, with imaging negative for PE, but concern for hepatic artery thrombosis    Restraints: no    Summary for 24  :  CT abdomen with concern for hepatic artery thrombosis, although per IR eval likely hepatic steatosis.  Ordered doppler liver US, might need liver bx for further eval    Plan:  NEUROLOGY/PSYCH:  Dx:  No acute issues    A&O x 3  Management:  Melatonin PRN for insomnia  OOB/PT/OT    CARDIOVASCULAR:  Dx:  Hypotension.  Per chart review patient's bp's normally run in low 90s   Troponin 3 and BNP 50.    No signs of end organ dysfunction   Management:  S/p LR and Albumen in the ED, and 500 ml Albumen on arrival to Step down  SBP acceptable at 80s-90s as long as asymptomatic   Tele monitor    PULMONARY:  Dx: Bilateral pleural effusions   Stable on RA  Management:  VB.42/35/45  CT PE Negative for PE. Bilateral pleural effusions present  Encourage IS while awake     RENAL/GENITOURINARY:  Dx:   No acute issues   Management:  Strict I&O's  Daily weights    GASTROENTEROLOGY:  Dx:  Liver ischemia/infarction, hypokalemia, hypocalcemia, hypoalbuminemia, hyponatremia, Ascites    T- bili 0.5, , AST 21,   CT abdomen pelvis: abnormal appearing liver is compatible with ischemia/infarction and obscures the patient's known underlying metastatic lesions. There is  no intra or extrahepatic biliary dilatation. Gallbladder is normal in size with no calcified stones.  Per IR CT findings might be more consistent with hepatic steatosis.  Will plan for liver US  Concern for immunotherapy related liver involvement, consider liver bx (discussed with patient's oncologist Dr Finn)     Management:  NPO over night for liver imaging, started PO in the afternoon  Bowel regimen: Miralax PRN  replace K, Mag, Ca and Phos  PPI   Daily RFP/MAG/LFTs    ENDOCRINOLOGY:  Dx: no hx of DM  Hypoglycemia likely 2/2 NPO status  Management:  BS 60 this am, gave D50 1/2 amp and started PO when able  Repeat accu check to correct hypoglycemia, otherwise BS via RFP     HEMATOLOGY/ONCOLOGY:  Dx: hx metastatic distal transverse colon adenocarcinoma and DVT.   Hgb stable at 8.1 today  Primary oncology Dr. Finn,   Patient on Pembrolizumab therapy  Duplex LE: 9/6/2024 with acute DVT Left peroneal vein  CT C/A/P showing hepatic ischemic/infarction suspected to be 2/2 hepatic artery thrombosis, although unlikely as per further eval.  Management:  Seen by IR, will follow up with liver US, No emergent intervention per IR at this time  Patient's oncology Dr Finn is aware of admission, updated on course, please keep her updated during course via [facility]  Hepatology consulted  Continue on heparin gtt for now  Follow up on repeat bilateral LE Duplex study  Transfuse for Hgb < 7  No s/s of bleeding; CTM  Coags daily    INFECTIOUS DISEASE:  Dx:  Leukocytosis   Afebrile with leukocytosis (WBC 20.2 on admission, down to 14.5 this am)  Management:  Lactate normal, no signs of end organ dysfunction.    Covid negative   Monitor off abx    ICU Check List       FEN  Fluids: PRN  Electrolytes: PRN  Nutrition: regular diet  Prophylaxis:  DVT ppx: Heparin gtt  GI ppx: PPI  Bowel care: Miralax  Hardware:  Catheter: None  Drains: None  Lines: PIV, Left Chest medport (changed needle 9/18)         Implantable Port 05/31/24 Left Chest  Single lumen port (Active)   Placement Date: 05/31/24   Placed by External Staff?: Other hospital  Orientation: Left  Implantable Port Location: Chest  Port Type: Single lumen port   Number of days: 110       Social:  Code: Full Code  (Discussed upon SDU admission)  HPOA: Lora Lawrence, wife, [phone]  Disposition: continue Step down care for now.  Consider transfer to oncology team      FARIHA De Anda-CNP   09/18/24 at 11:21 AM     Pt discussed with Dr. Vargas.  seen and examined. All labs, VS and previous plan of care reviewed.     Disclaimer: Documentation completed with the information available at the time of input. The times in the chart may not be reflective of actual patient care times, interventions, or procedures. Documentation occurs after the physical care of the patient.

## 2024-09-18 NOTE — PROGRESS NOTES
"Pharmacy Medication History Review    Yon Lawrence \"Vladimir\" is a 49 y.o. male admitted for No Principal Problem: There is no principal problem currently on the Problem List. Please update the Problem List and refresh.. Pharmacy reviewed the patient's [id number] medications and allergies for accuracy.    Medications ADDED:  None  Medications CHANGED:  None  Medications REMOVED / NOT TAKING :   Alfuzosin 10 mg 24 hr tablet - Patient Not Taking   Apixaban 5 mg (74 tabs ) tablet - Patient Not Taking   Lansoprazole 30 mg DR capsule - Patient Not Taking   Prochlorperazine 10 mg tablet - Patient Not Taking     The list below reflects the updated PTA list.   Prior to Admission Medications   Prescriptions Last Dose Informant   alfuzosin (UroxatraL) 10 mg 24 hr tablet Not Taking Self   Sig: Take 1 tablet (10 mg) by mouth once daily. Do not crush, chew, or split.   Patient not taking: Reported on 9/17/2024   apixaban (Eliquis) 5 mg (74 tabs) tablet Not Taking Self   Sig: Take 2 tablets (10 mg) by mouth 2 times a day for 7 days, then take 1 tablet (5 mg) by mouth 2 times a day.   Patient not taking: Reported on 9/17/2024   enoxaparin (Lovenox) 80 mg/0.8 mL syringe 9/17/2024 at 8 am Self   Sig: Inject 0.8 mL (80 mg) under the skin every 12 hours.   lansoprazole (Prevacid) 30 mg DR capsule Not Taking    Sig: Take 1 capsule (30 mg) by mouth once daily in the morning. Take before meals. Do not crush or chew.   Patient not taking: Reported on 9/17/2024   prochlorperazine (Compazine) 10 mg tablet Not Taking Self   Sig: Take 1 tablet (10 mg) by mouth every 6 hours if needed for nausea or vomiting.   Patient not taking: Reported on 9/17/2024      Facility-Administered Medications: None        The list below reflects the updated allergy list. Please review each documented allergy for additional clarification and justification.  Allergies  Reviewed by Terra Nicolas on 9/17/2024   No Known Allergies         Patient accepts M2B " "at discharge.     Sources:   OARRS - no hx found   Patient interview   Patient dispense hx    Chart Review  Care Everywhere     Additional Comments:  Patient is a reliable historian , knows medications by name , dosage and indication by memory.        Terra Nicolas  Pharmacy Technician  09/17/24     Secure Chat preferred   If no response call [phone] or [employer] \"Med Rec\"   "

## 2024-09-18 NOTE — CONSULTS
"  [facility]   [facility] [place]  INITIAL CONSULT NOTE       Reason For Consult  Elevated LFT iso known liver mets of colon Ca and ?Hepatic artery thrombosis    SUBJECTIVE     History Of Present Illness  Yon Lawrence \"Vladimir\" is a 49 y.o. 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 Hepatology is consulted for evaluation of elevated LFT in the light of usage of immune check point inhibitors.    Patient presented to ED 9/17 for SOB. CTAP showed bilateral pleural effusions and hepatic ischemic/infarction likely due to hepatic artery thrombosis. Started on heparin gtt. No intervention per surgery giving metastatic disease burden. IR consulted for possible HA thrombectomy. Deemed that findings may be due to hepatic steatosis causing diffuse decreased attenuation of liver parenchyma especially giving patent portal vein. Recommending doppler U/S which was fairly unremarkable, although could not have good visualization of Rt hepatic artery.     Of note patient is known to have metastasis to liver. ALP elevated 223 since 5/20/2024. CT 7/1/2024 showed decreased in size. ALP peaked at 274 and continues to improve with 136 9/18/2024.   Patient with history     In 8/2024, patient had episodes of diarrhea which presumed to be due to grade 2 ICI colitis. Treated with prednisone 1mg/kg/day and symptoms improved.     With regard to blood clot, CT from 7/1/2024 showed \"Interval development  of a long segment filling defect along the distal IVC and right  atrium (series 6, image 38, series 3 images 37-62), concerning for  thrombus. Secondary to the presence of port catheter\". Started on eliquis but doppler U/S 9/2024 showing " acute occlusive deep vein thrombosis visualized in the peroneal vein. So transitioned to lovenox.    Patient reports weakness of the Rt side of his body especially when ambulate. No abdominal pain. No liver issues prior to the Dx of colon Ca. No history of HLD/DM/Obesity. No alcohol use. No Fhx of liver diseases.       Review of Systems  12 point ROS otherwise negative, unless indicated above     Past Medical History:    Past Medical History:   Diagnosis Date    Cancer (Multi)     colon cancer       Home Medications  Medications Prior to Admission   Medication Sig Dispense Refill Last Dose    enoxaparin (Lovenox) 80 mg/0.8 mL syringe Inject 0.8 mL (80 mg) under the skin every 12 hours. 60 each 3 9/17/2024 at 8 am    alfuzosin (UroxatraL) 10 mg 24 hr tablet Take 1 tablet (10 mg) by mouth once daily. Do not crush, chew, or split. (Patient not taking: Reported on 9/17/2024) 30 tablet 11 Not Taking    apixaban (Eliquis) 5 mg (74 tabs) tablet Take 2 tablets (10 mg) by mouth 2 times a day for 7 days, then take 1 tablet (5 mg) by mouth 2 times a day. (Patient not taking: Reported on 9/17/2024) 74 tablet 0 Not Taking    lansoprazole (Prevacid) 30 mg DR capsule Take 1 capsule (30 mg) by mouth once daily in the morning. Take before meals. Do not crush or chew. (Patient not taking: Reported on 9/17/2024) 30 capsule 0 Not Taking    prochlorperazine (Compazine) 10 mg tablet Take 1 tablet (10 mg) by mouth every 6 hours if needed for nausea or vomiting. (Patient not taking: Reported on 9/17/2024) 30 tablet 5 Not Taking         Surgical History:    Past Surgical History:   Procedure Laterality Date    COLONOSCOPY  05/06/2024    DR VALDEZ    OTHER SURGICAL HISTORY  06/07/2022    Meniscectomy    TUNNELED VENOUS PORT PLACEMENT  05/31/2024    PLACEMENT OF TUNNELED CENTR VENOUS CATHETER W/SQ PORT/ DR VALDEZ       Allergies:  No Known Allergies    Social History:    Social History     Socioeconomic History    Marital status:       Spouse name: Not on file    Number of children: Not on file    Years of education: Not on file    Highest education level: Not on file   Occupational History    Not on file   Tobacco Use    Smoking status: Never    Smokeless tobacco: Never   Substance and Sexual Activity    Alcohol use: Never    Drug use: Never    Sexual activity: Defer   Other Topics Concern    Not on file   Social History Narrative    Lives with wife and 2 children. Works as a  at [employer]. Coaches basketball over the summer.      Social Determinants of Health     Financial Resource Strain: Low Risk  (9/18/2024)    Overall Financial Resource Strain (CARDIA)     Difficulty of Paying Living Expenses: Not hard at all   Food Insecurity: No Food Insecurity (9/18/2024)    Hunger Vital Sign     Worried About Running Out of Food in the Last Year: Never true     Ran Out of Food in the Last Year: Never true   Transportation Needs: No Transportation Needs (9/18/2024)    PRAPARE - Transportation     Lack of Transportation (Medical): No     Lack of Transportation (Non-Medical): No   Physical Activity: Not on file   Stress: No Stress Concern Present (9/18/2024)    [employer] [place] [employer] - Occupational Stress Questionnaire     Feeling of Stress : Not at all   Social Connections: Moderately Isolated (9/18/2024)    Social Connection and Isolation Panel [NHANES]     Frequency of Communication with Friends and Family: Once a week     Frequency of Social Gatherings with Friends and Family: Once a week     Attends Restorationism Services: More than 4 times per year     Active Member of Clubs or Organizations: No     Attends Club or Organization Meetings: Never     Marital Status:    Intimate Partner Violence: Not At Risk (9/18/2024)    Humiliation, Afraid, Rape, and Kick questionnaire     Fear of Current or Ex-Partner: No     Emotionally Abused: No     Physically Abused: No     Sexually Abused: No   Housing  Stability: Low Risk  (9/18/2024)    Housing Stability Vital Sign     Unable to Pay for Housing in the Last Year: No     Number of Times Moved in the Last Year: 0     Homeless in the Last Year: No       Family History:    Family History   Problem Relation Name Age of Onset    Diabetes type II Mother      Hypertension Father      Heart disease Father          CABG    Macular degeneration Father      Ulcers Father      Breast cancer Mother's Sister  50 - 59    Cancer Father's Sister          rare cancer unknown type    Cancer Paternal Grandmother      Stroke Paternal Grandfather      Other (MCAD) Daughter      No Known Problems Son         EXAM     Vitals:    Vitals:    09/18/24 0400 09/18/24 0500 09/18/24 0745 09/18/24 0815   BP: 80/59 83/55  94/56   Pulse: 70 69  79   Resp: 15 (!) 9  16   Temp:  36.2 °C (97.2 °F)  36 °C (96.8 °F)   TempSrc:  Temporal     SpO2: 98% 97%  96%   Weight:   84.6 kg (186 lb 8 oz)    Height:         Failed to redirect to the Timeline version of the [employer] SmartLink.    Intake/Output Summary (Last 24 hours) at 9/18/2024 0855  Last data filed at 9/18/2024 0745  Gross per 24 hour   Intake 500 ml   Output 850 ml   Net -350 ml         Physical Exam  General: no acute distress  HEENT: EOMI. Moist mucosa  Respiratory: nonlabored breathing on room air  Cardiovascular: RRR, no murmurs/rubs/gallops  Abdomen: Soft, nontender, nondistended, bowel sounds present. No masses palpated  Extremities: no edema, no asterixis  Neuro: alert and oriented, CNII-XII grossly intact, moves all 4 extremities with no focal deficits    OBJECTIVE                                                                              Medications       Current Facility-Administered Medications:     calcium gluconate 2 g in sodium chloride (iso)  mL, 2 g, intravenous, Once, Maribell Ruiz, APRN-CNP    heparin 25,000 Units in dextrose 5% 250 mL (100 Units/mL) infusion (premix), 0-4,500 Units/hr, intravenous, Continuous, Hernandez LARA  DO Jannet, Last Rate: 12 mL/hr at 09/18/24 0847, 1,200 Units/hr at 09/18/24 0847    heparin bolus from bag 3,000-6,000 Units, 3,000-6,000 Units, intravenous, q4h PRN, Hernandez Power DO    melatonin tablet 3 mg, 3 mg, oral, Nightly PRN, Brad Galeano APRN-CNP    pantoprazole (ProtoNix) injection 40 mg, 40 mg, intravenous, Daily before breakfast, FARIHA Graham-CNP, 40 mg at 09/18/24 0849    polyethylene glycol (Glycolax, Miralax) packet 17 g, 17 g, oral, Daily PRN, Brad Galeano APRN-CNP    potassium chloride CR (Klor-Con M20) ER tablet 40 mEq, 40 mEq, oral, Once, Maribell Bartram, APRN-CNP                                                                            Labs     Results for orders placed or performed during the hospital encounter of 09/17/24 (from the past 24 hour(s))   Type And Screen   Result Value Ref Range    ABO TYPE O     Rh TYPE POS     ANTIBODY SCREEN NEG    Blood Gas Venous Full Panel   Result Value Ref Range    POCT pH, Venous 7.42 7.33 - 7.43 pH    POCT pCO2, Venous 35 (L) 41 - 51 mm Hg    POCT pO2, Venous 45 35 - 45 mm Hg    POCT SO2, Venous 65 45 - 75 %    POCT Oxy Hemoglobin, Venous 59.2 45.0 - 75.0 %    POCT Hematocrit Calculated, Venous 29.0 (L) 41.0 - 52.0 %    POCT Sodium, Venous 128 (L) 136 - 145 mmol/L    POCT Potassium, Venous 3.3 (L) 3.5 - 5.3 mmol/L    POCT Chloride, Venous 102 98 - 107 mmol/L    POCT Ionized Calicum, Venous 0.90 (L) 1.10 - 1.33 mmol/L    POCT Glucose, Venous 82 74 - 99 mg/dL    POCT Lactate, Venous 0.6 0.4 - 2.0 mmol/L    POCT Base Excess, Venous -1.4 -2.0 - 3.0 mmol/L    POCT HCO3 Calculated, Venous 22.7 22.0 - 26.0 mmol/L    POCT Hemoglobin, Venous 9.6 (L) 13.5 - 17.5 g/dL    POCT Anion Gap, Venous 7.0 (L) 10.0 - 25.0 mmol/L    Patient Temperature 37.0 degrees Celsius    FiO2 21 %   Light Blue Top   Result Value Ref Range    Extra Tube Hold for add-ons.    SST TOP   Result Value Ref Range    Extra Tube Hold for add-ons.    PST Top   Result  Value Ref Range    Extra Tube Hold for add-ons.    PST Top   Result Value Ref Range    Extra Tube Hold for add-ons.    Heparin Assay, UFH   Result Value Ref Range    Heparin Unfractionated 0.9 See Comment Below for Therapeutic Ranges IU/mL   Magnesium   Result Value Ref Range    Magnesium 1.84 1.60 - 2.40 mg/dL   CBC   Result Value Ref Range    WBC 14.5 (H) 4.4 - 11.3 x10*3/uL    nRBC 0.0 0.0 - 0.0 /100 WBCs    RBC 3.01 (L) 4.50 - 5.90 x10*6/uL    Hemoglobin 8.1 (L) 13.5 - 17.5 g/dL    Hematocrit 23.7 (L) 41.0 - 52.0 %    MCV 79 (L) 80 - 100 fL    MCH 26.9 26.0 - 34.0 pg    MCHC 34.2 32.0 - 36.0 g/dL    RDW 18.4 (H) 11.5 - 14.5 %    Platelets 285 150 - 450 x10*3/uL   Coagulation Screen   Result Value Ref Range    Protime 15.6 (H) 9.8 - 12.8 seconds    INR 1.4 (H) 0.9 - 1.1    aPTT 125 (HH) 27 - 38 seconds   Hepatic Function Panel   Result Value Ref Range    Albumin <1.5 (L) 3.4 - 5.0 g/dL    Bilirubin, Total 0.7 0.0 - 1.2 mg/dL    Bilirubin, Direct 0.3 0.0 - 0.3 mg/dL    Alkaline Phosphatase 136 (H) 33 - 120 U/L    ALT 93 (H) 10 - 52 U/L    AST 19 9 - 39 U/L    Total Protein <3.0 (L) 6.4 - 8.2 g/dL   Calcium, ionized   Result Value Ref Range    POCT Calcium, Ionized 0.89 (L) 1.1 - 1.33 mmol/L   Phosphorus   Result Value Ref Range    Phosphorus 2.4 (L) 2.5 - 4.9 mg/dL   Basic Metabolic Panel   Result Value Ref Range    Glucose 62 (L) 74 - 99 mg/dL    Sodium 138 136 - 145 mmol/L    Potassium 3.1 (L) 3.5 - 5.3 mmol/L    Chloride 107 98 - 107 mmol/L    Bicarbonate 21 21 - 32 mmol/L    Anion Gap 13 10 - 20 mmol/L    Urea Nitrogen 12 6 - 23 mg/dL    Creatinine 0.40 (L) 0.50 - 1.30 mg/dL    eGFR >90 >60 mL/min/1.73m*2    Calcium 5.5 (LL) 8.6 - 10.6 mg/dL   Heparin Assay, UFH   Result Value Ref Range    Heparin Unfractionated 0.9 See Comment Below for Therapeutic Ranges IU/mL                                                                              Imaging             7/1/2024 CTAP w/IV  VESSELS:  Aorta and pulmonary  arteries are normal caliber. Interval development  of a long segment filling defect along the distal IVC and right  atrium (series 6, image 38, series 3 images 37-62), concerning for  thrombus. Secondary to the presence of port catheter. No  atherosclerotic changes of the aorta are identified.  No coronary       9/6/2024 Doppler LE  CONCLUSIONS:  Right Lower Venous: No evidence of acute deep vein thrombus visualized in the right lower extremity. Cannot rule out thrombus in non-visualized posterior tibial and Peroneal veins due to swelling and edema.  Left Lower Venous: There is acute occlusive deep vein thrombosis visualized in the peroneal vein. Cannot rule out thrombus in non-visualized posterior tibial vein due to edema and swelling. The remainder of the left leg is negative for deep vein thrombosis.    9/17/2024 CTA Chest   IMPRESSION:  1.  No pulmonary embolism is identified  2. Bilateral pleural effusions with dependent compressive atelectasis  3. CT abdomen and pelvis is reported separately.    9/17/2024 CTAP w/ IV   IMPRESSION:  Global abnormal appearing liver is compatible with  ischemia/infarction and obscures the patient's known underlying  metastatic lesions. 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      9/18/2024 Doppler U/S  IMPRESSION:  1. Hepatomegaly and echogenic hepatic parenchyma suggestive of fatty  infiltration. Several hepatic lesions are visualized and described  above, better characterized on recent CTs.  2. Poor visualization of the right hepatic artery and right hepatic  vein, potentially related to technique or patient condition, rather  than vascular abnormality. Otherwise normal patent hepatic  vasculature.  3. Right pleural effusion and small volume ascites.                                                             "             GI Procedures     5/6/2024  Impression  Polyp measuring smaller than 5 mm in the proximal rectum; performed cold forceps biopsy with piecemeal removal  Diverticulosis in the sigmoid colon (very mild)  Malignant-appearing mass measuring 10 cm in the distal transverse colon, covering the whole circumference; bleeding occurred after intervention; performed cold forceps biopsy     Findings  Polyp measuring smaller than 5 mm in the proximal rectum; performed cold forceps biopsy with complete piecemeal removal  Few very small diverticula in the sigmoid colon  Malignant-appearing mass (traversable) measuring 10 cm in the distal transverse colon, covering the whole circumference; bleeding occurred after intervention; performed cold forceps biopsy with partial removal. Kimberly ink tattoo placed on the fold immediately distal to the distal extent of this mass, using 6mL.       ASSESSMENT / PLAN                  ASSESSMENT/PLAN:    Yon Lawrence \"Vladimir\" is a 49 y.o. 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 Hepatology is consulted for evaluation of elevated LFT in the light of usage of immune check point inhibitors.    #Elevated LFT, mixed pattern  #Colon AdenoCa with metastasis to liver/mesenteric nodules/peritoneum and fistulization to jejunum  #DVT on lovenox (failed eliquis)  #Diarrhea presumed to be ICI colitis s/p prednisone, resolved   #Hypoalbuminemia/Low protein level  Lab on presentation AST/ALT 22/120, , T-marcy 0.5 has been improving AST/ALT 19/93, , T-marcy 0.7. ALP has been elevated at baseline likely due to metastatic liver lesion which has been improving, based on CT from 7/1/2024.  CTAP 9/17/2024 suggests hepatic artery " thrombosis which appears to be present in branches to the right and left hepatic lobes in the ivonne hepatis, which could explain the degree of elevated AST/ALT. We are awaiting for doppler U/S for confirmation. ICI hepatitis would be on differential, although less likely giving the amount of AST/ALT elevation not impressive, improving w/o treatment (prednisone). Also last pembrolizumab dose 7/12/2024 which is not close. Patient has no history of metabolic syndrome. No history of binge alcohol drink. No history of viral hepatitis. Doppler U/S showed patent vasculature although Rt hepatic artery and vein were not visualized well. Regardless, we have low suspicion for ICI hepatitis and would monitor LFT rather than obtaining liver biopsy at this time.  With regard to low albumin/protein, etiology is unclear. UA negative for proteinuria. Unclear liver mets and malnutrition are enough to explain this. Recommend alpha-1 antitrypsin clearance test to evaluate protein-losing gastroenteropathy as well as CTE to assess fistulization.     Plan  -Trend LFT   -Obtain alpha-1 antitrypsin clearance test   -Obtain CT enterography to assess fistulization  -Recommend nutrition consult for evaluation    Patient was discussed with Dr. Funez.     Thank you for the consultation. Hepatology will continue to the follow.  - During weekday hours of 7am- 5pm, please do not hesitate to contact me on [employer] Chat or page [phone] if there are any further questions   - After hours, on weekends, and on holidays, please page the on-call GI fellow at [phone]. Thank you.       Loan Plummer MD  PGY5 Gastroenterology Fellow  [facility] [place]

## 2024-09-18 NOTE — CONSULTS
IR was consulted for possible hepatic artery thrombectomy. Imaging was reviewed which showed diffusely decreased attenuation of liver parenchyma. Findings may be secondary to severe hepatic steatosis from recent medication. Findings are less likely to be secondary to hepatic ischemia especially given the patency of the portal vein. If there is high clinical concern for hepatic artery thrombus, recommend liver doppler ultrasound. If there is confirmation on liver ultrasound that this is hepatic arterial thrombus, please reach out to IR and place another IR consult. This was discussed with the ordering provider, Maribell Ruiz.    The consult was discussed with attending physician, Dr Kamran Day & Dr Merritt Harden.

## 2024-09-18 NOTE — CARE PLAN
The patient's goals for the shift include pt will remain HDS for the shift    The clinical goals for the shift include HDS      Problem: Fall/Injury  Goal: Not fall by end of shift  Outcome: Progressing  Goal: Be free from injury by end of the shift  Outcome: Progressing  Goal: Verbalize understanding of personal risk factors for fall in the hospital  Outcome: Progressing  Goal: Verbalize understanding of risk factor reduction measures to prevent injury from fall in the home  Outcome: Progressing  Goal: Use assistive devices by end of the shift  Outcome: Progressing  Goal: Pace activities to prevent fatigue by end of the shift  Outcome: Progressing     Problem: Safety - Adult  Goal: Free from fall injury  Outcome: Progressing

## 2024-09-18 NOTE — H&P
Acupuncture/TCM Follow Up    TREATMENT # 8     PRECAUTIONS:    · Precautions: none  SUBJECTIVE:   · Chief Complaint: Kendall is a 84 year old male c/o hypochondriac pain originating in mid back along spinal musculature and wrapping around to the sternum. If he presses on the sternum, he has pain. This pain usually starts around 4pm every day.  States the pain is like \"real bad gas or bloating which makes me think it might be my bowels.\" Chris was in the ED for this pain on 8/10/18 and tests did not reveal anything. Sent home with pain medications. States he has been very constipated lately. He has not had a bowel movement in two days. He is feeling a little down because he used to be very active about 2 years ago, but has started having sporadic dizziness which has slowed him down. He spends most of his time on the couch. Denies nausea, has some chronic minor neuropathy.  ·   · Today, Kendall states that he is feeling his neck pain 3/10 but he is not feeling dizzy.  The side pains are no longer present and his digestion is improved. It is still a little slow, but feels it is improving. He is noticing that he has more energy and finds the more active he is the better his digestion. He is not feeling dizzy today but feels unclear and cloudy in his head. States he feels like his quality of sleep is improving. He also noticed his gait is improving. He feels like he is walking straigther. He was able to walk in [employer] without feeling wobbly for the first time in months. He stepped off of his  yesterday and twisted his left ankle. It is not swollen or bruised, but feels a little sore. States he really noticed \"clarity\" after having his forehead needled and wishes to keep that point in.  No other complaints at this time.   ·   · Description of Onset: dizziness and neck pain has been ongoing for about 3 years.  · Prior Treatment: chiropractic, NSAIDs  · Pain & Biopsychosoical Scale  · Self-Reported Pain:  StepDown Unit - History and Physical   HPI:  A 49 y.o. 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  CT chest abdomen pelvis performed showing hepatic infarction c/f hepatic artery thrombosis.  No PE noted.  In ED patient with soft Bps with SBP 80s-90s.  Given 1L of LR and 25g 5% albumin without significant improvement in BP.  Per chart review patient's bp's normally run in low 90s. Labs notable for Sodium 129, Potassium 3.0, Calcium 5.3, Albumin <1.5, , , bilirubin 0.5, WBC 20.2, Lactate 0.6, INR 1.4, troponin 3 and BNP 50.  Seen by interventional radiology team while in ED, no emergent intervention recommended at that time, will be formally evaluate in the morning. Denies:  CP, Palpitations, SOB, N/V, abd. Pain, constipation/diarrhea, and sick contact;  Endorses: right sided weakness that has been persistent over the past week and bilateral lower extremity swelling that has been unchanged since 8/21. 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    Review of Systems:  Review of Systems   Constitutional:  Negative for appetite change, chills and fever.   Respiratory:  Positive for shortness of breath. Negative for cough, chest tightness and wheezing.         Reports mild SOB first noticed the morning of 9/17.  States that it seems improved currently   Cardiovascular:  Positive for leg swelling. Negative for chest pain and palpitations.   Gastrointestinal:  Negative for abdominal distention, abdominal pain, constipation, diarrhea and vomiting.   Genitourinary:  Negative for difficulty urinating.   Skin:  Negative for color change and rash.   Neurological:  Positive for weakness. Negative for syncope.        Reports right sided weakness which  "was first noticed roughly one week ago   Psychiatric/Behavioral:  The patient is not nervous/anxious.         Past Medical History:   Diagnosis Date    Cancer (Multi)     colon cancer     Past Surgical History:   Procedure Laterality Date    COLONOSCOPY  05/06/2024    DR VALDEZ    OTHER SURGICAL HISTORY  06/07/2022    Meniscectomy    TUNNELED VENOUS PORT PLACEMENT  05/31/2024    PLACEMENT OF TUNNELED CENTR VENOUS CATHETER W/SQ PORT/ DR VALDEZ     Patient has no known allergies.  Social History     Tobacco Use    Smoking status: Never    Smokeless tobacco: Never   Substance Use Topics    Alcohol use: Never    Drug use: Never     Family History   Problem Relation Name Age of Onset    Diabetes type II Mother      Hypertension Father      Heart disease Father          CABG    Macular degeneration Father      Ulcers Father      Breast cancer Mother's Sister  50 - 59    Cancer Father's Sister          rare cancer unknown type    Cancer Paternal Grandmother      Stroke Paternal Grandfather      Other (MCAD) Daughter      No Known Problems Son       Meds    Home medications:  Current Outpatient Medications   Medication Instructions    alfuzosin (UROXATRAL) 10 mg, oral, Daily, Do not crush, chew, or split.    apixaban (Eliquis) 5 mg (74 tabs) tablet Take 2 tablets (10 mg) by mouth 2 times a day for 7 days, then take 1 tablet (5 mg) by mouth 2 times a day.    enoxaparin (LOVENOX) 80 mg, subcutaneous, Every 12 hours    lansoprazole (PREVACID) 30 mg, oral, Daily before breakfast, Do not crush or chew.    prochlorperazine (COMPAZINE) 10 mg, oral, Every 6 hours PRN        Inpatient medications:  Scheduled medications     Continuous medications  heparin, 0-4,500 Units/hr, Last Rate: 16 Units/hr (09/17/24 2059)      PRN medications  PRN medications: heparin     Objective    Blood pressure 83/51, pulse 75, temperature 36.4 °C (97.5 °F), temperature source Oral, resp. rate 19, height 1.854 m (6' 1\"), weight 87 kg (191 lb 12.8 oz), " 3/10  · Self-Reported Biopsychosocial impact of condition on Emotional Wellbeing - How has your condition impacted your:  · Stress: 4/10   · Activity: starting to improve /10  · Sleep: interrupted/10  · Mood: down because he isn't moving as much as he once had/10  ·   · Patient's Post Treatment Comments and numerical pain score:  1/10,\"My neck feels better.\"  ·   Patient Goal(s):   1. Alleviate pain   2. Increase energy and alleviate depression  3. Alleviate dizziness    OBJECTIVE:   Physical Inspection: Observations/Measurements  Alert x 3, bright guy  Tongue: red, wet, no coat    Pulses: full, wiry    Traditional Chinese Medicine (TCM) Diagnosis:     · Heat, qi and blood stagnation    TODAY'S ACUPUNCTURE/TCM TREATMENT:     Acupuncture: Meridians/Points utilized  · DU 20, YT, ST 36, SP 6, GB 20,  · L: MAHNAZ 7, LI 4  · R: LV 3  ·   Acupuncture: # of needles used: 11 # of needles out: 11    Other Modalities Utilized: tuina on neck and shoulders    Patient Education:   · Continue to drink plenty of fluids and use massager to help alleviate any pain during the week.    · Patient Understanding of Education: Verbalizes understanding of education.    TCM Prognosis:       · Patient would benefit from skilled Acupuncture/TCM therapy to achieve stated goals  · Potential Resolution of chief complaint is excellent.       PLAN OF CARE:   · Frequency/Duration: 1x/week  · The plan of care and goals were established with the patient who concurs.    Acupuncture/TCM Daily Billing:    Acupuncture CPT Codes reflect billing claims  Timed Procedures:  · Acupuncture CPT Codes  Untimed Procedures:  · Initial Evaluation CPT Code  Total Time per CPT Codes: 30 minutes   SpO2 99%.     Physical Exam  Constitutional:       General: He is not in acute distress.     Appearance: Normal appearance. He is not ill-appearing or toxic-appearing.   HENT:      Head: Normocephalic and atraumatic.      Mouth/Throat:      Mouth: Mucous membranes are moist.   Eyes:      Extraocular Movements: Extraocular movements intact.      Pupils: Pupils are equal, round, and reactive to light.   Cardiovascular:      Rate and Rhythm: Normal rate.      Pulses: Normal pulses.      Heart sounds: Normal heart sounds.   Pulmonary:      Effort: Pulmonary effort is normal. No respiratory distress.      Breath sounds: Normal breath sounds. No wheezing.   Chest:      Chest wall: No tenderness.   Abdominal:      General: Bowel sounds are normal. There is no distension.      Palpations: Abdomen is soft.      Tenderness: There is no abdominal tenderness. There is no guarding.   Musculoskeletal:         General: Normal range of motion.      Cervical back: Normal range of motion.      Right lower leg: Edema present.      Left lower leg: Edema present.   Skin:     General: Skin is warm and dry.   Neurological:      General: No focal deficit present.      Mental Status: He is alert and oriented to person, place, and time. Mental status is at baseline.      Comments: Strength equal bilaterally    Psychiatric:         Mood and Affect: Mood normal.          No intake or output data in the 24 hours ending 09/17/24 2227  Labs:   Results from last 72 hours   Lab Units 09/17/24  1108 09/16/24  1506   SODIUM mmol/L 129* 130*   POTASSIUM mmol/L 3.0* 3.5   CHLORIDE mmol/L 102 101   CO2 mmol/L 21 23   BUN mg/dL 19 18   CREATININE mg/dL 0.52 0.63   GLUCOSE mg/dL 120* 110*   CALCIUM mg/dL 5.3* 5.6*   ANION GAP mmol/L 9* 10   EGFR mL/min/1.73m*2 >90 >90      Results from last 72 hours   Lab Units 09/17/24  1108 09/16/24  1506   WBC AUTO x10*3/uL 20.2* 21.9*   HEMOGLOBIN g/dL 9.6* 10.0*   HEMATOCRIT % 28.1* 29.7*   PLATELETS AUTO x10*3/uL  "348 361   NEUTROS PCT AUTO % 54.0  --    LYMPHO PCT MAN %  --  35.0   LYMPHS PCT AUTO % 42.8  --    MONO PCT MAN %  --  2.0   MONOS PCT AUTO % 2.8  --    EOSINO PCT MAN %  --  0.0   EOS PCT AUTO % 0.0  --         Micro/ID:     No results found for: \"URINECULTURE\", \"BLOODCULT\", \"CSFCULTSMEAR\"    Summary of Key Imaging Results  ECG 12 Lead    Result Date: 9/17/2024  Undetermined rhythm Low voltage QRS Nonspecific T wave abnormality Prolonged QT Abnormal ECG No previous ECGs available    CT abdomen pelvis w IV contrast    Result Date: 9/17/2024  Interpreted By:  Cassy Petit, STUDY: CT ABDOMEN PELVIS W IV CONTRAST; ;  9/17/2024 12:01 pm   INDICATION: Signs/Symptoms:colon cancer, SOB, edema-getting CTA of chest and is scheduled for CT chest abd pelvis at 2-do not want to have to repeat contrast bolus.     COMPARISON: 08/22/2024   ACCESSION NUMBER(S): [id number]   ORDERING CLINICIAN: FABIOLA PAUL   TECHNIQUE: Imaging was performed from dome of the diaphragm through ischial tuberosities with axial, coronal and sagittal images reconstructed. Patient received 69 mL Omnipaque 350 intravenously in conjunction with the separately reported CTA chest.   FINDINGS: CTA of the chest was also performed and is reported separately.   The liver is diffusely and profoundly decreased in density with some patchy areas of more normal density parenchyma. The previously noted focal hepatic lesions are obscured by the diffuse abnormality. The hepatic veins appear compressed and attenuated. The portal and hepatic veins are opacify and there is no evidence for large vessel thrombosis. However branches of the hepatic artery in the ivonne hepatis appear to have intraluminal filling defects and I can not trace them beyond the ivonne hepatis, with the appearance changed compared to the prior study. Liver is surrounded by ascites. There is no intra or extrahepatic biliary dilatation. Gallbladder is normal in size with no calcified stones. " Ascites surrounds the gallbladder.   No mass or inflammation is noted involving the pancreas.   Spleen is normal in size with no focal mass noted.   Adrenal glands appear normal. Kidneys enhance symmetrically with no hydronephrosis noted. No suspicious parenchymal mass is identified.   Stomach is decompressed. Small bowel loops are nondilated. Proximal colon is decompressed. In the left upper quadrant in the region of the distal transverse colon there is a mass with desmoplastic reaction in the adjacent mesentery (image 72 of 193) which appears similar to the prior exam. Mildly enlarged lymph nodes are noted in the mesentery adjacent to the mass, largest is 0.9 cm. Ascites is present and there is diffuse mesenteric and subcutaneous edema. No pneumoperitoneum is present.   Aorta and vena cava are normal in size. There are no pathologically enlarged retroperitoneal, iliac or inguinal lymph nodes identified.   Urinary bladder is normal in appearance with no wall thickening.   Prostate gland is nonenlarged.   Diffuse subcutaneous edema is noted.   No suspicious osseous lesion is noted..       Global abnormal appearing liver is compatible with ischemia/infarction and obscures the patient's known underlying metastatic lesions. 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   Patient's known tumor of the distal transverse colon has a associated mesenteric desmoplastic reaction and there are adjacent enlarged mesenteric lymph nodes consistent with locally advanced and locally metastatic disease.   Pleural effusions, ascites and diffuse subcutaneous edema     MACRO: Cassy Petit discussed the significance and urgency of this critical finding by telephone with  FABIOLA PAUL on 9/17/2024 at 1:01 pm.  (**-RCF-**) Findings:  See findings.   Signed by: Cassy  Marisabel 9/17/2024 1:02 PM Dictation workstation:   [id number]    CT angio chest for pulmonary embolism    Result Date: 9/17/2024  Interpreted By:  Cassy Petit, STUDY: CT ANGIO CHEST FOR PULMONARY EMBOLISM;  9/17/2024 12:01 pm   INDICATION: Signs/Symptoms:SOB, Known DVT, Colon Cancer.     COMPARISON: 08/22/2024   ACCESSION NUMBER(S): [id number]   ORDERING CLINICIAN: FABIOLA PAUL   TECHNIQUE: Helical data acquisition of the chest was obtained with intravenous administration of 69 mL Omnipaque 350. Images were reformatted in coronal and sagittal planes. Axial and coronal MIP images were created and reviewed.   FINDINGS: POTENTIAL LIMITATIONS OF THE STUDY: None   HEART AND VESSELS: No discrete filling defects within the main pulmonary artery or its branches.   Main pulmonary artery and its branches are normal in caliber.   The thoracic aorta is of normal course and caliber without vascular calcifications.   No coronary artery calcifications are seen.The study is not optimized for evaluation of coronary arteries.   The cardiac chambers are not enlarged.   No evidence of pericardial effusion.   MEDIASTINUM AND PRECIOUS, LOWER NECK AND AXILLA: The visualized thyroid gland is within normal limits.   No evidence of thoracic lymphadenopathy by CT criteria.   Left subclavian port catheter tip terminates at junction of superior vena cava and right atrium.   Esophagus appears within normal limits as seen.   LUNGS AND AIRWAYS: The trachea and central airways are patent. No endobronchial lesion.   Bilateral pleural effusions are present, small on the right and small to moderate on the left. There is mild dependent atelectasis.   UPPER ABDOMEN: CT of the abdomen and pelvis was also obtained and is reported separately.   CHEST WALL AND OSSEOUS STRUCTURES: There are no suspicious osseous lesions. Multilevel degenerative changes are present       1.  No pulmonary embolism is identified 2. Bilateral pleural effusions with  dependent compressive atelectasis 3. CT abdomen and pelvis is reported separately.     MACRO: None   Signed by: Cassy Petti 2024 12:36 PM Dictation workstation:   [id number]       Assessment and Plan     Assessment:  Yon Lawrence is a 49 y.o. 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  Per chart review patient's bp's normally run in low 90s   Troponin 3 and BNP 50.    No signs of end organ dysfunction   Management:  Given 1L LR and 25g 5%Albumin in ED   MAP goal >65  Tele    PULMONARY:  Dx: Bilateral pleural effusions   Stable on RA  Management:  VB.42/35/45  CT PE Negative for PE. Bilateral pleural effusions present  Encourage IS while awake     RENAL/GENITOURINARY:  Dx:   No acute issues   Management:  Strict I&O's  Daily weights    GASTROENTEROLOGY:  Dx:  Liver ischemia/infarction, hypokalemia, hypocalcemia, hypoalbuminemia, hyponatremia, Ascites    T- bili 0.5, , AST 21,   CT abdomen pelvis: abnormal appearing liver is compatible with ischemia/infarction and obscures the patient's known underlying metastatic lesions. There is no intra or extrahepatic biliary dilatation. Gallbladder is normal in size with no calcified stones.  Management:  Diet: NPO incase need for IR intervention   Bowel regimen: Miralax PRN  PPI   Daily Rfp/Mag/LFTs    ENDOCRINOLOGY:  Dx:    No acute issues   Management:  CTM    HEMATOLOGY/ONCOLOGY:  Dx: hx metastatic distal transverse colon adenocarcinoma and DVT.   Hgb stable at 9.6  Primary oncology Dr. Finn, on Pembrolizumab   Duplex LE: 2024 with acute DVT Left peroneal vein  CT C/A/P showing hepatic ischemic/infarction  suspected to be 2/2 hepatic artery thrombosis.  Management:  IR Consulted: No emergent intervention recommended at this time.  Patient to be formally staffed in AM for possible thrombectomy  Consult vascular medicine in AM  Consult Oncology in AM   Consult Hepatology in AM  Continue on heparin gtt  B/L LE duplex ordered   Transfuse for Hgb < 7  No s/s of bleeding; CTM  Coags daily      INFECTIOUS DISEASE:  Dx:  Leukocytosis   Afebrile with leukocytosis  Management:  Lactate normal, no signs of end organ dysfunction.    Covid negative   Monitor off abx     SDU Check List     FEN  Fluids: PRN  Electrolytes: PRN  Nutrition: NPO   Prophylaxis:  DVT ppx: Heparin gtt  GI ppx: PPI  Bowel care: Miralax prn   Hardware:  Catheter: None  Drains: None  Lines: PIV, Left Chest medport   Social:  Code: Full Code  (Discussed upon SDU admission)  HPOA: Lora Lawrence, wife, [phone]  Disposition: Admit to SDU       I spent >60 minutes in the professional and overall care of this patient.    Brad Galeano, FARIHA-CNP   09/17/24 at 10:27 PM       Disclaimer: Documentation completed with the information available at the time of input. The times in the chart may not be reflective of actual patient care times, interventions, or procedures. Documentation occurs after the physical care of the patient.

## 2024-09-18 NOTE — PROGRESS NOTES
"Pharmacy Admission Order Reconciliation Review    Yon Lawrence \"Vladimir\" is a 49 y.o. male admitted for Infarction of liver ([facility]). Pharmacy reviewed the patient's unreconciled admission medications.    Prior to admission medications that were reviewed and acted on by the pharmacist include:  Uroxtral  Eliquis  Lovenox  Prevacid  Compazine  These medications have been reconciled    Any other unreconcilied medications have been addressed and will be ordered or held by the patient's medical team. Medications addressed by the pharmacist may be added or changed by the patient's medical team at any time.    Heather Rivera, PharmD  Transitions of Care Pharmacist  [facility]  Please reach out via Secure Chat for questions   "

## 2024-09-18 NOTE — HOSPITAL COURSE
49 y.o. year old male with PMHx significant for metastatic distal transverse colon adenocarcinoma on Pembro and DVT while on Eliquis (now on Lovenox at home) who initially presented to [facility] with complaints of SOB and bilateral leg swelling with associated 15 lb weight gain over the last few weeks. Transferred to [facility].     CT C/A/P performed showing bilateral pleural effusions and hepatic infarction c/f hepatic artery thrombosis. No PE noted. In ED patient with soft BPs with SBP 80s-90s. Given 1L of LR and 25g 5% albumin without significant improvement in BP. Per chart review patient's bp's normally run in low 90s. Labs notable for Sodium 129, Potassium 3.0, Calcium 5.3, Albumin <1.5, , , bilirubin 0.5, WBC 20.2, Lactate 0.6, INR 1.4, troponin 3 and BNP 50. Seen by interventional radiology team while in ED, no emergent intervention recommended at that time, will be formally evaluate in the morning. Denies: CP, Palpitations, SOB, N/V, abd. Pain, constipation/diarrhea, and sick contact; Endorses: right sided weakness that has been persistent over the past week and bilateral lower extremity swelling that has been unchanged since 8/21.     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 He has a history of DVT of the left peroneal vein. But most recent DVT scan showed no propagation of DVTs in bilateral lower extremities. Liver ultrasound and CT enterography showed only hepatic steatosis with no clear evidence of arterial thrombus.     Things to follow up:  -If LFTs worsen, can follow up in hepatology clinic with Dr. Funez

## 2024-09-19 ENCOUNTER — APPOINTMENT (OUTPATIENT)
Dept: RADIOLOGY | Facility: HOSPITAL | Age: 49
DRG: 441 | End: 2024-09-19
Payer: COMMERCIAL

## 2024-09-19 ENCOUNTER — APPOINTMENT (OUTPATIENT)
Dept: VASCULAR MEDICINE | Facility: HOSPITAL | Age: 49
DRG: 441 | End: 2024-09-19
Payer: COMMERCIAL

## 2024-09-19 LAB
ALBUMIN SERPL BCP-MCNC: 1.6 G/DL (ref 3.4–5)
ALBUMIN SERPL BCP-MCNC: 1.6 G/DL (ref 3.4–5)
ALBUMIN SERPL BCP-MCNC: 1.7 G/DL (ref 3.4–5)
ALP SERPL-CCNC: 126 U/L (ref 33–120)
ALT SERPL W P-5'-P-CCNC: 85 U/L (ref 10–52)
ANION GAP SERPL CALC-SCNC: 9 MMOL/L (ref 10–20)
ANION GAP SERPL CALC-SCNC: 9 MMOL/L (ref 10–20)
APTT PPP: 100 SECONDS (ref 27–38)
AST SERPL W P-5'-P-CCNC: 19 U/L (ref 9–39)
BILIRUB DIRECT SERPL-MCNC: 0.3 MG/DL (ref 0–0.3)
BILIRUB SERPL-MCNC: 0.6 MG/DL (ref 0–1.2)
BUN SERPL-MCNC: 7 MG/DL (ref 6–23)
BUN SERPL-MCNC: 8 MG/DL (ref 6–23)
CALCIUM SERPL-MCNC: 5.6 MG/DL (ref 8.6–10.6)
CALCIUM SERPL-MCNC: 5.8 MG/DL (ref 8.6–10.6)
CHLORIDE SERPL-SCNC: 104 MMOL/L (ref 98–107)
CHLORIDE SERPL-SCNC: 107 MMOL/L (ref 98–107)
CO2 SERPL-SCNC: 24 MMOL/L (ref 21–32)
CO2 SERPL-SCNC: 25 MMOL/L (ref 21–32)
CREAT SERPL-MCNC: 0.35 MG/DL (ref 0.5–1.3)
CREAT SERPL-MCNC: 0.38 MG/DL (ref 0.5–1.3)
EGFRCR SERPLBLD CKD-EPI 2021: >90 ML/MIN/1.73M*2
EGFRCR SERPLBLD CKD-EPI 2021: >90 ML/MIN/1.73M*2
ERYTHROCYTE [DISTWIDTH] IN BLOOD BY AUTOMATED COUNT: 18.5 % (ref 11.5–14.5)
ERYTHROCYTE [DISTWIDTH] IN BLOOD BY AUTOMATED COUNT: 18.6 % (ref 11.5–14.5)
ERYTHROCYTE [DISTWIDTH] IN BLOOD BY AUTOMATED COUNT: 19.6 % (ref 11.5–14.5)
GLUCOSE SERPL-MCNC: 126 MG/DL (ref 74–99)
GLUCOSE SERPL-MCNC: 80 MG/DL (ref 74–99)
HCT VFR BLD AUTO: 21.3 % (ref 41–52)
HCT VFR BLD AUTO: 25.5 % (ref 41–52)
HCT VFR BLD AUTO: 27.3 % (ref 41–52)
HGB BLD-MCNC: 7.2 G/DL (ref 13.5–17.5)
HGB BLD-MCNC: 8.2 G/DL (ref 13.5–17.5)
HGB BLD-MCNC: 9.1 G/DL (ref 13.5–17.5)
HOLD SPECIMEN: NORMAL
INR PPP: 1.5 (ref 0.9–1.1)
MAGNESIUM SERPL-MCNC: 1.8 MG/DL (ref 1.6–2.4)
MAGNESIUM SERPL-MCNC: 1.82 MG/DL (ref 1.6–2.4)
MCH RBC QN AUTO: 26.3 PG (ref 26–34)
MCH RBC QN AUTO: 26.5 PG (ref 26–34)
MCH RBC QN AUTO: 26.9 PG (ref 26–34)
MCHC RBC AUTO-ENTMCNC: 32.2 G/DL (ref 32–36)
MCHC RBC AUTO-ENTMCNC: 33.3 G/DL (ref 32–36)
MCHC RBC AUTO-ENTMCNC: 33.8 G/DL (ref 32–36)
MCV RBC AUTO: 78 FL (ref 80–100)
MCV RBC AUTO: 81 FL (ref 80–100)
MCV RBC AUTO: 82 FL (ref 80–100)
NRBC BLD-RTO: 0 /100 WBCS (ref 0–0)
PHOSPHATE SERPL-MCNC: 2.5 MG/DL (ref 2.5–4.9)
PHOSPHATE SERPL-MCNC: 2.5 MG/DL (ref 2.5–4.9)
PHOSPHATE SERPL-MCNC: 2.6 MG/DL (ref 2.5–4.9)
PLATELET # BLD AUTO: 264 X10*3/UL (ref 150–450)
PLATELET # BLD AUTO: 275 X10*3/UL (ref 150–450)
PLATELET # BLD AUTO: 303 X10*3/UL (ref 150–450)
POTASSIUM SERPL-SCNC: 3.3 MMOL/L (ref 3.5–5.3)
POTASSIUM SERPL-SCNC: 4.1 MMOL/L (ref 3.5–5.3)
PROT SERPL-MCNC: <3 G/DL (ref 6.4–8.2)
PROTHROMBIN TIME: 16.7 SECONDS (ref 9.8–12.8)
RBC # BLD AUTO: 2.74 X10*6/UL (ref 4.5–5.9)
RBC # BLD AUTO: 3.1 X10*6/UL (ref 4.5–5.9)
RBC # BLD AUTO: 3.38 X10*6/UL (ref 4.5–5.9)
SODIUM SERPL-SCNC: 133 MMOL/L (ref 136–145)
SODIUM SERPL-SCNC: 138 MMOL/L (ref 136–145)
UFH PPP CHRO-ACNC: 0.4 IU/ML
WBC # BLD AUTO: 12.7 X10*3/UL (ref 4.4–11.3)
WBC # BLD AUTO: 15.3 X10*3/UL (ref 4.4–11.3)
WBC # BLD AUTO: 18 X10*3/UL (ref 4.4–11.3)

## 2024-09-19 PROCEDURE — 2500000004 HC RX 250 GENERAL PHARMACY W/ HCPCS (ALT 636 FOR OP/ED)

## 2024-09-19 PROCEDURE — 85520 HEPARIN ASSAY: CPT

## 2024-09-19 PROCEDURE — 93970 EXTREMITY STUDY: CPT | Performed by: SURGERY

## 2024-09-19 PROCEDURE — 82040 ASSAY OF SERUM ALBUMIN: CPT | Performed by: NURSE PRACTITIONER

## 2024-09-19 PROCEDURE — 1170000001 HC PRIVATE ONCOLOGY ROOM DAILY

## 2024-09-19 PROCEDURE — 83735 ASSAY OF MAGNESIUM: CPT

## 2024-09-19 PROCEDURE — 2500000001 HC RX 250 WO HCPCS SELF ADMINISTERED DRUGS (ALT 637 FOR MEDICARE OP): Performed by: NURSE PRACTITIONER

## 2024-09-19 PROCEDURE — 2550000001 HC RX 255 CONTRASTS: Performed by: STUDENT IN AN ORGANIZED HEALTH CARE EDUCATION/TRAINING PROGRAM

## 2024-09-19 PROCEDURE — 2500000002 HC RX 250 W HCPCS SELF ADMINISTERED DRUGS (ALT 637 FOR MEDICARE OP, ALT 636 FOR OP/ED): Performed by: NURSE PRACTITIONER

## 2024-09-19 PROCEDURE — 2500000004 HC RX 250 GENERAL PHARMACY W/ HCPCS (ALT 636 FOR OP/ED): Performed by: NURSE PRACTITIONER

## 2024-09-19 PROCEDURE — 85610 PROTHROMBIN TIME: CPT

## 2024-09-19 PROCEDURE — 99233 SBSQ HOSP IP/OBS HIGH 50: CPT | Performed by: INTERNAL MEDICINE

## 2024-09-19 PROCEDURE — 80076 HEPATIC FUNCTION PANEL: CPT

## 2024-09-19 PROCEDURE — 84100 ASSAY OF PHOSPHORUS: CPT

## 2024-09-19 PROCEDURE — 85027 COMPLETE CBC AUTOMATED: CPT | Performed by: NURSE PRACTITIONER

## 2024-09-19 PROCEDURE — 85027 COMPLETE CBC AUTOMATED: CPT

## 2024-09-19 PROCEDURE — 99233 SBSQ HOSP IP/OBS HIGH 50: CPT | Performed by: STUDENT IN AN ORGANIZED HEALTH CARE EDUCATION/TRAINING PROGRAM

## 2024-09-19 PROCEDURE — 93970 EXTREMITY STUDY: CPT

## 2024-09-19 PROCEDURE — 74170 CT ABD WO CNTRST FLWD CNTRST: CPT | Performed by: RADIOLOGY

## 2024-09-19 PROCEDURE — 74177 CT ABD & PELVIS W/CONTRAST: CPT

## 2024-09-19 RX ORDER — POTASSIUM CHLORIDE 20 MEQ/1
40 TABLET, EXTENDED RELEASE ORAL
Status: COMPLETED | OUTPATIENT
Start: 2024-09-19 | End: 2024-09-19

## 2024-09-19 RX ORDER — PANTOPRAZOLE SODIUM 40 MG/1
40 TABLET, DELAYED RELEASE ORAL
Status: DISCONTINUED | OUTPATIENT
Start: 2024-09-20 | End: 2024-09-24 | Stop reason: HOSPADM

## 2024-09-19 RX ORDER — MAGNESIUM SULFATE 1 G/100ML
1 INJECTION INTRAVENOUS ONCE
Status: COMPLETED | OUTPATIENT
Start: 2024-09-19 | End: 2024-09-19

## 2024-09-19 ASSESSMENT — PAIN SCALES - GENERAL
PAINLEVEL_OUTOF10: 0 - NO PAIN

## 2024-09-19 ASSESSMENT — COGNITIVE AND FUNCTIONAL STATUS - GENERAL
MOBILITY SCORE: 24
DAILY ACTIVITIY SCORE: 24

## 2024-09-19 ASSESSMENT — PAIN - FUNCTIONAL ASSESSMENT
PAIN_FUNCTIONAL_ASSESSMENT: 0-10

## 2024-09-19 NOTE — CARE PLAN
Problem: Fall/Injury  Goal: Not fall by end of shift  Outcome: Progressing  Goal: Verbalize understanding of personal risk factors for fall in the hospital  Outcome: Progressing

## 2024-09-19 NOTE — PROGRESS NOTES
"Yon Lawrence \"Roger" is a 49 y.o. male on day 2 of admission presenting with Infarction of liver (HHS-HCC).      Subjective   Patient was resting comfortably in bed. He had described some weakness when getting out of bed, as well as one bout of loose stools in the morning. He otherwise had no complaints.        Objective     Last Recorded Vitals  BP 89/63   Pulse 91   Temp 36.2 °C (97.2 °F) (Temporal)   Resp 15   Wt 84.6 kg (186 lb 8 oz)   SpO2 94%   Intake/Output last 3 Shifts:    Intake/Output Summary (Last 24 hours) at 9/19/2024 1535  Last data filed at 9/19/2024 1400  Gross per 24 hour   Intake 1340 ml   Output 2000 ml   Net -660 ml       Admission Weight  Weight: 87 kg (191 lb 12.8 oz) (09/17/24 1800)    Daily Weight  09/18/24 : 84.6 kg (186 lb 8 oz)    Image Results  US liver with doppler  Narrative: Interpreted By:  Kasprzak, Mike,  and Karson Rox   STUDY:  US LIVER WITH DOPPLER;  9/18/2024 11:20 am      INDICATION:  Signs/Symptoms:concern for liver statosis vs thrombus.          COMPARISON:  CT abdomen pelvis 09/17/2024 and 08/22/2024      ACCESSION NUMBER(S):  [id number]      ORDERING CLINICIAN:  ESTEBAN BEE      TECHNIQUE:  Multiple images of the right upper quadrant were obtained.  Gray  scale, color Doppler and spectral Doppler waveform analysis was  performed.  This examination was interpreted at [facility].      FINDINGS:  LIVER:  The liver measures 19.0 cm and is diffusely echogenic in appearance,  consistent with diffuse fatty infiltration.      There are several hepatic lesions visualized. For example, within the  left hepatic lobe, there is a hypoechoic lesion measuring 1.5 x 1.4 x  1.6 cm without internal vascularity. In the peripheral right hepatic  lobe, there is a heterogeneous hypoechoic region measuring 3.3 x 3.4  cm without internal vascularity and with numerous small internal  calcifications. More centrally in the right " hepatic lobe, there is an  additional hypoechoic lesion measuring 2.0 x 1.5 x 1.8 cm without  internal vascularity.      GALLBLADDER:  The gallbladder is nondistended, and demonstrates no evidence of  gallstones or wall thickening. The gallbladder wall thickness is 0.3  cm. Sonographic Barcenas's sign is negative. Note is made of  intraluminal biliary sludge. Small volume of pericholecystic fluid is  favored secondary to ascites.      BILIARY SYSTEM:  No evidence of intra or extrahepatic biliary dilatation is  identified; the common bile duct measures 5.0.      DOPPLER EVALUATION:      HEPATIC ARTERIES:  Hepatic artery and its left branches RI's are estimated at 0.7 and  0.8, respectively. There is poor visualization of the right hepatic  artery.      PORTAL VEIN:  Portal vein is patent. There is normal respiratory variation. Portal  vein velocities are calculated as follows: main portal vein 17.9  cm/s, antegrade flow; left portal vein branch 17.4 cm/s, antegrade  flow; right portal vein anterior branch 11.5 cm/s, antegrade flow;  right portal vein posterior branch 16.6 cm/s, antegrade flow. The  splenic vein is also patent.      HEPATIC VEIN:  The middle and left hepatic veins are patent and demonstrate  triphasic antegrade flow. The right hepatic vein is poorly  visualized. IVC appears also patent.      PANCREAS:  The pancreas is poorly visualized due to overlying bowel gas.      RIGHT KIDNEY:  The right kidney measures 10.6 in length. No hydronephrosis or renal  calculi are seen.      SPLEEN:  The spleen measures 8.4 x 5.1 x 2.9 cm and is grossly unremarkable.      PERITONEUM AND RETROPERITONEUM:  There is a right pleural effusion. Small volume of perihepatic  ascites.      Impression: 1. Hepatomegaly and echogenic hepatic parenchyma suggestive of fatty  infiltration. Several hepatic lesions are visualized and described  above, better characterized on recent CTs.  2. Poor visualization of the right hepatic artery  and right hepatic  vein, potentially related to technique or patient condition, rather  than vascular abnormality. Otherwise normal patent hepatic  vasculature.  3. Right pleural effusion and small volume ascites.      I personally reviewed the image(s)/study and resident interpretation  as stated by Dr. Rox Ty MD. I agree with the findings as  stated. This study was interpreted at [facility], [place], [place].      MACRO:  None      Signed by: Mike Tiffanie 9/18/2024 12:25 PM  Dictation workstation:   [id number]      Physical Exam  Constitutional:       General: He is not in acute distress.  HENT:      Head: Normocephalic and atraumatic.      Mouth/Throat:      Mouth: Mucous membranes are moist.      Pharynx: Oropharynx is clear.   Eyes:      General: No scleral icterus.     Extraocular Movements: Extraocular movements intact.      Pupils: Pupils are equal, round, and reactive to light.   Cardiovascular:      Rate and Rhythm: Normal rate and regular rhythm.      Pulses: Normal pulses.      Heart sounds: Normal heart sounds.   Pulmonary:      Effort: Pulmonary effort is normal.      Breath sounds: Normal breath sounds. No wheezing or rhonchi.   Abdominal:      General: Bowel sounds are normal. There is distension.      Comments: Mild tenderness in R upper quadrant   Skin:     General: Skin is warm and dry.      Coloration: Skin is not jaundiced.   Neurological:      Mental Status: He is alert and oriented to person, place, and time.   Psychiatric:         Mood and Affect: Mood normal.         Behavior: Behavior normal.         Relevant Results  Scheduled medications  [START ON 9/20/2024] pantoprazole, 40 mg, oral, Daily before breakfast  potassium phosphate (monobasic), 500 mg, oral, 4x daily      Continuous medications  heparin, 0-4,500 Units/hr, Last Rate: 1,200 Units/hr (09/19/24 1311)      PRN medications  PRN medications: heparin, melatonin, polyethylene glycol      US liver with doppler    Result Date: 9/18/2024  Interpreted By:  Kasprzak, Mike,  and Karson Rox STUDY: US LIVER WITH DOPPLER;  9/18/2024 11:20 am   INDICATION: Signs/Symptoms:concern for liver statosis vs thrombus.     COMPARISON: CT abdomen pelvis 09/17/2024 and 08/22/2024   ACCESSION NUMBER(S): [id number]   ORDERING CLINICIAN: ESTEBAN BEE   TECHNIQUE: Multiple images of the right upper quadrant were obtained.  Gray scale, color Doppler and spectral Doppler waveform analysis was performed.  This examination was interpreted at [facility].   FINDINGS: LIVER: The liver measures 19.0 cm and is diffusely echogenic in appearance, consistent with diffuse fatty infiltration.   There are several hepatic lesions visualized. For example, within the left hepatic lobe, there is a hypoechoic lesion measuring 1.5 x 1.4 x 1.6 cm without internal vascularity. In the peripheral right hepatic lobe, there is a heterogeneous hypoechoic region measuring 3.3 x 3.4 cm without internal vascularity and with numerous small internal calcifications. More centrally in the right hepatic lobe, there is an additional hypoechoic lesion measuring 2.0 x 1.5 x 1.8 cm without internal vascularity.   GALLBLADDER: The gallbladder is nondistended, and demonstrates no evidence of gallstones or wall thickening. The gallbladder wall thickness is 0.3 cm. Sonographic Barcenas's sign is negative. Note is made of intraluminal biliary sludge. Small volume of pericholecystic fluid is favored secondary to ascites.   BILIARY SYSTEM: No evidence of intra or extrahepatic biliary dilatation is identified; the common bile duct measures 5.0.   DOPPLER EVALUATION:   HEPATIC ARTERIES: Hepatic artery and its left branches RI's are estimated at 0.7 and 0.8, respectively. There is poor visualization of the right hepatic artery.   PORTAL VEIN: Portal vein is patent. There is normal respiratory variation.  Portal vein velocities are calculated as follows: main portal vein 17.9 cm/s, antegrade flow; left portal vein branch 17.4 cm/s, antegrade flow; right portal vein anterior branch 11.5 cm/s, antegrade flow; right portal vein posterior branch 16.6 cm/s, antegrade flow. The splenic vein is also patent.   HEPATIC VEIN: The middle and left hepatic veins are patent and demonstrate triphasic antegrade flow. The right hepatic vein is poorly visualized. IVC appears also patent.   PANCREAS: The pancreas is poorly visualized due to overlying bowel gas.   RIGHT KIDNEY: The right kidney measures 10.6 in length. No hydronephrosis or renal calculi are seen.   SPLEEN: The spleen measures 8.4 x 5.1 x 2.9 cm and is grossly unremarkable.   PERITONEUM AND RETROPERITONEUM: There is a right pleural effusion. Small volume of perihepatic ascites.       1. Hepatomegaly and echogenic hepatic parenchyma suggestive of fatty infiltration. Several hepatic lesions are visualized and described above, better characterized on recent CTs. 2. Poor visualization of the right hepatic artery and right hepatic vein, potentially related to technique or patient condition, rather than vascular abnormality. Otherwise normal patent hepatic vasculature. 3. Right pleural effusion and small volume ascites.   I personally reviewed the image(s)/study and resident interpretation as stated by Dr. Rox Ty MD. I agree with the findings as stated. This study was interpreted at [facility], [place], [place].   MACRO: None   Signed by: Mike Moreno 9/18/2024 12:25 PM Dictation workstation:   [id number]      Assessment/Plan      Assessment & Plan  Infarction of liver (HHS-HCC)    A 49 y.o. 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  CT C/A/P showed hepatic infarction c/f hepatic artery thrombosis. No PE noted. 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 vs hepatic steatosis  #Hypoalbuminemia  #Ascites  ::Admission labs: T-bili 0.4, ALT 31, AST 22, alb <1.5, Tprot 4.4  ::Labs (9/19/24): T-bili 0.6, ALT 85, AST 19, alb 1.6, Tprot <3.0  ::US liver doppler (9/18/24): hepatomegaly with likely fatty infiltration, several hepatic lesions; limited visualization of the hepatic artery  :CT A/P w/ con (9/17/24): globally abnormal liver compatible with ischemia/infarction, ischemia most likely related to hepatic artery thrombosis in branches to R and L hepatic lobes in the ivonne hepatis   ::Hepatology recs: consider liver biopsy if LFTs rise following next pembro dose (poss ICI hepatitis)  ::Last pembro dose on 7/12/24  ::Hypoalbuminemia possibly 2/2 malnutrition and liver mets  ::Stool alpha-1 antitrypsin 106  ::CT enterography (9/19/24): improved splenic flexure colitis, similar splenic flexure mass with associated enlarged adjacent mesenteric LNs, diffuse abdominal wall edema, pleural effusions, ascites, hepatic steatosis  -Consider liver biopsy  -Daily RFPs, LFTs, Mg, Coag panel  -Hepatology following    #Hypotension  ::Troponin 3, BNP 50  ::s/p albumin and LR in ED, and 500 mL albumin in SDU  ::Baseline SBP is in low 90s    #Microcytic anemia  ::hgb 7.2 (downtrending), MCV 78 (9/19/24)    #Left peroneal vein DVT on Lovenox  ::Duplex LE (9/6/24): L peroneal vein DVT  -On heparin gtt  -Pending LE DVT scan    #Metastatic distal transverse colon adenocarcinoma  ::Primary oncologist Dr. Finn; on Pembrolizumab therapy outpatient  ::CT A/P w/ con (9/18/24): tumor of the distal transverse colon with associated mesenteric  desmoplastic reaction, adjacent enlarged mesenteric lymph nodes    #Bilateral pleural effusions  ::Stable on room air  ::vbg: pH 7.42, pCO2 35, O2 45  ::CT PE (9/17/24): no PE, bilateral pleural effusions with compressive atelectasis    #Severe malnutrition related to chronic disease  ::23% down from normal body weight  ::Nutrition recs: Regular PO diet once ready, Ensure Plus TID  -Adult regular diet with nutritional supplementation    Fluids: PRN  Electrolytes: PRN  Nutrition: Regular Diet  DVT ppx: heparin gtt  GI ppx: PPI  Bowel ppx: Held due to loose stools  Access/Lines/Drains: PIV, Mediport    Code Status: Full Code  Emergency Contact: Lora Lawrence (wife) [phone]      Queen JOSE MIGUEL Hudson MD

## 2024-09-19 NOTE — CARE PLAN
The patient's goals for the shift include pt will remain HDS for the shift    The clinical goals for the shift include HDS    Problem: Fall/Injury  Goal: Not fall by end of shift  Outcome: Progressing  Goal: Be free from injury by end of the shift  Outcome: Progressing  Goal: Verbalize understanding of personal risk factors for fall in the hospital  Outcome: Progressing  Goal: Verbalize understanding of risk factor reduction measures to prevent injury from fall in the home  Outcome: Progressing  Goal: Use assistive devices by end of the shift  Outcome: Progressing  Goal: Pace activities to prevent fatigue by end of the shift  Outcome: Progressing     Problem: Safety - Adult  Goal: Free from fall injury  Outcome: Progressing     Problem: Discharge Planning  Goal: Discharge to home or other facility with appropriate resources  Outcome: Progressing

## 2024-09-19 NOTE — NURSING NOTE
1320 - Patient started drinking first bottle for CT scan  1340 - finished first bottle; started second bottle  1400 - finished second bottle; started on third bottle

## 2024-09-19 NOTE — CONSULTS
"Nutrition Initial Assessment:   Nutrition Assessment    Reason for Assessment: Admission nursing screening    Patient is a 49 y.o. male presenting with SOB, B/L leg swelling.  CT showed hepatic infarct with concern hepatic artery thrombosis.    Past medical history includes metastatic distal transverse colon CA on immunotherapy and DVT on AC    Nutrition History:  Food and Nutrient History: Met with patient and his wife.  They were about to take a walk around the unit.  He reports overall fair PO intake PTA-- has not been eating like his \"normal\".  He is down in weight from his pre-cancer weight.  Has not tried Ensure or Boost yet but they did just buy some Ensure at home (has not tried it yet).  Struggles most with diarrhea and managing which foods cause diarrhea through all of his treatment-- tends to be greasy foods.       Anthropometrics:  Height: 185.4 cm (6' 1\")   Weight: 84.6 kg (186 lb 8 oz)   BMI (Calculated): 24.61  IBW/kg (Dietitian Calculated): 83.6 kg  Percent of IBW: 101 %     Weight History:     9/18/24: 84.6kg  9/17/24: 87kg (admit)-- up from fluid retention  8/23/24: 79.7kg  7/12/24: 80.9kg  6/17/24: 82.3kg  3/14/24: 95.3kg  12/21/23: 100kg    Reports a usual body weight around 100kg.  Says that he has lost down to 77.3kg but is up now from fluid in his legs.  Overall, down 23% from his usual body weight.  Appears he last weighed this Dec 2023.      Weight Change %:       Nutrition Focused Physical Exam Findings:  Visual only     Subcutaneous Fat Loss:   Orbital Fat Pads: Severe (dark circles, hollowing and loose skin)  Buccal Fat Pads: Severe (hollow, sunken and narrow face)  Triceps: Severe (negligible fat tissue)  Muscle Wasting:  Temporalis: Severe (hollowed scooping depression)  Pectoralis (Clavicular Region): Severe (protruding prominent clavicle)  Deltoid/Trapezius: Severe (squared shoulders, acromion process prominent)  Quadriceps: Defer  Gastrocnemius: Defer  Edema:  Edema Location: B/L LE " "edema  Physical Findings:  Skin: Negative    Nutrition Significant Labs:  BMP Trend:   Results from last 7 days   Lab Units 09/19/24  0419 09/18/24  1548 09/18/24  0604 09/17/24  1108   GLUCOSE mg/dL 80 95 62* 120*   CALCIUM mg/dL 5.6* 6.0* 5.5* 5.3*   SODIUM mmol/L 138 138 138 129*   POTASSIUM mmol/L 3.3* 3.5 3.1* 3.0*   CO2 mmol/L 25 21 21 21   CHLORIDE mmol/L 107 107 107 102   BUN mg/dL 7 8 12 19   CREATININE mg/dL 0.35* 0.43* 0.40* 0.52    , A1C:No results found for: \"HGBA1C\", BG POCT trend:   Results from last 7 days   Lab Units 09/18/24  1250 09/18/24  1221 09/18/24  1159   POCT GLUCOSE mg/dL 69* 45* 50*    , Renal Lab Trend:   Results from last 7 days   Lab Units 09/19/24 0419 09/18/24  1548 09/18/24  0604 09/17/24  1108 09/17/24  1108   POTASSIUM mmol/L 3.3* 3.5 3.1*  --  3.0*   PHOSPHORUS mg/dL 2.5  2.5 2.1* 2.4*   < >  --    SODIUM mmol/L 138 138 138  --  129*   MAGNESIUM mg/dL 1.80  --  1.84  --  1.79   EGFR mL/min/1.73m*2 >90 >90 >90  --  >90   BUN mg/dL 7 8 12  --  19   CREATININE mg/dL 0.35* 0.43* 0.40*  --  0.52    < > = values in this interval not displayed.    , Vit D: No results found for: \"VITD25\"     Nutrition Specific Medications:  Scheduled medications  calcium gluconate, 3 g, intravenous, Once  magnesium sulfate, 1 g, intravenous, Once  [START ON 9/20/2024] pantoprazole, 40 mg, oral, Daily before breakfast  potassium chloride CR, 40 mEq, oral, q3h  potassium phosphate (monobasic), 500 mg, oral, 4x daily      Continuous medications  heparin, 0-4,500 Units/hr, Last Rate: 1,200 Units/hr (09/19/24 1029)      PRN medications  PRN medications: heparin, melatonin, polyethylene glycol     I/O:   Last BM Date: 09/18/24; Stool Appearance: Loose (09/18/24 1520)      Dietary Orders (From admission, onward)       Start     Ordered    09/19/24 1006  NPO Diet; Effective now  Diet effective now         09/19/24 1006    09/18/24 0426  May Participate in Room Service  Once        Question:  .  Answer:  Yes    " 09/18/24 0428                     Estimated Needs:   Total Energy Estimated Needs (kCal):  (3133-6900)  Method for Estimating Needs: MSJ= 1705 using 77.3kg  Total Protein Estimated Needs (g):  (110)  Method for Estimating Needs: 83.6kg x 1.3            Nutrition Diagnosis   Malnutrition Diagnosis  Patient has Malnutrition Diagnosis: Yes  Diagnosis Status: New  Malnutrition Diagnosis: Severe malnutrition related to chronic disease or condition  As Evidenced by: pt reports poor to fair PO intake PTA with noted 23% weight loss from his usual won and noted to have severe muscle and fat loss on physical exam        Nutrition Interventions/Recommendations         Nutrition Prescription:   Regular PO diet once ready to advance him.          Nutrition Interventions:    Ensure Plus TID= 350kcals, 13grams protein each         Nutrition Education:   Not appropriate       Nutrition Monitoring and Evaluation   Food/Nutrient Related History Monitoring  Monitoring and Evaluation Plan: Energy intake  Criteria: Once PO diet advanced, intake to meet >65% estimated needs         Time Spent/Follow-up Reminder:   Time Spent (min): 60 minutes  Last Date of Nutrition Visit: 09/19/24  Nutrition Follow-Up Needed?: Dietitian to reassess per policy  Follow up Comment: severe malnut, NPO-- will order Ensure once diet advances

## 2024-09-19 NOTE — PROGRESS NOTES
[facility]   [facility]  Follow up CONSULT NOTE       Reason For Consult  Elevated LFT iso known liver mets of colon Ca and ?Hepatic artery thrombosis    SUBJECTIVE     Feels ok. States that diarrhea has resolved since started on steroid and has not recurred.     EXAM     Vitals:    Vitals:    09/19/24 0004 09/19/24 0416 09/19/24 0800 09/19/24 1202   BP: 84/56 86/54 89/64 89/63   Pulse: 75 87 81 91   Resp: 13 17 17 15   Temp: 36.2 °C (97.2 °F) 36.2 °C (97.2 °F) 36.2 °C (97.2 °F)    TempSrc: Temporal Temporal Temporal    SpO2: 95% 94% 95% 94%   Weight:       Height:         Failed to redirect to the Timeline version of the [employer] SmartLink.    Intake/Output Summary (Last 24 hours) at 9/19/2024 1418  Last data filed at 9/19/2024 1400  Gross per 24 hour   Intake 1540 ml   Output 2000 ml   Net -460 ml         Physical Exam  General: no acute distress  HEENT: EOMI. Moist mucosa  Respiratory: nonlabored breathing on room air  Cardiovascular: RRR, no murmurs/rubs/gallops  Abdomen: Soft, nontender, nondistended, bowel sounds present. No masses palpated  Extremities: 1+ pitting edema bilaterally, no asterixis  Neuro: alert and oriented, CNII-XII grossly intact, moves all 4 extremities with no focal deficits    OBJECTIVE                                                                              Medications       Current Facility-Administered Medications:     heparin 25,000 Units in dextrose 5% 250 mL (100 Units/mL) infusion (premix), 0-4,500 Units/hr, intravenous, Continuous, Hernandez Power DO, Last Rate: 12 mL/hr at 09/19/24 1311, 1,200 Units/hr at 09/19/24 1311    heparin bolus from bag 3,000-6,000 Units, 3,000-6,000 Units, intravenous, q4h PRN, Hernandez Power DO    melatonin tablet 3 mg, 3 mg, oral, Nightly PRN, FARIHA Graham-CNP    [START ON 9/20/2024] pantoprazole (ProtoNix) EC tablet 40 mg, 40 mg, oral, Daily before breakfast, FARIHA De Anda-CNP     polyethylene glycol (Glycolax, Miralax) packet 17 g, 17 g, oral, Daily PRN, Brad Galeano, APRN-CNP    potassium phosphate (monobasic) (K-Phos) tablet 500 mg, 500 mg, oral, 4x daily, Powers SARAH Donohue, APRN-CNP, 500 mg at 09/19/24 1308                                                                            Labs     Results for orders placed or performed during the hospital encounter of 09/17/24 (from the past 24 hour(s))   Renal Function Panel   Result Value Ref Range    Glucose 95 74 - 99 mg/dL    Sodium 138 136 - 145 mmol/L    Potassium 3.5 3.5 - 5.3 mmol/L    Chloride 107 98 - 107 mmol/L    Bicarbonate 21 21 - 32 mmol/L    Anion Gap 14 10 - 20 mmol/L    Urea Nitrogen 8 6 - 23 mg/dL    Creatinine 0.43 (L) 0.50 - 1.30 mg/dL    eGFR >90 >60 mL/min/1.73m*2    Calcium 6.0 (L) 8.6 - 10.6 mg/dL    Phosphorus 2.1 (L) 2.5 - 4.9 mg/dL    Albumin 1.9 (L) 3.4 - 5.0 g/dL   Urinalysis with Reflex Culture and Microscopic   Result Value Ref Range    Color, Urine Light-Yellow Light-Yellow, Yellow, Dark-Yellow    Appearance, Urine Clear Clear    Specific Gravity, Urine 1.007 1.005 - 1.035    pH, Urine 6.0 5.0, 5.5, 6.0, 6.5, 7.0, 7.5, 8.0    Protein, Urine NEGATIVE NEGATIVE, 10 (TRACE), 20 (TRACE) mg/dL    Glucose, Urine Normal Normal mg/dL    Blood, Urine NEGATIVE NEGATIVE    Ketones, Urine NEGATIVE NEGATIVE mg/dL    Bilirubin, Urine NEGATIVE NEGATIVE    Urobilinogen, Urine Normal Normal mg/dL    Nitrite, Urine NEGATIVE NEGATIVE    Leukocyte Esterase, Urine NEGATIVE NEGATIVE   Heparin Assay, UFH   Result Value Ref Range    Heparin Unfractionated 0.6 See Comment Below for Therapeutic Ranges IU/mL   Extra Urine Gray Tube   Result Value Ref Range    Extra Tube Hold for add-ons.    Magnesium   Result Value Ref Range    Magnesium 1.80 1.60 - 2.40 mg/dL   CBC   Result Value Ref Range    WBC 12.7 (H) 4.4 - 11.3 x10*3/uL    nRBC 0.0 0.0 - 0.0 /100 WBCs    RBC 2.74 (L) 4.50 - 5.90 x10*6/uL    Hemoglobin 7.2 (L) 13.5 - 17.5 g/dL     Hematocrit 21.3 (L) 41.0 - 52.0 %    MCV 78 (L) 80 - 100 fL    MCH 26.3 26.0 - 34.0 pg    MCHC 33.8 32.0 - 36.0 g/dL    RDW 18.5 (H) 11.5 - 14.5 %    Platelets 264 150 - 450 x10*3/uL   Coagulation Screen   Result Value Ref Range    Protime 16.7 (H) 9.8 - 12.8 seconds    INR 1.5 (H) 0.9 - 1.1    aPTT 100 (HH) 27 - 38 seconds   Hepatic Function Panel   Result Value Ref Range    Albumin 1.6 (L) 3.4 - 5.0 g/dL    Bilirubin, Total 0.6 0.0 - 1.2 mg/dL    Bilirubin, Direct 0.3 0.0 - 0.3 mg/dL    Alkaline Phosphatase 126 (H) 33 - 120 U/L    ALT 85 (H) 10 - 52 U/L    AST 19 9 - 39 U/L    Total Protein <3.0 (L) 6.4 - 8.2 g/dL   Phosphorus   Result Value Ref Range    Phosphorus 2.5 2.5 - 4.9 mg/dL   Heparin Assay, UFH   Result Value Ref Range    Heparin Unfractionated 0.4 See Comment Below for Therapeutic Ranges IU/mL   Renal Function Panel   Result Value Ref Range    Glucose 80 74 - 99 mg/dL    Sodium 138 136 - 145 mmol/L    Potassium 3.3 (L) 3.5 - 5.3 mmol/L    Chloride 107 98 - 107 mmol/L    Bicarbonate 25 21 - 32 mmol/L    Anion Gap 9 (L) 10 - 20 mmol/L    Urea Nitrogen 7 6 - 23 mg/dL    Creatinine 0.35 (L) 0.50 - 1.30 mg/dL    eGFR >90 >60 mL/min/1.73m*2    Calcium 5.6 (LL) 8.6 - 10.6 mg/dL    Phosphorus 2.5 2.5 - 4.9 mg/dL    Albumin 1.6 (L) 3.4 - 5.0 g/dL                                                                              Imaging             7/1/2024 CTAP w/IV  VESSELS:  Aorta and pulmonary arteries are normal caliber. Interval development  of a long segment filling defect along the distal IVC and right  atrium (series 6, image 38, series 3 images 37-62), concerning for  thrombus. Secondary to the presence of port catheter. No  atherosclerotic changes of the aorta are identified.  No coronary       9/6/2024 Doppler LE  CONCLUSIONS:  Right Lower Venous: No evidence of acute deep vein thrombus visualized in the right lower extremity. Cannot rule out thrombus in non-visualized posterior tibial and Peroneal veins  due to swelling and edema.  Left Lower Venous: There is acute occlusive deep vein thrombosis visualized in the peroneal vein. Cannot rule out thrombus in non-visualized posterior tibial vein due to edema and swelling. The remainder of the left leg is negative for deep vein thrombosis.    9/17/2024 CTA Chest   IMPRESSION:  1.  No pulmonary embolism is identified  2. Bilateral pleural effusions with dependent compressive atelectasis  3. CT abdomen and pelvis is reported separately.    9/17/2024 CTAP w/ IV   IMPRESSION:  Global abnormal appearing liver is compatible with  ischemia/infarction and obscures the patient's known underlying  metastatic lesions. 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      9/18/2024 Doppler U/S  IMPRESSION:  1. Hepatomegaly and echogenic hepatic parenchyma suggestive of fatty  infiltration. Several hepatic lesions are visualized and described  above, better characterized on recent CTs.  2. Poor visualization of the right hepatic artery and right hepatic  vein, potentially related to technique or patient condition, rather  than vascular abnormality. Otherwise normal patent hepatic  vasculature.  3. Right pleural effusion and small volume ascites.                                                                         GI Procedures     5/6/2024  Impression  Polyp measuring smaller than 5 mm in the proximal rectum; performed cold forceps biopsy with piecemeal removal  Diverticulosis in the sigmoid colon (very mild)  Malignant-appearing mass measuring 10 cm in the distal transverse colon, covering the whole circumference; bleeding occurred after intervention; performed cold forceps biopsy     Findings  Polyp measuring smaller than 5 mm in the proximal rectum; performed cold forceps biopsy with complete piecemeal removal  Few  "very small diverticula in the sigmoid colon  Malignant-appearing mass (traversable) measuring 10 cm in the distal transverse colon, covering the whole circumference; bleeding occurred after intervention; performed cold forceps biopsy with partial removal. Kimberly ink tattoo placed on the fold immediately distal to the distal extent of this mass, using 6mL.       ASSESSMENT / PLAN                  ASSESSMENT/PLAN:    Yon Lawrence \"Vladimir\" is a 49 y.o. 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 Hepatology is consulted for evaluation of elevated LFT in the light of usage of immune check point inhibitors.    #Elevated LFT, mixed pattern  #Colon AdenoCa with metastasis to liver/mesenteric nodules/peritoneum and fistulization to jejunum  #DVT on lovenox (failed eliquis)  #Diarrhea presumed to be ICI colitis s/p prednisone, resolved   #Hypoalbuminemia/Low protein level  Lab on presentation AST/ALT 22/120, , T-marcy 0.5 has been improving AST/ALT 19/93, , T-marcy 0.7. ALP has been elevated at baseline likely due to metastatic liver lesion which has been improving, based on CT from 7/1/2024.  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 We are awaiting for doppler U/S for confirmation. ICI hepatitis would be on differential, although less likely giving the amount of AST/ALT elevation not impressive, improving w/o treatment (prednisone). Also last pembrolizumab dose 7/12/2024 which is not close. Patient has no history of metabolic syndrome. No history of binge alcohol drink. No history of viral " hepatitis. Doppler U/S showed patent vasculature although Rt hepatic artery and vein were not visualized well. Regardless, we have low suspicion for ICI hepatitis and would monitor LFT rather than obtaining liver biopsy at this time. Some of the elevated LFT might be from steatosis. At this time we have no objection for patient to goes back to pembrolizumab. Should LFT elevated with another infusion of pembrolizumab, then liver biopsy to be considered.   With regard to low albumin/protein, etiology is unclear. UA negative for proteinuria. Unclear liver mets and malnutrition are enough to explain this. Recommend alpha-1 antitrypsin clearance test to evaluate protein-losing gastroenteropathy as well as CTE to assess fistulization.     Plan  -Trend LFT   -Obtain alpha-1 antitrypsin clearance test   -Obtain CT enterography to assess fistulization  -Recommend nutrition consult for evaluation    Patient was discussed with Dr. Funez.     Thank you for the consultation. Hepatology will continue to the follow.  - During weekday hours of 7am- 5pm, please do not hesitate to contact me on [employer] Chat or page [phone] if there are any further questions   - After hours, on weekends, and on holidays, please [place] [phone]. Thank you.       Loan Plummer MD  PGY5 Gastroenterology Fellow  [facility]

## 2024-09-19 NOTE — PROGRESS NOTES
".Medical Intensive Care Step down - Daily Progress Note   Subjective    Yon Lawrence is a 49 y.o. year old male patient with PMH of metastatic Colon Ca on immunotherapy and DVT on Lovenox, admitted on 9/17/2024 with SOB and bilatal leg swelling, with imaging concerning for hepatic artery thrombosis.    Interval History:  No acute events over night.  SBP remains in the 80s (at baseline)    Meds    Scheduled medications  pantoprazole, 40 mg, intravenous, Daily before breakfast  potassium phosphate (monobasic), 500 mg, oral, 4x daily      Continuous medications  heparin, 0-4,500 Units/hr, Last Rate: 1,200 Units/hr (09/19/24 0428)      PRN medications  PRN medications: heparin, melatonin, polyethylene glycol     Objective    Blood pressure 86/54, pulse 87, temperature 36.2 °C (97.2 °F), temperature source Temporal, resp. rate 17, height 1.854 m (6' 1\"), weight 84.6 kg (186 lb 8 oz), SpO2 94%.     Physical Exam:  Constitutional: pt in NAD, alert and cooperative  Eyes: PERRL, EOMI, no icterus   ENMT: mucous membranes moist, no apparent injury, no lesions seen  Head/Neck: Neck supple, no apparent injury  Respiratory/Thorax: Lungs CTA bilaterally, non-labored breathing, no cough, on RA  Cardiovascular: Regular, rate and rhythm, no murmurs  Gastrointestinal: Nondistended, soft, non-tender, BS present x 4  : voiding  Musculoskeletal: ROM intact, no joint swelling, normal strength  Extremities: +2 BLE edema  Neurological: alert and oriented x 3, speech clear, follows commands appropriately, no focal deficits  Skin: Warm and dry, no lesions, no rashes       Intake/Output Summary (Last 24 hours) at 9/19/2024 0714  Last data filed at 9/18/2024 2100  Gross per 24 hour   Intake 740 ml   Output 3550 ml   Net -2810 ml     Labs:   Results from last 72 hours   Lab Units 09/19/24  0419 09/18/24  1548 09/18/24  0604 09/17/24  1108   SODIUM mmol/L  --  138 138 129*   POTASSIUM mmol/L  --  3.5 3.1* 3.0*   CHLORIDE mmol/L  --  107 107 " "102   CO2 mmol/L  --  21 21 21   BUN mg/dL  --  8 12 19   CREATININE mg/dL  --  0.43* 0.40* 0.52   GLUCOSE mg/dL  --  95 62* 120*   CALCIUM mg/dL  --  6.0* 5.5* 5.3*   ANION GAP mmol/L  --  14 13 9*   EGFR mL/min/1.73m*2  --  >90 >90 >90   PHOSPHORUS mg/dL 2.5 2.1* 2.4*  --       Results from last 72 hours   Lab Units 09/19/24  0419 09/18/24  0604 09/17/24  1108 09/16/24  1506   WBC AUTO x10*3/uL 12.7* 14.5* 20.2* 21.9*   HEMOGLOBIN g/dL 7.2* 8.1* 9.6* 10.0*   HEMATOCRIT % 21.3* 23.7* 28.1* 29.7*   PLATELETS AUTO x10*3/uL 264 285 348 361   NEUTROS PCT AUTO %  --   --  54.0  --    LYMPHO PCT MAN %  --   --   --  35.0   LYMPHS PCT AUTO %  --   --  42.8  --    MONO PCT MAN %  --   --   --  2.0   MONOS PCT AUTO %  --   --  2.8  --    EOSINO PCT MAN %  --   --   --  0.0   EOS PCT AUTO %  --   --  0.0  --           Results from last 72 hours   Lab Units 09/17/24  1827   POCT PH, VENOUS pH 7.42   POCT PCO2, VENOUS mm Hg 35*   POCT PO2, VENOUS mm Hg 45        Micro/ID:     No results found for: \"URINECULTURE\", \"BLOODCULT\", \"CSFCULTSMEAR\"    Summary of key imaging results from the last 24 hours    US liver with doppler 9/18/2024  1. Hepatomegaly and echogenic hepatic parenchyma suggestive of fatty infiltration. Several hepatic lesions are visualized and described above, better characterized on recent CTs.   2. Poor visualization of the right hepatic artery and right hepatic vein, potentially related to technique or patient condition, rather than vascular abnormality. Otherwise normal patent hepatic vasculature.   3. Right pleural effusion and small volume ascites.      CT abdomen pelvis w IV contrast  9/17/2024  Global abnormal appearing liver is compatible with ischemia/infarction and obscures the patient's known underlying metastatic lesions. No portal or hepatic vein occlusion is noted in the ischemia is most likely related to hepatic artery thrombosis which appears to be present in branches to the right and left hepatic " lobes in the ivonne hepatis.   Patient's known tumor of the distal transverse colon has a associated mesenteric desmoplastic reaction and there are adjacent enlarged mesenteric lymph nodes consistent with locally advanced and locally metastatic disease.   Pleural effusions, ascites and diffuse subcutaneous edema      CT angio chest for pulmonary embolism 9/17/2024  1.  No pulmonary embolism is identified   2. Bilateral pleural effusions with dependent compressive atelectasis   3. CT abdomen and pelvis is reported separately.         Vascular US lower extremity venous duplex bilateral 9/6/2024  Right Lower Venous: No evidence of acute deep vein thrombus visualized in the right lower extremity. Cannot rule out thrombus in non-visualized posterior tibial and Peroneal veins due to swelling and edema.   Left Lower Venous: There is acute occlusive deep vein thrombosis visualized in the peroneal vein. Cannot rule out thrombus in non-visualized posterior tibial vein due to edema and swelling. The remainder of the left leg is negative for deep vein thrombosis.      Assessment and Plan     Assessment: Yon Lawrence is a 49 y.o. year old male patient admitted on 9/17/2024 with   SOB and LE edema, with imaging negative for PE, but concern for hepatic artery thrombosis    Restraints: no    Summary for 09/19/24  :  Hepatology following with plan to obtain CT enterography to assess fistulization, and follow up on alpha-1 antitrypsin clearance test (resulted at 106)  Per primary oncology Dr Finn  If this is immune-related hepatitis he would  need high-dose steroids.  Liver bx will help to determine.  Hepatology wants to hold off on liver bx for now   Repeat LE DVT study pending     Plan:  NEUROLOGY/PSYCH:  Dx:  No acute issues    A&O x 3  Reports episodes of R sided weakness with ambulation   Management:  Melatonin PRN for insomnia  OOB/PT/OT    CARDIOVASCULAR:  Dx:  Hypotension.  Per chart review patient's bp's normally run in  low 90s   Troponin 3 and BNP 50.    No signs of end organ dysfunction   Management:  S/p LR and Albumen in the ED, and 500 ml Albumen on arrival to Step down  SBP acceptable at 80s-90s as long as asymptomatic   Tele monitor while in Step down    PULMONARY:  Dx: Bilateral pleural effusions   Stable on RA  Management:  VB.42/35/45  CT PE Negative for PE. Bilateral pleural effusions present  Encourage IS while awake     RENAL/GENITOURINARY:  Dx: no kidney dz  Stable creat at 0.35   Management:  Strict I&O's  Daily weights    GASTROENTEROLOGY:  Dx:  Liver ischemia/infarction, hypokalemia, hypocalcemia, hypoalbuminemia, hyponatremia, Ascites    T- bili 0.6, ALT 85, AST 19, , Alb 1.6, Tprot <3.0  CT abdomen pelvis: abnormal appearing liver is compatible with ischemia/infarction and obscures the patient's known underlying metastatic lesions. There is no intra or extrahepatic biliary dilatation. Gallbladder is normal in size with no calcified stones.  Per IR CT findings might be more consistent with hepatic steatosis.    Doppler liver US unremarkable , although could not visualize R hepatic artery well   consider liver bx as per primary oncologist Dr Finn to evaluate if cause is immunotherapy, although holding off at this point    Management:  Regular diet  Nutrition consult for low protein, low Albumen   Bowel regimen: Miralax PRN  replace K, Mag, Ca and Phos   BID RFP/Mag while aggressive repletion   PPI   Daily RFP/MAG/LFTs    ENDOCRINOLOGY:  Dx: no hx of DM  Management:  Monitor glucose on RFP       HEMATOLOGY/ONCOLOGY:  Dx: hx metastatic distal transverse colon adenocarcinoma and DVT. , anemia  Hgb trending down 7.2<8.1<9.6<10  Primary oncology Dr. Finn  Patient on Pembrolizumab therapy, holding off at this time  Duplex LE: 2024 with acute DVT Left peroneal vein  CT C/A/P showing hepatic ischemic/infarction suspected to be 2/2 hepatic artery thrombosis, although unlikely as per further  melina.  Management:  Seen by IR, will follow up with liver US, No emergent intervention per IR at this time  Patient's oncology Dr Finn is aware of admission, updated on course, please keep her updated during course via [employer]  Hepatology following  Continue on heparin gtt for now   repeat bilateral LE Duplex study pending  Transfuse for Hgb < 7  No s/s of bleeding; CTM  Coags daily    INFECTIOUS DISEASE:  Dx:  Leukocytosis   Afebrile with leukocytosis (WBC 20.2 > 14.5 >12.7 )  Management:  Lactate normal, no signs of end organ dysfunction.    Covid negative   Monitor off abx    ICU Check List       FEN  Fluids: PRN  Electrolytes: PRN  Nutrition: regular diet  Prophylaxis:  DVT ppx: Heparin gtt  GI ppx: PPI  Bowel care: Miralax  Hardware:  Catheter: None  Drains: None  Lines: PIV, Left Chest medport (changed needle 9/18)         Implantable Port 05/31/24 Left Chest Single lumen port (Active)   Placement Date: 05/31/24   Placed by External Staff?: Other hospital  Orientation: Left  Implantable Port Location: Chest  Port Type: Single lumen port   Number of days: 110       Social:  Code: Full Code  (Discussed upon SDU admission)  HPOA: Lora Lawrence, wife, [phone]  Disposition: will transfer to oncology service     FARIHA De Anda-CNP   09/19/24 at 7:14 AM     Pt discussed with Dr. Vargas.  seen and examined. All labs, VS and previous plan of care reviewed.     Disclaimer: Documentation completed with the information available at the time of input. The times in the chart may not be reflective of actual patient care times, interventions, or procedures. Documentation occurs after the physical care of the patient.

## 2024-09-20 LAB
ALBUMIN SERPL BCP-MCNC: 1.5 G/DL (ref 3.4–5)
ALBUMIN SERPL BCP-MCNC: 1.5 G/DL (ref 3.4–5)
ALP SERPL-CCNC: 126 U/L (ref 33–120)
ALT SERPL W P-5'-P-CCNC: 97 U/L (ref 10–52)
ANION GAP SERPL CALC-SCNC: 11 MMOL/L (ref 10–20)
ANION GAP SERPL CALC-SCNC: 8 MMOL/L (ref 10–20)
APTT PPP: 114 SECONDS (ref 27–38)
AST SERPL W P-5'-P-CCNC: 14 U/L (ref 9–39)
BILIRUB DIRECT SERPL-MCNC: 0.3 MG/DL (ref 0–0.3)
BILIRUB SERPL-MCNC: 0.7 MG/DL (ref 0–1.2)
BUN SERPL-MCNC: 10 MG/DL (ref 6–23)
BUN SERPL-MCNC: 11 MG/DL (ref 6–23)
CALCIUM SERPL-MCNC: 5.2 MG/DL (ref 8.6–10.6)
CALCIUM SERPL-MCNC: 5.4 MG/DL (ref 8.6–10.6)
CHLORIDE SERPL-SCNC: 104 MMOL/L (ref 98–107)
CHLORIDE SERPL-SCNC: 106 MMOL/L (ref 98–107)
CO2 SERPL-SCNC: 19 MMOL/L (ref 21–32)
CO2 SERPL-SCNC: 26 MMOL/L (ref 21–32)
CREAT SERPL-MCNC: 0.36 MG/DL (ref 0.5–1.3)
CREAT SERPL-MCNC: 0.41 MG/DL (ref 0.5–1.3)
EGFRCR SERPLBLD CKD-EPI 2021: >90 ML/MIN/1.73M*2
EGFRCR SERPLBLD CKD-EPI 2021: >90 ML/MIN/1.73M*2
ERYTHROCYTE [DISTWIDTH] IN BLOOD BY AUTOMATED COUNT: 18.3 % (ref 11.5–14.5)
GLUCOSE SERPL-MCNC: 107 MG/DL (ref 74–99)
GLUCOSE SERPL-MCNC: 140 MG/DL (ref 74–99)
HCT VFR BLD AUTO: 22.5 % (ref 41–52)
HGB BLD-MCNC: 7.7 G/DL (ref 13.5–17.5)
INR PPP: 1.7 (ref 0.9–1.1)
MAGNESIUM SERPL-MCNC: 1.85 MG/DL (ref 1.6–2.4)
MCH RBC QN AUTO: 26.6 PG (ref 26–34)
MCHC RBC AUTO-ENTMCNC: 34.2 G/DL (ref 32–36)
MCV RBC AUTO: 78 FL (ref 80–100)
NRBC BLD-RTO: 0 /100 WBCS (ref 0–0)
PHOSPHATE SERPL-MCNC: 2.2 MG/DL (ref 2.5–4.9)
PHOSPHATE SERPL-MCNC: 2.3 MG/DL (ref 2.5–4.9)
PLATELET # BLD AUTO: 245 X10*3/UL (ref 150–450)
POTASSIUM SERPL-SCNC: 3.9 MMOL/L (ref 3.5–5.3)
POTASSIUM SERPL-SCNC: 4 MMOL/L (ref 3.5–5.3)
PROT SERPL-MCNC: <3 G/DL (ref 6.4–8.2)
PROTHROMBIN TIME: 18.7 SECONDS (ref 9.8–12.8)
RBC # BLD AUTO: 2.89 X10*6/UL (ref 4.5–5.9)
SODIUM SERPL-SCNC: 132 MMOL/L (ref 136–145)
SODIUM SERPL-SCNC: 134 MMOL/L (ref 136–145)
UFH PPP CHRO-ACNC: 0.2 IU/ML
UFH PPP CHRO-ACNC: 0.3 IU/ML
UFH PPP CHRO-ACNC: 0.5 IU/ML
WBC # BLD AUTO: 11.9 X10*3/UL (ref 4.4–11.3)

## 2024-09-20 PROCEDURE — 2500000004 HC RX 250 GENERAL PHARMACY W/ HCPCS (ALT 636 FOR OP/ED)

## 2024-09-20 PROCEDURE — 85027 COMPLETE CBC AUTOMATED: CPT | Performed by: NURSE PRACTITIONER

## 2024-09-20 PROCEDURE — 99233 SBSQ HOSP IP/OBS HIGH 50: CPT | Performed by: INTERNAL MEDICINE

## 2024-09-20 PROCEDURE — 85610 PROTHROMBIN TIME: CPT

## 2024-09-20 PROCEDURE — 2500000001 HC RX 250 WO HCPCS SELF ADMINISTERED DRUGS (ALT 637 FOR MEDICARE OP): Performed by: NURSE PRACTITIONER

## 2024-09-20 PROCEDURE — P9045 ALBUMIN (HUMAN), 5%, 250 ML: HCPCS | Mod: JZ

## 2024-09-20 PROCEDURE — 82435 ASSAY OF BLOOD CHLORIDE: CPT | Performed by: NURSE PRACTITIONER

## 2024-09-20 PROCEDURE — 99222 1ST HOSP IP/OBS MODERATE 55: CPT | Performed by: STUDENT IN AN ORGANIZED HEALTH CARE EDUCATION/TRAINING PROGRAM

## 2024-09-20 PROCEDURE — 84100 ASSAY OF PHOSPHORUS: CPT

## 2024-09-20 PROCEDURE — 83735 ASSAY OF MAGNESIUM: CPT | Performed by: NURSE PRACTITIONER

## 2024-09-20 PROCEDURE — 84100 ASSAY OF PHOSPHORUS: CPT | Performed by: NURSE PRACTITIONER

## 2024-09-20 PROCEDURE — 85520 HEPARIN ASSAY: CPT

## 2024-09-20 PROCEDURE — 84075 ASSAY ALKALINE PHOSPHATASE: CPT | Performed by: NURSE PRACTITIONER

## 2024-09-20 PROCEDURE — 82103 ALPHA-1-ANTITRYPSIN TOTAL: CPT | Performed by: NURSE PRACTITIONER

## 2024-09-20 PROCEDURE — 2500000004 HC RX 250 GENERAL PHARMACY W/ HCPCS (ALT 636 FOR OP/ED): Mod: JZ

## 2024-09-20 PROCEDURE — 2500000005 HC RX 250 GENERAL PHARMACY W/O HCPCS

## 2024-09-20 PROCEDURE — 1170000001 HC PRIVATE ONCOLOGY ROOM DAILY

## 2024-09-20 RX ORDER — CALCIUM GLUCONATE 20 MG/ML
2 INJECTION, SOLUTION INTRAVENOUS ONCE
Status: COMPLETED | OUTPATIENT
Start: 2024-09-20 | End: 2024-09-20

## 2024-09-20 RX ORDER — ALBUMIN HUMAN 50 G/1000ML
25 SOLUTION INTRAVENOUS ONCE
Status: COMPLETED | OUTPATIENT
Start: 2024-09-20 | End: 2024-09-20

## 2024-09-20 SDOH — ECONOMIC STABILITY: FOOD INSECURITY: WITHIN THE PAST 12 MONTHS, YOU WORRIED THAT YOUR FOOD WOULD RUN OUT BEFORE YOU GOT MONEY TO BUY MORE.: NEVER TRUE

## 2024-09-20 SDOH — ECONOMIC STABILITY: FOOD INSECURITY: WITHIN THE PAST 12 MONTHS, THE FOOD YOU BOUGHT JUST DIDN'T LAST AND YOU DIDN'T HAVE MONEY TO GET MORE.: NEVER TRUE

## 2024-09-20 SDOH — ECONOMIC STABILITY: INCOME INSECURITY: IN THE PAST 12 MONTHS, HAS THE ELECTRIC, GAS, OIL, OR WATER COMPANY THREATENED TO SHUT OFF SERVICE IN YOUR HOME?: NO

## 2024-09-20 SDOH — ECONOMIC STABILITY: TRANSPORTATION INSECURITY
IN THE PAST 12 MONTHS, HAS LACK OF TRANSPORTATION KEPT YOU FROM MEETINGS, WORK, OR FROM GETTING THINGS NEEDED FOR DAILY LIVING?: NO

## 2024-09-20 SDOH — SOCIAL STABILITY: SOCIAL NETWORK: ARE YOU MARRIED, WIDOWED, DIVORCED, SEPARATED, NEVER MARRIED, OR LIVING WITH A PARTNER?: MARRIED

## 2024-09-20 SDOH — ECONOMIC STABILITY: INCOME INSECURITY: IN THE LAST 12 MONTHS, WAS THERE A TIME WHEN YOU WERE NOT ABLE TO PAY THE MORTGAGE OR RENT ON TIME?: NO

## 2024-09-20 SDOH — ECONOMIC STABILITY: HOUSING INSECURITY: AT ANY TIME IN THE PAST 12 MONTHS, WERE YOU HOMELESS OR LIVING IN A SHELTER (INCLUDING NOW)?: NO

## 2024-09-20 SDOH — ECONOMIC STABILITY: TRANSPORTATION INSECURITY
IN THE PAST 12 MONTHS, HAS THE LACK OF TRANSPORTATION KEPT YOU FROM MEDICAL APPOINTMENTS OR FROM GETTING MEDICATIONS?: NO

## 2024-09-20 SDOH — ECONOMIC STABILITY: INCOME INSECURITY: HOW HARD IS IT FOR YOU TO PAY FOR THE VERY BASICS LIKE FOOD, HOUSING, MEDICAL CARE, AND HEATING?: NOT HARD AT ALL

## 2024-09-20 SDOH — SOCIAL STABILITY: SOCIAL NETWORK: HOW OFTEN DO YOU GET TOGETHER WITH FRIENDS OR RELATIVES?: MORE THAN THREE TIMES A WEEK

## 2024-09-20 ASSESSMENT — PAIN - FUNCTIONAL ASSESSMENT: PAIN_FUNCTIONAL_ASSESSMENT: 0-10

## 2024-09-20 ASSESSMENT — COGNITIVE AND FUNCTIONAL STATUS - GENERAL
MOBILITY SCORE: 24
DAILY ACTIVITIY SCORE: 24

## 2024-09-20 ASSESSMENT — PAIN SCALES - GENERAL
PAINLEVEL_OUTOF10: 0 - NO PAIN
PAINLEVEL_OUTOF10: 0 - NO PAIN

## 2024-09-20 NOTE — CONSULTS
Reason For Consult  Bilateral pleural effusions.    History Of Present Illness  Vladimir Lawrence is a 49 y.o. 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  CT C/A/P showed hepatic infarction c/f hepatic artery thrombosis. No PE noted. 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     He reported hitory of dyspnea on exertion for the last month but got more progressive over the last week, currently he is short of breath with minimal exertion, around the same time he noticed bilateral lower extremities and abdominal swelling. No orthopnea, PND, no cough or chest pain. No fever.      Past Medical History  He has a past medical history of Cancer (Multi).    Surgical History  He has a past surgical history that includes Other surgical history (06/07/2022); Colonoscopy (05/06/2024); and Tunneled venous port placement (05/31/2024).     Social History  He reports that he has never smoked. He has never used smokeless tobacco. He reports that he does not drink alcohol and does not use drugs.    Family History  Family History   Problem Relation Name Age of Onset    Diabetes type II Mother      Hypertension Father      Heart disease Father          CABG    Macular degeneration Father      Ulcers Father      Breast cancer Mother's Sister  50 - 59    Cancer Father's Sister          rare cancer unknown type    Cancer Paternal Grandmother      Stroke Paternal Grandfather      Other (MCAD) Daughter      No Known Problems Son          Allergies  Patient has no known allergies.    Review of Systems  Unremarkable except of what is mentioned in HPI      Physical Exam  Constitutional:       General: He is not in acute distress.  HENT:      Head: Normocephalic  "and atraumatic.      Mouth/Throat:      Mouth: Mucous membranes are moist.      Pharynx: Oropharynx is clear.   Eyes:      General: No scleral icterus.     Extraocular Movements: Extraocular movements intact.      Pupils: Pupils are equal, round, and reactive to light.   Cardiovascular:      Rate and Rhythm: Normal rate and regular rhythm.      Pulses: Normal pulses.      Heart sounds: Normal heart sounds.   Pulmonary:      Effort: Pulmonary effort is normal.      Breath sounds: decreased breath sounds over the lower parts of his lung fields. No wheezing or rhonchi.   Abdominal:      General: Bowel sounds are normal. There is distension.   Skin:     General: Skin is warm and dry.      Comments: Pitting edema on both LE  Neurological:      Mental Status: He is alert and oriented to person, place, and time.   Psychiatric:         Mood and Affect: Mood normal.         Behavior: Behavior normal.      Last Recorded Vitals  Blood pressure 96/60, pulse 86, temperature 36.8 °C (98.2 °F), temperature source Temporal, resp. rate 18, height 1.854 m (6' 1\"), weight 84.6 kg (186 lb 8 oz), SpO2 93%.    CTPA 9/17:  IMPRESSION:  1.  No pulmonary embolism is identified  2. Bilateral pleural effusions with dependent compressive atelectasis  3. CT abdomen and pelvis is reported separately.       Assessment/Plan   Vladimir Lawrence is a 49 y.o. 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  CT C/A/P showed hepatic infarction c/f hepatic artery thrombosis. No PE noted. 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 Treatment for the underlying condition ( cancer treatment, fistula repair if indicated and nutritional supplements ) should help with his effusions and the generalized edema.    -No indication for thoracentesis at the current kelsey as it may cause further loss of protein. Can be considered if his dyspnea worsen or if he starts to require oxygen.    Pulmonary team will sign off, please contact us for any questions or concerns.     Franci Robison MD

## 2024-09-20 NOTE — PROGRESS NOTES
"   09/20/24 1240   Discharge Planning   Living Arrangements Spouse/significant other   Support Systems Spouse/significant other;Children;Family members   Type of Residence Private residence   Number of Stairs to Enter Residence 3   Number of Stairs Within Residence 14   Do you have animals or pets at home? No   Home or Post Acute Services None   Expected Discharge Disposition Home   Does the patient need discharge transport arranged? No   Financial Resource Strain   How hard is it for you to pay for the very basics like food, housing, medical care, and heating? Not hard   Housing Stability   In the last 12 months, was there a time when you were not able to pay the mortgage or rent on time? N   At any time in the past 12 months, were you homeless or living in a shelter (including now)? N   Transportation Needs   In the past 12 months, has lack of transportation kept you from medical appointments or from getting medications? no   In the past 12 months, has lack of transportation kept you from meetings, work, or from getting things needed for daily living? No     Social Work Note  09/20/24  LSW met with patient and spouse Gabi at bedside to introduce role and assess needs. Patient was sleeping, but LSW talked with Gabi. Gabi provided Vladimir's living will to be added to his chart. LSW made a copy and placed it in his paper chart at nurse's station and gave original back to Gabi. They live in a two story home. They have 2 kids, ages 15 and 18. They have a ramp in their garage with a railing to help Vladimir get in the house. They have an extensive support network, including Vladimir's siblings, neighbors, friends, etc. 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 She states they both \"have enough sick days to take an entire year off\" since their sick days accumulate (~ 300 days according to Gabi), so Vladimir is not on STD, LTD, or SS. They don't have any concerns about transportation, finances, or " insurance. They have a shower chair at home. Vladimir's main issue is getting up, but otherwise he ambulates fine on his own. Vladimir may need albumin effusions at discharge, possibly outpatient. LSW will follow for discharge needs.   NATALIIA Layton

## 2024-09-20 NOTE — PROGRESS NOTES
[facility]   [facility]  Follow up CONSULT NOTE       Reason For Consult  Elevated LFT iso known liver mets of colon Ca and ?Hepatic artery thrombosis    SUBJECTIVE     CTE done. Difficult to assess fistula giving no oral contrast.    EXAM     Vitals:    Vitals:    09/20/24 0010 09/20/24 0522 09/20/24 0810 09/20/24 1220   BP: 95/60 85/51 96/60 98/64   BP Location: Left arm Left arm Left arm Left arm   Patient Position: Lying Lying Lying Lying   Pulse: 77 88 86 81   Resp: 16 17 18 18   Temp: 37.3 °C (99.1 °F) 37.1 °C (98.8 °F) 36.8 °C (98.2 °F) 36.7 °C (98.1 °F)   TempSrc: Temporal Temporal Temporal Temporal   SpO2: 93% 93% 93% 95%   Weight:       Height:         Failed to redirect to the Timeline version of the [employer] SmartLink.    Intake/Output Summary (Last 24 hours) at 9/20/2024 1509  Last data filed at 9/20/2024 1044  Gross per 24 hour   Intake 232 ml   Output --   Net 232 ml         Physical Exam  General: no acute distress  HEENT: EOMI. Moist mucosa  Respiratory: nonlabored breathing on room air  Cardiovascular: RRR, no murmurs/rubs/gallops  Abdomen: Soft, nontender, nondistended, bowel sounds present. No masses palpated  Extremities: 1+ pitting edema bilaterally, no asterixis  Neuro: alert and oriented, CNII-XII grossly intact, moves all 4 extremities with no focal deficits    OBJECTIVE                                                                              Medications       Current Facility-Administered Medications:     albumin human 5 % infusion 25 g, 25 g, intravenous, Once, Machelle Boo MD    diphenhydramine-zinc acetate cream, , Topical, TID PRN, Queen JOSE MIGUEL Hudson MD    heparin 25,000 Units in dextrose 5% 250 mL (100 Units/mL) infusion (premix), 0-4,500 Units/hr, intravenous, Continuous, Hernandez Power DO, Last Rate: 14 mL/hr at 09/20/24 0616, 1,400 Units/hr at 09/20/24 0616    heparin bolus from bag 3,000-6,000 Units, 3,000-6,000 Units,  intravenous, q4h PRN, Hernandez Power DO, 3,000 Units at 09/20/24 0618    melatonin tablet 3 mg, 3 mg, oral, Nightly PRN, Brad Galeano APRN-CNP    pantoprazole (ProtoNix) EC tablet 40 mg, 40 mg, oral, Daily before breakfast, Maribell Ruiz, APRN-CNP, 40 mg at 09/20/24 0903    polyethylene glycol (Glycolax, Miralax) packet 17 g, 17 g, oral, Daily PRN, Brad Galeano APRN-CNP                                                                            Labs     Results for orders placed or performed during the hospital encounter of 09/17/24 (from the past 24 hour(s))   CBC   Result Value Ref Range    WBC 18.0 (H) 4.4 - 11.3 x10*3/uL    nRBC 0.0 0.0 - 0.0 /100 WBCs    RBC 3.38 (L) 4.50 - 5.90 x10*6/uL    Hemoglobin 9.1 (L) 13.5 - 17.5 g/dL    Hematocrit 27.3 (L) 41.0 - 52.0 %    MCV 81 80 - 100 fL    MCH 26.9 26.0 - 34.0 pg    MCHC 33.3 32.0 - 36.0 g/dL    RDW 18.6 (H) 11.5 - 14.5 %    Platelets 303 150 - 450 x10*3/uL   Renal function panel   Result Value Ref Range    Glucose 126 (H) 74 - 99 mg/dL    Sodium 133 (L) 136 - 145 mmol/L    Potassium 4.1 3.5 - 5.3 mmol/L    Chloride 104 98 - 107 mmol/L    Bicarbonate 24 21 - 32 mmol/L    Anion Gap 9 (L) 10 - 20 mmol/L    Urea Nitrogen 8 6 - 23 mg/dL    Creatinine 0.38 (L) 0.50 - 1.30 mg/dL    eGFR >90 >60 mL/min/1.73m*2    Calcium 5.8 (L) 8.6 - 10.6 mg/dL    Phosphorus 2.6 2.5 - 4.9 mg/dL    Albumin 1.7 (L) 3.4 - 5.0 g/dL   CBC   Result Value Ref Range    WBC 15.3 (H) 4.4 - 11.3 x10*3/uL    nRBC 0.0 0.0 - 0.0 /100 WBCs    RBC 3.10 (L) 4.50 - 5.90 x10*6/uL    Hemoglobin 8.2 (L) 13.5 - 17.5 g/dL    Hematocrit 25.5 (L) 41.0 - 52.0 %    MCV 82 80 - 100 fL    MCH 26.5 26.0 - 34.0 pg    MCHC 32.2 32.0 - 36.0 g/dL    RDW 19.6 (H) 11.5 - 14.5 %    Platelets 275 150 - 450 x10*3/uL   Magnesium   Result Value Ref Range    Magnesium 1.82 1.60 - 2.40 mg/dL   Coagulation Screen   Result Value Ref Range    Protime 18.7 (H) 9.8 - 12.8 seconds    INR 1.7 (H) 0.9 - 1.1    aPTT 114  (HH) 27 - 38 seconds   CBC   Result Value Ref Range    WBC 11.9 (H) 4.4 - 11.3 x10*3/uL    nRBC 0.0 0.0 - 0.0 /100 WBCs    RBC 2.89 (L) 4.50 - 5.90 x10*6/uL    Hemoglobin 7.7 (L) 13.5 - 17.5 g/dL    Hematocrit 22.5 (L) 41.0 - 52.0 %    MCV 78 (L) 80 - 100 fL    MCH 26.6 26.0 - 34.0 pg    MCHC 34.2 32.0 - 36.0 g/dL    RDW 18.3 (H) 11.5 - 14.5 %    Platelets 245 150 - 450 x10*3/uL   Hepatic Function Panel   Result Value Ref Range    Albumin 1.5 (L) 3.4 - 5.0 g/dL    Bilirubin, Total 0.7 0.0 - 1.2 mg/dL    Bilirubin, Direct 0.3 0.0 - 0.3 mg/dL    Alkaline Phosphatase 126 (H) 33 - 120 U/L    ALT 97 (H) 10 - 52 U/L    AST 14 9 - 39 U/L    Total Protein <3.0 (L) 6.4 - 8.2 g/dL   Magnesium   Result Value Ref Range    Magnesium 1.85 1.60 - 2.40 mg/dL   Phosphorus   Result Value Ref Range    Phosphorus 2.2 (L) 2.5 - 4.9 mg/dL   Basic Metabolic Panel   Result Value Ref Range    Glucose 107 (H) 74 - 99 mg/dL    Sodium 134 (L) 136 - 145 mmol/L    Potassium 4.0 3.5 - 5.3 mmol/L    Chloride 104 98 - 107 mmol/L    Bicarbonate 26 21 - 32 mmol/L    Anion Gap 8 (L) 10 - 20 mmol/L    Urea Nitrogen 10 6 - 23 mg/dL    Creatinine 0.36 (L) 0.50 - 1.30 mg/dL    eGFR >90 >60 mL/min/1.73m*2    Calcium 5.4 (LL) 8.6 - 10.6 mg/dL   Heparin Assay, UFH   Result Value Ref Range    Heparin Unfractionated 0.2 See Comment Below for Therapeutic Ranges IU/mL   Heparin Assay, UFH   Result Value Ref Range    Heparin Unfractionated 0.5 See Comment Below for Therapeutic Ranges IU/mL   Heparin Assay, UFH   Result Value Ref Range    Heparin Unfractionated 0.3 See Comment Below for Therapeutic Ranges IU/mL                                                                              Imaging             7/1/2024 CTAP w/IV  VESSELS:  Aorta and pulmonary arteries are normal caliber. Interval development  of a long segment filling defect along the distal IVC and right  atrium (series 6, image 38, series 3 images 37-62), concerning for  thrombus. Secondary to the  presence of port catheter. No  atherosclerotic changes of the aorta are identified.  No coronary       9/6/2024 Doppler LE  CONCLUSIONS:  Right Lower Venous: No evidence of acute deep vein thrombus visualized in the right lower extremity. Cannot rule out thrombus in non-visualized posterior tibial and Peroneal veins due to swelling and edema.  Left Lower Venous: There is acute occlusive deep vein thrombosis visualized in the peroneal vein. Cannot rule out thrombus in non-visualized posterior tibial vein due to edema and swelling. The remainder of the left leg is negative for deep vein thrombosis.    9/17/2024 CTA Chest   IMPRESSION:  1.  No pulmonary embolism is identified  2. Bilateral pleural effusions with dependent compressive atelectasis  3. CT abdomen and pelvis is reported separately.    9/17/2024 CTAP w/ IV   IMPRESSION:  Global abnormal appearing liver is compatible with  ischemia/infarction and obscures the patient's known underlying  metastatic lesions. 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      9/18/2024 Doppler U/S  IMPRESSION:  1. Hepatomegaly and echogenic hepatic parenchyma suggestive of fatty  infiltration. Several hepatic lesions are visualized and described  above, better characterized on recent CTs.  2. Poor visualization of the right hepatic artery and right hepatic  vein, potentially related to technique or patient condition, rather  than vascular abnormality. Otherwise normal patent hepatic  vasculature.  3. Right pleural effusion and small volume ascites.                                                                         GI Procedures     5/6/2024  Impression  Polyp measuring smaller than 5 mm in the proximal rectum; performed cold forceps biopsy with piecemeal removal  Diverticulosis in the sigmoid colon (very  "mild)  Malignant-appearing mass measuring 10 cm in the distal transverse colon, covering the whole circumference; bleeding occurred after intervention; performed cold forceps biopsy     Findings  Polyp measuring smaller than 5 mm in the proximal rectum; performed cold forceps biopsy with complete piecemeal removal  Few very small diverticula in the sigmoid colon  Malignant-appearing mass (traversable) measuring 10 cm in the distal transverse colon, covering the whole circumference; bleeding occurred after intervention; performed cold forceps biopsy with partial removal. Kimberly ink tattoo placed on the fold immediately distal to the distal extent of this mass, using 6mL.       ASSESSMENT / PLAN                  ASSESSMENT/PLAN:    Yon Lawrence \"Vladimir\" is a 49 y.o. 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 Hepatology is consulted for evaluation of elevated LFT in the light of usage of immune check point inhibitors.    #Elevated LFT, mixed pattern  #Colon AdenoCa with metastasis to liver/mesenteric nodules/peritoneum and fistulization to jejunum  #DVT on lovenox (failed eliquis)  #Diarrhea presumed to be ICI colitis s/p prednisone, resolved   #Hypoalbuminemia/Low protein level  Lab on presentation AST/ALT 22/120, , T-marcy 0.5 has been improving AST/ALT 19/93, , T-marcy 0.7. ALP has been elevated at baseline likely due to metastatic liver lesion which has been improving, based on CT from 7/1/2024.  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 We are awaiting for doppler U/S for confirmation. " ICI hepatitis would be on differential, although less likely giving the amount of AST/ALT elevation not impressive, improving w/o treatment (prednisone). Also last pembrolizumab dose 7/12/2024 which is not close. Patient has no history of metabolic syndrome. No history of binge alcohol drink. No history of viral hepatitis. Doppler U/S showed patent vasculature although Rt hepatic artery and vein were not visualized well. Regardless, we have low suspicion for ICI hepatitis and would monitor LFT rather than obtaining liver biopsy at this time. Some of the elevated LFT might be from steatosis. At this time we have no objection for patient to goes back to pembrolizumab. Should LFT elevated with another infusion of pembrolizumab, then liver biopsy to be considered.   With regard to low albumin/protein, etiology is unclear. UA negative for proteinuria. Likely that this is mixture of malnutrition and fistulization of colon cancer to jejunum. We recommend high protein diet.      Plan  -Trend LFT   -High protein diet (1~3g/kg/day). Continue ensures.  -Recommend nutrition consult for evaluation  -Patient to follow up with oncologist. To be referred to hepatology clinic if LFT again started to uptrend.     Patient was discussed with Dr. Velasco.     Thank you for the consultation. Hepatology will sign off.  - During weekday hours of 7am- 5pm, please do not hesitate to contact me on [employer] Chat or page [phone] if there are any further questions   - After hours, on weekends, and on holidays, please page the [place] GI fellow at [phone]. Thank you.       Loan Plummer MD  PGY5 Gastroenterology Fellow  [facility]

## 2024-09-20 NOTE — CARE PLAN
The patient's goals for the shift include pt will remain HDS for the shift    The clinical goals for the shift include patient will remain safe and free from injury this shift 9/20/24 0700      Problem: Fall/Injury  Goal: Not fall by end of shift  Outcome: Progressing  Goal: Be free from injury by end of the shift  Outcome: Progressing  Goal: Verbalize understanding of personal risk factors for fall in the hospital  Outcome: Progressing  Goal: Verbalize understanding of risk factor reduction measures to prevent injury from fall in the home  Outcome: Progressing  Goal: Use assistive devices by end of the shift  Outcome: Progressing  Goal: Pace activities to prevent fatigue by end of the shift  Outcome: Progressing     Problem: Safety - Adult  Goal: Free from fall injury  Outcome: Progressing

## 2024-09-20 NOTE — PROGRESS NOTES
"Yon Lawrence \"Roger" is a 49 y.o. male on day 3 of admission presenting with Infarction of liver (HHS-HCC).      Subjective   Patient was sitting in bed this morning. He reports mild shortness of breath on ambulation. Last bowel movement was yesterday morning. No dizziness on ambulation or abdominal pain reported.       Objective     Last Recorded Vitals  BP 96/60 (BP Location: Left arm, Patient Position: Lying)   Pulse 86   Temp 36.8 °C (98.2 °F) (Temporal)   Resp 18   Wt 84.6 kg (186 lb 8 oz)   SpO2 93%   Intake/Output last 3 Shifts:    Intake/Output Summary (Last 24 hours) at 9/20/2024 1112  Last data filed at 9/20/2024 1044  Gross per 24 hour   Intake 2332 ml   Output --   Net 2332 ml       Admission Weight  Weight: 87 kg (191 lb 12.8 oz) (09/17/24 1800)    Daily Weight  09/18/24 : 84.6 kg (186 lb 8 oz)    Image Results  CT enterography w contrast  Narrative: Interpreted By:  Mike Moreno and Dulla Kireeti   STUDY:  CT ENTEROGRAPHY WITH  IV CONTRAST;  9/19/2024 3:04 pm      INDICATION:  Signs/Symptoms:evaluation of protein absorption d/t severely low  protein/albumin.      COMPARISON:  CT abdomen/pelvis from 08/22/2024 and 09/17/2024.      ACCESSION NUMBER(S):  [id number]      ORDERING CLINICIAN:  ESTEBAN BEE      TECHNIQUE:  CT of the abdomen was performed.  Contiguous axial images were  obtained at 3 mm slice thickness through the abdomen. Coronal and  sagittal reconstructions at 3 mm slice thickness were performed. 75  ML of Omnipaque 350 was administered intravenously without immediate  complication.      FINDINGS:  LOWER CHEST:  The visualized lung base is unremarkable. The heart is normal in size  without evidence of pericardial effusion. Bilateral moderate pleural  effusion with overlying atelectasis which is similar to prior  imaging..      ABDOMEN:      LIVER:  There is diffuse decreased attenuation of the liver, with patchy  areas of slightly increased attenuation. Given this " decreased  attenuation, the patient's known metastatic disease is not well  evaluated on this exam. The liver is enlarged measuring 20.3 Cm in  maximum craniocaudal dimension..      BILE DUCTS:  The bile ducts are not dilated.      GALLBLADDER:  The gallbladder is not distended and without calcified stones.      PANCREAS:  The pancreas appears unremarkable.      SPLEEN:  Within normal limits.      ADRENAL GLANDS:  Bilateral adrenal glands appear normal.      KIDNEYS AND URETERS:  The kidneys have normal size and enhance symmetrically. There is  similar-appearing 2.3 cm exophytic simple cyst at the inferior pole  of the right kidney. (Series 2, image 87) No hydroureteronephrosis or  nephroureterolithiasis is present.      BOWEL:  The stomach is unremarkable.  The small is normal in caliber and  demonstrate no wall thickening. There is similar-appearing of a 5.0 x  4.8 cm mass at the splenic flexure of the colon (series 2, image 90)  There is improvement of the colitis of the sigmoid flexure with a  decrease in wall thickening and mucosal edema. The previously seen  fistulization between small bowel and the malignancy is not well  assessed on this exam without oral contrast. The appendix appears  normal.      VESSELS:  There is no aneurysmal dilatation of the abdominal aorta. The IVC is  within normal limits. No thrombus or stenosis of the arteries are  seen.      PERITONEUM/RETROPERITONEUM/LYMPH NODES:  There is a small volume ascites. There is no free air. Mesenteric  edema is noted the retroperitoneum appears unremarkable.  Similar-appearing enlarged lymph nodes adjacent to the tumor with the  largest measuring 1.2 cm (series 2, image 98).      ABDOMINAL WALL:  There is diffuse edema of the abdominal wall soft tissues      BONE AND SOFT TISSUE:  No suspicious osseous lesions are present. Degenerative discogenic  disease is noted in the lower thoracic and lumbar spine.      Impression: 1. Similar-appearing 5.0 x 4.8  cm mass at the splenic flexure, with  associated enlarged adjacent mesenteric lymph nodes. Full  characterization of the fistula tracts with the adjacent small bowel  is limited on this exam given lack of enteric contrast. No new fluid  collection.  2. There is decreased attenuation of the liver consistent with  hepatic steatosis which is similar to the prior study. Given this  finding, patient's known metastatic disease liver is not well  evaluated on this study.  3. Diffuse edema of the abdominal body wall soft tissue, pleural  effusions, and ascites which are consistent with volume overload  state.  4. Additional chronic findings as described above.          I personally reviewed the images/study and I agree with the findings  as stated by Clinton Ku MD, PGY-2 this study was interpreted at  [facility], [place], [place].      MACRO:  None      Signed by: Mike Moreno 9/20/2024 10:11 AM  Dictation workstation:   [id number]  Vascular US lower extremity venous duplex bilateral              [place] [address]    Tel [phone] and Fax [phone]       Vascular Lab Report  VASC US LOWER EXTREMITY VENOUS DUPLEX BILATERAL       Patient Name:     ALYLN Goode Physician: [id number] Sada Galvan MD  Study Date:       9/19/2024           Ordering           [id number] SEE DIETRICH                                        Physician:         BASHIR  MRN/PID:          [id number]            Technologist:      Reyna Schultz RVT, RDMS  Accession#:       [id number]        Technologist 2:  Date of           1975 / 49      Encounter#:        [id number]  Birth/Age:        years  Gender:           M  Admission Status: Inpatient           Location           [facility]                                        Performed:       Diagnosis/ICD: Localized (leg)  edema-R60.0  CPT Codes:     84006 Peripheral venous duplex scan for DVT complete       Patient History Hx of left peroneal thrombus 9/6.       CONCLUSIONS:  Right Lower Venous: No evidence of acute deep vein thrombus visualized in the right lower extremity.  Left Lower Venous: No propagation of the known peroneal thrombus.     Comparison:  Compared with study from 9/6/2024, no significant change.     Imaging & Doppler Findings:     Right                 Compressible Thrombus        Flow  Distal External Iliac                None   Spontaneous/Phasic  CFV                       Yes        None   Spontaneous/Phasic  PFV                       Yes        None  FV Proximal               Yes        None   Spontaneous/Phasic  FV Mid                    Yes        None  FV Distal                 Yes        None  Popliteal                 Yes        None   Spontaneous/Phasic  Peroneal                  Yes        None  PTV                       Yes        None       Left                  Compress    Thrombus            Flow  Distal External Iliac               None       Spontaneous/Phasic  CFV                     Yes         None       Spontaneous/Phasic  PFV                     Yes         None  FV Proximal             Yes         None       Spontaneous/Phasic  FV Mid                  Yes         None  FV Distal               Yes         None  Popliteal               Yes         None       Spontaneous/Phasic  Peroneal                 No    Acute occlusive  PTV                     Yes         None       [id number] Sada Galvan MD  Electronically signed by [id number] Sada Galvan MD on 9/20/2024 at 7:42:47 AM       ** Final **      Physical Exam  Constitutional:       General: He is not in acute distress.  HENT:      Head: Normocephalic and atraumatic.      Mouth/Throat:      Mouth: Mucous membranes are moist.      Pharynx: Oropharynx is clear.   Eyes:      General: No scleral icterus.     Extraocular Movements: Extraocular movements intact.       Pupils: Pupils are equal, round, and reactive to light.   Cardiovascular:      Rate and Rhythm: Normal rate and regular rhythm.      Pulses: Normal pulses.      Heart sounds: Normal heart sounds.   Pulmonary:      Effort: Pulmonary effort is normal.      Breath sounds: Normal breath sounds. No wheezing or rhonchi.   Abdominal:      General: Bowel sounds are normal. There is distension.      Comments: Mild tenderness in R upper quadrant   Skin:     General: Skin is warm and dry.      Coloration: Skin is not jaundiced.      Comments: Rash on abdomen   Neurological:      Mental Status: He is alert and oriented to person, place, and time.   Psychiatric:         Mood and Affect: Mood normal.         Behavior: Behavior normal.         Relevant Results  Scheduled medications  pantoprazole, 40 mg, oral, Daily before breakfast      Continuous medications  heparin, 0-4,500 Units/hr, Last Rate: 1,400 Units/hr (09/20/24 0616)      PRN medications  PRN medications: heparin, melatonin, polyethylene glycol     CT enterography w contrast    Result Date: 9/20/2024  Interpreted By:  Mike Moreno and Dulla Kireeti STUDY: CT ENTEROGRAPHY WITH  IV CONTRAST;  9/19/2024 3:04 pm   INDICATION: Signs/Symptoms:evaluation of protein absorption d/t severely low protein/albumin.   COMPARISON: CT abdomen/pelvis from 08/22/2024 and 09/17/2024.   ACCESSION NUMBER(S): [id number]   ORDERING CLINICIAN: ESTEBAN BEE   TECHNIQUE: CT of the abdomen was performed.  Contiguous axial images were obtained at 3 mm slice thickness through the abdomen. Coronal and sagittal reconstructions at 3 mm slice thickness were performed. 75 ML of Omnipaque 350 was administered intravenously without immediate complication.   FINDINGS: LOWER CHEST: The visualized lung base is unremarkable. The heart is normal in size without evidence of pericardial effusion. Bilateral moderate pleural effusion with overlying atelectasis which is similar to prior imaging..    ABDOMEN:   LIVER: There is diffuse decreased attenuation of the liver, with patchy areas of slightly increased attenuation. Given this decreased attenuation, the patient's known metastatic disease is not well evaluated on this exam. The liver is enlarged measuring 20.3 Cm in maximum craniocaudal dimension..   BILE DUCTS: The bile ducts are not dilated.   GALLBLADDER: The gallbladder is not distended and without calcified stones.   PANCREAS: The pancreas appears unremarkable.   SPLEEN: Within normal limits.   ADRENAL GLANDS: Bilateral adrenal glands appear normal.   KIDNEYS AND URETERS: The kidneys have normal size and enhance symmetrically. There is similar-appearing 2.3 cm exophytic simple cyst at the inferior pole of the right kidney. (Series 2, image 87) No hydroureteronephrosis or nephroureterolithiasis is present.   BOWEL: The stomach is unremarkable.  The small is normal in caliber and demonstrate no wall thickening. There is similar-appearing of a 5.0 x 4.8 cm mass at the splenic flexure of the colon (series 2, image 90) There is improvement of the colitis of the sigmoid flexure with a decrease in wall thickening and mucosal edema. 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 The appendix appears normal.   VESSELS: There is no aneurysmal dilatation of the abdominal aorta. The IVC is within normal limits. No thrombus or stenosis of the arteries are seen.   PERITONEUM/RETROPERITONEUM/LYMPH NODES: There is a small volume ascites. There is no free air. Mesenteric edema is noted the retroperitoneum appears unremarkable. Similar-appearing enlarged lymph nodes adjacent to the tumor with the largest measuring 1.2 cm (series 2, image 98).   ABDOMINAL WALL: There is diffuse edema of the abdominal wall soft tissues   BONE AND SOFT TISSUE: No suspicious osseous lesions are present. Degenerative discogenic disease is noted in the lower  thoracic and lumbar spine.       1. Similar-appearing 5.0 x 4.8 cm mass at the splenic flexure, with associated enlarged adjacent mesenteric lymph nodes. Full characterization of the fistula tracts with the adjacent small bowel is limited on this exam given lack of enteric contrast. No new fluid collection. 2. There is decreased attenuation of the liver consistent with hepatic steatosis which is similar to the prior study. Given this finding, patient's known metastatic disease liver is not well evaluated on this study. 3. Diffuse edema of the abdominal body wall soft tissue, pleural effusions, and ascites which are consistent with volume overload state. 4. Additional chronic findings as described above.     I personally reviewed the images/study and I agree with the findings as stated by Clinton Ku MD, PGY-2 this study was interpreted at [facility], [place], [place].   MACRO: None   Signed by: Mike Moreno 9/20/2024 10:11 AM Dictation workstation:   [id number]    Vascular US lower extremity venous duplex bilateral    Result Date: 9/20/2024            [place] [address]   Tel [phone] and Fax [phone]  Vascular Lab Report VASC US LOWER EXTREMITY VENOUS DUPLEX BILATERAL  Patient Name:     ALLYN Goode Physician: [id number] Sada Galvan MD Study Date:       9/19/2024           Ordering           [id number] SEE DIETRICH                                       Physician:         BASHIR MRN/PID:          [id number]            Technologist:      Reyna Schultz RVT                                                          [facility] [[email or web address]] Accession#:       [id number]        Technologist 2: Date of           1975 / 49      Encounter#:        [id number] Birth/Age:        years Gender:           M Admission Status: Inpatient           Location           [facility]                                       Performed:   Diagnosis/ICD: Localized (leg) edema-R60.0 CPT Codes:     48010 Peripheral venous duplex scan for DVT complete  Patient History Hx of left peroneal thrombus 9/6.  CONCLUSIONS: Right Lower Venous: No evidence of acute deep vein thrombus visualized in the right lower extremity. Left Lower Venous: No propagation of the known peroneal thrombus.  Comparison: Compared with study from 9/6/2024, no significant change.  Imaging & Doppler Findings:  Right                 Compressible Thrombus        Flow Distal External Iliac                None   Spontaneous/Phasic CFV                       Yes        None   Spontaneous/Phasic PFV                       Yes        None FV Proximal               Yes        None   Spontaneous/Phasic FV Mid                    Yes        None FV Distal                 Yes        None Popliteal                 Yes        None   Spontaneous/Phasic Peroneal                  Yes        None PTV                       Yes        None  Left                  Compress    Thrombus            Flow Distal External Iliac               None       Spontaneous/Phasic CFV                     Yes         None       Spontaneous/Phasic PFV                     Yes         None FV Proximal             Yes         None       Spontaneous/Phasic FV Mid                  Yes         None FV Distal               Yes         None Popliteal               Yes         None       Spontaneous/Phasic Peroneal                 No    Acute occlusive PTV                     Yes         None  [id number] Sada Galvan MD Electronically signed by [id number] Sada Galvan MD on 9/20/2024 at 7:42:47 AM  ** Final **     US liver with doppler    Result Date: 9/18/2024  Interpreted By:  Kasprzak, Mike,  and Karson Rox STUDY: US LIVER WITH DOPPLER;  9/18/2024 11:20 am   INDICATION: Signs/Symptoms:concern for liver statosis vs thrombus.     COMPARISON: CT abdomen pelvis 09/17/2024 and 08/22/2024   ACCESSION NUMBER(S): [id number]   ORDERING  CLINICIAN: ESTEBAN BEE   TECHNIQUE: Multiple images of the right upper quadrant were obtained.  Gray scale, color Doppler and spectral Doppler waveform analysis was performed.  This examination was interpreted at [facility].   FINDINGS: LIVER: The liver measures 19.0 cm and is diffusely echogenic in appearance, consistent with diffuse fatty infiltration.   There are several hepatic lesions visualized. For example, within the left hepatic lobe, there is a hypoechoic lesion measuring 1.5 x 1.4 x 1.6 cm without internal vascularity. In the peripheral right hepatic lobe, there is a heterogeneous hypoechoic region measuring 3.3 x 3.4 cm without internal vascularity and with numerous small internal calcifications. More centrally in the right hepatic lobe, there is an additional hypoechoic lesion measuring 2.0 x 1.5 x 1.8 cm without internal vascularity.   GALLBLADDER: The gallbladder is nondistended, and demonstrates no evidence of gallstones or wall thickening. The gallbladder wall thickness is 0.3 cm. Sonographic Barcenas's sign is negative. Note is made of intraluminal biliary sludge. Small volume of pericholecystic fluid is favored secondary to ascites.   BILIARY SYSTEM: No evidence of intra or extrahepatic biliary dilatation is identified; the common bile duct measures 5.0.   DOPPLER EVALUATION:   HEPATIC ARTERIES: Hepatic artery and its left branches RI's are estimated at 0.7 and 0.8, respectively. There is poor visualization of the right hepatic artery.   PORTAL VEIN: Portal vein is patent. There is normal respiratory variation. Portal vein velocities are calculated as follows: main portal vein 17.9 cm/s, antegrade flow; left portal vein branch 17.4 cm/s, antegrade flow; right portal vein anterior branch 11.5 cm/s, antegrade flow; right portal vein posterior branch 16.6 cm/s, antegrade flow. The splenic vein is also patent.   HEPATIC VEIN: The middle and left hepatic  veins are patent and demonstrate triphasic antegrade flow. The right hepatic vein is poorly visualized. IVC appears also patent.   PANCREAS: The pancreas is poorly visualized due to overlying bowel gas.   RIGHT KIDNEY: The right kidney measures 10.6 in length. No hydronephrosis or renal calculi are seen.   SPLEEN: The spleen measures 8.4 x 5.1 x 2.9 cm and is grossly unremarkable.   PERITONEUM AND RETROPERITONEUM: There is a right pleural effusion. Small volume of perihepatic ascites.       1. Hepatomegaly and echogenic hepatic parenchyma suggestive of fatty infiltration. Several hepatic lesions are visualized and described above, better characterized on recent CTs. 2. Poor visualization of the right hepatic artery and right hepatic vein, potentially related to technique or patient condition, rather than vascular abnormality. Otherwise normal patent hepatic vasculature. 3. Right pleural effusion and small volume ascites.   I personally reviewed the image(s)/study and resident interpretation as stated by Dr. Rox Ty MD. I agree with the findings as stated. This study was interpreted at [facility], [place], [place].   MACRO: None   Signed by: Mike Moreno 9/18/2024 12:25 PM Dictation workstation:   [id number]      Assessment/Plan      Assessment & Plan  Infarction of liver (HHS-HCC)    A 49 y.o. 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 CT C/A/P showed hepatic infarction c/f hepatic artery thrombosis. No PE noted. 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  #Ascites  ::Admission labs: T-bili 0.4, ALT 31, AST 22, alb <1.5, Tprot 4.4  ::Labs (9/19/24): T-bili 0.6, ALT 85, AST 19, alb 1.6, Tprot <3.0  ::US liver doppler (9/18/24): hepatomegaly with likely fatty infiltration, several hepatic lesions; limited visualization of the hepatic artery  :CT A/P w/ con (9/17/24): globally abnormal liver compatible with ischemia/infarction, ischemia most likely related to hepatic artery thrombosis in branches to R and L hepatic lobes in the ivonne hepatis   ::Hepatology recs: consider liver biopsy if LFTs rise following next pembro dose (poss ICI hepatitis)  ::Last pembro dose on 7/12/24  ::Hypoalbuminemia possibly 2/2 malnutrition and liver mets  ::Stool alpha-1 antitrypsin 106  ::CT enterography (9/19/24): improved splenic flexure colitis, similar splenic flexure mass with associated enlarged adjacent mesenteric LNs, diffuse abdominal wall edema, pleural effusions, ascites, hepatic steatosis  -Consider liver biopsy  -Daily RFPs, LFTs, Mg, Coag panel  -Hepatology following  -Albumin 25 g prior to diuresis    #Hypotension  ::Troponin 3, BNP 50  ::s/p albumin and LR in ED, and 500 mL albumin in SDU  ::Baseline SBP is in low 90s    #Microcytic anemia  ::hgb 7.2 (downtrending), MCV 78 (9/19/24)    #Left peroneal vein DVT on Lovenox  ::Duplex LE (9/6/24): L peroneal vein DVT  ::DVT scan (9/19/24): no DVTs noted in bilateral lower extremities  -On heparin gtt    #Metastatic distal transverse colon adenocarcinoma  ::Primary oncologist Dr. Finn; on Pembrolizumab therapy outpatient  ::CT A/P w/ con (9/18/24): tumor of the distal transverse colon with associated mesenteric desmoplastic reaction, adjacent enlarged mesenteric lymph nodes    #Bilateral pleural effusions  ::Stable on room air  ::vbg: pH 7.42, pCO2 35, O2 45  ::CT PE (9/17/24):  no PE, bilateral pleural effusions with compressive atelectasis  -Pulm consulted    #Severe malnutrition related to chronic disease  ::23% down from normal body weight  ::Nutrition recs: Regular PO diet once ready, Ensure Plus TID  -Adult regular diet with nutritional supplementation    Fluids: PRN  Electrolytes: PRN  Nutrition: Regular Diet  DVT ppx: heparin gtt  GI ppx: PPI  Bowel ppx: Held due to loose stools  Access/Lines/Drains: PIV, Mediport    Code Status: Full Code  Emergency Contact: Lora Lawrence (wife) [phone]      Queen JOSE MIGUEL Hudson MD

## 2024-09-21 PROBLEM — I82.4Y9 DEEP VEIN THROMBOSIS (DVT) OF PROXIMAL LOWER EXTREMITY (MULTI): Status: ACTIVE | Noted: 2024-09-21

## 2024-09-21 LAB
ALBUMIN SERPL BCP-MCNC: 1.6 G/DL (ref 3.4–5)
ALBUMIN SERPL BCP-MCNC: 1.9 G/DL (ref 3.4–5)
ALP SERPL-CCNC: 103 U/L (ref 33–120)
ALT SERPL W P-5'-P-CCNC: 78 U/L (ref 10–52)
ANION GAP SERPL CALC-SCNC: 11 MMOL/L (ref 10–20)
ANION GAP SERPL CALC-SCNC: 11 MMOL/L (ref 10–20)
APTT PPP: 200 SECONDS (ref 27–38)
AST SERPL W P-5'-P-CCNC: 11 U/L (ref 9–39)
BILIRUB DIRECT SERPL-MCNC: 0.3 MG/DL (ref 0–0.3)
BILIRUB SERPL-MCNC: 0.6 MG/DL (ref 0–1.2)
BUN SERPL-MCNC: 10 MG/DL (ref 6–23)
BUN SERPL-MCNC: 9 MG/DL (ref 6–23)
CA-I BLD-SCNC: 0.82 MMOL/L (ref 1.1–1.33)
CA-I BLD-SCNC: 0.96 MMOL/L (ref 1.1–1.33)
CALCIUM SERPL-MCNC: 5.5 MG/DL (ref 8.6–10.6)
CALCIUM SERPL-MCNC: 5.9 MG/DL (ref 8.6–10.6)
CHLORIDE SERPL-SCNC: 103 MMOL/L (ref 98–107)
CHLORIDE SERPL-SCNC: 105 MMOL/L (ref 98–107)
CO2 SERPL-SCNC: 22 MMOL/L (ref 21–32)
CO2 SERPL-SCNC: 24 MMOL/L (ref 21–32)
CREAT SERPL-MCNC: 0.37 MG/DL (ref 0.5–1.3)
CREAT SERPL-MCNC: 0.41 MG/DL (ref 0.5–1.3)
EGFRCR SERPLBLD CKD-EPI 2021: >90 ML/MIN/1.73M*2
EGFRCR SERPLBLD CKD-EPI 2021: >90 ML/MIN/1.73M*2
ERYTHROCYTE [DISTWIDTH] IN BLOOD BY AUTOMATED COUNT: 18.5 % (ref 11.5–14.5)
GLUCOSE SERPL-MCNC: 113 MG/DL (ref 74–99)
GLUCOSE SERPL-MCNC: 116 MG/DL (ref 74–99)
HCT VFR BLD AUTO: 20 % (ref 41–52)
HCT VFR BLD AUTO: 22.3 % (ref 41–52)
HGB BLD-MCNC: 6.7 G/DL (ref 13.5–17.5)
HGB BLD-MCNC: 7.4 G/DL (ref 13.5–17.5)
INR PPP: 1.7 (ref 0.9–1.1)
MAGNESIUM SERPL-MCNC: 1.77 MG/DL (ref 1.6–2.4)
MAGNESIUM SERPL-MCNC: 1.93 MG/DL (ref 1.6–2.4)
MCH RBC QN AUTO: 27.1 PG (ref 26–34)
MCHC RBC AUTO-ENTMCNC: 33.5 G/DL (ref 32–36)
MCV RBC AUTO: 81 FL (ref 80–100)
NRBC BLD-RTO: 0 /100 WBCS (ref 0–0)
PHOSPHATE SERPL-MCNC: 2.8 MG/DL (ref 2.5–4.9)
PHOSPHATE SERPL-MCNC: 3.2 MG/DL (ref 2.5–4.9)
PLATELET # BLD AUTO: 207 X10*3/UL (ref 150–450)
POTASSIUM SERPL-SCNC: 3.3 MMOL/L (ref 3.5–5.3)
POTASSIUM SERPL-SCNC: 3.7 MMOL/L (ref 3.5–5.3)
PROT SERPL-MCNC: <3 G/DL (ref 6.4–8.2)
PROTHROMBIN TIME: 19.6 SECONDS (ref 9.8–12.8)
RBC # BLD AUTO: 2.47 X10*6/UL (ref 4.5–5.9)
SODIUM SERPL-SCNC: 134 MMOL/L (ref 136–145)
SODIUM SERPL-SCNC: 135 MMOL/L (ref 136–145)
UFH PPP CHRO-ACNC: 0.2 IU/ML
UFH PPP CHRO-ACNC: 0.3 IU/ML
WBC # BLD AUTO: 9.1 X10*3/UL (ref 4.4–11.3)

## 2024-09-21 PROCEDURE — 2500000002 HC RX 250 W HCPCS SELF ADMINISTERED DRUGS (ALT 637 FOR MEDICARE OP, ALT 636 FOR OP/ED)

## 2024-09-21 PROCEDURE — P9045 ALBUMIN (HUMAN), 5%, 250 ML: HCPCS | Mod: JZ

## 2024-09-21 PROCEDURE — 83735 ASSAY OF MAGNESIUM: CPT | Performed by: NURSE PRACTITIONER

## 2024-09-21 PROCEDURE — 2500000005 HC RX 250 GENERAL PHARMACY W/O HCPCS

## 2024-09-21 PROCEDURE — 2500000001 HC RX 250 WO HCPCS SELF ADMINISTERED DRUGS (ALT 637 FOR MEDICARE OP): Performed by: NURSE PRACTITIONER

## 2024-09-21 PROCEDURE — 2500000004 HC RX 250 GENERAL PHARMACY W/ HCPCS (ALT 636 FOR OP/ED): Mod: JZ

## 2024-09-21 PROCEDURE — 82330 ASSAY OF CALCIUM: CPT

## 2024-09-21 PROCEDURE — 85027 COMPLETE CBC AUTOMATED: CPT | Performed by: NURSE PRACTITIONER

## 2024-09-21 PROCEDURE — 83735 ASSAY OF MAGNESIUM: CPT

## 2024-09-21 PROCEDURE — 2500000004 HC RX 250 GENERAL PHARMACY W/ HCPCS (ALT 636 FOR OP/ED)

## 2024-09-21 PROCEDURE — 85520 HEPARIN ASSAY: CPT

## 2024-09-21 PROCEDURE — 80053 COMPREHEN METABOLIC PANEL: CPT | Performed by: NURSE PRACTITIONER

## 2024-09-21 PROCEDURE — 85610 PROTHROMBIN TIME: CPT

## 2024-09-21 PROCEDURE — 80069 RENAL FUNCTION PANEL: CPT | Mod: CCI

## 2024-09-21 PROCEDURE — 85014 HEMATOCRIT: CPT

## 2024-09-21 PROCEDURE — 99233 SBSQ HOSP IP/OBS HIGH 50: CPT | Performed by: INTERNAL MEDICINE

## 2024-09-21 PROCEDURE — 80076 HEPATIC FUNCTION PANEL: CPT | Performed by: NURSE PRACTITIONER

## 2024-09-21 PROCEDURE — 1170000001 HC PRIVATE ONCOLOGY ROOM DAILY

## 2024-09-21 PROCEDURE — 84100 ASSAY OF PHOSPHORUS: CPT | Performed by: NURSE PRACTITIONER

## 2024-09-21 RX ORDER — CALCIUM GLUCONATE 20 MG/ML
1 INJECTION, SOLUTION INTRAVENOUS EVERY 4 HOURS
Status: DISCONTINUED | OUTPATIENT
Start: 2024-09-21 | End: 2024-09-21

## 2024-09-21 RX ORDER — POTASSIUM CHLORIDE 20 MEQ/1
40 TABLET, EXTENDED RELEASE ORAL ONCE
Status: COMPLETED | OUTPATIENT
Start: 2024-09-21 | End: 2024-09-21

## 2024-09-21 RX ORDER — POTASSIUM CHLORIDE 20 MEQ/1
20 TABLET, EXTENDED RELEASE ORAL ONCE
Status: COMPLETED | OUTPATIENT
Start: 2024-09-21 | End: 2024-09-21

## 2024-09-21 RX ORDER — ALBUMIN HUMAN 50 G/1000ML
25 SOLUTION INTRAVENOUS ONCE
Status: COMPLETED | OUTPATIENT
Start: 2024-09-21 | End: 2024-09-21

## 2024-09-21 RX ORDER — MAGNESIUM SULFATE HEPTAHYDRATE 40 MG/ML
2 INJECTION, SOLUTION INTRAVENOUS ONCE
Status: COMPLETED | OUTPATIENT
Start: 2024-09-21 | End: 2024-09-21

## 2024-09-21 RX ORDER — CALCIUM GLUCONATE 20 MG/ML
2 INJECTION, SOLUTION INTRAVENOUS EVERY 4 HOURS
Status: COMPLETED | OUTPATIENT
Start: 2024-09-21 | End: 2024-09-21

## 2024-09-21 RX ORDER — FUROSEMIDE 10 MG/ML
20 INJECTION INTRAMUSCULAR; INTRAVENOUS ONCE
Status: COMPLETED | OUTPATIENT
Start: 2024-09-21 | End: 2024-09-21

## 2024-09-21 ASSESSMENT — COGNITIVE AND FUNCTIONAL STATUS - GENERAL
DAILY ACTIVITIY SCORE: 24
MOBILITY SCORE: 24

## 2024-09-21 ASSESSMENT — PAIN SCALES - GENERAL
PAINLEVEL_OUTOF10: 0 - NO PAIN
PAINLEVEL_OUTOF10: 0 - NO PAIN

## 2024-09-21 ASSESSMENT — PAIN - FUNCTIONAL ASSESSMENT: PAIN_FUNCTIONAL_ASSESSMENT: 0-10

## 2024-09-21 NOTE — CARE PLAN
The patient's goals for the shift include pt will remain HDS for the shift    The clinical goals for the shift include patient will remain safe and free from injury this shift 9/21/24 0700      Problem: Fall/Injury  Goal: Not fall by end of shift  Outcome: Progressing  Goal: Be free from injury by end of the shift  Outcome: Progressing  Goal: Verbalize understanding of personal risk factors for fall in the hospital  Outcome: Progressing  Goal: Verbalize understanding of risk factor reduction measures to prevent injury from fall in the home  Outcome: Progressing  Goal: Use assistive devices by end of the shift  Outcome: Progressing  Goal: Pace activities to prevent fatigue by end of the shift  Outcome: Progressing     Problem: Safety - Adult  Goal: Free from fall injury  Outcome: Progressing

## 2024-09-21 NOTE — PROGRESS NOTES
"Yon Lawrence \"Vladimir\" is a 49 y.o. male on day 4 of admission presenting with Infarction of liver ([facility]-HCC).      Subjective   NAEO. This AM, no acute concerns. Denies melena, hematochezia, hematemesis, dyspnea.        Objective     Last Recorded Vitals  /71   Pulse 75   Temp 36.8 °C (98.2 °F) (Temporal)   Resp 16   Wt 84.6 kg (186 lb 8 oz)   SpO2 95%   Intake/Output last 3 Shifts:    Intake/Output Summary (Last 24 hours) at 9/21/2024 0711  Last data filed at 9/21/2024 0347  Gross per 24 hour   Intake 757.43 ml   Output --   Net 757.43 ml       Admission Weight  Weight: 87 kg (191 lb 12.8 oz) (09/17/24 1800)    Daily Weight  09/18/24 : 84.6 kg (186 lb 8 oz)    Image Results  CT enterography w contrast  Narrative: Interpreted By:  Mike Moreno,  Eliza Alonzo   STUDY:  CT ENTEROGRAPHY WITH  IV CONTRAST;  9/19/2024 3:04 pm      INDICATION:  Signs/Symptoms:evaluation of protein absorption d/t severely low  protein/albumin.      COMPARISON:  CT abdomen/pelvis from 08/22/2024 and 09/17/2024.      ACCESSION NUMBER(S):  [id number]      ORDERING CLINICIAN:  ESTEBAN BEE      TECHNIQUE:  CT of the abdomen was performed.  Contiguous axial images were  obtained at 3 mm slice thickness through the abdomen. Coronal and  sagittal reconstructions at 3 mm slice thickness were performed. 75  ML of Omnipaque 350 was administered intravenously without immediate  complication.      FINDINGS:  LOWER CHEST:  The visualized lung base is unremarkable. The heart is normal in size  without evidence of pericardial effusion. Bilateral moderate pleural  effusion with overlying atelectasis which is similar to prior  imaging..      ABDOMEN:      LIVER:  There is diffuse decreased attenuation of the liver, with patchy  areas of slightly increased attenuation. Given this decreased  attenuation, the patient's known metastatic disease is not well  evaluated on this exam. The liver is enlarged measuring 20.3 Cm in  maximum " craniocaudal dimension..      BILE DUCTS:  The bile ducts are not dilated.      GALLBLADDER:  The gallbladder is not distended and without calcified stones.      PANCREAS:  The pancreas appears unremarkable.      SPLEEN:  Within normal limits.      ADRENAL GLANDS:  Bilateral adrenal glands appear normal.      KIDNEYS AND URETERS:  The kidneys have normal size and enhance symmetrically. There is  similar-appearing 2.3 cm exophytic simple cyst at the inferior pole  of the right kidney. (Series 2, image 87) No hydroureteronephrosis or  nephroureterolithiasis is present.      BOWEL:  The stomach is unremarkable.  The small is normal in caliber and  demonstrate no wall thickening. There is similar-appearing of a 5.0 x  4.8 cm mass at the splenic flexure of the colon (series 2, image 90)  There is improvement of the colitis of the sigmoid flexure with a  decrease in wall thickening and mucosal edema. The previously seen  fistulization between small bowel and the malignancy is not well  assessed on this exam without oral contrast. The appendix appears  normal.      VESSELS:  There is no aneurysmal dilatation of the abdominal aorta. The IVC is  within normal limits. No thrombus or stenosis of the arteries are  seen.      PERITONEUM/RETROPERITONEUM/LYMPH NODES:  There is a small volume ascites. There is no free air. Mesenteric  edema is noted the retroperitoneum appears unremarkable.  Similar-appearing enlarged lymph nodes adjacent to the tumor with the  largest measuring 1.2 cm (series 2, image 98).      ABDOMINAL WALL:  There is diffuse edema of the abdominal wall soft tissues      BONE AND SOFT TISSUE:  No suspicious osseous lesions are present. Degenerative discogenic  disease is noted in the lower thoracic and lumbar spine.      Impression: 1. Similar-appearing 5.0 x 4.8 cm mass at the splenic flexure, with  associated enlarged adjacent mesenteric lymph nodes. Full  characterization of the fistula tracts with the  adjacent small bowel  is limited on this exam given lack of enteric contrast. No new fluid  collection.  2. There is decreased attenuation of the liver consistent with  hepatic steatosis which is similar to the prior study. Given this  finding, patient's known metastatic disease liver is not well  evaluated on this study.  3. Diffuse edema of the abdominal body wall soft tissue, pleural  effusions, and ascites which are consistent with volume overload  state.  4. Additional chronic findings as described above.          I personally reviewed the images/study and I agree with the findings  as stated by Clinton Ku MD, PGY-2 this study was interpreted at  [place], [place].      MACRO:  None      Signed by: Mike Moreno 9/20/2024 10:11 AM  Dictation workstation:   [id number]  Vascular US lower extremity venous duplex bilateral              [place] [address]    Tel [phone] and Fax [phone]       Vascular Lab Report  VASC US LOWER EXTREMITY VENOUS DUPLEX BILATERAL       Patient Name:     ALLYN Goode Physician: [id number] Sada Galvan MD  Study Date:       9/19/2024           Ordering           [id number] SEE DIETRICH                                        Physician:         BASHIR  MRN/PID:          [id number]            Technologist:      Reyna Schultz RVT, RDMS  Accession#:       [id number]        Technologist 2:  Date of           1975 / 49      Encounter#:        [id number]  Birth/Age:        years  Gender:           M  Admission Status: Inpatient           Location           [facility]                                        Performed:       Diagnosis/ICD: Localized (leg) edema-R60.0  CPT Codes:     38271 Peripheral venous duplex scan for DVT complete       Patient History Hx of left peroneal thrombus 9/6.        CONCLUSIONS:  Right Lower Venous: No evidence of acute deep vein thrombus visualized in the right lower extremity.  Left Lower Venous: No propagation of the known peroneal thrombus.     Comparison:  Compared with study from 9/6/2024, no significant change.     Imaging & Doppler Findings:     Right                 Compressible Thrombus        Flow  Distal External Iliac                None   Spontaneous/Phasic  CFV                       Yes        None   Spontaneous/Phasic  PFV                       Yes        None  FV Proximal               Yes        None   Spontaneous/Phasic  FV Mid                    Yes        None  FV Distal                 Yes        None  Popliteal                 Yes        None   Spontaneous/Phasic  Peroneal                  Yes        None  PTV                       Yes        None       Left                  Compress    Thrombus            Flow  Distal External Iliac               None       Spontaneous/Phasic  CFV                     Yes         None       Spontaneous/Phasic  PFV                     Yes         None  FV Proximal             Yes         None       Spontaneous/Phasic  FV Mid                  Yes         None  FV Distal               Yes         None  Popliteal               Yes         None       Spontaneous/Phasic  Peroneal                 No    Acute occlusive  PTV                     Yes         None       [id number] Sada Galvan MD  Electronically signed by [id number] Sada Galvan MD on 9/20/2024 at 7:42:47 AM       ** Final **    PE:   Constitutional: NAD  HEENT: NCAT. EOMI.   Respiratory: CTAB anteriorly. Normal respiratory effort on RA.  Cardiovascular: RRR  Abdominal: soft, NTND  Neuro: A&Ox3  MSK: 3+ peripheral edema up to knees in BLE    Relevant Results  Scheduled medications  calcium gluconate, 1 g, intravenous, q4h  pantoprazole, 40 mg, oral, Daily before breakfast      Continuous medications  heparin, 0-4,500 Units/hr, Last Rate: 1,600 Units/hr (09/21/24 0519)      PRN  medications  PRN medications: diphenhydramine-zinc acetate, heparin, melatonin, polyethylene glycol     Results for orders placed or performed during the hospital encounter of 09/17/24 (from the past 24 hour(s))   Heparin Assay, UFH   Result Value Ref Range    Heparin Unfractionated 0.5 See Comment Below for Therapeutic Ranges IU/mL   Heparin Assay, UFH   Result Value Ref Range    Heparin Unfractionated 0.3 See Comment Below for Therapeutic Ranges IU/mL   Renal function panel   Result Value Ref Range    Glucose 140 (H) 74 - 99 mg/dL    Sodium 132 (L) 136 - 145 mmol/L    Potassium 3.9 3.5 - 5.3 mmol/L    Chloride 106 98 - 107 mmol/L    Bicarbonate 19 (L) 21 - 32 mmol/L    Anion Gap 11 10 - 20 mmol/L    Urea Nitrogen 11 6 - 23 mg/dL    Creatinine 0.41 (L) 0.50 - 1.30 mg/dL    eGFR >90 >60 mL/min/1.73m*2    Calcium 5.2 (LL) 8.6 - 10.6 mg/dL    Phosphorus 2.3 (L) 2.5 - 4.9 mg/dL    Albumin 1.5 (L) 3.4 - 5.0 g/dL   Calcium, ionized   Result Value Ref Range    POCT Calcium, Ionized 0.96 (L) 1.1 - 1.33 mmol/L   Coagulation Screen   Result Value Ref Range    Protime 19.6 (H) 9.8 - 12.8 seconds    INR 1.7 (H) 0.9 - 1.1    aPTT 200 (HH) 27 - 38 seconds   CBC   Result Value Ref Range    WBC 9.1 4.4 - 11.3 x10*3/uL    nRBC 0.0 0.0 - 0.0 /100 WBCs    RBC 2.47 (L) 4.50 - 5.90 x10*6/uL    Hemoglobin 6.7 (L) 13.5 - 17.5 g/dL    Hematocrit 20.0 (L) 41.0 - 52.0 %    MCV 81 80 - 100 fL    MCH 27.1 26.0 - 34.0 pg    MCHC 33.5 32.0 - 36.0 g/dL    RDW 18.5 (H) 11.5 - 14.5 %    Platelets 207 150 - 450 x10*3/uL   Hepatic Function Panel   Result Value Ref Range    Albumin 1.6 (L) 3.4 - 5.0 g/dL    Bilirubin, Total 0.6 0.0 - 1.2 mg/dL    Bilirubin, Direct 0.3 0.0 - 0.3 mg/dL    Alkaline Phosphatase 103 33 - 120 U/L    ALT 78 (H) 10 - 52 U/L    AST 11 9 - 39 U/L    Total Protein <3.0 (L) 6.4 - 8.2 g/dL   Magnesium   Result Value Ref Range    Magnesium 1.77 1.60 - 2.40 mg/dL   Phosphorus   Result Value Ref Range    Phosphorus 3.2 2.5 - 4.9  mg/dL   Basic Metabolic Panel   Result Value Ref Range    Glucose 113 (H) 74 - 99 mg/dL    Sodium 134 (L) 136 - 145 mmol/L    Potassium 3.7 3.5 - 5.3 mmol/L    Chloride 103 98 - 107 mmol/L    Bicarbonate 24 21 - 32 mmol/L    Anion Gap 11 10 - 20 mmol/L    Urea Nitrogen 10 6 - 23 mg/dL    Creatinine 0.37 (L) 0.50 - 1.30 mg/dL    eGFR >90 >60 mL/min/1.73m*2    Calcium 5.9 (L) 8.6 - 10.6 mg/dL   Heparin Assay, UFH   Result Value Ref Range    Heparin Unfractionated 0.2 See Comment Below for Therapeutic Ranges IU/mL   Hemoglobin and hematocrit, blood   Result Value Ref Range    Hemoglobin 7.4 (L) 13.5 - 17.5 g/dL    Hematocrit 22.3 (L) 41.0 - 52.0 %      Assessment/Plan      49 y.o. 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 CT C/A/P showed hepatic infarction c/f hepatic artery thrombosis. No PE noted. 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      Updates 9/21/24:  -Hepatology recommending trending LFTs (improving), high protein diet, nutrition c/s. Unlikely ICI-induced hepatitis and no liver biopsy at this point. If worsening LFTs can follow up in hepatology clinic (Dr. 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  #Ascites  ::Admission labs: T-bili 0.4, ALT 31, AST 22, alb <1.5, Tprot 4.4  ::Labs (9/19/24): T-bili 0.6, ALT 85, AST 19, alb 1.6, Tprot <3.0  ::US  liver doppler (9/18/24): hepatomegaly with likely fatty infiltration, several hepatic lesions; limited visualization of the hepatic artery  :CT A/P w/ con (9/17/24): globally abnormal liver compatible with ischemia/infarction, ischemia most likely related to hepatic artery thrombosis in branches to R and L hepatic lobes in the ivonne hepatis   ::Hepatology recs: consider liver biopsy if LFTs rise following next pembro dose (poss ICI hepatitis)  ::Last pembro dose on 7/12/24  ::Hypoalbuminemia possibly 2/2 malnutrition and liver mets  ::Stool alpha-1 antitrypsin 106  ::CT enterography (9/19/24): improved splenic flexure colitis, similar splenic flexure mass with associated enlarged adjacent mesenteric LNs, diffuse abdominal wall edema, pleural effusions, ascites, hepatic steatosis  -Hepatology previously consulted  -Daily RFPs, LFTs, Mg, Coag panel  -Albumin 25 g, lasix 20 mg   -High protein diet     #Hypotension  ::Troponin 3, BNP 50  ::s/p albumin and LR in ED, and 500 mL albumin in SDU  ::Baseline SBP is in low 90s     #Microcytic anemia  ::hgb 7.2 (downtrending), MCV 78 (9/19/24)  -CTM on heparin gtt  -No active signs of bleeding     #Left peroneal vein DVT on Lovenox  ::Duplex LE (9/6/24): L peroneal vein DVT  ::DVT scan (9/19/24): no DVTs noted in bilateral lower extremities  -On heparin gtt     #Metastatic distal transverse colon adenocarcinoma  ::Primary oncologist Dr. Finn; on Pembrolizumab therapy outpatient  ::CT A/P w/ con (9/18/24): tumor of the distal transverse colon with associated mesenteric desmoplastic reaction, adjacent enlarged mesenteric lymph nodes     #Bilateral pleural effusions  ::Stable on room air  ::vbg: pH 7.42, pCO2 35, O2 45  ::CT PE (9/17/24): no PE, bilateral pleural effusions with compressive atelectasis  -Diuresis, high protein diet per above  -No indication for thoracentesis at this point per pulm     #Severe malnutrition related to chronic disease  ::23% down from normal body  weight  ::Nutrition recs: Regular PO diet once ready, Ensure Plus TID  -High protein diet  -Nutrition consulted     Fluids: PRN  Electrolytes: PRN  Nutrition: High protein diet  DVT ppx: heparin gtt  GI ppx: PPI  Bowel ppx: Held due to loose stools  Access/Lines/Drains: PIV, Mediport     Code Status: Full Code  Emergency Contact: Lora Lawrence (wife) [phone]         Shonda Marion MD

## 2024-09-22 VITALS
RESPIRATION RATE: 16 BRPM | WEIGHT: 186.5 LBS | TEMPERATURE: 98.6 F | SYSTOLIC BLOOD PRESSURE: 100 MMHG | BODY MASS INDEX: 24.72 KG/M2 | HEART RATE: 78 BPM | DIASTOLIC BLOOD PRESSURE: 66 MMHG | HEIGHT: 73 IN | OXYGEN SATURATION: 96 %

## 2024-09-22 LAB
ABO GROUP (TYPE) IN BLOOD: NORMAL
ALBUMIN SERPL BCP-MCNC: 1.7 G/DL (ref 3.4–5)
ALP SERPL-CCNC: 101 U/L (ref 33–120)
ALT SERPL W P-5'-P-CCNC: 70 U/L (ref 10–52)
ANION GAP SERPL CALC-SCNC: 10 MMOL/L (ref 10–20)
ANTIBODY SCREEN: NORMAL
AST SERPL W P-5'-P-CCNC: 12 U/L (ref 9–39)
BASOPHILS # BLD AUTO: 0.01 X10*3/UL (ref 0–0.1)
BASOPHILS NFR BLD AUTO: 0.1 %
BILIRUB DIRECT SERPL-MCNC: 0.5 MG/DL (ref 0–0.3)
BILIRUB SERPL-MCNC: 1 MG/DL (ref 0–1.2)
BLOOD EXPIRATION DATE: NORMAL
BUN SERPL-MCNC: 9 MG/DL (ref 6–23)
CA-I BLD-SCNC: 0.9 MMOL/L (ref 1.1–1.33)
CALCIUM SERPL-MCNC: 5.6 MG/DL (ref 8.6–10.6)
CHLORIDE SERPL-SCNC: 103 MMOL/L (ref 98–107)
CO2 SERPL-SCNC: 26 MMOL/L (ref 21–32)
CREAT SERPL-MCNC: 0.35 MG/DL (ref 0.5–1.3)
DISPENSE STATUS: NORMAL
EGFRCR SERPLBLD CKD-EPI 2021: >90 ML/MIN/1.73M*2
EOSINOPHIL # BLD AUTO: 0.02 X10*3/UL (ref 0–0.7)
EOSINOPHIL NFR BLD AUTO: 0.2 %
ERYTHROCYTE [DISTWIDTH] IN BLOOD BY AUTOMATED COUNT: 18.5 % (ref 11.5–14.5)
ERYTHROCYTE [DISTWIDTH] IN BLOOD BY AUTOMATED COUNT: 18.6 % (ref 11.5–14.5)
FERRITIN SERPL-MCNC: 662 NG/ML (ref 20–300)
FOLATE SERPL-MCNC: 13 NG/ML
GLUCOSE SERPL-MCNC: 97 MG/DL (ref 74–99)
HAPTOGLOB SERPL NEPH-MCNC: 106 MG/DL (ref 30–200)
HCT VFR BLD AUTO: 18.5 % (ref 41–52)
HCT VFR BLD AUTO: 18.7 % (ref 41–52)
HCT VFR BLD AUTO: 24.4 % (ref 41–52)
HGB BLD-MCNC: 6.3 G/DL (ref 13.5–17.5)
HGB BLD-MCNC: 6.4 G/DL (ref 13.5–17.5)
HGB BLD-MCNC: 8.1 G/DL (ref 13.5–17.5)
IMM GRANULOCYTES # BLD AUTO: 0.04 X10*3/UL (ref 0–0.7)
IMM GRANULOCYTES NFR BLD AUTO: 0.4 % (ref 0–0.9)
IRON SATN MFR SERPL: ABNORMAL %
IRON SERPL-MCNC: 31 UG/DL (ref 35–150)
LDH SERPL L TO P-CCNC: 471 U/L (ref 84–246)
LYMPHOCYTES # BLD AUTO: 4.63 X10*3/UL (ref 1.2–4.8)
LYMPHOCYTES NFR BLD AUTO: 47.5 %
MAGNESIUM SERPL-MCNC: 1.83 MG/DL (ref 1.6–2.4)
MCH RBC QN AUTO: 26.6 PG (ref 26–34)
MCH RBC QN AUTO: 27.2 PG (ref 26–34)
MCHC RBC AUTO-ENTMCNC: 33.2 G/DL (ref 32–36)
MCHC RBC AUTO-ENTMCNC: 34.2 G/DL (ref 32–36)
MCV RBC AUTO: 80 FL (ref 80–100)
MCV RBC AUTO: 80 FL (ref 80–100)
MONOCYTES # BLD AUTO: 0.47 X10*3/UL (ref 0.1–1)
MONOCYTES NFR BLD AUTO: 4.8 %
NEUTROPHILS # BLD AUTO: 4.58 X10*3/UL (ref 1.2–7.7)
NEUTROPHILS NFR BLD AUTO: 47 %
NRBC BLD-RTO: 0 /100 WBCS (ref 0–0)
NRBC BLD-RTO: 0 /100 WBCS (ref 0–0)
PHOSPHATE SERPL-MCNC: 3.5 MG/DL (ref 2.5–4.9)
PLATELET # BLD AUTO: 155 X10*3/UL (ref 150–450)
PLATELET # BLD AUTO: 163 X10*3/UL (ref 150–450)
POTASSIUM SERPL-SCNC: 3.7 MMOL/L (ref 3.5–5.3)
PRODUCT BLOOD TYPE: 5100
PRODUCT CODE: NORMAL
PROT SERPL-MCNC: <3 G/DL (ref 6.4–8.2)
RBC # BLD AUTO: 2.35 X10*6/UL (ref 4.5–5.9)
RBC # BLD AUTO: 3.04 X10*6/UL (ref 4.5–5.9)
RH FACTOR (ANTIGEN D): NORMAL
SODIUM SERPL-SCNC: 135 MMOL/L (ref 136–145)
TIBC SERPL-MCNC: ABNORMAL UG/DL
UFH PPP CHRO-ACNC: 0.4 IU/ML
UFH PPP CHRO-ACNC: 0.5 IU/ML
UIBC SERPL-MCNC: <55 UG/DL (ref 110–370)
UNIT ABO: NORMAL
UNIT NUMBER: NORMAL
UNIT RH: NORMAL
UNIT VOLUME: 350
VIT B12 SERPL-MCNC: 650 PG/ML (ref 211–911)
WBC # BLD AUTO: 8.5 X10*3/UL (ref 4.4–11.3)
WBC # BLD AUTO: 9.8 X10*3/UL (ref 4.4–11.3)
XM INTEP: NORMAL

## 2024-09-22 PROCEDURE — 83735 ASSAY OF MAGNESIUM: CPT | Performed by: NURSE PRACTITIONER

## 2024-09-22 PROCEDURE — 85520 HEPARIN ASSAY: CPT

## 2024-09-22 PROCEDURE — 84100 ASSAY OF PHOSPHORUS: CPT | Performed by: NURSE PRACTITIONER

## 2024-09-22 PROCEDURE — 82607 VITAMIN B-12: CPT

## 2024-09-22 PROCEDURE — 82728 ASSAY OF FERRITIN: CPT

## 2024-09-22 PROCEDURE — 82374 ASSAY BLOOD CARBON DIOXIDE: CPT | Performed by: NURSE PRACTITIONER

## 2024-09-22 PROCEDURE — 86923 COMPATIBILITY TEST ELECTRIC: CPT

## 2024-09-22 PROCEDURE — 99233 SBSQ HOSP IP/OBS HIGH 50: CPT | Performed by: STUDENT IN AN ORGANIZED HEALTH CARE EDUCATION/TRAINING PROGRAM

## 2024-09-22 PROCEDURE — 85014 HEMATOCRIT: CPT

## 2024-09-22 PROCEDURE — 83615 LACTATE (LD) (LDH) ENZYME: CPT

## 2024-09-22 PROCEDURE — 83010 ASSAY OF HAPTOGLOBIN QUANT: CPT

## 2024-09-22 PROCEDURE — 82330 ASSAY OF CALCIUM: CPT

## 2024-09-22 PROCEDURE — 30233N1 TRANSFUSION OF NONAUTOLOGOUS RED BLOOD CELLS INTO PERIPHERAL VEIN, PERCUTANEOUS APPROACH: ICD-10-PCS | Performed by: STUDENT IN AN ORGANIZED HEALTH CARE EDUCATION/TRAINING PROGRAM

## 2024-09-22 PROCEDURE — 84075 ASSAY ALKALINE PHOSPHATASE: CPT | Performed by: NURSE PRACTITIONER

## 2024-09-22 PROCEDURE — 82746 ASSAY OF FOLIC ACID SERUM: CPT

## 2024-09-22 PROCEDURE — 2500000001 HC RX 250 WO HCPCS SELF ADMINISTERED DRUGS (ALT 637 FOR MEDICARE OP): Performed by: NURSE PRACTITIONER

## 2024-09-22 PROCEDURE — 85025 COMPLETE CBC W/AUTO DIFF WBC: CPT

## 2024-09-22 PROCEDURE — 86901 BLOOD TYPING SEROLOGIC RH(D): CPT

## 2024-09-22 PROCEDURE — 36430 TRANSFUSION BLD/BLD COMPNT: CPT

## 2024-09-22 PROCEDURE — 2500000004 HC RX 250 GENERAL PHARMACY W/ HCPCS (ALT 636 FOR OP/ED)

## 2024-09-22 PROCEDURE — 1170000001 HC PRIVATE ONCOLOGY ROOM DAILY

## 2024-09-22 PROCEDURE — 83540 ASSAY OF IRON: CPT

## 2024-09-22 PROCEDURE — P9040 RBC LEUKOREDUCED IRRADIATED: HCPCS

## 2024-09-22 PROCEDURE — 2500000004 HC RX 250 GENERAL PHARMACY W/ HCPCS (ALT 636 FOR OP/ED): Mod: JZ

## 2024-09-22 PROCEDURE — 85027 COMPLETE CBC AUTOMATED: CPT | Performed by: NURSE PRACTITIONER

## 2024-09-22 RX ORDER — FUROSEMIDE 10 MG/ML
20 INJECTION INTRAMUSCULAR; INTRAVENOUS ONCE
Status: COMPLETED | OUTPATIENT
Start: 2024-09-22 | End: 2024-09-22

## 2024-09-22 ASSESSMENT — COGNITIVE AND FUNCTIONAL STATUS - GENERAL
MOBILITY SCORE: 24
DAILY ACTIVITIY SCORE: 24

## 2024-09-22 ASSESSMENT — PAIN SCALES - GENERAL: PAINLEVEL_OUTOF10: 0 - NO PAIN

## 2024-09-22 NOTE — PROGRESS NOTES
"Yon Lawrence \"Roger" is a 49 y.o. male on day 5 of admission presenting with Infarction of liver ([facility]).      Subjective   No acute concerns this morning. No melena, hematochezia, hematemesis, dyspnea.        Objective     Last Recorded Vitals  /71   Pulse 90   Temp 36.7 °C (98.1 °F) (Temporal)   Resp 18   Wt 84.6 kg (186 lb 8 oz)   SpO2 94%   Intake/Output last 3 Shifts:    Intake/Output Summary (Last 24 hours) at 9/22/2024 1149  Last data filed at 9/22/2024 0519  Gross per 24 hour   Intake 500.8 ml   Output --   Net 500.8 ml       Admission Weight  Weight: 87 kg (191 lb 12.8 oz) (09/17/24 1800)    Daily Weight  09/18/24 : 84.6 kg (186 lb 8 oz)    Image Results  CT enterography w contrast  Narrative: Interpreted By:  Mike Moreno and Dulla Kireeti   STUDY:  CT ENTEROGRAPHY WITH  IV CONTRAST;  9/19/2024 3:04 pm      INDICATION:  Signs/Symptoms:evaluation of protein absorption d/t severely low  protein/albumin.      COMPARISON:  CT abdomen/pelvis from 08/22/2024 and 09/17/2024.      ACCESSION NUMBER(S):  [id number]      ORDERING CLINICIAN:  ESTEBAN BEE      TECHNIQUE:  CT of the abdomen was performed.  Contiguous axial images were  obtained at 3 mm slice thickness through the abdomen. Coronal and  sagittal reconstructions at 3 mm slice thickness were performed. 75  ML of Omnipaque 350 was administered intravenously without immediate  complication.      FINDINGS:  LOWER CHEST:  The visualized lung base is unremarkable. The heart is normal in size  without evidence of pericardial effusion. Bilateral moderate pleural  effusion with overlying atelectasis which is similar to prior  imaging..      ABDOMEN:      LIVER:  There is diffuse decreased attenuation of the liver, with patchy  areas of slightly increased attenuation. Given this decreased  attenuation, the patient's known metastatic disease is not well  evaluated on this exam. The liver is enlarged measuring 20.3 Cm in  maximum craniocaudal " dimension..      BILE DUCTS:  The bile ducts are not dilated.      GALLBLADDER:  The gallbladder is not distended and without calcified stones.      PANCREAS:  The pancreas appears unremarkable.      SPLEEN:  Within normal limits.      ADRENAL GLANDS:  Bilateral adrenal glands appear normal.      KIDNEYS AND URETERS:  The kidneys have normal size and enhance symmetrically. There is  similar-appearing 2.3 cm exophytic simple cyst at the inferior pole  of the right kidney. (Series 2, image 87) No hydroureteronephrosis or  nephroureterolithiasis is present.      BOWEL:  The stomach is unremarkable.  The small is normal in caliber and  demonstrate no wall thickening. There is similar-appearing of a 5.0 x  4.8 cm mass at the splenic flexure of the colon (series 2, image 90)  There is improvement of the colitis of the sigmoid flexure with a  decrease in wall thickening and mucosal edema. The previously seen  fistulization between small bowel and the malignancy is not well  assessed on this exam without oral contrast. The appendix appears  normal.      VESSELS:  There is no aneurysmal dilatation of the abdominal aorta. The IVC is  within normal limits. No thrombus or stenosis of the arteries are  seen.      PERITONEUM/RETROPERITONEUM/LYMPH NODES:  There is a small volume ascites. There is no free air. Mesenteric  edema is noted the retroperitoneum appears unremarkable.  Similar-appearing enlarged lymph nodes adjacent to the tumor with the  largest measuring 1.2 cm (series 2, image 98).      ABDOMINAL WALL:  There is diffuse edema of the abdominal wall soft tissues      BONE AND SOFT TISSUE:  No suspicious osseous lesions are present. Degenerative discogenic  disease is noted in the lower thoracic and lumbar spine.      Impression: 1. Similar-appearing 5.0 x 4.8 cm mass at the splenic flexure, with  associated enlarged adjacent mesenteric lymph nodes. Full  characterization of the fistula tracts with the adjacent small  bowel  is limited on this exam given lack of enteric contrast. No new fluid  collection.  2. There is decreased attenuation of the liver consistent with  hepatic steatosis which is similar to the prior study. Given this  finding, patient's known metastatic disease liver is not well  evaluated on this study.  3. Diffuse edema of the abdominal body wall soft tissue, pleural  effusions, and ascites which are consistent with volume overload  state.  4. Additional chronic findings as described above.          I personally reviewed the images/study and I agree with the findings  as stated by Clinton Ku MD, PGY-2 this study was interpreted at  [place], [place].      MACRO:  None      Signed by: Mike Moreno 9/20/2024 10:11 AM  Dictation workstation:   [id number]  Vascular US lower extremity venous duplex bilateral              [place] [address]    Tel [phone] and Fax [phone]       Vascular Lab Report  VASC US LOWER EXTREMITY VENOUS DUPLEX BILATERAL       Patient Name:     ALLYN Goode Physician: [id number] Sada Galvan MD  Study Date:       9/19/2024           Ordering           [id number] SEE DIETRICH                                        Physician:         BASHIR  MRN/PID:          [id number]            Technologist:      Reyna Schultz RVT                                                           [facility]  Accession#:       [id number]        Technologist 2:  Date of           1975 / 49      Encounter#:        [id number]  Birth/Age:        years  Gender:           M  Admission Status: Inpatient           Location           [facility]                                        Performed:       Diagnosis/ICD: Localized (leg) edema-R60.0  CPT Codes:     03446 Peripheral venous duplex scan for DVT complete       Patient History Hx of left peroneal thrombus 9/6.       CONCLUSIONS:  Right Lower  Venous: No evidence of acute deep vein thrombus visualized in the right lower extremity.  Left Lower Venous: No propagation of the known peroneal thrombus.     Comparison:  Compared with study from 9/6/2024, no significant change.     Imaging & Doppler Findings:     Right                 Compressible Thrombus        Flow  Distal External Iliac                None   Spontaneous/Phasic  CFV                       Yes        None   Spontaneous/Phasic  PFV                       Yes        None  FV Proximal               Yes        None   Spontaneous/Phasic  FV Mid                    Yes        None  FV Distal                 Yes        None  Popliteal                 Yes        None   Spontaneous/Phasic  Peroneal                  Yes        None  PTV                       Yes        None       Left                  Compress    Thrombus            Flow  Distal External Iliac               None       Spontaneous/Phasic  CFV                     Yes         None       Spontaneous/Phasic  PFV                     Yes         None  FV Proximal             Yes         None       Spontaneous/Phasic  FV Mid                  Yes         None  FV Distal               Yes         None  Popliteal               Yes         None       Spontaneous/Phasic  Peroneal                 No    Acute occlusive  PTV                     Yes         None       [id number] Sada Galvan MD  Electronically signed by [id number] Sada Galvan MD on 9/20/2024 at 7:42:47 AM       ** Final **    Physical Exam:   Constitutional: NAD  HEENT: NCAT. EOMI.   Respiratory: CTAB anteriorly. Normal respiratory effort on RA.  Cardiovascular: RRR  Abdominal: soft, NTND  Neuro: A&Ox3  MSK: 3+ peripheral edema up to knees in BLE    Relevant Results  Scheduled medications  calcium gluconate, 3 g, intravenous, Once  furosemide, 20 mg, intravenous, Once  pantoprazole, 40 mg, oral, Daily before breakfast      Continuous medications  heparin, 0-4,500 Units/hr, Last Rate: 20 Units/hr  (09/22/24 1056)      PRN medications  PRN medications: diphenhydramine-zinc acetate, heparin, melatonin, polyethylene glycol     Results for orders placed or performed during the hospital encounter of 09/17/24 (from the past 24 hour(s))   Heparin Assay, UFH   Result Value Ref Range    Heparin Unfractionated 0.2 See Comment Below for Therapeutic Ranges IU/mL   Renal function panel   Result Value Ref Range    Glucose 116 (H) 74 - 99 mg/dL    Sodium 135 (L) 136 - 145 mmol/L    Potassium 3.3 (L) 3.5 - 5.3 mmol/L    Chloride 105 98 - 107 mmol/L    Bicarbonate 22 21 - 32 mmol/L    Anion Gap 11 10 - 20 mmol/L    Urea Nitrogen 9 6 - 23 mg/dL    Creatinine 0.41 (L) 0.50 - 1.30 mg/dL    eGFR >90 >60 mL/min/1.73m*2    Calcium 5.5 (LL) 8.6 - 10.6 mg/dL    Phosphorus 2.8 2.5 - 4.9 mg/dL    Albumin 1.9 (L) 3.4 - 5.0 g/dL   Magnesium   Result Value Ref Range    Magnesium 1.93 1.60 - 2.40 mg/dL   Calcium, ionized   Result Value Ref Range    POCT Calcium, Ionized 0.82 (L) 1.1 - 1.33 mmol/L   Heparin Assay, UFH   Result Value Ref Range    Heparin Unfractionated 0.3 See Comment Below for Therapeutic Ranges IU/mL   Heparin Assay, UFH   Result Value Ref Range    Heparin Unfractionated 0.2 See Comment Below for Therapeutic Ranges IU/mL   Heparin Assay, UFH   Result Value Ref Range    Heparin Unfractionated 0.5 See Comment Below for Therapeutic Ranges IU/mL   CBC   Result Value Ref Range    WBC 8.5 4.4 - 11.3 x10*3/uL    nRBC 0.0 0.0 - 0.0 /100 WBCs    RBC 2.35 (L) 4.50 - 5.90 x10*6/uL    Hemoglobin 6.4 (LL) 13.5 - 17.5 g/dL    Hematocrit 18.7 (L) 41.0 - 52.0 %    MCV 80 80 - 100 fL    MCH 27.2 26.0 - 34.0 pg    MCHC 34.2 32.0 - 36.0 g/dL    RDW 18.5 (H) 11.5 - 14.5 %    Platelets 155 150 - 450 x10*3/uL   Hepatic Function Panel   Result Value Ref Range    Albumin 1.7 (L) 3.4 - 5.0 g/dL    Bilirubin, Total 1.0 0.0 - 1.2 mg/dL    Bilirubin, Direct 0.5 (H) 0.0 - 0.3 mg/dL    Alkaline Phosphatase 101 33 - 120 U/L    ALT 70 (H) 10 - 52 U/L     AST 12 9 - 39 U/L    Total Protein <3.0 (L) 6.4 - 8.2 g/dL   Magnesium   Result Value Ref Range    Magnesium 1.83 1.60 - 2.40 mg/dL   Phosphorus   Result Value Ref Range    Phosphorus 3.5 2.5 - 4.9 mg/dL   Basic Metabolic Panel   Result Value Ref Range    Glucose 97 74 - 99 mg/dL    Sodium 135 (L) 136 - 145 mmol/L    Potassium 3.7 3.5 - 5.3 mmol/L    Chloride 103 98 - 107 mmol/L    Bicarbonate 26 21 - 32 mmol/L    Anion Gap 10 10 - 20 mmol/L    Urea Nitrogen 9 6 - 23 mg/dL    Creatinine 0.35 (L) 0.50 - 1.30 mg/dL    eGFR >90 >60 mL/min/1.73m*2    Calcium 5.6 (LL) 8.6 - 10.6 mg/dL   Calcium, ionized   Result Value Ref Range    POCT Calcium, Ionized 0.90 (L) 1.1 - 1.33 mmol/L   Prepare RBC: 1 Units   Result Value Ref Range    PRODUCT CODE F5484Y21     Unit Number [id number]     Unit ABO O     Unit RH POS     XM INTEP COMP     Dispense Status IS     Blood Expiration Date 10/16/2024 11:59:00 PM EDT     PRODUCT BLOOD TYPE 5100     UNIT VOLUME 350    Type and screen   Result Value Ref Range    ABO TYPE O     Rh TYPE POS     ANTIBODY SCREEN NEG    Heparin Assay, UFH   Result Value Ref Range    Heparin Unfractionated 0.4 See Comment Below for Therapeutic Ranges IU/mL   Hemoglobin and hematocrit, blood   Result Value Ref Range    Hemoglobin 6.3 (LL) 13.5 - 17.5 g/dL    Hematocrit 18.5 (L) 41.0 - 52.0 %   Ferritin   Result Value Ref Range    Ferritin 662 (H) 20 - 300 ng/mL   Folate   Result Value Ref Range    Folate, Serum 13.0 >5.0 ng/mL   Haptoglobin   Result Value Ref Range    Haptoglobin 106 30 - 200 mg/dL   Iron and TIBC   Result Value Ref Range    Iron 31 (L) 35 - 150 ug/dL    UIBC      TIBC      % Saturation     Vitamin B12   Result Value Ref Range    Vitamin B12 650 211 - 911 pg/mL   Lactate Dehydrogenase   Result Value Ref Range     (H) 84 - 246 U/L      Assessment/Plan      49 y.o. 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 CT C/A/P showed hepatic infarction c/f hepatic artery thrombosis. No PE noted. 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  #Ascites  ::Admission labs: T-bili 0.4, ALT 31, AST 22, alb <1.5, Tprot 4.4  ::Labs (9/19/24): T-bili 0.6, ALT 85, AST 19, alb 1.6, Tprot <3.0  ::US liver doppler (9/18/24): hepatomegaly with likely fatty infiltration, several hepatic lesions; limited visualization of the hepatic artery  :CT A/P w/ con (9/17/24): globally abnormal liver compatible with ischemia/infarction, ischemia most likely related to hepatic artery thrombosis in branches to R and L hepatic lobes in the ivonne hepatis   ::Hepatology recs: consider liver biopsy if LFTs rise following next pembro dose (poss ICI hepatitis)  ::Last pembro dose on 7/12/24  ::Hypoalbuminemia possibly 2/2 malnutrition and liver mets  ::Stool alpha-1 antitrypsin 106  ::CT enterography (9/19/24): improved splenic flexure colitis, similar splenic flexure mass with associated enlarged adjacent mesenteric LNs, diffuse abdominal wall edema, pleural effusions, ascites, hepatic steatosis  ::Received albumin 25g on 9/17, 9/18, 9/20, and 9/21  ::Hepatology recommending trending LFTs (improving), high protein diet, nutrition c/s. Unlikely ICI-induced hepatitis and no liver biopsy at this point. If worsening LFTs can follow up in hepatology clinic (Dr. Funez)   -Hepatology previously consulted  -Daily RFPs, LFTs, Mg, Coag panel  -Lasix 20 mg   -High protein diet     #Hypotension  ::Troponin 3, BNP 50  ::s/p albumin and LR in ED, and 500 mL  albumin in SDU  ::Baseline SBP is in low 90s     #Microcytic anemia  ::hgb 7.2 (downtrending), MCV 78 (9/19/24)  ::No active signs of bleeding  ::B12 650, folate 13  ::Ferritin 662, , haptoglobin 106, iron 31, UIBC <55  -CTM on heparin gtt  -Transfused with 1 unit pRBCs (9/22/24)  -Anemia workup ordered     #Left peroneal vein DVT on Lovenox  ::Duplex LE (9/6/24): L peroneal vein DVT  ::DVT scan (9/19/24): no DVTs noted in bilateral lower extremities  -On heparin gtt     #Metastatic distal transverse colon adenocarcinoma  ::Primary oncologist Dr. Finn; on Pembrolizumab therapy outpatient  ::CT A/P w/ con (9/18/24): tumor of the distal transverse colon with associated mesenteric desmoplastic reaction, adjacent enlarged mesenteric lymph nodes     #Bilateral pleural effusions  ::Stable on room air  ::vbg: pH 7.42, pCO2 35, O2 45  ::CT PE (9/17/24): no PE, bilateral pleural effusions with compressive atelectasis  ::Pulm rec treating underlying condition to help with effusions/edema, no indication for thoracentesis at this point, consider if worsening O2 requirements/dyspnea  -Diuresis, high protein diet per above  -No indication for thoracentesis at this point per pulm     #Severe malnutrition related to chronic disease  ::23% down from normal body weight  ::Nutrition recs: Regular PO diet once ready, Ensure Plus TID  -High protein diet  -Nutrition consulted     Fluids: PRN  Electrolytes: PRN  Nutrition: High protein diet  DVT ppx: heparin gtt  GI ppx: PPI  Bowel ppx: Held due to loose stools  Access/Lines/Drains: PIV, Mediport     Code Status: Full Code  Emergency Contact: Lora Lawrence (wife) [phone]         Queen JOSE MIGUEL Hudson MD

## 2024-09-22 NOTE — CARE PLAN
Patient will remain HDS and VSS by 9/22/24 @0700    Problem: Fall/Injury  Goal: Not fall by end of shift  Outcome: Progressing  Goal: Be free from injury by end of the shift  Outcome: Progressing  Goal: Verbalize understanding of personal risk factors for fall in the hospital  Outcome: Progressing  Goal: Verbalize understanding of risk factor reduction measures to prevent injury from fall in the home  Outcome: Progressing  Goal: Use assistive devices by end of the shift  Outcome: Progressing  Goal: Pace activities to prevent fatigue by end of the shift  Outcome: Progressing     Problem: Safety - Adult  Goal: Free from fall injury  Outcome: Progressing     Problem: Discharge Planning  Goal: Discharge to home or other facility with appropriate resources  Outcome: Progressing

## 2024-09-23 ENCOUNTER — APPOINTMENT (OUTPATIENT)
Dept: GASTROENTEROLOGY | Facility: HOSPITAL | Age: 49
End: 2024-09-23
Payer: COMMERCIAL

## 2024-09-23 LAB
ALBUMIN SERPL BCP-MCNC: 1.6 G/DL (ref 3.4–5)
ALP SERPL-CCNC: 123 U/L (ref 33–120)
ALT SERPL W P-5'-P-CCNC: 73 U/L (ref 10–52)
ANION GAP SERPL CALC-SCNC: 11 MMOL/L (ref 10–20)
AST SERPL W P-5'-P-CCNC: 10 U/L (ref 9–39)
ATRIAL RATE: 441 BPM
BILIRUB DIRECT SERPL-MCNC: 0.5 MG/DL (ref 0–0.3)
BILIRUB SERPL-MCNC: 1.1 MG/DL (ref 0–1.2)
BLOOD EXPIRATION DATE: NORMAL
BUN SERPL-MCNC: 9 MG/DL (ref 6–23)
CALCIUM SERPL-MCNC: 5.7 MG/DL (ref 8.6–10.6)
CHLORIDE SERPL-SCNC: 101 MMOL/L (ref 98–107)
CO2 SERPL-SCNC: 27 MMOL/L (ref 21–32)
CREAT SERPL-MCNC: 0.33 MG/DL (ref 0.5–1.3)
DISPENSE STATUS: NORMAL
EGFRCR SERPLBLD CKD-EPI 2021: >90 ML/MIN/1.73M*2
ERYTHROCYTE [DISTWIDTH] IN BLOOD BY AUTOMATED COUNT: 18.3 % (ref 11.5–14.5)
GLUCOSE SERPL-MCNC: 93 MG/DL (ref 74–99)
HCT VFR BLD AUTO: 22.7 % (ref 41–52)
HGB BLD-MCNC: 7.7 G/DL (ref 13.5–17.5)
MAGNESIUM SERPL-MCNC: 1.79 MG/DL (ref 1.6–2.4)
MCH RBC QN AUTO: 26.6 PG (ref 26–34)
MCHC RBC AUTO-ENTMCNC: 33.9 G/DL (ref 32–36)
MCV RBC AUTO: 79 FL (ref 80–100)
NRBC BLD-RTO: 0 /100 WBCS (ref 0–0)
P AXIS: 38 DEGREES
P OFFSET: 187 MS
P ONSET: 146 MS
PHOSPHATE SERPL-MCNC: 3.4 MG/DL (ref 2.5–4.9)
PLATELET # BLD AUTO: 127 X10*3/UL (ref 150–450)
POTASSIUM SERPL-SCNC: 3.6 MMOL/L (ref 3.5–5.3)
PRODUCT BLOOD TYPE: 5100
PRODUCT CODE: NORMAL
PROT SERPL-MCNC: <3 G/DL (ref 6.4–8.2)
Q ONSET: 211 MS
QRS COUNT: 15 BEATS
QRS DURATION: 94 MS
QT INTERVAL: 372 MS
QTC CALCULATION(BAZETT): 467 MS
QTC FREDERICIA: 433 MS
R AXIS: 0 DEGREES
RBC # BLD AUTO: 2.89 X10*6/UL (ref 4.5–5.9)
SODIUM SERPL-SCNC: 135 MMOL/L (ref 136–145)
T AXIS: 46 DEGREES
T OFFSET: 397 MS
UFH PPP CHRO-ACNC: 0.3 IU/ML
UNIT ABO: NORMAL
UNIT NUMBER: NORMAL
UNIT RH: NORMAL
UNIT VOLUME: 350
VENTRICULAR RATE: 95 BPM
WBC # BLD AUTO: 9.4 X10*3/UL (ref 4.4–11.3)
XM INTEP: NORMAL

## 2024-09-23 PROCEDURE — 84075 ASSAY ALKALINE PHOSPHATASE: CPT | Performed by: NURSE PRACTITIONER

## 2024-09-23 PROCEDURE — 2500000004 HC RX 250 GENERAL PHARMACY W/ HCPCS (ALT 636 FOR OP/ED)

## 2024-09-23 PROCEDURE — 84100 ASSAY OF PHOSPHORUS: CPT | Performed by: NURSE PRACTITIONER

## 2024-09-23 PROCEDURE — 99233 SBSQ HOSP IP/OBS HIGH 50: CPT | Performed by: STUDENT IN AN ORGANIZED HEALTH CARE EDUCATION/TRAINING PROGRAM

## 2024-09-23 PROCEDURE — 85027 COMPLETE CBC AUTOMATED: CPT | Performed by: NURSE PRACTITIONER

## 2024-09-23 PROCEDURE — 83735 ASSAY OF MAGNESIUM: CPT | Performed by: NURSE PRACTITIONER

## 2024-09-23 PROCEDURE — 2500000001 HC RX 250 WO HCPCS SELF ADMINISTERED DRUGS (ALT 637 FOR MEDICARE OP): Performed by: NURSE PRACTITIONER

## 2024-09-23 PROCEDURE — 1170000001 HC PRIVATE ONCOLOGY ROOM DAILY

## 2024-09-23 PROCEDURE — 85520 HEPARIN ASSAY: CPT

## 2024-09-23 ASSESSMENT — COGNITIVE AND FUNCTIONAL STATUS - GENERAL
MOBILITY SCORE: 24
DAILY ACTIVITIY SCORE: 24
DAILY ACTIVITIY SCORE: 24
MOBILITY SCORE: 24

## 2024-09-23 ASSESSMENT — PAIN - FUNCTIONAL ASSESSMENT: PAIN_FUNCTIONAL_ASSESSMENT: 0-10

## 2024-09-23 ASSESSMENT — PAIN SCALES - GENERAL
PAINLEVEL_OUTOF10: 0 - NO PAIN
PAINLEVEL_OUTOF10: 0 - NO PAIN

## 2024-09-23 NOTE — PROGRESS NOTES
"Yon Lawrence \"Roger" is a 49 y.o. male on day 6 of admission presenting with Infarction of liver (HHS-HCC).      Subjective   No acute concerns this morning. No melena, hematochezia, hematemesis, dyspnea.        Objective     Last Recorded Vitals  BP 92/62 (BP Location: Right arm, Patient Position: Lying)   Pulse 88   Temp 36.7 °C (98.1 °F) (Temporal)   Resp 18   Wt 84.6 kg (186 lb 8 oz)   SpO2 97%   Intake/Output last 3 Shifts:    Intake/Output Summary (Last 24 hours) at 9/23/2024 1311  Last data filed at 9/22/2024 1532  Gross per 24 hour   Intake 100 ml   Output --   Net 100 ml       Admission Weight  Weight: 87 kg (191 lb 12.8 oz) (09/17/24 1800)    Daily Weight  09/18/24 : 84.6 kg (186 lb 8 oz)    Image Results  ECG 12 Lead  Undetermined rhythm  Low voltage QRS  Nonspecific T wave abnormality  Prolonged QT  Abnormal ECG  No previous ECGs available  See ED provider note for full interpretation and clinical correlation  Confirmed by Lana Sánchez ([id number]) on 9/23/2024 10:10:00 AM    Physical Exam  Vitals and nursing note reviewed.   HENT:      Head: Normocephalic and atraumatic.   Eyes:      Extraocular Movements: Extraocular movements intact.      Pupils: Pupils are equal, round, and reactive to light.   Cardiovascular:      Rate and Rhythm: Normal rate and regular rhythm.   Pulmonary:      Effort: Pulmonary effort is normal.      Breath sounds: Normal breath sounds.   Abdominal:      General: Abdomen is flat. Bowel sounds are normal.      Tenderness: There is no abdominal tenderness.   Musculoskeletal:      Right lower leg: No edema.      Left lower leg: No edema.   Neurological:      Mental Status: He is oriented to person, place, and time.   Psychiatric:         Behavior: Behavior is cooperative.           Relevant Results  Scheduled medications  calcium gluconate, 3 g, intravenous, Once  pantoprazole, 40 mg, oral, Daily before breakfast      Continuous medications  heparin, 0-4,500 Units/hr, Last Rate: " 2,000 Units/hr (09/23/24 1207)      PRN medications  PRN medications: diphenhydramine-zinc acetate, heparin, melatonin, polyethylene glycol     Results for orders placed or performed during the hospital encounter of 09/17/24 (from the past 24 hour(s))   CBC and Auto Differential   Result Value Ref Range    WBC 9.8 4.4 - 11.3 x10*3/uL    nRBC 0.0 0.0 - 0.0 /100 WBCs    RBC 3.04 (L) 4.50 - 5.90 x10*6/uL    Hemoglobin 8.1 (L) 13.5 - 17.5 g/dL    Hematocrit 24.4 (L) 41.0 - 52.0 %    MCV 80 80 - 100 fL    MCH 26.6 26.0 - 34.0 pg    MCHC 33.2 32.0 - 36.0 g/dL    RDW 18.6 (H) 11.5 - 14.5 %    Platelets 163 150 - 450 x10*3/uL    Neutrophils % 47.0 40.0 - 80.0 %    Immature Granulocytes %, Automated 0.4 0.0 - 0.9 %    Lymphocytes % 47.5 13.0 - 44.0 %    Monocytes % 4.8 2.0 - 10.0 %    Eosinophils % 0.2 0.0 - 6.0 %    Basophils % 0.1 0.0 - 2.0 %    Neutrophils Absolute 4.58 1.20 - 7.70 x10*3/uL    Immature Granulocytes Absolute, Automated 0.04 0.00 - 0.70 x10*3/uL    Lymphocytes Absolute 4.63 1.20 - 4.80 x10*3/uL    Monocytes Absolute 0.47 0.10 - 1.00 x10*3/uL    Eosinophils Absolute 0.02 0.00 - 0.70 x10*3/uL    Basophils Absolute 0.01 0.00 - 0.10 x10*3/uL   CBC   Result Value Ref Range    WBC 9.4 4.4 - 11.3 x10*3/uL    nRBC 0.0 0.0 - 0.0 /100 WBCs    RBC 2.89 (L) 4.50 - 5.90 x10*6/uL    Hemoglobin 7.7 (L) 13.5 - 17.5 g/dL    Hematocrit 22.7 (L) 41.0 - 52.0 %    MCV 79 (L) 80 - 100 fL    MCH 26.6 26.0 - 34.0 pg    MCHC 33.9 32.0 - 36.0 g/dL    RDW 18.3 (H) 11.5 - 14.5 %    Platelets 127 (L) 150 - 450 x10*3/uL   Hepatic Function Panel   Result Value Ref Range    Albumin 1.6 (L) 3.4 - 5.0 g/dL    Bilirubin, Total 1.1 0.0 - 1.2 mg/dL    Bilirubin, Direct 0.5 (H) 0.0 - 0.3 mg/dL    Alkaline Phosphatase 123 (H) 33 - 120 U/L    ALT 73 (H) 10 - 52 U/L    AST 10 9 - 39 U/L    Total Protein <3.0 (L) 6.4 - 8.2 g/dL   Magnesium   Result Value Ref Range    Magnesium 1.79 1.60 - 2.40 mg/dL   Phosphorus   Result Value Ref Range     Phosphorus 3.4 2.5 - 4.9 mg/dL   Basic Metabolic Panel   Result Value Ref Range    Glucose 93 74 - 99 mg/dL    Sodium 135 (L) 136 - 145 mmol/L    Potassium 3.6 3.5 - 5.3 mmol/L    Chloride 101 98 - 107 mmol/L    Bicarbonate 27 21 - 32 mmol/L    Anion Gap 11 10 - 20 mmol/L    Urea Nitrogen 9 6 - 23 mg/dL    Creatinine 0.33 (L) 0.50 - 1.30 mg/dL    eGFR >90 >60 mL/min/1.73m*2    Calcium 5.7 (L) 8.6 - 10.6 mg/dL   Heparin Assay, UFH   Result Value Ref Range    Heparin Unfractionated 0.3 See Comment Below for Therapeutic Ranges IU/mL      Assessment/Plan      49 y.o. 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 CT C/A/P showed hepatic infarction c/f hepatic artery thrombosis. No PE noted. 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     Vladimir Lawrence was seen at the bedside and he reported no acute pain. His fecal alpha-1 antitrypsin lab is still pending. He notes a waxing and waning course of his lower extremity edema noting that during my visit he was quite dry. He received 1 unit of PRBC's yesterday which accreted well. His anemia labs were indicative of anemia of chronic disease. Currently have low suspicion of a GI bleed as his stools have been tan likely 2/2 his hepatic dysfunction.      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  #Ascites  ::Admission labs: T-bili 0.4, ALT 31, AST 22, alb <1.5, Tprot 4.4  ::Labs (9/19/24): T-bili 0.6, ALT 85, AST 19, alb 1.6, Tprot <3.0  ::US liver doppler (9/18/24): hepatomegaly with likely fatty  infiltration, several hepatic lesions; limited visualization of the hepatic artery  :CT A/P w/ con (9/17/24): globally abnormal liver compatible with ischemia/infarction, ischemia most likely related to hepatic artery thrombosis in branches to R and L hepatic lobes in the ivonne hepatis   ::Hepatology recs: consider liver biopsy if LFTs rise following next pembro dose (poss ICI hepatitis)  ::Last pembro dose on 7/12/24  ::Hypoalbuminemia possibly 2/2 malnutrition and liver mets  ::Blood  alpha-1 antitrypsin 106 (17-Sep-24)  :: Stool alpha-1 antitrypsin pending  ::CT enterography (9/19/24): improved splenic flexure colitis, similar splenic flexure mass with associated enlarged adjacent mesenteric LNs, diffuse abdominal wall edema, pleural effusions, ascites, hepatic steatosis  ::Received albumin 25g on 9/17, 9/18, 9/20, and 9/21  ::Hepatology recommending trending LFTs  high protein diet, nutrition c/s. Unlikely ICI-induced hepatitis and no liver biopsy at this point. If worsening LFTs can follow up in hepatology clinic (Dr. Funez)   -Hepatology previously consulted  -Daily RFPs, LFTs, Mg, Coag panel  -Lasix 20 mg   -High protein diet     #Hypotension  ::Troponin 3, BNP 50 (17-Sep-24)  ::s/p albumin and LR in ED, and 500 mL albumin in SDU  ::Baseline SBP is in low 90s  -BP 92/62 today 23-Sep-24     #Microcytic anemia  ::hgb 7.2 (downtrending), MCV 78 (9/19/24)  ::No active signs of bleeding  ::B12 650, folate 13  ::Ferritin 662, , haptoglobin 106, iron 31, UIBC <55  -CTM on heparin gtt  -Transfused with 1 unit pRBCs (9/22/24)  -Anemia workup 22-Sep-24 showed likely anemia of chronic disease (Ferritin 662, Iron 31, )      #Left peroneal vein DVT on Lovenox  ::Duplex LE (9/6/24): L peroneal vein DVT  ::DVT scan (9/19/24): no DVTs noted in bilateral lower extremities  -On heparin gtt     #Metastatic distal transverse colon adenocarcinoma  ::Primary oncologist Dr. Finn; on Pembrolizumab therapy  outpatient  ::CT A/P w/ con (9/18/24): tumor of the distal transverse colon with associated mesenteric desmoplastic reaction, adjacent enlarged mesenteric lymph nodes     #Bilateral pleural effusions  ::Stable on room air  ::vbg: pH 7.42, pCO2 35, O2 45  ::CT PE (9/17/24): no PE, bilateral pleural effusions with compressive atelectasis  ::Pulm rec treating underlying condition to help with effusions/edema, no indication for thoracentesis at this point, consider if worsening O2 requirements/dyspnea  -Diuresis, high protein diet per above  -No indication for thoracentesis at this point per pulm     #Severe malnutrition related to chronic disease  ::23% down from normal body weight  ::Nutrition recs: Regular PO diet once ready, Ensure Plus TID  -High protein diet  -Nutrition consulted     Fluids: PRN  Electrolytes: PRN  Nutrition: High protein diet  DVT ppx: heparin gtt  GI ppx: PPI  Bowel ppx: Held due to loose stools  Access/Lines/Drains: PIV, Mediport     Code Status: Full Code  Emergency Contact: Lora Lawrence (wife) [phone]         Karl Hilliard MD

## 2024-09-23 NOTE — CARE PLAN
Problem: Fall/Injury  Goal: Not fall by end of shift  Outcome: Progressing  Goal: Be free from injury by end of the shift  Outcome: Progressing  Goal: Verbalize understanding of personal risk factors for fall in the hospital  Outcome: Progressing  Goal: Verbalize understanding of risk factor reduction measures to prevent injury from fall in the home  Outcome: Progressing  Goal: Use assistive devices by end of the shift  Outcome: Progressing  Goal: Pace activities to prevent fatigue by end of the shift  Outcome: Progressing     Problem: Safety - Adult  Goal: Free from fall injury  Outcome: Progressing     Problem: Discharge Planning  Goal: Discharge to home or other facility with appropriate resources  Outcome: Progressing     The clinical goals for the shift include Pt will be safe and free from injury throughout shift.

## 2024-09-23 NOTE — CONSULTS
"Nutrition Follow Up Assessment:   Nutrition Assessment    The patient is a 49 y.o. male who is hospital day #6.  Since last RD visit:  - diarrhea improved with steroids.  - hepatology recs high protein diet for low albumin levels  - Pulm: effusions 2/2 to low protein/albumin.   - anemia work up.  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 Eating two meals a day during admission and 2-3 Ensure Plus per day. Just recently started eating most of the meals. States for breakfast he usually does scrambled eggs w/ fruit and dinner varies (yesterday had pasta). Record of PO intake on flowsheets show increase in PO intake from 25% to %. Feels he is eating better. PO intake decreased end of Aug (~1 month now).      Anthropometrics:  Start of admission anthropometrics:  Height: 185.4 cm (6' 1\")  Weight: 87 kg (191 lb 12.8 oz)  BMI (Calculated): 25.31    IBW/kg (Dietitian Calculated): 83.6 kg  Percent of IBW: 101 %     Weight         9/17/2024  1800 9/18/2024  0745          Weight: 87 kg (191 lb 12.8 oz) 84.6 kg (186 lb 8 oz)                Weight Change %:  Weight History / % Weight Change: no new wt to assess.    Nutrition Focused Physical Exam Findings:  Moderate to severe losses.   Subcutaneous Fat Loss:   Orbital Fat Pads: Mild-Moderate (slight dark circles and slight hollowing)  Buccal Fat Pads: Mild-Moderate (flat cheeks, minimal bounce)  Triceps: Mild-Moderate (less than ample fat tissue)  Muscle Wasting:  Temporalis: Mild-Moderate (slight depression)  Pectoralis (Clavicular Region): Severe (protruding prominent clavicle)  Deltoid/Trapezius: Severe (squared shoulders, acromion process prominent)  Quadriceps: Mild-Moderate (mild depression on inner and outer thigh)  Gastrocnemius: Mild-Moderate (not well developed muscle)  Edema:  Edema: +3 " moderate  Edema Location: BLE  Physical Findings:  Eyes: Negative  Mouth: Negative  Nails: Negative  Skin: Negative    Objective Data:    Last BM Date: 09/21/24    Nutrition Significant Labs:    Results from last 7 days   Lab Units 09/23/24  0520 09/22/24  1615 09/22/24  0720   HEMOGLOBIN g/dL 7.7* 8.1* 6.3*   MCV fL 79* 80  --    GLUCOSE mg/dL 93  --   --    POTASSIUM mmol/L 3.6  --   --    SODIUM mmol/L 135*  --   --    PHOSPHORUS mg/dL 3.4  --   --    MAGNESIUM mg/dL 1.79  --   --    CREATININE mg/dL 0.33*  --   --    BUN mg/dL 9  --   --    ALT U/L 73*  --   --    AST U/L 10  --   --    ALK PHOS U/L 123*  --   --        Nutrition Specific Medications:  pantoprazole, 40 mg, oral, Daily before breakfast    heparin, 0-4,500 Units/hr, Last Rate: 2,000 Units/hr (09/23/24 0732)          I/O:   I/O last 2 completed shifts:  In: 100 (1.2 mL/kg) [IV Piggyback:100]  Out: - (0 mL/kg)   Weight: 84.6 kg     Dietary Orders (From admission, onward)       Start     Ordered    09/21/24 1049  Adult diet Regular  Diet effective now        Comments: High protein diet (1~3g/kg/day   Question:  Diet type  Answer:  Regular    09/21/24 1049    09/19/24 1531  Oral nutritional supplements  Until discontinued        Question Answer Comment   Deliver with All meals    Select supplement: Ensure Plus        09/19/24 1531    09/18/24 0426  May Participate in Room Service  Once        Question:  .  Answer:  Yes    09/18/24 0428                     Estimated Needs:   Total Energy Estimated Needs (kCal):  (8519-4324)  Method for Estimating Needs: MSJ= 1705 using 77.3kg    Total Protein Estimated Needs (g):  (110)  Method for Estimating Needs: 83.6kg x 1.3               Nutrition Diagnosis   Malnutrition Diagnosis  Patient has Malnutrition Diagnosis: Yes  Diagnosis Status: Ongoing  Malnutrition Diagnosis: Severe malnutrition related to chronic disease or condition  As Evidenced by: pt reports poor to fair PO intake PTA with noted 23% weight loss  from his usual won and noted to have moderate to severe muscle and fat loss on physical exam; PO intake meeting <75% of nurt needs for ~1 month            Nutrition Interventions/Recommendations   Individualized Nutrition Prescription Provided for : 2400 kcal and 110g protein via oral diet    Estimate pt is consuming ~50-60g of protein (~0.7 g/kg   recommendation 1.3 (+) g/kg).     ONS order adjusted to: Ensure Plus (350 kcal, 13g pro) BID  + Boost VHC (530 kcal, 22g pro)  Education on high protein/high calorie diet done. See below for details.       Education Documentation  High Calorie, High Protein Diet, taught by Maria Dolores Kuhn RDN, CECE at 9/23/2024 11:20 AM.  Learner: Patient  Readiness: Acceptance  Method: Explanation, Handout  Response: Verbalizes Understanding  Comment: Reviewed handout. Pt denied any further questions. Encouraged pt to review handout and reach out to inpatient or outpatient RD with any questions             Nutrition Monitoring and Evaluation   Monitoring and Evaluation Plan: Energy intake  Energy Intake: Estimated energy intake  Criteria: >75% of nutr needs    Monitoring and Evaluation Plan: Weight  Weight: Weight change  Criteria: no wt change                   Time Spent (min): 45 minutes

## 2024-09-24 ENCOUNTER — TELEPHONE (OUTPATIENT)
Dept: HEMATOLOGY/ONCOLOGY | Facility: CLINIC | Age: 49
End: 2024-09-24
Payer: COMMERCIAL

## 2024-09-24 VITALS
WEIGHT: 186.5 LBS | TEMPERATURE: 97.9 F | OXYGEN SATURATION: 94 % | BODY MASS INDEX: 24.72 KG/M2 | DIASTOLIC BLOOD PRESSURE: 79 MMHG | RESPIRATION RATE: 18 BRPM | SYSTOLIC BLOOD PRESSURE: 113 MMHG | HEART RATE: 98 BPM | HEIGHT: 73 IN

## 2024-09-24 LAB
ALBUMIN SERPL BCP-MCNC: 1.7 G/DL (ref 3.4–5)
ALP SERPL-CCNC: 125 U/L (ref 33–120)
ALT SERPL W P-5'-P-CCNC: 68 U/L (ref 10–52)
ANION GAP SERPL CALC-SCNC: 11 MMOL/L (ref 10–20)
APTT PPP: >200 SECONDS (ref 27–38)
AST SERPL W P-5'-P-CCNC: 10 U/L (ref 9–39)
BASOPHILS # BLD AUTO: 0.01 X10*3/UL (ref 0–0.1)
BASOPHILS NFR BLD AUTO: 0.1 %
BILIRUB DIRECT SERPL-MCNC: 0.7 MG/DL (ref 0–0.3)
BILIRUB SERPL-MCNC: 1.4 MG/DL (ref 0–1.2)
BUN SERPL-MCNC: 10 MG/DL (ref 6–23)
CALCIUM SERPL-MCNC: 5.8 MG/DL (ref 8.6–10.6)
CHLORIDE SERPL-SCNC: 101 MMOL/L (ref 98–107)
CO2 SERPL-SCNC: 26 MMOL/L (ref 21–32)
CREAT SERPL-MCNC: 0.31 MG/DL (ref 0.5–1.3)
EGFRCR SERPLBLD CKD-EPI 2021: >90 ML/MIN/1.73M*2
EOSINOPHIL # BLD AUTO: 0.02 X10*3/UL (ref 0–0.7)
EOSINOPHIL NFR BLD AUTO: 0.2 %
ERYTHROCYTE [DISTWIDTH] IN BLOOD BY AUTOMATED COUNT: 18 % (ref 11.5–14.5)
ERYTHROCYTE [DISTWIDTH] IN BLOOD BY AUTOMATED COUNT: 18.3 % (ref 11.5–14.5)
GLUCOSE SERPL-MCNC: 89 MG/DL (ref 74–99)
HCT VFR BLD AUTO: 22.8 % (ref 41–52)
HCT VFR BLD AUTO: 23.6 % (ref 41–52)
HGB BLD-MCNC: 7.8 G/DL (ref 13.5–17.5)
HGB BLD-MCNC: 8.1 G/DL (ref 13.5–17.5)
IMM GRANULOCYTES # BLD AUTO: 0.04 X10*3/UL (ref 0–0.7)
IMM GRANULOCYTES NFR BLD AUTO: 0.5 % (ref 0–0.9)
INR PPP: 2 (ref 0.9–1.1)
LYMPHOCYTES # BLD AUTO: 4.16 X10*3/UL (ref 1.2–4.8)
LYMPHOCYTES NFR BLD AUTO: 49.6 %
MAGNESIUM SERPL-MCNC: 1.79 MG/DL (ref 1.6–2.4)
MCH RBC QN AUTO: 26.7 PG (ref 26–34)
MCH RBC QN AUTO: 27.3 PG (ref 26–34)
MCHC RBC AUTO-ENTMCNC: 34.2 G/DL (ref 32–36)
MCHC RBC AUTO-ENTMCNC: 34.3 G/DL (ref 32–36)
MCV RBC AUTO: 78 FL (ref 80–100)
MCV RBC AUTO: 80 FL (ref 80–100)
MONOCYTES # BLD AUTO: 0.52 X10*3/UL (ref 0.1–1)
MONOCYTES NFR BLD AUTO: 6.2 %
NEUTROPHILS # BLD AUTO: 3.64 X10*3/UL (ref 1.2–7.7)
NEUTROPHILS NFR BLD AUTO: 43.4 %
NRBC BLD-RTO: 0 /100 WBCS (ref 0–0)
NRBC BLD-RTO: 0 /100 WBCS (ref 0–0)
PHOSPHATE SERPL-MCNC: 3.8 MG/DL (ref 2.5–4.9)
PLATELET # BLD AUTO: 126 X10*3/UL (ref 150–450)
PLATELET # BLD AUTO: 142 X10*3/UL (ref 150–450)
POTASSIUM SERPL-SCNC: 3.5 MMOL/L (ref 3.5–5.3)
PROT SERPL-MCNC: 3 G/DL (ref 6.4–8.2)
PROTHROMBIN TIME: 22.5 SECONDS (ref 9.8–12.8)
RBC # BLD AUTO: 2.92 X10*6/UL (ref 4.5–5.9)
RBC # BLD AUTO: 2.97 X10*6/UL (ref 4.5–5.9)
SODIUM SERPL-SCNC: 134 MMOL/L (ref 136–145)
UFH PPP CHRO-ACNC: 0.3 IU/ML
WBC # BLD AUTO: 7.9 X10*3/UL (ref 4.4–11.3)
WBC # BLD AUTO: 8.4 X10*3/UL (ref 4.4–11.3)

## 2024-09-24 PROCEDURE — 84100 ASSAY OF PHOSPHORUS: CPT | Performed by: NURSE PRACTITIONER

## 2024-09-24 PROCEDURE — 85520 HEPARIN ASSAY: CPT

## 2024-09-24 PROCEDURE — 2500000004 HC RX 250 GENERAL PHARMACY W/ HCPCS (ALT 636 FOR OP/ED)

## 2024-09-24 PROCEDURE — 99239 HOSP IP/OBS DSCHRG MGMT >30: CPT | Performed by: STUDENT IN AN ORGANIZED HEALTH CARE EDUCATION/TRAINING PROGRAM

## 2024-09-24 PROCEDURE — 84075 ASSAY ALKALINE PHOSPHATASE: CPT | Performed by: NURSE PRACTITIONER

## 2024-09-24 PROCEDURE — 80048 BASIC METABOLIC PNL TOTAL CA: CPT | Performed by: NURSE PRACTITIONER

## 2024-09-24 PROCEDURE — 85610 PROTHROMBIN TIME: CPT

## 2024-09-24 PROCEDURE — 83735 ASSAY OF MAGNESIUM: CPT | Performed by: NURSE PRACTITIONER

## 2024-09-24 PROCEDURE — 85027 COMPLETE CBC AUTOMATED: CPT | Performed by: NURSE PRACTITIONER

## 2024-09-24 PROCEDURE — 85025 COMPLETE CBC W/AUTO DIFF WBC: CPT

## 2024-09-24 PROCEDURE — 2500000001 HC RX 250 WO HCPCS SELF ADMINISTERED DRUGS (ALT 637 FOR MEDICARE OP): Performed by: NURSE PRACTITIONER

## 2024-09-24 RX ORDER — ENOXAPARIN SODIUM 100 MG/ML
1 INJECTION SUBCUTANEOUS ONCE
Status: COMPLETED | OUTPATIENT
Start: 2024-09-24 | End: 2024-09-24

## 2024-09-24 ASSESSMENT — COGNITIVE AND FUNCTIONAL STATUS - GENERAL
MOBILITY SCORE: 24
DAILY ACTIVITIY SCORE: 24

## 2024-09-24 ASSESSMENT — PAIN SCALES - GENERAL
PAINLEVEL_OUTOF10: 0 - NO PAIN
PAINLEVEL_OUTOF10: 0 - NO PAIN

## 2024-09-24 NOTE — NURSING NOTE
Pt discharged safely. All belongings returned. IV removed. Port deaccessed. Discharge paperwork given and reviewed with pt.

## 2024-09-24 NOTE — TELEPHONE ENCOUNTER
Called and spoke with Mr Lawrence about getting him scheduled for infusion. I got Thursday 9/26 approved at 130 pm in [place]. Mr Lawrence stated that works.     I told him to give them a call if anything changes and to give us a call if he needs anything.      Brenda KIM

## 2024-09-24 NOTE — DISCHARGE INSTRUCTIONS
"Dear Yon Lawrence \"Vladimir\",    You were admitted to [facility] from 9/17 to 9/24 due to concern for a hepatic artery thrombosis. You were evaluated by the interventional radiology and Hepatology teams who opted not to intervene in your care. You were treated with Albumin and Furosemide for your ascites. Ensure you are eating a high protein diet as an outpatient, including protein supplementation as an integral part of your diet.     Medication changes:  Start these medications: Tums (500 mg tab) 4-6 per day Every day     Appointments/follow-up:  Follow up with Dr. Finn your outpatient oncologist on October 7th     It was a pleasure taking care of you and we wish you all the best with your recovery,    Your  Care Team    "

## 2024-09-24 NOTE — CARE PLAN
The clinical goals for the shift include Pt will remain safe and free from injury throughout shift.      Problem: Fall/Injury  Goal: Not fall by end of shift  Outcome: Progressing  Goal: Be free from injury by end of the shift  Outcome: Progressing  Goal: Verbalize understanding of personal risk factors for fall in the hospital  Outcome: Progressing  Goal: Verbalize understanding of risk factor reduction measures to prevent injury from fall in the home  Outcome: Progressing  Goal: Use assistive devices by end of the shift  Outcome: Progressing  Goal: Pace activities to prevent fatigue by end of the shift  Outcome: Progressing     Problem: Safety - Adult  Goal: Free from fall injury  Outcome: Progressing     Problem: Discharge Planning  Goal: Discharge to home or other facility with appropriate resources  Outcome: Progressing

## 2024-09-24 NOTE — PROGRESS NOTES
09/24/24 1000   Discharge Planning   Who is requesting discharge planning? Provider   Home or Post Acute Services None   Expected Discharge Disposition Home   Does the patient need discharge transport arranged? No     9/24/24 @ 1005  Per team, plan to discharge patient home today. He will follow up closely with Dr. Finn. No other needs at this time.   Mira Busby RN [facility]

## 2024-09-24 NOTE — CARE PLAN
Problem: Fall/Injury  Goal: Not fall by end of shift  Outcome: Progressing  Goal: Be free from injury by end of the shift  Outcome: Progressing  Goal: Verbalize understanding of personal risk factors for fall in the hospital  Outcome: Progressing  Goal: Verbalize understanding of risk factor reduction measures to prevent injury from fall in the home  Outcome: Progressing  Goal: Use assistive devices by end of the shift  Outcome: Progressing  Goal: Pace activities to prevent fatigue by end of the shift  Outcome: Progressing     Problem: Safety - Adult  Goal: Free from fall injury  Outcome: Progressing     Problem: Discharge Planning  Goal: Discharge to home or other facility with appropriate resources  Outcome: Progressing       The patient's goals for the shift include pt will remain HDS for the shift. He has remained injury free and has tolerated treatment. He has been receiving continuous heparin and has remained vital sign stable with soft blood pressures.     The clinical goals for the shift include Patient will remain safe and free from injury and will remain vital sign stable throughout the shift on 9/24 until 0700.       Patent

## 2024-09-24 NOTE — DISCHARGE SUMMARY
Discharge Diagnosis  Infarction of liver (HHS-HCC)    Issues Requiring Follow-Up  - Follow up outpatient with Dr. Finn       Test Results Pending At Discharge  Pending Labs       Order Current Status    Alpha-1-antitrypsin, stool In process            Hospital Course  49 y.o. year old male with PMHx significant for metastatic distal transverse colon adenocarcinoma on Pembro and DVT while on Eliquis (now on Lovenox at home) who initially presented to [facility] with complaints of SOB and bilateral leg swelling with associated 15 lb weight gain over the last few weeks. Transferred to [facility].     CT C/A/P performed showing bilateral pleural effusions and hepatic infarction c/f hepatic artery thrombosis. No PE noted. In ED patient with soft BPs with SBP 80s-90s. Given 1L of LR and 25g 5% albumin without significant improvement in BP. Per chart review patient's bp's normally run in low 90s. Labs notable for Sodium 129, Potassium 3.0, Calcium 5.3, Albumin <1.5, , , bilirubin 0.5, WBC 20.2, Lactate 0.6, INR 1.4, troponin 3 and BNP 50. Seen by interventional radiology team while in ED, no emergent intervention recommended at that time, will be formally evaluate in the morning. Denies: CP, Palpitations, SOB, N/V, abd. Pain, constipation/diarrhea, and sick contact; Endorses: right sided weakness that has been persistent over the past week and bilateral lower extremity swelling that has been unchanged since 8/21.     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 He has a history of DVT of the left peroneal vein. But most recent DVT scan showed no propagation of DVTs in bilateral lower extremities. Liver ultrasound and CT enterography showed only hepatic steatosis with no clear evidence of arterial thrombus and Bilateral Pleural effusions with compressive atelectasis. Thoracentesis was avoided due to risk of further protein loss.     He  received lasix and Albumin on September 22nd to in an attempt to diurese some of the asitic fluid he had built up. He also received 1 unit of PRBC's for anemia on September 22nd. His subsequent anemia workup showed he is likely suffering from anemia of chronic disease.     Discharged with future labs and close follow up with Dr. Finn on September 24th.     Pertinent Physical Exam At Time of Discharge  Physical Exam  Vitals and nursing note reviewed.   HENT:      Head: Normocephalic and atraumatic.   Eyes:      Extraocular Movements: Extraocular movements intact.      Conjunctiva/sclera: Conjunctivae normal.   Cardiovascular:      Rate and Rhythm: Regular rhythm. Tachycardia present.   Pulmonary:      Effort: Pulmonary effort is normal.      Breath sounds: Normal breath sounds.   Abdominal:      General: Abdomen is protuberant. Bowel sounds are normal.      Tenderness: There is no abdominal tenderness.   Musculoskeletal:      Cervical back: Normal range of motion.      Right lower leg: Edema present.      Left lower leg: Edema present.   Skin:     General: Skin is warm and dry.      Coloration: Skin is not jaundiced.   Psychiatric:         Behavior: Behavior is cooperative.         Home Medications     Medication List      CONTINUE taking these medications     enoxaparin 80 mg/0.8 mL syringe; Commonly known as: Lovenox; Inject 0.8   mL (80 mg) under the skin every 12 hours.     STOP taking these medications     alfuzosin 10 mg 24 hr tablet; Commonly known as: UroxatraL   apixaban 5 mg (74 tabs) tablet; Commonly known as: Eliquis   lansoprazole 30 mg DR capsule; Commonly known as: Prevacid   prochlorperazine 10 mg tablet; Commonly known as: Compazine       Outpatient Follow-Up  Future Appointments   Date Time Provider Department Center   9/26/2024  1:30 PM [facility]   10/7/2024  1:20 PM Billie Finn MD [id number] [facility]   10/7/2024  2:00 PM INF 07 MINOFF [id number] [facility]   10/16/2024   3:30 PM Chin Carson MD [id number] [facility]   10/30/2024  2:45 PM Danielle Kraus [facility] [id number] [facility]   1/21/2025  1:00 PM Jenni Phelps MD [id number] [facility]       Karl Hilliard MD

## 2024-09-25 LAB — A1AT STL-MCNT: >1.13 MG/G (ref 0–0.5)

## 2024-09-26 ENCOUNTER — INFUSION (OUTPATIENT)
Dept: HEMATOLOGY/ONCOLOGY | Facility: CLINIC | Age: 49
End: 2024-09-26
Payer: COMMERCIAL

## 2024-09-26 VITALS
RESPIRATION RATE: 18 BRPM | HEART RATE: 120 BPM | OXYGEN SATURATION: 99 % | BODY MASS INDEX: 23.24 KG/M2 | TEMPERATURE: 97.3 F | WEIGHT: 176.15 LBS | SYSTOLIC BLOOD PRESSURE: 101 MMHG | DIASTOLIC BLOOD PRESSURE: 70 MMHG

## 2024-09-26 DIAGNOSIS — C18.4 ADENOCARCINOMA OF TRANSVERSE COLON (MULTI): ICD-10-CM

## 2024-09-26 PROCEDURE — 96413 CHEMO IV INFUSION 1 HR: CPT

## 2024-09-26 PROCEDURE — 2500000004 HC RX 250 GENERAL PHARMACY W/ HCPCS (ALT 636 FOR OP/ED): Mod: JZ | Performed by: INTERNAL MEDICINE

## 2024-09-26 RX ORDER — HEPARIN 100 UNIT/ML
500 SYRINGE INTRAVENOUS AS NEEDED
Status: DISCONTINUED | OUTPATIENT
Start: 2024-09-26 | End: 2024-09-26 | Stop reason: HOSPADM

## 2024-09-26 RX ORDER — HEPARIN 100 UNIT/ML
500 SYRINGE INTRAVENOUS AS NEEDED
OUTPATIENT
Start: 2024-09-26

## 2024-09-26 RX ORDER — HEPARIN SODIUM,PORCINE/PF 10 UNIT/ML
50 SYRINGE (ML) INTRAVENOUS AS NEEDED
OUTPATIENT
Start: 2024-09-26

## 2024-09-26 ASSESSMENT — PAIN SCALES - GENERAL: PAINLEVEL: 0-NO PAIN

## 2024-09-27 ENCOUNTER — APPOINTMENT (OUTPATIENT)
Dept: RADIOLOGY | Facility: HOSPITAL | Age: 49
End: 2024-09-27
Payer: COMMERCIAL

## 2024-09-27 ENCOUNTER — HOSPITAL ENCOUNTER (EMERGENCY)
Facility: HOSPITAL | Age: 49
Discharge: HOME | End: 2024-09-27
Attending: EMERGENCY MEDICINE
Payer: COMMERCIAL

## 2024-09-27 ENCOUNTER — APPOINTMENT (OUTPATIENT)
Dept: CARDIOLOGY | Facility: HOSPITAL | Age: 49
End: 2024-09-27
Payer: COMMERCIAL

## 2024-09-27 ENCOUNTER — NURSE TRIAGE (OUTPATIENT)
Dept: HEMATOLOGY/ONCOLOGY | Facility: HOSPITAL | Age: 49
End: 2024-09-27
Payer: COMMERCIAL

## 2024-09-27 VITALS
BODY MASS INDEX: 23.33 KG/M2 | RESPIRATION RATE: 20 BRPM | OXYGEN SATURATION: 98 % | TEMPERATURE: 98.2 F | HEIGHT: 73 IN | DIASTOLIC BLOOD PRESSURE: 55 MMHG | WEIGHT: 176 LBS | SYSTOLIC BLOOD PRESSURE: 90 MMHG | HEART RATE: 78 BPM

## 2024-09-27 DIAGNOSIS — R53.1 GENERALIZED WEAKNESS: Primary | ICD-10-CM

## 2024-09-27 DIAGNOSIS — E83.51 HYPOCALCEMIA: ICD-10-CM

## 2024-09-27 DIAGNOSIS — E87.6 HYPOKALEMIA: ICD-10-CM

## 2024-09-27 DIAGNOSIS — E72.20 SERUM AMMONIA INCREASED (MULTI): ICD-10-CM

## 2024-09-27 LAB
ALBUMIN SERPL BCP-MCNC: 1.7 G/DL (ref 3.4–5)
ALP SERPL-CCNC: 137 U/L (ref 33–120)
ALT SERPL W P-5'-P-CCNC: 57 U/L (ref 10–52)
AMMONIA PLAS-SCNC: 62 UMOL/L (ref 16–53)
AMORPH CRY #/AREA UR COMP ASSIST: ABNORMAL /HPF
ANION GAP SERPL CALC-SCNC: 8 MMOL/L (ref 10–20)
APPEARANCE UR: CLEAR
AST SERPL W P-5'-P-CCNC: 10 U/L (ref 9–39)
ATRIAL RATE: 95 BPM
BACTERIA #/AREA URNS AUTO: ABNORMAL /HPF
BASOPHILS # BLD AUTO: 0.01 X10*3/UL (ref 0–0.1)
BASOPHILS NFR BLD AUTO: 0.1 %
BILIRUB DIRECT SERPL-MCNC: 0.5 MG/DL (ref 0–0.3)
BILIRUB SERPL-MCNC: 1.1 MG/DL (ref 0–1.2)
BILIRUB UR STRIP.AUTO-MCNC: ABNORMAL MG/DL
BUN SERPL-MCNC: 15 MG/DL (ref 6–23)
CALCIUM SERPL-MCNC: 6 MG/DL (ref 8.6–10.3)
CARDIAC TROPONIN I PNL SERPL HS: 4 NG/L (ref 0–20)
CARDIAC TROPONIN I PNL SERPL HS: 4 NG/L (ref 0–20)
CHLORIDE SERPL-SCNC: 102 MMOL/L (ref 98–107)
CO2 SERPL-SCNC: 25 MMOL/L (ref 21–32)
COLOR UR: ABNORMAL
CREAT SERPL-MCNC: 0.39 MG/DL (ref 0.5–1.3)
EGFRCR SERPLBLD CKD-EPI 2021: >90 ML/MIN/1.73M*2
EOSINOPHIL # BLD AUTO: 0.01 X10*3/UL (ref 0–0.7)
EOSINOPHIL NFR BLD AUTO: 0.1 %
ERYTHROCYTE [DISTWIDTH] IN BLOOD BY AUTOMATED COUNT: 16.8 % (ref 11.5–14.5)
GLUCOSE SERPL-MCNC: 97 MG/DL (ref 74–99)
GLUCOSE UR STRIP.AUTO-MCNC: NORMAL MG/DL
HCT VFR BLD AUTO: 23.8 % (ref 41–52)
HGB BLD-MCNC: 7.8 G/DL (ref 13.5–17.5)
IMM GRANULOCYTES # BLD AUTO: 0.03 X10*3/UL (ref 0–0.7)
IMM GRANULOCYTES NFR BLD AUTO: 0.4 % (ref 0–0.9)
KETONES UR STRIP.AUTO-MCNC: NEGATIVE MG/DL
LACTATE SERPL-SCNC: 1.1 MMOL/L (ref 0.4–2)
LEUKOCYTE ESTERASE UR QL STRIP.AUTO: NEGATIVE
LYMPHOCYTES # BLD AUTO: 4.17 X10*3/UL (ref 1.2–4.8)
LYMPHOCYTES NFR BLD AUTO: 53.9 %
MAGNESIUM SERPL-MCNC: 1.66 MG/DL (ref 1.6–2.4)
MCH RBC QN AUTO: 27.4 PG (ref 26–34)
MCHC RBC AUTO-ENTMCNC: 32.8 G/DL (ref 32–36)
MCV RBC AUTO: 84 FL (ref 80–100)
MONOCYTES # BLD AUTO: 0.33 X10*3/UL (ref 0.1–1)
MONOCYTES NFR BLD AUTO: 4.3 %
MUCOUS THREADS #/AREA URNS AUTO: ABNORMAL /LPF
NEUTROPHILS # BLD AUTO: 3.19 X10*3/UL (ref 1.2–7.7)
NEUTROPHILS NFR BLD AUTO: 41.2 %
NITRITE UR QL STRIP.AUTO: NEGATIVE
NRBC BLD-RTO: 0 /100 WBCS (ref 0–0)
P AXIS: 84 DEGREES
P OFFSET: 207 MS
P ONSET: 147 MS
PH UR STRIP.AUTO: 6 [PH]
PLATELET # BLD AUTO: 171 X10*3/UL (ref 150–450)
POTASSIUM SERPL-SCNC: 3.2 MMOL/L (ref 3.5–5.3)
PROT SERPL-MCNC: 3.1 G/DL (ref 6.4–8.2)
PROT UR STRIP.AUTO-MCNC: ABNORMAL MG/DL
Q ONSET: 219 MS
QRS COUNT: 15 BEATS
QRS DURATION: 86 MS
QT INTERVAL: 358 MS
QTC CALCULATION(BAZETT): 449 MS
QTC FREDERICIA: 417 MS
R AXIS: 94 DEGREES
RBC # BLD AUTO: 2.85 X10*6/UL (ref 4.5–5.9)
RBC # UR STRIP.AUTO: NEGATIVE /UL
RBC #/AREA URNS AUTO: ABNORMAL /HPF
SODIUM SERPL-SCNC: 132 MMOL/L (ref 136–145)
SP GR UR STRIP.AUTO: 1.02
T AXIS: 65 DEGREES
T OFFSET: 398 MS
UROBILINOGEN UR STRIP.AUTO-MCNC: ABNORMAL MG/DL
VENTRICULAR RATE: 95 BPM
WBC # BLD AUTO: 7.7 X10*3/UL (ref 4.4–11.3)
WBC #/AREA URNS AUTO: ABNORMAL /HPF

## 2024-09-27 PROCEDURE — 2500000004 HC RX 250 GENERAL PHARMACY W/ HCPCS (ALT 636 FOR OP/ED): Performed by: EMERGENCY MEDICINE

## 2024-09-27 PROCEDURE — 96360 HYDRATION IV INFUSION INIT: CPT

## 2024-09-27 PROCEDURE — 99283 EMERGENCY DEPT VISIT LOW MDM: CPT | Mod: 25

## 2024-09-27 PROCEDURE — 84484 ASSAY OF TROPONIN QUANT: CPT | Performed by: EMERGENCY MEDICINE

## 2024-09-27 PROCEDURE — 71045 X-RAY EXAM CHEST 1 VIEW: CPT | Mod: FOREIGN READ | Performed by: RADIOLOGY

## 2024-09-27 PROCEDURE — 83735 ASSAY OF MAGNESIUM: CPT | Performed by: EMERGENCY MEDICINE

## 2024-09-27 PROCEDURE — 71045 X-RAY EXAM CHEST 1 VIEW: CPT

## 2024-09-27 PROCEDURE — 82248 BILIRUBIN DIRECT: CPT | Performed by: EMERGENCY MEDICINE

## 2024-09-27 PROCEDURE — 83605 ASSAY OF LACTIC ACID: CPT | Performed by: EMERGENCY MEDICINE

## 2024-09-27 PROCEDURE — 80053 COMPREHEN METABOLIC PANEL: CPT | Performed by: EMERGENCY MEDICINE

## 2024-09-27 PROCEDURE — 85025 COMPLETE CBC W/AUTO DIFF WBC: CPT | Performed by: EMERGENCY MEDICINE

## 2024-09-27 PROCEDURE — 96361 HYDRATE IV INFUSION ADD-ON: CPT

## 2024-09-27 PROCEDURE — 81001 URINALYSIS AUTO W/SCOPE: CPT | Performed by: EMERGENCY MEDICINE

## 2024-09-27 PROCEDURE — 2500000002 HC RX 250 W HCPCS SELF ADMINISTERED DRUGS (ALT 637 FOR MEDICARE OP, ALT 636 FOR OP/ED): Performed by: EMERGENCY MEDICINE

## 2024-09-27 PROCEDURE — 2500000001 HC RX 250 WO HCPCS SELF ADMINISTERED DRUGS (ALT 637 FOR MEDICARE OP): Performed by: EMERGENCY MEDICINE

## 2024-09-27 PROCEDURE — 93005 ELECTROCARDIOGRAM TRACING: CPT

## 2024-09-27 PROCEDURE — 82140 ASSAY OF AMMONIA: CPT | Performed by: EMERGENCY MEDICINE

## 2024-09-27 RX ORDER — POTASSIUM CHLORIDE 1.5 G/1.58G
40 POWDER, FOR SOLUTION ORAL ONCE
Status: COMPLETED | OUTPATIENT
Start: 2024-09-27 | End: 2024-09-27

## 2024-09-27 RX ORDER — SODIUM CHLORIDE 9 MG/ML
150 INJECTION, SOLUTION INTRAVENOUS CONTINUOUS
Status: DISCONTINUED | OUTPATIENT
Start: 2024-09-27 | End: 2024-09-27 | Stop reason: HOSPADM

## 2024-09-27 RX ORDER — POTASSIUM CHLORIDE 20 MEQ/1
40 TABLET, EXTENDED RELEASE ORAL ONCE
Status: COMPLETED | OUTPATIENT
Start: 2024-09-27 | End: 2024-09-27

## 2024-09-27 RX ORDER — HEPARIN 100 UNIT/ML
5 SYRINGE INTRAVENOUS ONCE
Status: COMPLETED | OUTPATIENT
Start: 2024-09-27 | End: 2024-09-27

## 2024-09-27 RX ORDER — LACTULOSE 10 G/15ML
20 SOLUTION ORAL ONCE
Status: COMPLETED | OUTPATIENT
Start: 2024-09-27 | End: 2024-09-27

## 2024-09-27 RX ORDER — LACTULOSE 10 G/15ML
20 SOLUTION ORAL EVERY OTHER DAY
Qty: 450 ML | Refills: 0 | Status: SHIPPED | OUTPATIENT
Start: 2024-09-27 | End: 2024-10-27

## 2024-09-27 RX ORDER — POTASSIUM CHLORIDE 20 MEQ/1
20 TABLET, EXTENDED RELEASE ORAL DAILY
Qty: 7 TABLET | Refills: 0 | Status: SHIPPED | OUTPATIENT
Start: 2024-09-27 | End: 2024-10-04

## 2024-09-27 RX ADMIN — POTASSIUM CHLORIDE 40 MEQ: 1500 TABLET, EXTENDED RELEASE ORAL at 15:08

## 2024-09-27 RX ADMIN — POTASSIUM CHLORIDE 40 MEQ: 1.5 POWDER, FOR SOLUTION ORAL at 13:16

## 2024-09-27 RX ADMIN — HEPARIN 500 UNITS: 100 SYRINGE at 15:46

## 2024-09-27 RX ADMIN — LACTULOSE 20 G: 20 SOLUTION ORAL at 14:05

## 2024-09-27 RX ADMIN — SODIUM CHLORIDE 1000 ML: 9 INJECTION, SOLUTION INTRAVENOUS at 11:25

## 2024-09-27 RX ADMIN — SODIUM CHLORIDE 150 ML/HR: 9 INJECTION, SOLUTION INTRAVENOUS at 13:16

## 2024-09-27 RX ADMIN — SODIUM CHLORIDE 1000 ML: 9 INJECTION, SOLUTION INTRAVENOUS at 14:04

## 2024-09-27 ASSESSMENT — VISUAL ACUITY: OU: 1

## 2024-09-27 ASSESSMENT — PAIN SCALES - GENERAL: PAINLEVEL_OUTOF10: 0 - NO PAIN

## 2024-09-27 ASSESSMENT — PAIN - FUNCTIONAL ASSESSMENT: PAIN_FUNCTIONAL_ASSESSMENT: 0-10

## 2024-09-27 NOTE — TELEPHONE ENCOUNTER
Additional Information   Commented on: Please tell me more about the problem you are having. the more detail you provide, the better.     Patient states that he received Pembrolizumab yesterday and states that his legs are so weak that he can't get out his chair without assistance. He lives alone but neighbor is checking on him. He said once someone stands him up he can walk slowly but he doesn't have the strength to stand up on his own, Also his urine is orange and he is having a hard time starting the stream. Denies dysuria, hematuria, frequency or urgency. Denies fever/chills, SOB or chest pain, N/V, constipation or diarrhea.    Protocols used: Other    Pt also states that his Pembro infusion needs to be rescheduled. He is scheduled for infusion on 10/7 but states it should be Q 3 weeks.    Advised pt to call 911 for ambulance. He lives alone and cannot get out of chair if needed. Understanding verbalized with teach back.    Team notified via secure chat also.

## 2024-09-27 NOTE — ED PROVIDER NOTES
Generalized weakness, urine is orange.  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  He states that he had immunotherapy for treatment of his cancer yesterday.  Patient states that he was just discharged [facility] on Tuesday.  He states that he has had lower extremity swelling and also he was diagnosed with pleural effusions bilaterally.  He states that they did not do a thoracentesis on him.  He denies any history of fever or chills or other systemic symptoms.  He states that he has not been eating or drinking as much is normal.      History provided by:  Patient   used: No         Physical Exam  Vitals and nursing note reviewed.   Constitutional:       General: He is awake.      Appearance: Normal appearance. He is normal weight. He is ill-appearing.   HENT:      Head: Normocephalic and atraumatic.      Right Ear: Hearing and external ear normal.      Left Ear: Hearing and external ear normal.      Nose: Nose normal. No congestion or rhinorrhea.      Mouth/Throat:      Lips: Pink.      Mouth: Mucous membranes are moist.      Pharynx: Oropharynx is clear. Uvula midline. No oropharyngeal exudate or posterior oropharyngeal erythema.      Comments: Patient's lips are dry.  Eyes:      General: Lids are normal. Vision grossly intact.         Right eye: No discharge.         Left eye: No discharge.      Extraocular Movements: Extraocular movements intact.      Conjunctiva/sclera: Conjunctivae normal.      Pupils: Pupils are equal, round, and reactive to light.   Cardiovascular:      Rate and Rhythm: Normal rate. Rhythm irregularly irregular.      Pulses: Normal pulses.      Heart sounds: Normal heart sounds. No murmur heard.     No friction rub. No gallop.   Pulmonary:      Effort: Pulmonary effort is normal. No respiratory distress.      Breath sounds: Normal breath  sounds. No stridor. No wheezing, rhonchi or rales.   Chest:      Chest wall: No tenderness.   Abdominal:      General: Abdomen is flat. Bowel sounds are normal. There is no distension.      Palpations: Abdomen is soft. There is no mass.      Tenderness: There is no abdominal tenderness. There is no guarding or rebound.      Hernia: No hernia is present.      Comments: Patient has a benign abdominal exam.   Musculoskeletal:         General: No swelling, tenderness, deformity or signs of injury. Normal range of motion.      Cervical back: Full passive range of motion without pain, normal range of motion and neck supple.      Right lower le+ Pitting Edema present.      Left lower le+ Pitting Edema present.   Skin:     General: Skin is warm and dry.      Capillary Refill: Capillary refill takes less than 2 seconds.      Coloration: Skin is pale. Skin is not jaundiced.      Findings: No bruising, erythema, lesion or rash.   Neurological:      General: No focal deficit present.      Mental Status: He is alert and oriented to person, place, and time.      GCS: GCS eye subscore is 4. GCS verbal subscore is 5. GCS motor subscore is 6.      Cranial Nerves: Cranial nerves 2-12 are intact. No cranial nerve deficit.      Sensory: Sensation is intact. No sensory deficit.      Motor: Motor function is intact. No weakness.      Coordination: Coordination is intact. Coordination normal.      Deep Tendon Reflexes: Reflexes normal.   Psychiatric:         Attention and Perception: Attention and perception normal.         Mood and Affect: Mood and affect normal.         Speech: Speech normal.         Behavior: Behavior normal. Behavior is cooperative.         Thought Content: Thought content normal.         Cognition and Memory: Cognition and memory normal.         Judgment: Judgment normal.          Labs Reviewed   AMMONIA - Abnormal       Result Value    Ammonia 62 (*)    CBC WITH AUTO DIFFERENTIAL - Abnormal    WBC 7.7       nRBC 0.0      RBC 2.85 (*)     Hemoglobin 7.8 (*)     Hematocrit 23.8 (*)     MCV 84      MCH 27.4      MCHC 32.8      RDW 16.8 (*)     Platelets 171      Neutrophils % 41.2      Immature Granulocytes %, Automated 0.4      Lymphocytes % 53.9      Monocytes % 4.3      Eosinophils % 0.1      Basophils % 0.1      Neutrophils Absolute 3.19      Immature Granulocytes Absolute, Automated 0.03      Lymphocytes Absolute 4.17      Monocytes Absolute 0.33      Eosinophils Absolute 0.01      Basophils Absolute 0.01     HEPATIC FUNCTION PANEL - Abnormal    Albumin 1.7 (*)     Bilirubin, Total 1.1      Bilirubin, Direct 0.5 (*)     Alkaline Phosphatase 137 (*)     ALT 57 (*)     AST 10      Total Protein 3.1 (*)    BASIC METABOLIC PANEL - Abnormal    Glucose 97      Sodium 132 (*)     Potassium 3.2 (*)     Chloride 102      Bicarbonate 25      Anion Gap 8 (*)     Urea Nitrogen 15      Creatinine 0.39 (*)     eGFR >90      Calcium 6.0 (*)    URINALYSIS WITH REFLEX CULTURE AND MICROSCOPIC - Abnormal    Color, Urine Dark-Yellow      Appearance, Urine Clear      Specific Gravity, Urine 1.023      pH, Urine 6.0      Protein, Urine 20 (TRACE)      Glucose, Urine Normal      Blood, Urine NEGATIVE      Ketones, Urine NEGATIVE      Bilirubin, Urine 0.5 (1+) (*)     Urobilinogen, Urine OVER (4+) (*)     Nitrite, Urine NEGATIVE      Leukocyte Esterase, Urine NEGATIVE      Narrative:     OVER is reported when the result is greater than the clinically reportable range.   URINALYSIS MICROSCOPIC WITH REFLEX CULTURE - Abnormal    WBC, Urine 1-5      RBC, Urine 1-2      Bacteria, Urine 1+ (*)     Mucus, Urine 2+      Amorphous Crystals, Urine 1+     LACTATE - Normal    Lactate 1.1      Narrative:     Venipuncture immediately after or during the administration of Metamizole may lead to falsely low results. Testing should be performed immediately prior to Metamizole dosing.   SERIAL TROPONIN-INITIAL - Normal    Troponin I, High Sensitivity 4       Narrative:     Less than 99th percentile of normal range cutoff-  Female and children under 18 years old <14 ng/L; Male <21 ng/L: Negative  Repeat testing should be performed if clinically indicated.     Female and children under 18 years old 14-50 ng/L; Male 21-50 ng/L:  Consistent with possible cardiac damage and possible increased clinical   risk. Serial measurements may help to assess extent of myocardial damage.     >50 ng/L: Consistent with cardiac damage, increased clinical risk and  myocardial infarction. Serial measurements may help assess extent of   myocardial damage.      NOTE: Children less than 1 year old may have higher baseline troponin   levels and results should be interpreted in conjunction with the overall   clinical context.     NOTE: Troponin I testing is performed using a different   testing methodology at [facility] than at other   [facility]. Direct result comparisons should only   be made within the same method.   SERIAL TROPONIN, 1 HOUR - Normal    Troponin I, High Sensitivity 4      Narrative:     Less than 99th percentile of normal range cutoff-  Female and children under 18 years old <14 ng/L; Male <21 ng/L: Negative  Repeat testing should be performed if clinically indicated.     Female and children under 18 years old 14-50 ng/L; Male 21-50 ng/L:  Consistent with possible cardiac damage and possible increased clinical   risk. Serial measurements may help to assess extent of myocardial damage.     >50 ng/L: Consistent with cardiac damage, increased clinical risk and  myocardial infarction. Serial measurements may help assess extent of   myocardial damage.      NOTE: Children less than 1 year old may have higher baseline troponin   levels and results should be interpreted in conjunction with the overall   clinical context.     NOTE: Troponin I testing is performed using a different   testing methodology at [facility] than at other   [facility].  Direct result comparisons should only   be made within the same method.   MAGNESIUM - Normal    Magnesium 1.66     TROPONIN SERIES- (INITIAL, 1 HR)    Narrative:     The following orders were created for panel order Troponin I Series, High Sensitivity (0, 1 HR).  Procedure                               Abnormality         Status                     ---------                               -----------         ------                     Troponin I, High Sensiti...[[id number]]  Normal              Final result               Troponin, High Sensitivi...[[id number]]  Normal              Final result                 Please view results for these tests on the individual orders.   URINALYSIS WITH REFLEX CULTURE AND MICROSCOPIC    Narrative:     The following orders were created for panel order Urinalysis with Reflex Culture and Microscopic.  Procedure                               Abnormality         Status                     ---------                               -----------         ------                     Urinalysis with Reflex C...[[id number]]  Abnormal            Final result               Extra Urine Gray Tube[[id number]]                                                         Please view results for these tests on the individual orders.   EXTRA URINE GRAY TUBE        XR chest 1 view   Final Result   Persistent left pleural effusion with compression atelectasis of the   left lower lobe.   Signed by Frandy Maher MD           Procedures     Medical Decision Making  Patient was seen and evaluated due to complaints of generalized weakness and orange urine.  Patient with history of adenocarcinoma the liver and was just discharged from [facility].  Urinalysis was obtained which reveals a specific gravity 1.023 and urobilinogen was +4.  Wong is +1.  Leukocyte Estrace and nitrate are both negative.  And there is 1-5 WBCs per high-power field.  Albumin is low at 1.7.  Bilirubin is 0.5.  Alkaline phosphatase is 137.  The ALT  is 57 and total protein was 31 AST was normal at 10.  Lactic acid was normal at 1.1.  Troponin and repeat troponin both negative.  Basic metabolic panel revealed hypokalemia with a potassium of 3.2.  Sodium is low at 132.  Creatinine is 0.39 and GFR was greater than 90 calcium was low at 6.  White cell count was normal 7.7.  Hemoglobin is low at 7.8.  Chest x-ray revealed persistent left pleural effusion with compression atelectasis of the left lower lobe.  Patient had a magnesium level obtained and this was normal.  Patient was not tolerating the liquid potassium chloride and therefore I wrote him for pills prior to being discharged home he was treated with lactulose due to an elevated ammonia level of 62.  Patient will be discharged home with a prescription for lactulose and will need to have repeat ammonia levels checked.  Patient also will be prescribed potassium chloride and he was encouraged to continue taking the Tums for his low calcium level.  Patient was treated with a another liter of normal saline wide open prior to being discharged home.    Amount and/or Complexity of Data Reviewed  ECG/medicine tests:      Details: EKG reveals atrial flutter with variable AV conduction with PVCs.  Rightward axis.  Nonspecific T wave changes.  Rate is 95 bpm.  The QRS durations 86 ms.  The QTc is 449 ms.  Axis is 94 degrees.         Diagnoses as of 09/28/24 0949   Generalized weakness   Hypokalemia   Hypocalcemia   Serum ammonia increased (Multi)                    Hasmukh Roach,   09/28/24 0949

## 2024-09-27 NOTE — DISCHARGE INSTRUCTIONS
Today your urinalysis revealed elevation of your urobilinogen which explains why her urine is orange in color.  Your potassium level is low and your calcium level is low.  Continue taking the Tums as directed.  I provided you with prescriptions for potassium chloride to help with your potassium level.  Your ammonia level was also slightly elevated and prescribed you lactulose to take every other day.  You will need a repeat ammonia level as an outpatient through your primary care doctor.

## 2024-09-29 ENCOUNTER — TELEPHONE (OUTPATIENT)
Dept: HEMATOLOGY/ONCOLOGY | Facility: HOSPITAL | Age: 49
End: 2024-09-29
Payer: COMMERCIAL

## 2024-09-29 DIAGNOSIS — C18.4 ADENOCARCINOMA OF TRANSVERSE COLON (MULTI): Primary | ICD-10-CM

## 2024-09-29 NOTE — SIGNIFICANT EVENT
Met with patient at bedside on 9/20/24 to discuss current clinical status and recent events. Patient explained that he went to the ED due to difficulty breathing and was admitted with fluid accumulation throughout the body, weakness and liver dysfunction. He is no longer having frequent diarrhea. He understands that it is difficult to determine whether the recent clinical decline is related to immune checkpoint blockade side effects or due to his underlying malignancy but that the overall impression is that his liver dysfunction is not secondary to the immunotherapy. He understands that there is a risk of worsening his condition if he were to resume immunotherapy, but he is willing to accept this risk given that it was working to treat his underlying malignancy based on his most recent scan.

## 2024-09-29 NOTE — TELEPHONE ENCOUNTER
Called patient to check in after recent hospitalization. He states that he does not have the strength to get up by himself. He is not certain whether this is worse than it was when he was in the hospital because in the hospital he had better equipment for his condition (bed that sits up with rails etc). He also noted on Friday that his urine was orange and after getting hydration in the emergency room and increasing his fluid intake it has started to improve. With regard to the elevated ammonia level noted, he states that he has not been confused and he has not been taking the lactulose prescribed. His leg swelling is slightly better but feet are still swollen.    Discussed referral for home services and evaluation for physical therapy and patient is amenable. Also explained that urine was likely orange in part due to bilirubin in the urine, related to liver dysfunction. He will continue to monitor this. He is also reporting difficulty with urine stream during the day compared with at night. Will continue to monitor this. He knows to call back with any change in symptoms.

## 2024-09-30 ENCOUNTER — HOME HEALTH ADMISSION (OUTPATIENT)
Dept: HOME HEALTH SERVICES | Facility: HOME HEALTH | Age: 49
End: 2024-09-30
Payer: COMMERCIAL

## 2024-09-30 ENCOUNTER — DOCUMENTATION (OUTPATIENT)
Dept: CASE MANAGEMENT | Facility: HOSPITAL | Age: 49
End: 2024-09-30
Payer: COMMERCIAL

## 2024-09-30 LAB
ATRIAL RATE: 95 BPM
P AXIS: 84 DEGREES
P OFFSET: 207 MS
P ONSET: 147 MS
Q ONSET: 219 MS
QRS COUNT: 15 BEATS
QRS DURATION: 86 MS
QT INTERVAL: 358 MS
QTC CALCULATION(BAZETT): 449 MS
QTC FREDERICIA: 417 MS
R AXIS: 94 DEGREES
T AXIS: 65 DEGREES
T OFFSET: 398 MS
VENTRICULAR RATE: 95 BPM

## 2024-09-30 NOTE — PROGRESS NOTES
SW referred to assist with home care for physical therapy. Referral will be sent to  home care today.

## 2024-10-01 ENCOUNTER — NURSE TRIAGE (OUTPATIENT)
Dept: ADMISSION | Facility: HOSPITAL | Age: 49
End: 2024-10-01
Payer: COMMERCIAL

## 2024-10-01 DIAGNOSIS — C18.4 ADENOCARCINOMA OF TRANSVERSE COLON (MULTI): Primary | ICD-10-CM

## 2024-10-01 DIAGNOSIS — C18.4 ADENOCARCINOMA OF TRANSVERSE COLON (MULTI): ICD-10-CM

## 2024-10-01 DIAGNOSIS — R53.1 WEAKNESS: ICD-10-CM

## 2024-10-01 NOTE — TELEPHONE ENCOUNTER
Per Epic, last pembro was 9/26 and treatment plan orders are for 21 day cycles. Appointments for 10/7 currently have returned orders, but treatment plan dates are not yet updated. Secure chat sent to Dr. Finn requesting to review and update treatment plan dates.

## 2024-10-01 NOTE — TELEPHONE ENCOUNTER
Dr. Finn would like him to keep his FUV in person on 10/7, then have a virtual FUV on 10/14 before next pembro on 10/17. Appointments have been updated.

## 2024-10-01 NOTE — TELEPHONE ENCOUNTER
Vladimir called back in. States he had one episode of diarrhea in the past 24 hours. Advised he should monitor for now and notify the office if diarrhea persists.   States he is eating and drinking OK. No N/V.   Additional Information   Commented on: Are your bowel movements liquid or formed?     Unknown per patient as he did not pay attention to this   Commented on: What color are your bowel movements?     Unknown per patient as he did not pay attention to this    Protocols used: Diarrhea

## 2024-10-01 NOTE — TELEPHONE ENCOUNTER
Vladimir called the office to follow up on his weakness. He is asking if Dr. Finn is able to order him an Albumin infusion as he was receiving this while in the hospital.    States the issue he has is getting up; his brother is currently staying with him to help him. Expressed his strength level seems to have gotten worse since being discharged home. He is using a walker at home.     Asking if he qualifies for a hospital bed at home.     Vladimir is also asking if Dr. Finn can write for a handicap placard for him; he has a FUV on 10/7 so would like to pick it up at this appt.

## 2024-10-02 ENCOUNTER — DOCUMENTATION (OUTPATIENT)
Dept: CASE MANAGEMENT | Facility: HOSPITAL | Age: 49
End: 2024-10-02
Payer: COMMERCIAL

## 2024-10-02 NOTE — PROGRESS NOTES
Per  MD., patient requesting Placard and prescription for hospital bed. MD provided prescriptions for both. LETICIA emailed patient Placard application and prescription. SW will contact DME for hospital bed.   LETICIA spoke to [employer] for  hospital bed coverage and supplier in network. Insurance will cover 90% after 300.00 deductible.  DME contacted and waiting on forms from supplier to complete. Patient is aware of the above.   Information faxed to DME company.

## 2024-10-03 ENCOUNTER — HOME CARE VISIT (OUTPATIENT)
Dept: HOME HEALTH SERVICES | Facility: HOME HEALTH | Age: 49
End: 2024-10-03
Payer: COMMERCIAL

## 2024-10-03 DIAGNOSIS — C18.4 ADENOCARCINOMA OF TRANSVERSE COLON (MULTI): ICD-10-CM

## 2024-10-03 PROCEDURE — G0151 HHCP-SERV OF PT,EA 15 MIN: HCPCS

## 2024-10-03 SDOH — HEALTH STABILITY: PHYSICAL HEALTH: EXERCISE TYPE: SEATED

## 2024-10-03 ASSESSMENT — ENCOUNTER SYMPTOMS
DENIES PAIN: 1
HIGHEST PAIN SEVERITY IN PAST 24 HOURS: 0/10
LOSS OF SENSATION IN FEET: 0
OCCASIONAL FEELINGS OF UNSTEADINESS: 1
PAIN SEVERITY GOAL: 0/10
LOWEST PAIN SEVERITY IN PAST 24 HOURS: 0/10
DEPRESSION: 0
MUSCLE WEAKNESS: 1

## 2024-10-03 ASSESSMENT — ACTIVITIES OF DAILY LIVING (ADL)
TOILETING: 1
FEEDING ASSESSED: 1
CURRENT_FUNCTION: ONE PERSON
FEEDING: INDEPENDENT
ENTERING_EXITING_HOME: ONE PERSON
TOILETING: ONE PERSON
PREPARING MEALS: DEPENDENT
CURRENT_FUNCTION: MAXIMUM ASSIST
GROOMING_CURRENT_FUNCTION: SUPERVISION
DRESSING_UB_CURRENT_FUNCTION: MAXIMUM ASSIST
SHOPPING ASSESSED: 1
BATHING ASSESSED: 1
GROOMING ASSESSED: 1
LAUNDRY: DEPENDENT
SHOPPING: DEPENDENT
LAUNDRY ASSESSED: 1
DRESSING_LB_CURRENT_FUNCTION: MAXIMUM ASSIST
BATHING_CURRENT_FUNCTION: ONE PERSON
TRANSPORTATION ASSESSED: 1
TRANSPORTATION: DEPENDENT
GROOMING_CURRENT_FUNCTION: MAXIMUM ASSIST
AMBULATION ASSISTANCE: 1
HOUSEKEEPING ASSESSED: 1
TOILETING: MAXIMUM ASSIST
PHYSICAL TRANSFERS ASSESSED: 1
LIGHT HOUSEKEEPING: DEPENDENT
AMBULATION ASSISTANCE: ONE PERSON
BATHING_CURRENT_FUNCTION: MAXIMUM ASSIST
OASIS_M1830: 05

## 2024-10-03 ASSESSMENT — PAIN SCALES - PAIN ASSESSMENT IN ADVANCED DEMENTIA (PAINAD)
TOTALSCORE: 0
BODYLANGUAGE: 0
BODYLANGUAGE: 0 - RELAXED.
FACIALEXPRESSION: 0
BREATHING: 0
NEGVOCALIZATION: 0
CONSOLABILITY: 0
NEGVOCALIZATION: 0 - NONE.
FACIALEXPRESSION: 0 - SMILING OR INEXPRESSIVE.
CONSOLABILITY: 0 - NO NEED TO CONSOLE.

## 2024-10-03 NOTE — TELEPHONE ENCOUNTER
Pt notified of the pharmacist's response, but I let him know that we are waiting on Dr. Finn's recommendation also. Patient verbalized understanding.

## 2024-10-03 NOTE — TELEPHONE ENCOUNTER
Pt called back- asking if he can take an Albumin supplement at home also. He is aware of the infusion on 10/8, but he said his wife found Albumin supplements on [employer] so wanted to ask about that. Message sent to the team.

## 2024-10-04 ENCOUNTER — HOME CARE VISIT (OUTPATIENT)
Dept: HOME HEALTH SERVICES | Facility: HOME HEALTH | Age: 49
End: 2024-10-04
Payer: COMMERCIAL

## 2024-10-04 VITALS
SYSTOLIC BLOOD PRESSURE: 87 MMHG | DIASTOLIC BLOOD PRESSURE: 58 MMHG | WEIGHT: 176 LBS | RESPIRATION RATE: 18 BRPM | HEART RATE: 100 BPM | BODY MASS INDEX: 20.36 KG/M2 | HEIGHT: 78 IN | OXYGEN SATURATION: 96 %

## 2024-10-04 PROCEDURE — G0299 HHS/HOSPICE OF RN EA 15 MIN: HCPCS

## 2024-10-04 ASSESSMENT — ENCOUNTER SYMPTOMS
DEPRESSION: 0
LOWER EXTREMITY EDEMA: 1
CHANGE IN APPETITE: UNCHANGED
DIARRHEA: 1
STOOL FREQUENCY: TWICE DAILY
PERSON REPORTING PAIN: PATIENT
LOSS OF SENSATION IN FEET: 0
APPETITE LEVEL: FAIR
PAIN SEVERITY GOAL: 0/10
HIGHEST PAIN SEVERITY IN PAST 24 HOURS: 0/10
SUBJECTIVE PAIN PROGRESSION: UNCHANGED
DENIES PAIN: 1
OCCASIONAL FEELINGS OF UNSTEADINESS: 0
LOWEST PAIN SEVERITY IN PAST 24 HOURS: 0/10

## 2024-10-04 ASSESSMENT — PAIN SCALES - PAIN ASSESSMENT IN ADVANCED DEMENTIA (PAINAD)
BREATHING: 0
TOTALSCORE: 0
FACIALEXPRESSION: 0 - SMILING OR INEXPRESSIVE.
BODYLANGUAGE: 0 - RELAXED.
CONSOLABILITY: 0
NEGVOCALIZATION: 0
CONSOLABILITY: 0 - NO NEED TO CONSOLE.
BODYLANGUAGE: 0
NEGVOCALIZATION: 0 - NONE.
FACIALEXPRESSION: 0

## 2024-10-04 ASSESSMENT — ACTIVITIES OF DAILY LIVING (ADL)
AMBULATION ASSISTANCE: ONE PERSON
AMBULATION ASSISTANCE: 1

## 2024-10-07 ENCOUNTER — HOSPITAL ENCOUNTER (INPATIENT)
Facility: HOSPITAL | Age: 49
End: 2024-10-07
Attending: EMERGENCY MEDICINE | Admitting: HOSPITALIST
Payer: COMMERCIAL

## 2024-10-07 ENCOUNTER — OFFICE VISIT (OUTPATIENT)
Dept: HEMATOLOGY/ONCOLOGY | Facility: CLINIC | Age: 49
End: 2024-10-07
Payer: COMMERCIAL

## 2024-10-07 ENCOUNTER — APPOINTMENT (OUTPATIENT)
Dept: RADIOLOGY | Facility: HOSPITAL | Age: 49
End: 2024-10-07
Payer: COMMERCIAL

## 2024-10-07 ENCOUNTER — APPOINTMENT (OUTPATIENT)
Dept: HEMATOLOGY/ONCOLOGY | Facility: CLINIC | Age: 49
End: 2024-10-07
Payer: COMMERCIAL

## 2024-10-07 VITALS
HEART RATE: 116 BPM | RESPIRATION RATE: 20 BRPM | DIASTOLIC BLOOD PRESSURE: 62 MMHG | SYSTOLIC BLOOD PRESSURE: 86 MMHG | TEMPERATURE: 97.2 F

## 2024-10-07 DIAGNOSIS — R60.0 BILATERAL LOWER EXTREMITY EDEMA: ICD-10-CM

## 2024-10-07 DIAGNOSIS — C18.4 ADENOCARCINOMA OF TRANSVERSE COLON (MULTI): ICD-10-CM

## 2024-10-07 DIAGNOSIS — J90 LARGE PLEURAL EFFUSION: Primary | ICD-10-CM

## 2024-10-07 DIAGNOSIS — R33.9 RETENTION OF URINE: ICD-10-CM

## 2024-10-07 LAB
ALBUMIN SERPL BCP-MCNC: 1.6 G/DL (ref 3.4–5)
ALP SERPL-CCNC: 151 U/L (ref 33–120)
ALT SERPL W P-5'-P-CCNC: 53 U/L (ref 10–52)
ANION GAP BLDV CALCULATED.4IONS-SCNC: 5 MMOL/L (ref 10–25)
ANION GAP SERPL CALC-SCNC: 8 MMOL/L (ref 10–20)
APPEARANCE UR: CLEAR
AST SERPL W P-5'-P-CCNC: 20 U/L (ref 9–39)
BASE EXCESS BLDV CALC-SCNC: -0.9 MMOL/L (ref -2–3)
BASOPHILS # BLD AUTO: 0.01 X10*3/UL (ref 0–0.1)
BASOPHILS NFR BLD AUTO: 0.1 %
BILIRUB SERPL-MCNC: 0.9 MG/DL (ref 0–1.2)
BILIRUB UR STRIP.AUTO-MCNC: NEGATIVE MG/DL
BODY TEMPERATURE: 37 DEGREES CELSIUS
BUN SERPL-MCNC: 15 MG/DL (ref 6–23)
CA-I BLD-SCNC: 0.93 MMOL/L (ref 1.1–1.33)
CA-I BLDV-SCNC: 0.95 MMOL/L (ref 1.1–1.33)
CALCIUM SERPL-MCNC: 5.6 MG/DL (ref 8.6–10.3)
CHLORIDE BLDV-SCNC: 100 MMOL/L (ref 98–107)
CHLORIDE SERPL-SCNC: 98 MMOL/L (ref 98–107)
CO2 SERPL-SCNC: 25 MMOL/L (ref 21–32)
COLOR UR: YELLOW
CREAT SERPL-MCNC: 0.44 MG/DL (ref 0.5–1.3)
EGFRCR SERPLBLD CKD-EPI 2021: >90 ML/MIN/1.73M*2
EOSINOPHIL # BLD AUTO: 0 X10*3/UL (ref 0–0.7)
EOSINOPHIL NFR BLD AUTO: 0 %
ERYTHROCYTE [DISTWIDTH] IN BLOOD BY AUTOMATED COUNT: 17.2 % (ref 11.5–14.5)
GLUCOSE BLDV-MCNC: 124 MG/DL (ref 74–99)
GLUCOSE SERPL-MCNC: 150 MG/DL (ref 74–99)
GLUCOSE UR STRIP.AUTO-MCNC: NORMAL MG/DL
HCO3 BLDV-SCNC: 24.3 MMOL/L (ref 22–26)
HCT VFR BLD AUTO: 25 % (ref 41–52)
HCT VFR BLD EST: 23 % (ref 41–52)
HGB BLD-MCNC: 8.1 G/DL (ref 13.5–17.5)
HGB BLDV-MCNC: 7.8 G/DL (ref 13.5–17.5)
IMM GRANULOCYTES # BLD AUTO: 0.08 X10*3/UL (ref 0–0.7)
IMM GRANULOCYTES NFR BLD AUTO: 0.5 % (ref 0–0.9)
INHALED O2 CONCENTRATION: 100 %
KETONES UR STRIP.AUTO-MCNC: NEGATIVE MG/DL
LACTATE BLDV-SCNC: 2 MMOL/L (ref 0.4–2)
LEUKOCYTE ESTERASE UR QL STRIP.AUTO: NEGATIVE
LIPASE SERPL-CCNC: 6 U/L (ref 9–82)
LYMPHOCYTES # BLD AUTO: 4.07 X10*3/UL (ref 1.2–4.8)
LYMPHOCYTES NFR BLD AUTO: 25.6 %
MCH RBC QN AUTO: 28.3 PG (ref 26–34)
MCHC RBC AUTO-ENTMCNC: 32.4 G/DL (ref 32–36)
MCV RBC AUTO: 87 FL (ref 80–100)
MONOCYTES # BLD AUTO: 0.3 X10*3/UL (ref 0.1–1)
MONOCYTES NFR BLD AUTO: 1.9 %
MRSA DNA SPEC QL NAA+PROBE: NOT DETECTED
MUCOUS THREADS #/AREA URNS AUTO: NORMAL /LPF
NEUTROPHILS # BLD AUTO: 11.42 X10*3/UL (ref 1.2–7.7)
NEUTROPHILS NFR BLD AUTO: 71.9 %
NITRITE UR QL STRIP.AUTO: NEGATIVE
NRBC BLD-RTO: 0 /100 WBCS (ref 0–0)
OXYHGB MFR BLDV: 27.6 % (ref 45–75)
PCO2 BLDV: 42 MM HG (ref 41–51)
PH BLDV: 7.37 PH (ref 7.33–7.43)
PH UR STRIP.AUTO: 6 [PH]
PLATELET # BLD AUTO: 406 X10*3/UL (ref 150–450)
PO2 BLDV: 31 MM HG (ref 35–45)
POTASSIUM BLDV-SCNC: 4.3 MMOL/L (ref 3.5–5.3)
POTASSIUM SERPL-SCNC: 3.9 MMOL/L (ref 3.5–5.3)
PROT SERPL-MCNC: 3 G/DL (ref 6.4–8.2)
PROT UR STRIP.AUTO-MCNC: ABNORMAL MG/DL
RBC # BLD AUTO: 2.86 X10*6/UL (ref 4.5–5.9)
RBC # UR STRIP.AUTO: NEGATIVE /UL
RBC #/AREA URNS AUTO: NORMAL /HPF
SAO2 % BLDV: 28 % (ref 45–75)
SODIUM BLDV-SCNC: 125 MMOL/L (ref 136–145)
SODIUM SERPL-SCNC: 127 MMOL/L (ref 136–145)
SP GR UR STRIP.AUTO: 1.03
UROBILINOGEN UR STRIP.AUTO-MCNC: ABNORMAL MG/DL
WBC # BLD AUTO: 15.9 X10*3/UL (ref 4.4–11.3)
WBC #/AREA URNS AUTO: NORMAL /HPF

## 2024-10-07 PROCEDURE — 82435 ASSAY OF BLOOD CHLORIDE: CPT

## 2024-10-07 PROCEDURE — 96366 THER/PROPH/DIAG IV INF ADDON: CPT | Mod: 59

## 2024-10-07 PROCEDURE — 2500000004 HC RX 250 GENERAL PHARMACY W/ HCPCS (ALT 636 FOR OP/ED): Mod: JZ | Performed by: PHARMACIST

## 2024-10-07 PROCEDURE — 96367 TX/PROPH/DG ADDL SEQ IV INF: CPT | Mod: 59

## 2024-10-07 PROCEDURE — 51702 INSERT TEMP BLADDER CATH: CPT

## 2024-10-07 PROCEDURE — P9045 ALBUMIN (HUMAN), 5%, 250 ML: HCPCS | Mod: JZ | Performed by: PHARMACIST

## 2024-10-07 PROCEDURE — 99214 OFFICE O/P EST MOD 30 MIN: CPT | Performed by: INTERNAL MEDICINE

## 2024-10-07 PROCEDURE — 71045 X-RAY EXAM CHEST 1 VIEW: CPT | Mod: FOREIGN READ | Performed by: RADIOLOGY

## 2024-10-07 PROCEDURE — 83690 ASSAY OF LIPASE: CPT

## 2024-10-07 PROCEDURE — 74177 CT ABD & PELVIS W/CONTRAST: CPT | Mod: FOREIGN READ | Performed by: RADIOLOGY

## 2024-10-07 PROCEDURE — 96365 THER/PROPH/DIAG IV INF INIT: CPT | Mod: 59

## 2024-10-07 PROCEDURE — 82330 ASSAY OF CALCIUM: CPT

## 2024-10-07 PROCEDURE — 2550000001 HC RX 255 CONTRASTS: Performed by: EMERGENCY MEDICINE

## 2024-10-07 PROCEDURE — 99285 EMERGENCY DEPT VISIT HI MDM: CPT

## 2024-10-07 PROCEDURE — 87641 MR-STAPH DNA AMP PROBE: CPT

## 2024-10-07 PROCEDURE — 81003 URINALYSIS AUTO W/O SCOPE: CPT

## 2024-10-07 PROCEDURE — 71260 CT THORAX DX C+: CPT

## 2024-10-07 PROCEDURE — 71260 CT THORAX DX C+: CPT | Mod: FOREIGN READ | Performed by: RADIOLOGY

## 2024-10-07 PROCEDURE — 85025 COMPLETE CBC W/AUTO DIFF WBC: CPT

## 2024-10-07 PROCEDURE — 99223 1ST HOSP IP/OBS HIGH 75: CPT | Performed by: HOSPITALIST

## 2024-10-07 PROCEDURE — 80053 COMPREHEN METABOLIC PANEL: CPT

## 2024-10-07 PROCEDURE — 71045 X-RAY EXAM CHEST 1 VIEW: CPT

## 2024-10-07 PROCEDURE — 2500000004 HC RX 250 GENERAL PHARMACY W/ HCPCS (ALT 636 FOR OP/ED): Mod: JZ

## 2024-10-07 RX ORDER — ALBUMIN HUMAN 50 G/1000ML
12.5 SOLUTION INTRAVENOUS ONCE
Status: COMPLETED | OUTPATIENT
Start: 2024-10-07 | End: 2024-10-07

## 2024-10-07 RX ORDER — ALBUMIN HUMAN 50 G/1000ML
25 SOLUTION INTRAVENOUS ONCE
Status: DISCONTINUED | OUTPATIENT
Start: 2024-10-07 | End: 2024-10-07 | Stop reason: ALTCHOICE

## 2024-10-07 RX ORDER — CALCIUM GLUCONATE 20 MG/ML
2 INJECTION, SOLUTION INTRAVENOUS ONCE
Status: COMPLETED | OUTPATIENT
Start: 2024-10-07 | End: 2024-10-07

## 2024-10-07 ASSESSMENT — ENCOUNTER SYMPTOMS
LOSS OF SENSATION IN FEET: 0
OCCASIONAL FEELINGS OF UNSTEADINESS: 1
DEPRESSION: 0

## 2024-10-07 ASSESSMENT — PAIN SCALES - GENERAL
PAINLEVEL: 3
PAINLEVEL_OUTOF10: 0 - NO PAIN
PAINLEVEL_OUTOF10: 1

## 2024-10-07 ASSESSMENT — PAIN - FUNCTIONAL ASSESSMENT: PAIN_FUNCTIONAL_ASSESSMENT: 0-10

## 2024-10-07 NOTE — Clinical Note
550ML removed from right side, patient tolerated well. Report called to new RN in 532. Patient in transport.

## 2024-10-07 NOTE — NURSING NOTE
A [employer] Device was used to determine this patient's fluid responsiveness. A value of +7 ms indicates a patient is fluid responsive, whereas any reading less than +7 ms indicates fluid to not be an effective intervention.     Vitals:    10/07/24 1537   BP: 89/69   Pulse: (!) 110   Resp: 19   Temp:    SpO2: 96%       The device reading of  +2  indicates this patient is not fluid responsive.

## 2024-10-07 NOTE — ED PROVIDER NOTES
HPI   No chief complaint on file.      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 Finn with concern for hypotension, lightheadedness and shortness of breath.  Reports he has had chronic issues but the shortness of breath and lightheadedness acutely restarted approximately 24 hours ago.  Does not endorse obvious provocation of symptoms.  Endorses he is still on pembrolizumab.  Endorses chronic lower extremity swelling that he reports is unchanged recently.            Patient History   Past Medical History:   Diagnosis Date    Cancer (Multi)     colon cancer     Past Surgical History:   Procedure Laterality Date    COLONOSCOPY  05/06/2024    DR VALDEZ    OTHER SURGICAL HISTORY  06/07/2022    Meniscectomy    TUNNELED VENOUS PORT PLACEMENT  05/31/2024    PLACEMENT OF TUNNELED CENTR VENOUS CATHETER W/SQ PORT/ DR VALDEZ     Family History   Problem Relation Name Age of Onset    Diabetes type II Mother      Hypertension Father      Heart disease Father          CABG    Macular degeneration Father      Ulcers Father      Breast cancer Mother's Sister  50 - 59    Cancer Father's Sister          rare cancer unknown type    Cancer Paternal Grandmother      Stroke Paternal Grandfather      Other (MCAD) Daughter      No Known Problems Son       Social History     Tobacco Use    Smoking status: Never    Smokeless tobacco: Never   Substance Use Topics    Alcohol use: Never    Drug use: Never       Physical Exam   ED Triage Vitals   Temp Pulse Resp BP   -- -- -- --      SpO2 Temp src Heart Rate Source Patient Position   -- -- -- --      BP Location FiO2 (%)     -- --       Physical Exam      ED Course & [facility]   ED Course as of 10/08/24 2003   Mon Oct 07, 2024   2341 CT chest abdomen pelvis demonstrated increasing bilateral pleural  effusions, moderate air and fluid distention of small bowel without obstruction, known tumor mass in the distal transverse colon similar to previous.  I discussed results with patient and he agreed with plan for admission. 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 Finn with concern for hypotension, lightheadedness and shortness of breath. Work up with shortness of breath most notable for worsening bilateral pleural effusions likely secondary to malignancy.  Patient otherwise with no leukocytosis approximately twice his baseline as well as initial tachycardia and hypotension and empirically started on antibiotics although no clear source of infection.  Differential of possible sources include loss of sterility of pleural effusions.  Inpatient versus outpatient management discussed with patient with impression that patient and family struggling to take care of himself at home and requested admission.  Case discussed with medicine and patient admitted for further management.    Patient seen and discussed with Dr. Rufus Laughlin MD, PhD  Emergency Medicine PGY3          Procedure  Procedures     Marc Laughlin MD  Resident  10/08/24 2006

## 2024-10-07 NOTE — PROGRESS NOTES
Pt here today for FUV. Patients bp in the 80's hr 113, pale dizzy, lower limb edema +2-3, could not obtain pulse ox. Patient wife stated that he has not been urinating enough, 1-2 times a day and it is orange. He is having trouble getting a stream started and does not feel like he needs to go. His abdomen is slightly distended and patient stated it is tender to the touch. Called EMS and let MD know. Gave patient OJ and blankets, elevated feet.   Brenda KIM

## 2024-10-08 ENCOUNTER — APPOINTMENT (OUTPATIENT)
Dept: RADIOLOGY | Facility: HOSPITAL | Age: 49
End: 2024-10-08
Payer: COMMERCIAL

## 2024-10-08 ENCOUNTER — APPOINTMENT (OUTPATIENT)
Dept: HEMATOLOGY/ONCOLOGY | Facility: CLINIC | Age: 49
End: 2024-10-08
Payer: COMMERCIAL

## 2024-10-08 ENCOUNTER — HOME CARE VISIT (OUTPATIENT)
Dept: HOME HEALTH SERVICES | Facility: HOME HEALTH | Age: 49
End: 2024-10-08
Payer: COMMERCIAL

## 2024-10-08 ENCOUNTER — APPOINTMENT (OUTPATIENT)
Dept: CARDIOLOGY | Facility: HOSPITAL | Age: 49
DRG: 186 | End: 2024-10-08
Payer: COMMERCIAL

## 2024-10-08 PROBLEM — J90 LARGE PLEURAL EFFUSION: Status: ACTIVE | Noted: 2024-10-08

## 2024-10-08 LAB
ABO GROUP (TYPE) IN BLOOD: NORMAL
ABO GROUP (TYPE) IN BLOOD: NORMAL
ALBUMIN SERPL BCP-MCNC: 1.5 G/DL (ref 3.4–5)
ALP SERPL-CCNC: 102 U/L (ref 33–120)
ALT SERPL W P-5'-P-CCNC: 40 U/L (ref 10–52)
ANION GAP SERPL CALC-SCNC: 10 MMOL/L (ref 10–20)
ANTIBODY SCREEN: NORMAL
AORTIC VALVE MEAN GRADIENT: 2.7 MMHG
AORTIC VALVE PEAK VELOCITY: 1.16 M/S
AST SERPL W P-5'-P-CCNC: 17 U/L (ref 9–39)
AV PEAK GRADIENT: 5.4 MMHG
AVA (PEAK VEL): 2.17 CM2
AVA (VTI): 2.49 CM2
BASOPHILS # BLD AUTO: 0.01 X10*3/UL (ref 0–0.1)
BASOPHILS NFR BLD AUTO: 0.1 %
BILIRUB SERPL-MCNC: 0.6 MG/DL (ref 0–1.2)
BLOOD EXPIRATION DATE: NORMAL
BUN SERPL-MCNC: 11 MG/DL (ref 6–23)
CALCIUM SERPL-MCNC: 6 MG/DL (ref 8.6–10.3)
CHLORIDE SERPL-SCNC: 101 MMOL/L (ref 98–107)
CO2 SERPL-SCNC: 24 MMOL/L (ref 21–32)
CREAT SERPL-MCNC: 0.36 MG/DL (ref 0.5–1.3)
DISPENSE STATUS: NORMAL
EGFRCR SERPLBLD CKD-EPI 2021: >90 ML/MIN/1.73M*2
EJECTION FRACTION: 58 %
EOSINOPHIL # BLD AUTO: 0.04 X10*3/UL (ref 0–0.7)
EOSINOPHIL NFR BLD AUTO: 0.4 %
ERYTHROCYTE [DISTWIDTH] IN BLOOD BY AUTOMATED COUNT: 17.5 % (ref 11.5–14.5)
GLUCOSE SERPL-MCNC: 67 MG/DL (ref 74–99)
HCT VFR BLD AUTO: 18.2 % (ref 41–52)
HGB BLD-MCNC: 6 G/DL (ref 13.5–17.5)
HOLD SPECIMEN: NORMAL
HOLD SPECIMEN: NORMAL
IMM GRANULOCYTES # BLD AUTO: 0.06 X10*3/UL (ref 0–0.7)
IMM GRANULOCYTES NFR BLD AUTO: 0.6 % (ref 0–0.9)
INR PPP: 3.1 (ref 0.9–1.1)
LDH SERPL L TO P-CCNC: 313 U/L (ref 84–246)
LEFT VENTRICLE INTERNAL DIMENSION DIASTOLE: 2.94 CM (ref 3.5–6)
LEFT VENTRICULAR OUTFLOW TRACT DIAMETER: 2.02 CM
LYMPHOCYTES # BLD AUTO: 3.42 X10*3/UL (ref 1.2–4.8)
LYMPHOCYTES NFR BLD AUTO: 31.4 %
MCH RBC QN AUTO: 28.8 PG (ref 26–34)
MCHC RBC AUTO-ENTMCNC: 33 G/DL (ref 32–36)
MCV RBC AUTO: 88 FL (ref 80–100)
MITRAL VALVE E/A RATIO: 0.75
MONOCYTES # BLD AUTO: 0.24 X10*3/UL (ref 0.1–1)
MONOCYTES NFR BLD AUTO: 2.2 %
NEUTROPHILS # BLD AUTO: 7.13 X10*3/UL (ref 1.2–7.7)
NEUTROPHILS NFR BLD AUTO: 65.3 %
NRBC BLD-RTO: 0 /100 WBCS (ref 0–0)
PLATELET # BLD AUTO: 324 X10*3/UL (ref 150–450)
POTASSIUM SERPL-SCNC: 3.7 MMOL/L (ref 3.5–5.3)
PRODUCT BLOOD TYPE: 5100
PRODUCT CODE: NORMAL
PROT SERPL-MCNC: <3 G/DL (ref 6.4–8.2)
PROTHROMBIN TIME: 35.1 SECONDS (ref 9.8–12.8)
RBC # BLD AUTO: 2.08 X10*6/UL (ref 4.5–5.9)
RH FACTOR (ANTIGEN D): NORMAL
RH FACTOR (ANTIGEN D): NORMAL
SODIUM SERPL-SCNC: 131 MMOL/L (ref 136–145)
TSH SERPL-ACNC: 3.31 MIU/L (ref 0.44–3.98)
UNIT ABO: NORMAL
UNIT NUMBER: NORMAL
UNIT RH: NORMAL
UNIT VOLUME: 350
WBC # BLD AUTO: 10.9 X10*3/UL (ref 4.4–11.3)
XM INTEP: NORMAL

## 2024-10-08 PROCEDURE — 99291 CRITICAL CARE FIRST HOUR: CPT | Performed by: SURGERY

## 2024-10-08 PROCEDURE — 93306 TTE W/DOPPLER COMPLETE: CPT | Performed by: INTERNAL MEDICINE

## 2024-10-08 PROCEDURE — 85610 PROTHROMBIN TIME: CPT | Performed by: HOSPITALIST

## 2024-10-08 PROCEDURE — 2500000004 HC RX 250 GENERAL PHARMACY W/ HCPCS (ALT 636 FOR OP/ED): Performed by: HOSPITALIST

## 2024-10-08 PROCEDURE — 85025 COMPLETE CBC W/AUTO DIFF WBC: CPT | Performed by: HOSPITALIST

## 2024-10-08 PROCEDURE — 84443 ASSAY THYROID STIM HORMONE: CPT | Performed by: HOSPITALIST

## 2024-10-08 PROCEDURE — P9040 RBC LEUKOREDUCED IRRADIATED: HCPCS

## 2024-10-08 PROCEDURE — 99233 SBSQ HOSP IP/OBS HIGH 50: CPT | Performed by: INTERNAL MEDICINE

## 2024-10-08 PROCEDURE — 80053 COMPREHEN METABOLIC PANEL: CPT | Performed by: HOSPITALIST

## 2024-10-08 PROCEDURE — 99223 1ST HOSP IP/OBS HIGH 75: CPT | Performed by: INTERNAL MEDICINE

## 2024-10-08 PROCEDURE — 83615 LACTATE (LD) (LDH) ENZYME: CPT | Performed by: HOSPITALIST

## 2024-10-08 PROCEDURE — 2060000001 HC INTERMEDIATE ICU ROOM DAILY

## 2024-10-08 PROCEDURE — 2500000004 HC RX 250 GENERAL PHARMACY W/ HCPCS (ALT 636 FOR OP/ED): Performed by: PHARMACIST

## 2024-10-08 PROCEDURE — 36430 TRANSFUSION BLD/BLD COMPNT: CPT

## 2024-10-08 PROCEDURE — 86901 BLOOD TYPING SEROLOGIC RH(D): CPT | Performed by: INTERNAL MEDICINE

## 2024-10-08 PROCEDURE — 93306 TTE W/DOPPLER COMPLETE: CPT

## 2024-10-08 PROCEDURE — 2500000004 HC RX 250 GENERAL PHARMACY W/ HCPCS (ALT 636 FOR OP/ED): Performed by: INTERNAL MEDICINE

## 2024-10-08 PROCEDURE — 86923 COMPATIBILITY TEST ELECTRIC: CPT

## 2024-10-08 RX ORDER — ONDANSETRON HYDROCHLORIDE 2 MG/ML
4 INJECTION, SOLUTION INTRAVENOUS EVERY 8 HOURS PRN
Status: DISCONTINUED | OUTPATIENT
Start: 2024-10-08 | End: 2024-10-18 | Stop reason: HOSPADM

## 2024-10-08 RX ORDER — ACETAMINOPHEN 325 MG/1
650 TABLET ORAL EVERY 4 HOURS PRN
Status: DISCONTINUED | OUTPATIENT
Start: 2024-10-08 | End: 2024-10-18 | Stop reason: HOSPADM

## 2024-10-08 RX ORDER — ONDANSETRON 4 MG/1
4 TABLET, ORALLY DISINTEGRATING ORAL EVERY 8 HOURS PRN
Status: DISCONTINUED | OUTPATIENT
Start: 2024-10-08 | End: 2024-10-18 | Stop reason: HOSPADM

## 2024-10-08 RX ORDER — ENOXAPARIN SODIUM 100 MG/ML
80 INJECTION SUBCUTANEOUS EVERY 12 HOURS
Status: DISCONTINUED | OUTPATIENT
Start: 2024-10-08 | End: 2024-10-18 | Stop reason: HOSPADM

## 2024-10-08 RX ORDER — FUROSEMIDE 10 MG/ML
40 INJECTION INTRAMUSCULAR; INTRAVENOUS ONCE
Status: COMPLETED | OUTPATIENT
Start: 2024-10-08 | End: 2024-10-08

## 2024-10-08 RX ORDER — TALC
3 POWDER (GRAM) TOPICAL NIGHTLY PRN
Status: DISCONTINUED | OUTPATIENT
Start: 2024-10-08 | End: 2024-10-18 | Stop reason: HOSPADM

## 2024-10-08 RX ORDER — VANCOMYCIN HYDROCHLORIDE 1 G/20ML
INJECTION, POWDER, LYOPHILIZED, FOR SOLUTION INTRAVENOUS DAILY PRN
Status: DISCONTINUED | OUTPATIENT
Start: 2024-10-08 | End: 2024-10-09

## 2024-10-08 SDOH — SOCIAL STABILITY: SOCIAL INSECURITY: DO YOU FEEL UNSAFE GOING BACK TO THE PLACE WHERE YOU ARE LIVING?: NO

## 2024-10-08 SDOH — SOCIAL STABILITY: SOCIAL INSECURITY: HAVE YOU HAD THOUGHTS OF HARMING ANYONE ELSE?: NO

## 2024-10-08 SDOH — ECONOMIC STABILITY: INCOME INSECURITY: IN THE PAST 12 MONTHS HAS THE ELECTRIC, GAS, OIL, OR WATER COMPANY THREATENED TO SHUT OFF SERVICES IN YOUR HOME?: NO

## 2024-10-08 SDOH — ECONOMIC STABILITY: FOOD INSECURITY: WITHIN THE PAST 12 MONTHS, THE FOOD YOU BOUGHT JUST DIDN'T LAST AND YOU DIDN'T HAVE MONEY TO GET MORE.: NEVER TRUE

## 2024-10-08 SDOH — SOCIAL STABILITY: SOCIAL INSECURITY: ARE YOU MARRIED, WIDOWED, DIVORCED, SEPARATED, NEVER MARRIED, OR LIVING WITH A PARTNER?: MARRIED

## 2024-10-08 SDOH — SOCIAL STABILITY: SOCIAL NETWORK: ARE YOU MARRIED, WIDOWED, DIVORCED, SEPARATED, NEVER MARRIED, OR LIVING WITH A PARTNER?: MARRIED

## 2024-10-08 SDOH — HEALTH STABILITY: PHYSICAL HEALTH: ON AVERAGE, HOW MANY DAYS PER WEEK DO YOU ENGAGE IN MODERATE TO STRENUOUS EXERCISE (LIKE A BRISK WALK)?: 0 DAYS

## 2024-10-08 SDOH — ECONOMIC STABILITY: FOOD INSECURITY: WITHIN THE PAST 12 MONTHS, YOU WORRIED THAT YOUR FOOD WOULD RUN OUT BEFORE YOU GOT MONEY TO BUY MORE.: NEVER TRUE

## 2024-10-08 SDOH — SOCIAL STABILITY: SOCIAL NETWORK: HOW OFTEN DO YOU ATTENT MEETINGS OF THE CLUB OR ORGANIZATION YOU BELONG TO?: NEVER

## 2024-10-08 SDOH — SOCIAL STABILITY: SOCIAL INSECURITY: WITHIN THE LAST YEAR, HAVE YOU BEEN AFRAID OF YOUR PARTNER OR EX-PARTNER?: NO

## 2024-10-08 SDOH — ECONOMIC STABILITY: INCOME INSECURITY: IN THE LAST 12 MONTHS, WAS THERE A TIME WHEN YOU WERE NOT ABLE TO PAY THE MORTGAGE OR RENT ON TIME?: NO

## 2024-10-08 SDOH — SOCIAL STABILITY: SOCIAL INSECURITY: WITHIN THE LAST YEAR, HAVE YOU BEEN HUMILIATED OR EMOTIONALLY ABUSED IN OTHER WAYS BY YOUR PARTNER OR EX-PARTNER?: NO

## 2024-10-08 SDOH — SOCIAL STABILITY: SOCIAL INSECURITY: DO YOU FEEL ANYONE HAS EXPLOITED OR TAKEN ADVANTAGE OF YOU FINANCIALLY OR OF YOUR PERSONAL PROPERTY?: NO

## 2024-10-08 SDOH — SOCIAL STABILITY: SOCIAL INSECURITY: WERE YOU ABLE TO COMPLETE ALL THE BEHAVIORAL HEALTH SCREENINGS?: YES

## 2024-10-08 SDOH — SOCIAL STABILITY: SOCIAL INSECURITY
WITHIN THE LAST YEAR, HAVE YOU BEEN RAPED OR FORCED TO HAVE ANY KIND OF SEXUAL ACTIVITY BY YOUR PARTNER OR EX-PARTNER?: NO

## 2024-10-08 SDOH — SOCIAL STABILITY: SOCIAL NETWORK: HOW OFTEN DO YOU ATTEND CHURCH OR RELIGIOUS SERVICES?: MORE THAN 4 TIMES PER YEAR

## 2024-10-08 SDOH — HEALTH STABILITY: MENTAL HEALTH
DO YOU FEEL STRESS - TENSE, RESTLESS, NERVOUS, OR ANXIOUS, OR UNABLE TO SLEEP AT NIGHT BECAUSE YOUR MIND IS TROUBLED ALL THE TIME - THESE DAYS?: NOT AT ALL

## 2024-10-08 SDOH — ECONOMIC STABILITY: HOUSING INSECURITY: IN THE PAST 12 MONTHS, HOW MANY TIMES HAVE YOU MOVED WHERE YOU WERE LIVING?: 0

## 2024-10-08 SDOH — ECONOMIC STABILITY: INCOME INSECURITY: IN THE PAST 12 MONTHS, HAS THE ELECTRIC, GAS, OIL, OR WATER COMPANY THREATENED TO SHUT OFF SERVICE IN YOUR HOME?: NO

## 2024-10-08 SDOH — ECONOMIC STABILITY: INCOME INSECURITY: HOW HARD IS IT FOR YOU TO PAY FOR THE VERY BASICS LIKE FOOD, HOUSING, MEDICAL CARE, AND HEATING?: NOT HARD AT ALL

## 2024-10-08 SDOH — ECONOMIC STABILITY: HOUSING INSECURITY: IN THE LAST 12 MONTHS, WAS THERE A TIME WHEN YOU WERE NOT ABLE TO PAY THE MORTGAGE OR RENT ON TIME?: NO

## 2024-10-08 SDOH — ECONOMIC STABILITY: HOUSING INSECURITY: AT ANY TIME IN THE PAST 12 MONTHS, WERE YOU HOMELESS OR LIVING IN A SHELTER (INCLUDING NOW)?: NO

## 2024-10-08 SDOH — SOCIAL STABILITY: SOCIAL NETWORK: IN A TYPICAL WEEK, HOW MANY TIMES DO YOU TALK ON THE PHONE WITH FAMILY, FRIENDS, OR NEIGHBORS?: ONCE A WEEK

## 2024-10-08 SDOH — SOCIAL STABILITY: SOCIAL INSECURITY: ARE THERE ANY APPARENT SIGNS OF INJURIES/BEHAVIORS THAT COULD BE RELATED TO ABUSE/NEGLECT?: NO

## 2024-10-08 SDOH — ECONOMIC STABILITY: FOOD INSECURITY: WITHIN THE PAST 12 MONTHS, YOU WORRIED THAT YOUR FOOD WOULD RUN OUT BEFORE YOU GOT THE MONEY TO BUY MORE.: NEVER TRUE

## 2024-10-08 SDOH — SOCIAL STABILITY: SOCIAL INSECURITY: HAVE YOU HAD ANY THOUGHTS OF HARMING ANYONE ELSE?: NO

## 2024-10-08 SDOH — HEALTH STABILITY: PHYSICAL HEALTH: ON AVERAGE, HOW MANY MINUTES DO YOU ENGAGE IN EXERCISE AT THIS LEVEL?: 0 MIN

## 2024-10-08 SDOH — SOCIAL STABILITY: SOCIAL NETWORK: HOW OFTEN DO YOU GET TOGETHER WITH FRIENDS OR RELATIVES?: ONCE A WEEK

## 2024-10-08 SDOH — SOCIAL STABILITY: SOCIAL INSECURITY: ARE YOU OR HAVE YOU BEEN THREATENED OR ABUSED PHYSICALLY, EMOTIONALLY, OR SEXUALLY BY ANYONE?: NO

## 2024-10-08 SDOH — SOCIAL STABILITY: SOCIAL NETWORK
DO YOU BELONG TO ANY CLUBS OR ORGANIZATIONS SUCH AS CHURCH GROUPS, UNIONS, FRATERNAL OR ATHLETIC GROUPS, OR SCHOOL GROUPS?: NO

## 2024-10-08 SDOH — ECONOMIC STABILITY: FOOD INSECURITY: HOW HARD IS IT FOR YOU TO PAY FOR THE VERY BASICS LIKE FOOD, HOUSING, MEDICAL CARE, AND HEATING?: NOT HARD AT ALL

## 2024-10-08 SDOH — SOCIAL STABILITY: SOCIAL INSECURITY: HAS ANYONE EVER THREATENED TO HURT YOUR FAMILY OR YOUR PETS?: NO

## 2024-10-08 SDOH — SOCIAL STABILITY: SOCIAL INSECURITY: ABUSE: ADULT

## 2024-10-08 SDOH — SOCIAL STABILITY: SOCIAL INSECURITY: DOES ANYONE TRY TO KEEP YOU FROM HAVING/CONTACTING OTHER FRIENDS OR DOING THINGS OUTSIDE YOUR HOME?: NO

## 2024-10-08 SDOH — ECONOMIC STABILITY: TRANSPORTATION INSECURITY: IN THE PAST 12 MONTHS, HAS LACK OF TRANSPORTATION KEPT YOU FROM MEDICAL APPOINTMENTS OR FROM GETTING MEDICATIONS?: NO

## 2024-10-08 SDOH — SOCIAL STABILITY: SOCIAL NETWORK: HOW OFTEN DO YOU ATTEND MEETINGS OF THE CLUBS OR ORGANIZATIONS YOU BELONG TO?: NEVER

## 2024-10-08 ASSESSMENT — ENCOUNTER SYMPTOMS
LIGHT-HEADEDNESS: 1
PSYCHIATRIC NEGATIVE: 1
SINUS PRESSURE: 0
SORE THROAT: 0
MYALGIAS: 0
DIFFICULTY URINATING: 1
BACK PAIN: 0
FREQUENCY: 0
NECK PAIN: 0
APPETITE CHANGE: 1
CHEST TIGHTNESS: 0
DIARRHEA: 1
NERVOUS/ANXIOUS: 0
SHORTNESS OF BREATH: 1
UNEXPECTED WEIGHT CHANGE: 1
ROS GI COMMENTS: INTERMITTENT DIARRHEA
CONSTIPATION: 0
SEIZURES: 0
NAUSEA: 0
DIZZINESS: 1
PALPITATIONS: 1
DYSURIA: 0
ARTHRALGIAS: 0
MUSCULOSKELETAL NEGATIVE: 1
WOUND: 0
RHINORRHEA: 0
EYE ITCHING: 0
EYE PAIN: 0
FATIGUE: 1
VOMITING: 0
CHILLS: 0
FEVER: 0
WHEEZING: 0
CONFUSION: 0
ALLERGIC/IMMUNOLOGIC NEGATIVE: 1
SINUS PAIN: 0
NEUROLOGICAL NEGATIVE: 1
COUGH: 0
ABDOMINAL PAIN: 0
HEMATURIA: 0
EYES NEGATIVE: 1
EYE REDNESS: 0
HEMATOLOGIC/LYMPHATIC NEGATIVE: 1
HEADACHES: 0
DYSPHORIC MOOD: 0

## 2024-10-08 ASSESSMENT — LIFESTYLE VARIABLES
AUDIT-C TOTAL SCORE: 0
SKIP TO QUESTIONS 9-10: 1
AUDIT-C TOTAL SCORE: 0
HOW MANY STANDARD DRINKS CONTAINING ALCOHOL DO YOU HAVE ON A TYPICAL DAY: PATIENT DOES NOT DRINK
HOW OFTEN DO YOU HAVE 6 OR MORE DRINKS ON ONE OCCASION: NEVER
HOW OFTEN DO YOU HAVE A DRINK CONTAINING ALCOHOL: NEVER

## 2024-10-08 ASSESSMENT — ACTIVITIES OF DAILY LIVING (ADL)
ADEQUATE_TO_COMPLETE_ADL: YES
FEEDING YOURSELF: INDEPENDENT
JUDGMENT_ADEQUATE_SAFELY_COMPLETE_DAILY_ACTIVITIES: YES
LACK_OF_TRANSPORTATION: NO
GROOMING: DEPENDENT
HEARING - LEFT EAR: FUNCTIONAL
HEARING - RIGHT EAR: FUNCTIONAL
BATHING: DEPENDENT
PATIENT'S MEMORY ADEQUATE TO SAFELY COMPLETE DAILY ACTIVITIES?: YES
WALKS IN HOME: DEPENDENT
ASSISTIVE_DEVICE: EYEGLASSES;OXYGEN;WALKER
DRESSING YOURSELF: DEPENDENT
TOILETING: DEPENDENT
LACK_OF_TRANSPORTATION: NO

## 2024-10-08 ASSESSMENT — COGNITIVE AND FUNCTIONAL STATUS - GENERAL
MOBILITY SCORE: 6
STANDING UP FROM CHAIR USING ARMS: TOTAL
DAILY ACTIVITIY SCORE: 13
WALKING IN HOSPITAL ROOM: TOTAL
TOILETING: TOTAL
TOILETING: TOTAL
HELP NEEDED FOR BATHING: TOTAL
CLIMB 3 TO 5 STEPS WITH RAILING: TOTAL
STANDING UP FROM CHAIR USING ARMS: TOTAL
DRESSING REGULAR UPPER BODY CLOTHING: A LOT
DRESSING REGULAR UPPER BODY CLOTHING: A LOT
MOBILITY SCORE: 6
MOVING TO AND FROM BED TO CHAIR: TOTAL
PATIENT BASELINE BEDBOUND: NO
TURNING FROM BACK TO SIDE WHILE IN FLAT BAD: TOTAL
CLIMB 3 TO 5 STEPS WITH RAILING: TOTAL
STANDING UP FROM CHAIR USING ARMS: TOTAL
DRESSING REGULAR UPPER BODY CLOTHING: A LOT
MOVING FROM LYING ON BACK TO SITTING ON SIDE OF FLAT BED WITH BEDRAILS: TOTAL
MOVING TO AND FROM BED TO CHAIR: TOTAL
MOVING FROM LYING ON BACK TO SITTING ON SIDE OF FLAT BED WITH BEDRAILS: TOTAL
CLIMB 3 TO 5 STEPS WITH RAILING: TOTAL
DRESSING REGULAR LOWER BODY CLOTHING: TOTAL
DRESSING REGULAR LOWER BODY CLOTHING: TOTAL
DAILY ACTIVITIY SCORE: 13
MOVING TO AND FROM BED TO CHAIR: TOTAL
HELP NEEDED FOR BATHING: TOTAL
MOVING FROM LYING ON BACK TO SITTING ON SIDE OF FLAT BED WITH BEDRAILS: TOTAL
TOILETING: TOTAL
MOBILITY SCORE: 6
HELP NEEDED FOR BATHING: TOTAL
TURNING FROM BACK TO SIDE WHILE IN FLAT BAD: TOTAL
WALKING IN HOSPITAL ROOM: TOTAL
DRESSING REGULAR LOWER BODY CLOTHING: TOTAL
WALKING IN HOSPITAL ROOM: TOTAL
DAILY ACTIVITIY SCORE: 13
TURNING FROM BACK TO SIDE WHILE IN FLAT BAD: TOTAL

## 2024-10-08 ASSESSMENT — PAIN SCALES - GENERAL
PAINLEVEL_OUTOF10: 0 - NO PAIN

## 2024-10-08 ASSESSMENT — PAIN - FUNCTIONAL ASSESSMENT
PAIN_FUNCTIONAL_ASSESSMENT: 0-10

## 2024-10-08 NOTE — SIGNIFICANT EVENT
Consulted for thoracentesis.   CT with large bilateral pleural effusions L>R.   INR this morning borderline high at 3.1.   Patient currently stable, weaned to RA this morning 98% SpO2.   Will defer thoracentesis until INR <3.     Please reach out if clinical decompensation or increased O2 demands which would warrant more urgent procedure.

## 2024-10-08 NOTE — PROGRESS NOTES
"Pharmacy Medication History Review    Yon Lawrence \"Vladimir\" is a 49 y.o. male admitted for Large pleural effusion. Pharmacy reviewed the patient's mieim-to-yuoeqrunl medications and allergies for accuracy.    The list below reflectives the updated PTA list. Please review each medication in order reconciliation for additional clarification and justification.  Prior to Admission Medications   Prescriptions Last Dose Informant     enoxaparin (Lovenox) 80 mg/0.8 mL syringe 10/7/2024 at 8:00am Self     Sig: Inject 0.8 mL (80 mg) under the skin every 12 hours.                   Facility-Administered Medications: None      Allergies  Reviewed by Chelly Trimble on 10/8/2024   No Known Allergies         Below are additional concerns with the patient's PTA list.  The following updates were made to the Prior to Admission medication list:     Source of Information:     Medications ADDED:   N/A  Medications CHANGED:  N/A  Medications REMOVED:   N/A  Medications NOT TAKING:   POTASSIUM CHLORIDE 20 MEQ    Allergy reviewed : Yes    Comments: Patient verified medication and dose.      Chelly Trimble    "

## 2024-10-08 NOTE — CONSULTS
Inpatient consult to Pulmonology  Consult performed by: Sarina Michelle MD  Consult ordered by: Paula Moore MD    Reason For Consult  pt with metastatic colon cancer with large b/l pleural effusions   History Of Present Illness  Vladimir Lawrence is a 49 y.o. male with recently diagnosed metastatic colon cancer, DVT on Lovenox, who was sent from his physician office for low blood pressure and not feeling well. Symptoms also included as sudden onset SOB x 1 day  and LE edema. In the ED work up revealed WBC 15.9, Hb 8.1, Na 127. Admitted to SDU for further management. Of note he found to have b/l moderate to large pleural effusions, for which pulmonary is consulted.   States he developed a sudden onset progressive LH/DZ associated with SOB x 1 day. Symptoms also fatigue and feeling tired. SOB only with activity. CARRION after walking 20-30 feet. No associated CP or wheezing. Denies orthopnea or PND, but +ve LE edema for the last 2 months. No cough, sputum or h/o hemoptysis.   Full ROS as below.   Overall is improved since admission.   Past Medical History  Past Medical History:   Diagnosis Date    Cancer (Multi)     colon cancer   Surgical History  Past Surgical History:   Procedure Laterality Date    COLONOSCOPY  05/06/2024    DR VALDEZ    OTHER SURGICAL HISTORY  06/07/2022    Meniscectomy    TUNNELED VENOUS PORT PLACEMENT  05/31/2024    PLACEMENT OF TUNNELED CENTR VENOUS CATHETER W/SQ PORT/ DR VALDEZ   Social History  Social History     Tobacco Use    Smoking status: Never    Smokeless tobacco: Never   Substance Use Topics    Alcohol use: Never    Drug use: Never   Family History  Family History   Problem Relation Name Age of Onset    Diabetes type II Mother      Hypertension Father      Heart disease Father          CABG    Macular degeneration Father      Ulcers Father      Breast cancer Mother's Sister  50 - 59    Cancer Father's Sister          rare cancer unknown type    Cancer Paternal Grandmother      Stroke  Paternal Grandfather      Other (MCAD) Daughter      No Known Problems Son       Current Outpatient Medications   Medication Instructions    enoxaparin (LOVENOX) 80 mg, subcutaneous, Every 12 hours    Allergies  Patient has no known allergies.  Review of Systems   Constitutional:  Positive for appetite change, fatigue and unexpected weight change. Negative for chills and fever.   HENT:  Negative for congestion, ear pain, postnasal drip, rhinorrhea, sinus pressure, sinus pain and sore throat.    Eyes:  Negative for pain, redness, itching and visual disturbance.   Respiratory:  Positive for shortness of breath. Negative for cough, chest tightness and wheezing.    Cardiovascular:  Positive for palpitations and leg swelling. Negative for chest pain.   Gastrointestinal:  Positive for diarrhea. Negative for abdominal pain, constipation, nausea and vomiting.   Genitourinary:  Positive for difficulty urinating. Negative for dysuria, frequency and hematuria.   Musculoskeletal:  Negative for arthralgias, back pain, myalgias and neck pain.   Skin:  Negative for pallor, rash and wound.   Neurological:  Positive for dizziness and light-headedness. Negative for seizures, syncope and headaches.   Psychiatric/Behavioral:  Negative for confusion and dysphoric mood. The patient is not nervous/anxious.    Scheduled medications  [Held by provider] enoxaparin, 80 mg, subcutaneous, q12h  perflutren lipid microspheres, 0.5-10 mL of dilution, intravenous, Once in imaging  perflutren protein A microsphere, 0.5 mL, intravenous, Once in imaging  piperacillin-tazobactam, 3.375 g, intravenous, q6h  sulfur hexafluoride microsphr, 2 mL, intravenous, Once in imaging  vancomycin, 1,250 mg, intravenous, q12h    Continuous medications     PRN medications  PRN medications: acetaminophen, acetaminophen, melatonin, ondansetron ODT **OR** ondansetron, vancomycin   Physical Exam  Constitutional:       General: He is not in acute distress.     Appearance:  "He is ill-appearing. He is not toxic-appearing.      Comments: Thin.    HENT:      Head: Normocephalic and atraumatic.      Nose:      Comments: On 2L NC     Mouth/Throat:      Mouth: Mucous membranes are moist.      Comments: Mallampati 3.   Eyes:      General: No scleral icterus.     Extraocular Movements: Extraocular movements intact.      Pupils: Pupils are equal, round, and reactive to light.   Cardiovascular:      Rate and Rhythm: Regular rhythm. Tachycardia present.      Heart sounds: No murmur heard.     No friction rub. No gallop.   Pulmonary:      Effort: Pulmonary effort is normal. No respiratory distress.      Breath sounds: No wheezing or rales.      Comments: Decreased/absent breath sounds b/l lower lungs, L > R.   Abdominal:      General: Abdomen is flat. Bowel sounds are normal. There is no distension.      Palpations: Abdomen is soft.      Tenderness: There is no abdominal tenderness.   Musculoskeletal:         General: No tenderness. Normal range of motion.      Cervical back: Normal range of motion and neck supple. No rigidity or tenderness.      Right lower leg: Edema (1+) present.      Left lower leg: Edema (1+) present.   Lymphadenopathy:      Cervical: No cervical adenopathy.   Skin:     General: Skin is warm and dry.      Coloration: Skin is not jaundiced.   Neurological:      General: No focal deficit present.      Mental Status: He is oriented to person, place, and time.      Cranial Nerves: No cranial nerve deficit.      Motor: No weakness.   Psychiatric:         Mood and Affect: Mood normal.         Behavior: Behavior normal.     Vital Signs  Blood pressure 82/59, pulse 101, temperature 36.6 °C (97.9 °F), temperature source Temporal, resp. rate 16, height 1.854 m (6' 1\"), weight 80.7 kg (178 lb), SpO2 100%.  Oxygen Therapy  SpO2: 100 %  Medical Gas Therapy: Supplemental oxygen  Medical Gas Delivery Method: Nasal cannula     Relevant Results  XR chest 1 view " 10/07/2024    Narrative  STUDY:  Chest Radiograph;  10/7/2024 at 3:38 PM.  INDICATION:  Shortness of breath.  COMPARISON:  XR chest 9/27/2024,5/31/2024; CTA chest 9/17/2024; CT CAP 8/22/2024.  ACCESSION NUMBER(S):  [id number]  ORDERING CLINICIAN:  DMITRIY CHIU  TECHNIQUE:  Frontal chest was obtained at 15:38 hours.  FINDINGS:  CARDIOMEDIASTINAL SILHOUETTE:  Cardiomediastinal silhouette is normal in size and configuration.    LUNGS:  There is some improvement in the atelectasis at the left base but  small residual atelectasis persists.  No definite effusion is seen on  the current study but there does appear to be some new infrahilar  infiltrate on the right.  An injection port remains on the left the  catheter extending to the mid superior vena cava.  There is no  pneumothorax..    ABDOMEN:  No remarkable upper abdominal findings.    BONES:  No acute osseous changes.    Impression  There is some improvement at the left base but small atelectasis  remains at the left base and there is a question of a new area of  alveolar infiltrate in the infrahilar area on the right..  Signed by Spencer Onofre MD    Results for orders placed or performed during the hospital encounter of 10/07/24 (from the past 24 hour(s))   MRSA Surveillance for Vancomycin De-escalation, PCR    Specimen: Anterior Nares; Swab   Result Value Ref Range    MRSA PCR Not Detected Not Detected   Urinalysis with Reflex Culture and Microscopic   Result Value Ref Range    Color, Urine Yellow Light-Yellow, Yellow, Dark-Yellow    Appearance, Urine Clear Clear    Specific Gravity, Urine 1.028 1.005 - 1.035    pH, Urine 6.0 5.0, 5.5, 6.0, 6.5, 7.0, 7.5, 8.0    Protein, Urine 20 (TRACE) NEGATIVE, 10 (TRACE), 20 (TRACE) mg/dL    Glucose, Urine Normal Normal mg/dL    Blood, Urine NEGATIVE NEGATIVE    Ketones, Urine NEGATIVE NEGATIVE mg/dL    Bilirubin, Urine NEGATIVE NEGATIVE    Urobilinogen, Urine 4 (2+) (A) Normal mg/dL    Nitrite, Urine NEGATIVE NEGATIVE     Leukocyte Esterase, Urine NEGATIVE NEGATIVE   Extra Urine Gray Tube   Result Value Ref Range    Extra Tube Hold for add-ons.    Urinalysis Microscopic   Result Value Ref Range    WBC, Urine 1-5 1-5, NONE /HPF    RBC, Urine 1-2 NONE, 1-2, 3-5 /HPF    Mucus, Urine 1+ Reference range not established. /LPF   Comprehensive metabolic panel   Result Value Ref Range    Glucose 67 (L) 74 - 99 mg/dL    Sodium 131 (L) 136 - 145 mmol/L    Potassium 3.7 3.5 - 5.3 mmol/L    Chloride 101 98 - 107 mmol/L    Bicarbonate 24 21 - 32 mmol/L    Anion Gap 10 10 - 20 mmol/L    Urea Nitrogen 11 6 - 23 mg/dL    Creatinine 0.36 (L) 0.50 - 1.30 mg/dL    eGFR >90 >60 mL/min/1.73m*2    Calcium 6.0 (L) 8.6 - 10.3 mg/dL    Albumin 1.5 (L) 3.4 - 5.0 g/dL    Alkaline Phosphatase 102 33 - 120 U/L    Total Protein <3.0 (L) 6.4 - 8.2 g/dL    AST 17 9 - 39 U/L    Bilirubin, Total 0.6 0.0 - 1.2 mg/dL    ALT 40 10 - 52 U/L   CBC and Auto Differential   Result Value Ref Range    WBC 10.9 4.4 - 11.3 x10*3/uL    nRBC 0.0 0.0 - 0.0 /100 WBCs    RBC 2.08 (L) 4.50 - 5.90 x10*6/uL    Hemoglobin 6.0 (LL) 13.5 - 17.5 g/dL    Hematocrit 18.2 (L) 41.0 - 52.0 %    MCV 88 80 - 100 fL    MCH 28.8 26.0 - 34.0 pg    MCHC 33.0 32.0 - 36.0 g/dL    RDW 17.5 (H) 11.5 - 14.5 %    Platelets 324 150 - 450 x10*3/uL    Neutrophils % 65.3 40.0 - 80.0 %    Immature Granulocytes %, Automated 0.6 0.0 - 0.9 %    Lymphocytes % 31.4 13.0 - 44.0 %    Monocytes % 2.2 2.0 - 10.0 %    Eosinophils % 0.4 0.0 - 6.0 %    Basophils % 0.1 0.0 - 2.0 %    Neutrophils Absolute 7.13 1.20 - 7.70 x10*3/uL    Immature Granulocytes Absolute, Automated 0.06 0.00 - 0.70 x10*3/uL    Lymphocytes Absolute 3.42 1.20 - 4.80 x10*3/uL    Monocytes Absolute 0.24 0.10 - 1.00 x10*3/uL    Eosinophils Absolute 0.04 0.00 - 0.70 x10*3/uL    Basophils Absolute 0.01 0.00 - 0.10 x10*3/uL   Protime-INR   Result Value Ref Range    Protime 35.1 (H) 9.8 - 12.8 seconds    INR 3.1 (H) 0.9 - 1.1   Lactate Dehydrogenase   Result  Value Ref Range     (H) 84 - 246 U/L   TSH   Result Value Ref Range    Thyroid Stimulating Hormone 3.31 0.44 - 3.98 mIU/L   Prepare RBC: 1 Units   Result Value Ref Range    PRODUCT CODE L1713I21     Unit Number [id number]     Unit ABO O     Unit RH POS     XM INTEP COMP     Dispense Status IS     Blood Expiration Date 10/30/2024 11:59:00 PM EDT     PRODUCT BLOOD TYPE 5100     UNIT VOLUME 350    SST TOP   Result Value Ref Range    Extra Tube Hold for add-ons.    Type and screen   Result Value Ref Range    ABO TYPE O     Rh TYPE POS     ANTIBODY SCREEN NEG    Transthoracic Echo (TTE) Complete   Result Value Ref Range    AV pk jeimy 1.16 m/s    AV mn grad 2.7 mmHg    LVOT diam 2.02 cm    MV E/A ratio 0.75     LV EF 58 %    LVIDd 2.94 cm    Aortic Valve Area by Continuity of VTI 2.49 cm2    Aortic Valve Area by Continuity of Peak Velocity 2.17 cm2    AV pk grad 5.4 mmHg   VERIFY ABO/Rh Group Test   Result Value Ref Range    ABO TYPE O     Rh TYPE POS    Assessment/Plan   49 with recently diagnosed metastatic colon cancer, DVT on Lovenox, who was sent from his physician office for low blood pressure and not feeling well. Symptoms also included as sudden onset SOB x 1 day  and LE edema. In the ED work up revealed WBC 15.9, Hb 8.1, Na 127. Admitted to SDU for further management. Of note he found to have b/l moderate to large pleural effusions, for which pulmonary is consulted.     Respiratory failure: acute with hypoxia, due to below. Overall mild      Continue supplemental O2, wean off as tolerates      Home O2 evaluation before DC      BPH with IS, Acapella      Management of the individual causes as below    Pleural effusions: bilateral, likely due to severe protein calorie malnutrition as echo on 10/8, no significant L sided abnormalities. Serum albumin 1.5. However given h/o cancer and overall new from August 2024, will need diagnostic/therapeutic tap       Being evaluated by IR for thoracentesis        Please send the samples for LDH, total protein, cell count, bacterial, fungal culture and cytology       Pulmonary will FU on results.     Bilateral GGO: upper lobe predominant. Given also leukocytosis concerning for infectious etiology. However DD also includes pembrolizumab lung toxicity.       Continue broad spectrum antibiotics with vancomycin and Zosyn      FU with ID recommendations      Will check pro-calcitonin, if WNL, consider stopping antibiotics      If normal pro-calcitonin, might need further work up/treatment for pembrolizumab lung toxicity        History of VTE:        Continue Lovenox    Thank you for the consult.  Pulmonary will FU while in house.   Sarina Michelle MD

## 2024-10-08 NOTE — PROGRESS NOTES
10/08/24 1101   Discharge Planning   Living Arrangements Spouse/significant other;Children   Support Systems Spouse/significant other;Children;Family members   Assistance Needed Assist. Has shower care, hospital bed, walker   Type of Residence Private residence   Number of Stairs to Enter Residence 3   Number of Stairs Within Residence 14   Do you have animals or pets at home? No   Who is requesting discharge planning? Provider   Home or Post Acute Services In home services   Type of Home Care Services Home nursing visits;Home OT;Home PT   Expected Discharge Disposition Home Health   Does the patient need discharge transport arranged? Yes   RoundTrip coordination needed? No   Has discharge transport been arranged? No   Financial Resource Strain   How hard is it for you to pay for the very basics like food, housing, medical care, and heating? Not hard   Housing Stability   In the last 12 months, was there a time when you were not able to pay the mortgage or rent on time? N   In the past 12 months, how many times have you moved where you were living? 0   At any time in the past 12 months, were you homeless or living in a shelter (including now)? N   Transportation Needs   In the past 12 months, has lack of transportation kept you from medical appointments or from getting medications? no   In the past 12 months, has lack of transportation kept you from meetings, work, or from getting things needed for daily living? No   Patient Choice   Provider Choice list and CMS website (https://medicare.gov/care-compare#search) for post-acute Quality and Resource Measure Data were provided and reviewed with: Patient   Patient / Family choosing to utilize agency / facility established prior to hospitalization Yes          Met with patient at bedside and explained my role as care coordinator. He lives with his wife and kids, 15 and 18 years old, in the house. He is assisted with his care at home. He uses a walker. He has ramp in  the garage with a railing. He has shower chair and he said his hospital bed just got delivered yesterday. No oxygen in use at home, no HD. His PCP is Dr. Pedro Bazzi nd he seen him 5 months ago. Pharmacy he uses is [facility] in [place]. Patient's H/H this morning is 18.2/ 6.0. His sodium improves and is 131. Patient was scheduled  for thoracentesis but his INR is elevated at 3.1 and might be done tomorrow. . Patient is active with [facility] and would like them back. Messaged Dr. Alanis to place referral to [facility]. Also PT/OT is still pending. Will continue to follow for discharge needs.

## 2024-10-08 NOTE — CONSULTS
Vancomycin Dosing by Pharmacy- INITIAL    Yon Lawrence is a 49 y.o. year old male who Pharmacy has been consulted for vancomycin dosing for other infection of unknown source . Based on the patient's indication and renal status this patient will be dosed based on a goal AUC of 400-600.     Renal function is currently stable.    Visit Vitals  BP 86/58 (BP Location: Left arm, Patient Position: Lying)   Pulse 84   Temp 36.5 °C (97.7 °F) (Temporal)   Resp 16        Lab Results   Component Value Date    CREATININE 0.44 (L) 10/07/2024    CREATININE 0.39 (L) 2024    CREATININE 0.31 (L) 2024    CREATININE 0.33 (L) 2024        Patient weight is as follows:   Vitals:    10/08/24 0558   Weight: 80.7 kg (178 lb)       Cultures:  No results found for the encounter in last 14 days.        No intake/output data recorded.  I/O during current shift:  I/O this shift:  In: 500 [IV Piggyback:500]  Out: 300 [Urine:300]    Temp (24hrs), Av.3 °C (97.3 °F), Min:36.2 °C (97.1 °F), Max:36.5 °C (97.7 °F)         Assessment/Plan     Patient has already been given a loading dose of 1250 mg.  Will initiate vancomycin maintenance,  1250 mg every 12 hours.    This dosing regimen is predicted by [employer] to result in the following pharmacokinetic parameters:  AUC24,ss: 462 mg/L.hr  Probability of AUC24 > 400: 65 %  Ctrough,ss: 13.2 mg/L  Probability of Ctrough,ss > 20: 21 %    Follow-up level will be ordered on 10/9 at 0500 unless clinically indicated sooner.  Will continue to monitor renal function daily while on vancomycin and order serum creatinine at least every 48 hours if not already ordered.  Follow for continued vancomycin needs, clinical response, and signs/symptoms of toxicity.       Anibal Magdaleno, PharmD

## 2024-10-08 NOTE — CARE PLAN
The patient's goals for the shift include Pt. will have a safe, restful and uneventful evening    The clinical goals for the shift include Pt. will have a decrease in peripheral edema this shift      Problem: Pain - Adult  Goal: Verbalizes/displays adequate comfort level or baseline comfort level  Outcome: Progressing     Problem: Safety - Adult  Goal: Free from fall injury  Outcome: Progressing     Problem: Discharge Planning  Goal: Discharge to home or other facility with appropriate resources  Outcome: Progressing     Problem: Chronic Conditions and Co-morbidities  Goal: Patient's chronic conditions and co-morbidity symptoms are monitored and maintained or improved  Outcome: Progressing     Problem: Fall/Injury  Goal: Not fall by end of shift  Outcome: Progressing  Goal: Be free from injury by end of the shift  Outcome: Progressing  Goal: Verbalize understanding of personal risk factors for fall in the hospital  Outcome: Progressing  Goal: Verbalize understanding of risk factor reduction measures to prevent injury from fall in the home  Outcome: Progressing  Goal: Use assistive devices by end of the shift  Outcome: Progressing  Goal: Pace activities to prevent fatigue by end of the shift  Outcome: Progressing

## 2024-10-08 NOTE — H&P
"History Of Present Illness  Yon Lawrence \"Vladimir\" is a 49 y.o. male with recently diagnosed metastatic colon cancer (on Pembrolizumab), DVT (on Lovenox) presenting with hypotension.    Mr Lawrence had presented to Dr Finn's office today for a follow up appointment, BP was undetectable.   Pt c/o not feeling well in general, LE edema and SOB.   Pt has been taking his BP at home; for the past few weeks his BP has been in the high 80s to low 90s SBP; prior to his cancer diagnosis his SBP was in the 110s.   Appetite has been poor; he has been trying to keep up with fluids, no appetite for solids.   SOB began today while he was walking from the parking lot to his appt. States it was sudden onset.   Has been taking therapeutic dosing of Lovenox, has not missed any doses.   Denies fever, chills. Has had intermittent diarrhea, currently under control.   Has had pleural effusions; CT PE 9/17 noted bilateral pleural effusions, small on R, small to mod on the left.   CXR 9/27 showed persistent L pleural effusion.   Pt has not had a thoracentesis.     He has been on Pembrolizumab for colon cancer, had received 3 rounds, however this was stopped in August secondary to diarrhea, restarted Thursday, 5 days PTA.     Work up in the ED showed leukocytosis, WBC ct 15.9, Hb 8.1 (baseline).   Na low at 127.   ALT elevated at 53.   CT C/A/P showed mod to large bilateral pleural effusions, with edema b/l upper lobes.   There is mod air and fluid in the small bowel.   Tumor in distal transverse colon similar to prior; metastatic liver lesions.          Past Medical History  Past Medical History:   Diagnosis Date    Cancer (Multi)     colon cancer       Surgical History  Past Surgical History:   Procedure Laterality Date    COLONOSCOPY  05/06/2024    DR VALDEZ    OTHER SURGICAL HISTORY  06/07/2022    Meniscectomy    TUNNELED VENOUS PORT PLACEMENT  05/31/2024    PLACEMENT OF TUNNELED CENTR VENOUS CATHETER W/SQ PORT/ DR VALDEZ        Social " History  He reports that he has never smoked. He has never used smokeless tobacco. He reports that he does not drink alcohol and does not use drugs.    Family History  Family History   Problem Relation Name Age of Onset    Diabetes type II Mother      Hypertension Father      Heart disease Father          CABG    Macular degeneration Father      Ulcers Father      Breast cancer Mother's Sister  50 - 59    Cancer Father's Sister          rare cancer unknown type    Cancer Paternal Grandmother      Stroke Paternal Grandfather      Other (MCAD) Daughter      No Known Problems Son          Allergies  Patient has no known allergies.    Review of Systems   Constitutional:  Positive for fatigue. Negative for chills and fever.   HENT: Negative.     Eyes: Negative.    Respiratory:  Positive for shortness of breath.    Cardiovascular:  Positive for leg swelling. Negative for chest pain.   Gastrointestinal:  Positive for diarrhea. Negative for nausea and vomiting.        Intermittent diarrhea   Genitourinary: Negative.    Musculoskeletal: Negative.    Skin: Negative.    Allergic/Immunologic: Negative.    Neurological: Negative.    Hematological: Negative.    Psychiatric/Behavioral: Negative.          Physical Exam  Vitals reviewed.   Constitutional:       Appearance: Normal appearance.      Comments: Thin, chronically ill appearing male, alert, oriented. No acute distress. Answers questions appropriately.    HENT:      Head: Normocephalic and atraumatic.      Mouth/Throat:      Mouth: Mucous membranes are moist.   Eyes:      Extraocular Movements: Extraocular movements intact.      Conjunctiva/sclera: Conjunctivae normal.      Pupils: Pupils are equal, round, and reactive to light.   Cardiovascular:      Rate and Rhythm: Normal rate and regular rhythm.      Pulses: Normal pulses.      Heart sounds: Normal heart sounds.   Pulmonary:      Effort: Pulmonary effort is normal.      Breath sounds: Normal breath sounds.       Comments: Absent breath sounds mid lung to base bilaterally.   Abdominal:      General: Abdomen is flat. Bowel sounds are normal. There is distension.      Palpations: Abdomen is soft.      Comments: Abdomen slightly distended, BS present. Tender to palpation LUQ, LLQ and suprapubic area.    Musculoskeletal:         General: Normal range of motion.      Comments: Bilateral pedal edema.    Skin:     General: Skin is warm and dry.      Comments: Skin at port site upper left chest without signs of infection.    Neurological:      General: No focal deficit present.      Mental Status: He is alert and oriented to person, place, and time.   Psychiatric:         Mood and Affect: Mood normal.         Behavior: Behavior normal.         Thought Content: Thought content normal.         Judgment: Judgment normal.          Last Recorded Vitals  Blood pressure 93/61, pulse 82, temperature 36.2 °C (97.1 °F), resp. rate 20, SpO2 96%.    Relevant Results        Results for orders placed or performed during the hospital encounter of 10/07/24 (from the past 24 hour(s))   CBC and Auto Differential   Result Value Ref Range    WBC 15.9 (H) 4.4 - 11.3 x10*3/uL    nRBC 0.0 0.0 - 0.0 /100 WBCs    RBC 2.86 (L) 4.50 - 5.90 x10*6/uL    Hemoglobin 8.1 (L) 13.5 - 17.5 g/dL    Hematocrit 25.0 (L) 41.0 - 52.0 %    MCV 87 80 - 100 fL    MCH 28.3 26.0 - 34.0 pg    MCHC 32.4 32.0 - 36.0 g/dL    RDW 17.2 (H) 11.5 - 14.5 %    Platelets 406 150 - 450 x10*3/uL    Neutrophils % 71.9 40.0 - 80.0 %    Immature Granulocytes %, Automated 0.5 0.0 - 0.9 %    Lymphocytes % 25.6 13.0 - 44.0 %    Monocytes % 1.9 2.0 - 10.0 %    Eosinophils % 0.0 0.0 - 6.0 %    Basophils % 0.1 0.0 - 2.0 %    Neutrophils Absolute 11.42 (H) 1.20 - 7.70 x10*3/uL    Immature Granulocytes Absolute, Automated 0.08 0.00 - 0.70 x10*3/uL    Lymphocytes Absolute 4.07 1.20 - 4.80 x10*3/uL    Monocytes Absolute 0.30 0.10 - 1.00 x10*3/uL    Eosinophils Absolute 0.00 0.00 - 0.70 x10*3/uL     Basophils Absolute 0.01 0.00 - 0.10 x10*3/uL   Comprehensive metabolic panel   Result Value Ref Range    Glucose 150 (H) 74 - 99 mg/dL    Sodium 127 (L) 136 - 145 mmol/L    Potassium 3.9 3.5 - 5.3 mmol/L    Chloride 98 98 - 107 mmol/L    Bicarbonate 25 21 - 32 mmol/L    Anion Gap 8 (L) 10 - 20 mmol/L    Urea Nitrogen 15 6 - 23 mg/dL    Creatinine 0.44 (L) 0.50 - 1.30 mg/dL    eGFR >90 >60 mL/min/1.73m*2    Calcium 5.6 (LL) 8.6 - 10.3 mg/dL    Albumin 1.6 (L) 3.4 - 5.0 g/dL    Alkaline Phosphatase 151 (H) 33 - 120 U/L    Total Protein 3.0 (L) 6.4 - 8.2 g/dL    AST 20 9 - 39 U/L    Bilirubin, Total 0.9 0.0 - 1.2 mg/dL    ALT 53 (H) 10 - 52 U/L   Lipase   Result Value Ref Range    Lipase 6 (L) 9 - 82 U/L   Calcium, Ionized   Result Value Ref Range    POCT Calcium, Ionized 0.93 (L) 1.1 - 1.33 mmol/L   BLOOD GAS VENOUS FULL PANEL   Result Value Ref Range    POCT pH, Venous 7.37 7.33 - 7.43 pH    POCT pCO2, Venous 42 41 - 51 mm Hg    POCT pO2, Venous 31 (L) 35 - 45 mm Hg    POCT SO2, Venous 28 (L) 45 - 75 %    POCT Oxy Hemoglobin, Venous 27.6 (L) 45.0 - 75.0 %    POCT Hematocrit Calculated, Venous 23.0 (L) 41.0 - 52.0 %    POCT Sodium, Venous 125 (L) 136 - 145 mmol/L    POCT Potassium, Venous 4.3 3.5 - 5.3 mmol/L    POCT Chloride, Venous 100 98 - 107 mmol/L    POCT Ionized Calicum, Venous 0.95 (L) 1.10 - 1.33 mmol/L    POCT Glucose, Venous 124 (H) 74 - 99 mg/dL    POCT Lactate, Venous 2.0 0.4 - 2.0 mmol/L    POCT Base Excess, Venous -0.9 -2.0 - 3.0 mmol/L    POCT HCO3 Calculated, Venous 24.3 22.0 - 26.0 mmol/L    POCT Hemoglobin, Venous 7.8 (L) 13.5 - 17.5 g/dL    POCT Anion Gap, Venous 5.0 (L) 10.0 - 25.0 mmol/L    Patient Temperature 37.0 degrees Celsius    FiO2 100 %   MRSA Surveillance for Vancomycin De-escalation, PCR    Specimen: Anterior Nares; Swab   Result Value Ref Range    MRSA PCR Not Detected Not Detected   Urinalysis with Reflex Culture and Microscopic   Result Value Ref Range    Color, Urine Yellow  Light-Yellow, Yellow, Dark-Yellow    Appearance, Urine Clear Clear    Specific Gravity, Urine 1.028 1.005 - 1.035    pH, Urine 6.0 5.0, 5.5, 6.0, 6.5, 7.0, 7.5, 8.0    Protein, Urine 20 (TRACE) NEGATIVE, 10 (TRACE), 20 (TRACE) mg/dL    Glucose, Urine Normal Normal mg/dL    Blood, Urine NEGATIVE NEGATIVE    Ketones, Urine NEGATIVE NEGATIVE mg/dL    Bilirubin, Urine NEGATIVE NEGATIVE    Urobilinogen, Urine 4 (2+) (A) Normal mg/dL    Nitrite, Urine NEGATIVE NEGATIVE    Leukocyte Esterase, Urine NEGATIVE NEGATIVE   Urinalysis Microscopic   Result Value Ref Range    WBC, Urine 1-5 1-5, NONE /HPF    RBC, Urine 1-2 NONE, 1-2, 3-5 /HPF    Mucus, Urine 1+ Reference range not established. /LPF     CT chest abdomen pelvis w IV contrast    Result Date: 10/7/2024  STUDY: CT Chest, Abdomen, and Pelvis with IV Contrast; 10/07/2024 6:40 pm INDICATION: Hypotension.  Leukocytosis.  Metastatic colon cancer. COMPARISON: CTA Chest 09/17/2024;  CT A/P 09/17/2024;  CT CAP 08/22/2024. ACCESSION NUMBER(S): [id number] ORDERING CLINICIAN: CAMERON JOSHI TECHNIQUE: CT of the chest, abdomen, and pelvis was performed.  Contiguous axial images were obtained at 3 mm slice thickness through the chest, abdomen, and pelvis.  Coronal and sagittal reconstructions at 3 mm slice thickness were performed.  Omnipaque 350, 75 mL was administered intravenously. FINDINGS: CHEST: There are increasing, now moderate to large bilateral layering pleural effusions. There is compressive atelectasis in the adjacent lower lobes. Ground glass opacities in the aerated upper lobes likely represent edema. There is no pneumothorax. The heart is normal size and the thoracic aorta is normal caliber. There is no pericardial effusion. There are no detectable central pulmonary emboli. There is no mediastinal mass or lymphadenopathy. ABDOMEN: The background liver demonstrates marked steatosis, with underlying lesions in both lobes consistent with the known metastases. These are  similar to the recent exam. Spleen is normal size. Pancreas is moderately atrophic. Adrenal glands are normal. Gallbladder is normally distended with normal caliber bile ducts. Kidneys and adrenal glands are unremarkable. There is no hydronephrosis. There is no abdominal or retroperitoneal lymphadenopathy. Abdominal aorta and IVC are normal caliber and patent. There is no ascites. Stomach is nondistended. There is moderate air and fluid distention in the small bowel without thickening or obstruction. Proximal colon demonstrates nonspecific thickening, similar to prior. Known tumor mass in the distal transverse colon is similar to prior. There is no abscess or free air. PELVIS: Minimal ascites collects dependently. The bladder is normally distended. Prostate gland is normal size. Deep pelvic veins are patent. Inguinal rings are minimally patulous containing fat. BONES: Bony structures are intact. There is minimal spondylosis in the spine. There are no blastic or lytic lesions. There is moderate anasarca throughout the soft tissues.    1. Increasing, now moderate to large bilateral layering pleural effusions. 2. Groundglass opacities in the aerated upper lobes likely represent edema. 3. Moderate air and fluid distention small bowel, new since last exam. 4. Known tumor mass in the distal transverse colon similar to prior, with unchanged non-specific thickening of the more proximal colon. 5. Persistent marked hepatic steatosis with underlying metastatic liver lesions. 6. Remainder as above. Signed by Teja Lacey MD    XR chest 1 view    Result Date: 10/7/2024  STUDY: Chest Radiograph;  10/7/2024 at 3:38 PM. INDICATION: Shortness of breath. COMPARISON: XR chest 9/27/2024,5/31/2024; CTA chest 9/17/2024; CT CAP 8/22/2024. ACCESSION NUMBER(S): [id number] ORDERING CLINICIAN: DMITRIY CHIU TECHNIQUE:  Frontal chest was obtained at 15:38 hours. FINDINGS: CARDIOMEDIASTINAL SILHOUETTE: Cardiomediastinal silhouette is normal in  size and configuration.  LUNGS: There is some improvement in the atelectasis at the left base but small residual atelectasis persists.  No definite effusion is seen on the current study but there does appear to be some new infrahilar infiltrate on the right.  An injection port remains on the left the catheter extending to the mid superior vena cava.  There is no pneumothorax..  ABDOMEN: No remarkable upper abdominal findings.  BONES: No acute osseous changes.    There is some improvement at the left base but small atelectasis remains at the left base and there is a question of a new area of alveolar infiltrate in the infrahilar area on the right.. Signed by Spencer Onofre MD    ECG 12 Lead    Result Date: 9/30/2024  Atrial flutter with variable AV block with premature ventricular or aberrantly conducted complexes Rightward axis Nonspecific T wave abnormality Abnormal ECG When compared with ECG of 16-SEP-2024 16:00, (unconfirmed) Atrial flutter has replaced Atrial fibrillation Left bundle branch block is no longer Present See ED provider note for full interpretation and clinical correlation Confirmed by Lana Sánchez ([id number]) on 9/30/2024 10:32:15 AM    XR chest 1 view    Result Date: 9/27/2024  STUDY: Chest Radiograph;  9/27/24 at 11:14 AM INDICATION: Weakness. COMPARISON: CTA chest 9/17/24. ACCESSION NUMBER(S): [id number] ORDERING CLINICIAN: OMERO ANDRE TECHNIQUE:  Frontal chest was obtained at 1113 hours. FINDINGS:. CARDIOMEDIASTINAL SILHOUETTE: Cardiomediastinal silhouette is normal in size and configuration. Left subclavian infusion catheter remains in place  LUNGS: Persistent left pleural effusion with compression atelectasis of the left lower lobe.  ABDOMEN: No remarkable upper abdominal findings.  BONES: No acute osseous changes.    Persistent left pleural effusion with compression atelectasis of the left lower lobe. Signed by Frandy Maher MD    ECG 12 Lead    Result Date: 9/23/2024  Undetermined  rhythm Low voltage QRS Nonspecific T wave abnormality Prolonged QT Abnormal ECG No previous ECGs available See ED provider note for full interpretation and clinical correlation Confirmed by Lana Sánchez ([id number]) on 9/23/2024 10:10:00 AM    CT enterography w contrast    Result Date: 9/20/2024  Interpreted By:  Mike Moreno and Dulla Kireeti STUDY: CT ENTEROGRAPHY WITH  IV CONTRAST;  9/19/2024 3:04 pm   INDICATION: Signs/Symptoms:evaluation of protein absorption d/t severely low protein/albumin.   COMPARISON: CT abdomen/pelvis from 08/22/2024 and 09/17/2024.   ACCESSION NUMBER(S): [id number]   ORDERING CLINICIAN: ESTEBAN BEE   TECHNIQUE: CT of the abdomen was performed.  Contiguous axial images were obtained at 3 mm slice thickness through the abdomen. Coronal and sagittal reconstructions at 3 mm slice thickness were performed. 75 ML of Omnipaque 350 was administered intravenously without immediate complication.   FINDINGS: LOWER CHEST: The visualized lung base is unremarkable. The heart is normal in size without evidence of pericardial effusion. Bilateral moderate pleural effusion with overlying atelectasis which is similar to prior imaging..   ABDOMEN:   LIVER: There is diffuse decreased attenuation of the liver, with patchy areas of slightly increased attenuation. Given this decreased attenuation, the patient's known metastatic disease is not well evaluated on this exam. The liver is enlarged measuring 20.3 Cm in maximum craniocaudal dimension..   BILE DUCTS: The bile ducts are not dilated.   GALLBLADDER: The gallbladder is not distended and without calcified stones.   PANCREAS: The pancreas appears unremarkable.   SPLEEN: Within normal limits.   ADRENAL GLANDS: Bilateral adrenal glands appear normal.   KIDNEYS AND URETERS: The kidneys have normal size and enhance symmetrically. There is similar-appearing 2.3 cm exophytic simple cyst at the inferior pole of the right kidney. (Series 2, image 87) No  hydroureteronephrosis or nephroureterolithiasis is present.   BOWEL: The stomach is unremarkable.  The small is normal in caliber and demonstrate no wall thickening. There is similar-appearing of a 5.0 x 4.8 cm mass at the splenic flexure of the colon (series 2, image 90) There is improvement of the colitis of the sigmoid flexure with a decrease in wall thickening and mucosal edema. 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 The appendix appears normal.   VESSELS: There is no aneurysmal dilatation of the abdominal aorta. The IVC is within normal limits. No thrombus or stenosis of the arteries are seen.   PERITONEUM/RETROPERITONEUM/LYMPH NODES: There is a small volume ascites. There is no free air. Mesenteric edema is noted the retroperitoneum appears unremarkable. Similar-appearing enlarged lymph nodes adjacent to the tumor with the largest measuring 1.2 cm (series 2, image 98).   ABDOMINAL WALL: There is diffuse edema of the abdominal wall soft tissues   BONE AND SOFT TISSUE: No suspicious osseous lesions are present. Degenerative discogenic disease is noted in the lower thoracic and lumbar spine.       1. Similar-appearing 5.0 x 4.8 cm mass at the splenic flexure, with associated enlarged adjacent mesenteric lymph nodes. Full characterization of the fistula tracts with the adjacent small bowel is limited on this exam given lack of enteric contrast. No new fluid collection. 2. There is decreased attenuation of the liver consistent with hepatic steatosis which is similar to the prior study. Given this finding, patient's known metastatic disease liver is not well evaluated on this study. 3. Diffuse edema of the abdominal body wall soft tissue, pleural effusions, and ascites which are consistent with volume overload state. 4. Additional chronic findings as described above.     I personally reviewed the images/study and I agree with  the findings as stated by Clinton Ku MD, PGY-2 this study was interpreted at [facility], [place], [place].   MACRO: None   Signed by: Mike Moreno 9/20/2024 10:11 AM Dictation workstation:   [id number]    Vascular US lower extremity venous duplex bilateral    Result Date: 9/20/2024            [place] [address]   Tel [phone] and Fax [phone]  Vascular Lab Report VASC US LOWER EXTREMITY VENOUS DUPLEX BILATERAL  Patient Name:     ALLYN Goode Physician: [id number] Sada Galvan MD Study Date:       9/19/2024           Ordering           [id number] SEE DIETRICH                                       Physician:         BASHIR MRN/PID:          [id number]            Technologist:      Reyna Schultz RVT                                                          [facility] Accession#:       [id number]        Technologist 2: Date of           1975 / 49      Encounter#:        [id number] Birth/Age:        years Gender:           M Admission Status: Inpatient           Location           [facility]                                       Performed:  Diagnosis/ICD: Localized (leg) edema-R60.0 CPT Codes:     95414 Peripheral venous duplex scan for DVT complete  Patient History Hx of left peroneal thrombus 9/6.  CONCLUSIONS: Right Lower Venous: No evidence of acute deep vein thrombus visualized in the right lower extremity. Left Lower Venous: No propagation of the known peroneal thrombus.  Comparison: Compared with study from 9/6/2024, no significant change.  Imaging & Doppler Findings:  Right                 Compressible Thrombus        Flow Distal External Iliac                None   Spontaneous/Phasic CFV                       Yes        None   Spontaneous/Phasic PFV                       Yes        None FV Proximal               Yes        None   Spontaneous/Phasic FV Mid                    Yes        None FV Distal                  Yes        None Popliteal                 Yes        None   Spontaneous/Phasic Peroneal                  Yes        None PTV                       Yes        None  Left                  Compress    Thrombus            Flow Distal External Iliac               None       Spontaneous/Phasic CFV                     Yes         None       Spontaneous/Phasic PFV                     Yes         None FV Proximal             Yes         None       Spontaneous/Phasic FV Mid                  Yes         None FV Distal               Yes         None Popliteal               Yes         None       Spontaneous/Phasic Peroneal                 No    Acute occlusive PTV                     Yes         None  [id number] Sada Galvan MD Electronically signed by [id number] Sada Galvan MD on 9/20/2024 at 7:42:47 AM  ** Final **     US liver with doppler    Result Date: 9/18/2024  Interpreted By:  Kasprzak, Mike,  and [place] Rox STUDY: US LIVER WITH DOPPLER;  9/18/2024 11:20 am   INDICATION: Signs/Symptoms:concern for liver statosis vs thrombus.     COMPARISON: CT abdomen pelvis 09/17/2024 and 08/22/2024   ACCESSION NUMBER(S): [id number]   ORDERING CLINICIAN: ESTEBAN BEE   TECHNIQUE: Multiple images of the right upper quadrant were obtained.  Gray scale, color Doppler and spectral Doppler waveform analysis was performed.  This examination was interpreted at [facility].   FINDINGS: LIVER: The liver measures 19.0 cm and is diffusely echogenic in appearance, consistent with diffuse fatty infiltration.   There are several hepatic lesions visualized. For example, within the left hepatic lobe, there is a hypoechoic lesion measuring 1.5 x 1.4 x 1.6 cm without internal vascularity. In the peripheral right hepatic lobe, there is a heterogeneous hypoechoic region measuring 3.3 x 3.4 cm without internal vascularity and with numerous small internal calcifications. More centrally in the  right hepatic lobe, there is an additional hypoechoic lesion measuring 2.0 x 1.5 x 1.8 cm without internal vascularity.   GALLBLADDER: The gallbladder is nondistended, and demonstrates no evidence of gallstones or wall thickening. The gallbladder wall thickness is 0.3 cm. Sonographic Barcenas's sign is negative. Note is made of intraluminal biliary sludge. Small volume of pericholecystic fluid is favored secondary to ascites.   BILIARY SYSTEM: No evidence of intra or extrahepatic biliary dilatation is identified; the common bile duct measures 5.0.   DOPPLER EVALUATION:   HEPATIC ARTERIES: Hepatic artery and its left branches RI's are estimated at 0.7 and 0.8, respectively. There is poor visualization of the right hepatic artery.   PORTAL VEIN: Portal vein is patent. There is normal respiratory variation. Portal vein velocities are calculated as follows: main portal vein 17.9 cm/s, antegrade flow; left portal vein branch 17.4 cm/s, antegrade flow; right portal vein anterior branch 11.5 cm/s, antegrade flow; right portal vein posterior branch 16.6 cm/s, antegrade flow. The splenic vein is also patent.   HEPATIC VEIN: The middle and left hepatic veins are patent and demonstrate triphasic antegrade flow. The right hepatic vein is poorly visualized. IVC appears also patent.   PANCREAS: The pancreas is poorly visualized due to overlying bowel gas.   RIGHT KIDNEY: The right kidney measures 10.6 in length. No hydronephrosis or renal calculi are seen.   SPLEEN: The spleen measures 8.4 x 5.1 x 2.9 cm and is grossly unremarkable.   PERITONEUM AND RETROPERITONEUM: There is a right pleural effusion. Small volume of perihepatic ascites.       1. Hepatomegaly and echogenic hepatic parenchyma suggestive of fatty infiltration. Several hepatic lesions are visualized and described above, better characterized on recent CTs. 2. Poor visualization of the right hepatic artery and right hepatic vein, potentially related to technique or  patient condition, rather than vascular abnormality. Otherwise normal patent hepatic vasculature. 3. Right pleural effusion and small volume ascites.   I personally reviewed the image(s)/study and resident interpretation as stated by Dr. Rox Ty MD. I agree with the findings as stated. This study was interpreted at [facility], [place], [place].   MACRO: None   Signed by: Mike Moreno 9/18/2024 12:25 PM Dictation workstation:   [id number]    CT abdomen pelvis w IV contrast    Result Date: 9/17/2024  Interpreted By:  Cassy Petit, STUDY: CT ABDOMEN PELVIS W IV CONTRAST; ;  9/17/2024 12:01 pm   INDICATION: Signs/Symptoms:colon cancer, SOB, edema-getting CTA of chest and is scheduled for CT chest abd pelvis at -do not want to have to repeat contrast bolus.     COMPARISON: 08/22/2024   ACCESSION NUMBER(S): [id number]   ORDERING CLINICIAN: FABIOLA PAUL   TECHNIQUE: Imaging was performed from dome of the diaphragm through ischial tuberosities with axial, coronal and sagittal images reconstructed. Patient received 69 mL Omnipaque 350 intravenously in conjunction with the separately reported CTA chest.   FINDINGS: CTA of the chest was also performed and is reported separately.   The liver is diffusely and profoundly decreased in density with some patchy areas of more normal density parenchyma. The previously noted focal hepatic lesions are obscured by the diffuse abnormality. The hepatic veins appear compressed and attenuated. The portal and hepatic veins are opacify and there is no evidence for large vessel thrombosis. However branches of the hepatic artery in the ivonne hepatis appear to have intraluminal filling defects and I can not trace them beyond the ivonne hepatis, with the appearance changed compared to the prior study. Liver is surrounded by ascites. There is no intra or extrahepatic biliary dilatation. Gallbladder is normal in size with no calcified  stones. Ascites surrounds the gallbladder.   No mass or inflammation is noted involving the pancreas.   Spleen is normal in size with no focal mass noted.   Adrenal glands appear normal. Kidneys enhance symmetrically with no hydronephrosis noted. No suspicious parenchymal mass is identified.   Stomach is decompressed. Small bowel loops are nondilated. Proximal colon is decompressed. In the left upper quadrant in the region of the distal transverse colon there is a mass with desmoplastic reaction in the adjacent mesentery (image 72 of 193) which appears similar to the prior exam. Mildly enlarged lymph nodes are noted in the mesentery adjacent to the mass, largest is 0.9 cm. Ascites is present and there is diffuse mesenteric and subcutaneous edema. No pneumoperitoneum is present.   Aorta and vena cava are normal in size. There are no pathologically enlarged retroperitoneal, iliac or inguinal lymph nodes identified.   Urinary bladder is normal in appearance with no wall thickening.   Prostate gland is nonenlarged.   Diffuse subcutaneous edema is noted.   No suspicious osseous lesion is noted..       Global abnormal appearing liver is compatible with ischemia/infarction and obscures the patient's known underlying metastatic lesions. 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   Patient's known tumor of the distal transverse colon has a associated mesenteric desmoplastic reaction and there are adjacent enlarged mesenteric lymph nodes consistent with locally advanced and locally metastatic disease.   Pleural effusions, ascites and diffuse subcutaneous edema     MACRO: Cassy Petit discussed the significance and urgency of this critical finding by telephone with  FABIOLA PAUL on 9/17/2024 at 1:01 pm.  (**-RCF-**) Findings:  See findings.   Signed  by: Cassy Petit 9/17/2024 1:02 PM Dictation workstation:   [id number]    CT angio chest for pulmonary embolism    Result Date: 9/17/2024  Interpreted By:  Cassy Petit, STUDY: CT ANGIO CHEST FOR PULMONARY EMBOLISM;  9/17/2024 12:01 pm   INDICATION: Signs/Symptoms:SOB, Known DVT, Colon Cancer.     COMPARISON: 08/22/2024   ACCESSION NUMBER(S): [id number]   ORDERING CLINICIAN: FABIOLA PAUL   TECHNIQUE: Helical data acquisition of the chest was obtained with intravenous administration of 69 mL Omnipaque 350. Images were reformatted in coronal and sagittal planes. Axial and coronal MIP images were created and reviewed.   FINDINGS: POTENTIAL LIMITATIONS OF THE STUDY: None   HEART AND VESSELS: No discrete filling defects within the main pulmonary artery or its branches.   Main pulmonary artery and its branches are normal in caliber.   The thoracic aorta is of normal course and caliber without vascular calcifications.   No coronary artery calcifications are seen.The study is not optimized for evaluation of coronary arteries.   The cardiac chambers are not enlarged.   No evidence of pericardial effusion.   MEDIASTINUM AND PRECIOUS, LOWER NECK AND AXILLA: The visualized thyroid gland is within normal limits.   No evidence of thoracic lymphadenopathy by CT criteria.   Left subclavian port catheter tip terminates at junction of superior vena cava and right atrium.   Esophagus appears within normal limits as seen.   LUNGS AND AIRWAYS: The trachea and central airways are patent. No endobronchial lesion.   Bilateral pleural effusions are present, small on the right and small to moderate on the left. There is mild dependent atelectasis.   UPPER ABDOMEN: CT of the abdomen and pelvis was also obtained and is reported separately.   CHEST WALL AND OSSEOUS STRUCTURES: There are no suspicious osseous lesions. Multilevel degenerative changes are present       1.  No pulmonary embolism is identified 2. Bilateral pleural  effusions with dependent compressive atelectasis 3. CT abdomen and pelvis is reported separately.     MACRO: None   Signed by: Cassy Petit 9/17/2024 12:36 PM Dictation workstation:   [id number]        Assessment/Plan   Assessment & Plan    Leukocytosis  - uncertain etiology  - Continue Vanc and zosyn  - ID consult  - pt without fever and chills, however, has persistent hypotension    Hypotension  - IVF  - if refractory to fluid resuscitation, will request ICU consult    Bilateral pleural effusions  - have ordered IR consult for thoracentesis, will likely need bilateral thoracenteses during the next few days  - cytology, labs ordered  - Pulmonary consult    Persistent bilateral LE edema  - echocardiogram  - has been on therapeutic lovenox for DVT, so unlikely secondary to DVT    Urine retention in the ED  - barksdale placed  - Urology consult  - Avoid flomax secondary to hypotension    Metastatic colon cancer  - Last Pembrolizumab 5 days ago    Code status  - FULL       I spent 65 minutes in the professional and overall care of this patient.      Paula Moore MD

## 2024-10-08 NOTE — CARE PLAN
The patient's goals for the shift include Pt. will have a safe, restful and uneventful evening    The clinical goals for the shift include Pt. will have a decrease in peripheral edema this shift

## 2024-10-08 NOTE — PROGRESS NOTES
"Yon Lawrence \"Vladimir\" is a 49 y.o. male on day 0 of admission presenting with Large pleural effusion.    Subjective   No acute events overnight. Lying in bed overall comfortable. Discussed overall plan. Will transfuse 1 Unit of PRBCs and will also try to diurese. He got albumin in the ED, may need more depending on blood pressures.        Objective     VITALS  Blood pressure 94/62, pulse 76, temperature 36.4 °C (97.5 °F), temperature source Temporal, resp. rate 16, height 1.854 m (6' 1\"), weight 80.7 kg (178 lb), SpO2 98%.  Physical Exam  Vitals and nursing note reviewed.   Constitutional:       General: He is not in acute distress.     Appearance: Normal appearance. He is not ill-appearing.   HENT:      Head: Normocephalic and atraumatic.      Mouth/Throat:      Mouth: Mucous membranes are moist.      Pharynx: Oropharynx is clear.   Eyes:      Extraocular Movements: Extraocular movements intact.      Conjunctiva/sclera: Conjunctivae normal.   Cardiovascular:      Rate and Rhythm: Normal rate and regular rhythm.      Heart sounds: Normal heart sounds. No murmur heard.     No friction rub. No gallop.   Pulmonary:      Effort: Pulmonary effort is normal.      Breath sounds: Normal breath sounds. No wheezing or rales.   Abdominal:      General: Bowel sounds are normal. There is no distension.      Palpations: Abdomen is soft.      Tenderness: There is no abdominal tenderness. There is no guarding.   Musculoskeletal:         General: No swelling or tenderness.      Right lower leg: Edema present.      Left lower leg: Edema present.   Skin:     General: Skin is warm and dry.      Capillary Refill: Capillary refill takes less than 2 seconds.      Coloration: Skin is pale.   Neurological:      General: No focal deficit present.      Mental Status: He is alert and oriented to person, place, and time.   Psychiatric:         Mood and Affect: Mood normal.           Intake/Output last 3 Shifts:  I/O last 3 completed " shifts:  In: 500 (6.2 mL/kg) [IV Piggyback:500]  Out: 300 (3.7 mL/kg) [Urine:300 (0.1 mL/kg/hr)]  Weight: 80.7 kg     Relevant Results  Results for orders placed or performed during the hospital encounter of 10/07/24 (from the past 24 hour(s))   CBC and Auto Differential   Result Value Ref Range    WBC 15.9 (H) 4.4 - 11.3 x10*3/uL    nRBC 0.0 0.0 - 0.0 /100 WBCs    RBC 2.86 (L) 4.50 - 5.90 x10*6/uL    Hemoglobin 8.1 (L) 13.5 - 17.5 g/dL    Hematocrit 25.0 (L) 41.0 - 52.0 %    MCV 87 80 - 100 fL    MCH 28.3 26.0 - 34.0 pg    MCHC 32.4 32.0 - 36.0 g/dL    RDW 17.2 (H) 11.5 - 14.5 %    Platelets 406 150 - 450 x10*3/uL    Neutrophils % 71.9 40.0 - 80.0 %    Immature Granulocytes %, Automated 0.5 0.0 - 0.9 %    Lymphocytes % 25.6 13.0 - 44.0 %    Monocytes % 1.9 2.0 - 10.0 %    Eosinophils % 0.0 0.0 - 6.0 %    Basophils % 0.1 0.0 - 2.0 %    Neutrophils Absolute 11.42 (H) 1.20 - 7.70 x10*3/uL    Immature Granulocytes Absolute, Automated 0.08 0.00 - 0.70 x10*3/uL    Lymphocytes Absolute 4.07 1.20 - 4.80 x10*3/uL    Monocytes Absolute 0.30 0.10 - 1.00 x10*3/uL    Eosinophils Absolute 0.00 0.00 - 0.70 x10*3/uL    Basophils Absolute 0.01 0.00 - 0.10 x10*3/uL   Comprehensive metabolic panel   Result Value Ref Range    Glucose 150 (H) 74 - 99 mg/dL    Sodium 127 (L) 136 - 145 mmol/L    Potassium 3.9 3.5 - 5.3 mmol/L    Chloride 98 98 - 107 mmol/L    Bicarbonate 25 21 - 32 mmol/L    Anion Gap 8 (L) 10 - 20 mmol/L    Urea Nitrogen 15 6 - 23 mg/dL    Creatinine 0.44 (L) 0.50 - 1.30 mg/dL    eGFR >90 >60 mL/min/1.73m*2    Calcium 5.6 (LL) 8.6 - 10.3 mg/dL    Albumin 1.6 (L) 3.4 - 5.0 g/dL    Alkaline Phosphatase 151 (H) 33 - 120 U/L    Total Protein 3.0 (L) 6.4 - 8.2 g/dL    AST 20 9 - 39 U/L    Bilirubin, Total 0.9 0.0 - 1.2 mg/dL    ALT 53 (H) 10 - 52 U/L   Lipase   Result Value Ref Range    Lipase 6 (L) 9 - 82 U/L   Calcium, Ionized   Result Value Ref Range    POCT Calcium, Ionized 0.93 (L) 1.1 - 1.33 mmol/L   BLOOD GAS VENOUS  FULL PANEL   Result Value Ref Range    POCT pH, Venous 7.37 7.33 - 7.43 pH    POCT pCO2, Venous 42 41 - 51 mm Hg    POCT pO2, Venous 31 (L) 35 - 45 mm Hg    POCT SO2, Venous 28 (L) 45 - 75 %    POCT Oxy Hemoglobin, Venous 27.6 (L) 45.0 - 75.0 %    POCT Hematocrit Calculated, Venous 23.0 (L) 41.0 - 52.0 %    POCT Sodium, Venous 125 (L) 136 - 145 mmol/L    POCT Potassium, Venous 4.3 3.5 - 5.3 mmol/L    POCT Chloride, Venous 100 98 - 107 mmol/L    POCT Ionized Calicum, Venous 0.95 (L) 1.10 - 1.33 mmol/L    POCT Glucose, Venous 124 (H) 74 - 99 mg/dL    POCT Lactate, Venous 2.0 0.4 - 2.0 mmol/L    POCT Base Excess, Venous -0.9 -2.0 - 3.0 mmol/L    POCT HCO3 Calculated, Venous 24.3 22.0 - 26.0 mmol/L    POCT Hemoglobin, Venous 7.8 (L) 13.5 - 17.5 g/dL    POCT Anion Gap, Venous 5.0 (L) 10.0 - 25.0 mmol/L    Patient Temperature 37.0 degrees Celsius    FiO2 100 %   MRSA Surveillance for Vancomycin De-escalation, PCR    Specimen: Anterior Nares; Swab   Result Value Ref Range    MRSA PCR Not Detected Not Detected   Urinalysis with Reflex Culture and Microscopic   Result Value Ref Range    Color, Urine Yellow Light-Yellow, Yellow, Dark-Yellow    Appearance, Urine Clear Clear    Specific Gravity, Urine 1.028 1.005 - 1.035    pH, Urine 6.0 5.0, 5.5, 6.0, 6.5, 7.0, 7.5, 8.0    Protein, Urine 20 (TRACE) NEGATIVE, 10 (TRACE), 20 (TRACE) mg/dL    Glucose, Urine Normal Normal mg/dL    Blood, Urine NEGATIVE NEGATIVE    Ketones, Urine NEGATIVE NEGATIVE mg/dL    Bilirubin, Urine NEGATIVE NEGATIVE    Urobilinogen, Urine 4 (2+) (A) Normal mg/dL    Nitrite, Urine NEGATIVE NEGATIVE    Leukocyte Esterase, Urine NEGATIVE NEGATIVE   Extra Urine Gray Tube   Result Value Ref Range    Extra Tube Hold for add-ons.    Urinalysis Microscopic   Result Value Ref Range    WBC, Urine 1-5 1-5, NONE /HPF    RBC, Urine 1-2 NONE, 1-2, 3-5 /HPF    Mucus, Urine 1+ Reference range not established. /LPF   Comprehensive metabolic panel   Result Value Ref Range     Glucose 67 (L) 74 - 99 mg/dL    Sodium 131 (L) 136 - 145 mmol/L    Potassium 3.7 3.5 - 5.3 mmol/L    Chloride 101 98 - 107 mmol/L    Bicarbonate 24 21 - 32 mmol/L    Anion Gap 10 10 - 20 mmol/L    Urea Nitrogen 11 6 - 23 mg/dL    Creatinine 0.36 (L) 0.50 - 1.30 mg/dL    eGFR >90 >60 mL/min/1.73m*2    Calcium 6.0 (L) 8.6 - 10.3 mg/dL    Albumin 1.5 (L) 3.4 - 5.0 g/dL    Alkaline Phosphatase 102 33 - 120 U/L    Total Protein <3.0 (L) 6.4 - 8.2 g/dL    AST 17 9 - 39 U/L    Bilirubin, Total 0.6 0.0 - 1.2 mg/dL    ALT 40 10 - 52 U/L   CBC and Auto Differential   Result Value Ref Range    WBC 10.9 4.4 - 11.3 x10*3/uL    nRBC 0.0 0.0 - 0.0 /100 WBCs    RBC 2.08 (L) 4.50 - 5.90 x10*6/uL    Hemoglobin 6.0 (LL) 13.5 - 17.5 g/dL    Hematocrit 18.2 (L) 41.0 - 52.0 %    MCV 88 80 - 100 fL    MCH 28.8 26.0 - 34.0 pg    MCHC 33.0 32.0 - 36.0 g/dL    RDW 17.5 (H) 11.5 - 14.5 %    Platelets 324 150 - 450 x10*3/uL    Neutrophils % 65.3 40.0 - 80.0 %    Immature Granulocytes %, Automated 0.6 0.0 - 0.9 %    Lymphocytes % 31.4 13.0 - 44.0 %    Monocytes % 2.2 2.0 - 10.0 %    Eosinophils % 0.4 0.0 - 6.0 %    Basophils % 0.1 0.0 - 2.0 %    Neutrophils Absolute 7.13 1.20 - 7.70 x10*3/uL    Immature Granulocytes Absolute, Automated 0.06 0.00 - 0.70 x10*3/uL    Lymphocytes Absolute 3.42 1.20 - 4.80 x10*3/uL    Monocytes Absolute 0.24 0.10 - 1.00 x10*3/uL    Eosinophils Absolute 0.04 0.00 - 0.70 x10*3/uL    Basophils Absolute 0.01 0.00 - 0.10 x10*3/uL   Protime-INR   Result Value Ref Range    Protime 35.1 (H) 9.8 - 12.8 seconds    INR 3.1 (H) 0.9 - 1.1   Lactate Dehydrogenase   Result Value Ref Range     (H) 84 - 246 U/L   TSH   Result Value Ref Range    Thyroid Stimulating Hormone 3.31 0.44 - 3.98 mIU/L       Imaging Results  CT chest abdomen pelvis w IV contrast   Final Result   1. Increasing, now moderate to large bilateral layering pleural   effusions.   2. Groundglass opacities in the aerated upper lobes likely represent   edema.    3. Moderate air and fluid distention small bowel, new since last exam.   4. Known tumor mass in the distal transverse colon similar to prior,   with unchanged non-specific thickening of the more proximal colon.   5. Persistent marked hepatic steatosis with underlying metastatic   liver lesions.   6. Remainder as above.   Signed by Teja Lacey MD      XR chest 1 view   Final Result   There is some improvement at the left base but small atelectasis   remains at the left base and there is a question of a new area of   alveolar infiltrate in the infrahilar area on the right..   Signed by Spencer Onofre MD      Transthoracic Echo (TTE) Complete    (Results Pending)   XR chest 2 views    (Results Pending)   US thoracentesis    (Results Pending)       Medications:  [Held by provider] enoxaparin, 80 mg, subcutaneous, q12h  furosemide, 40 mg, intravenous, Once  perflutren lipid microspheres, 0.5-10 mL of dilution, intravenous, Once in imaging  perflutren protein A microsphere, 0.5 mL, intravenous, Once in imaging  piperacillin-tazobactam, 3.375 g, intravenous, q6h  sulfur hexafluoride microsphr, 2 mL, intravenous, Once in imaging  vancomycin, 1,250 mg, intravenous, q12h       PRN medications: acetaminophen, acetaminophen, melatonin, ondansetron ODT **OR** ondansetron, vancomycin        Assessment/Plan          Principal Problem:    Large pleural effusion    Leukocytosis  - uncertain etiology  - Continue Vanc and zosyn  - ID consult  - pt without fever and chills, however, has persistent hypotension though this is his relative baseline (low blood pressure)      Hypotension  -Chronically low, suspect 2/2 decreased oncotic pressure in the setting of low albumin      Bilateral pleural effusions  - have ordered IR consult for thoracentesis, will likely need bilateral thoracenteses during the next few days (delayed given high INR)   - cytology, labs ordered  - Pulmonary consult     Persistent bilateral LE edema  - suspect 2/2  low albumin   -Received albumin in the ED, may need more to build up oncotic pressure.   -Will diurese slowly. Lasix IV 40mg x1 now.      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 Unable to absorb nutrition well.     DVT Prophylaxis:  Lovenox subq currently on hold, trying to get thoracentesis     Disposition:  Further diuresis and needs thoracentesis     Marc Alanis, DO [facility]

## 2024-10-08 NOTE — H&P (VIEW-ONLY)
Inpatient consult to Pulmonology  Consult performed by: Sarina Michelle MD  Consult ordered by: Paula Moore MD    Reason For Consult  pt with metastatic colon cancer with large b/l pleural effusions   History Of Present Illness  Vladimir Lawrence is a 49 y.o. male with recently diagnosed metastatic colon cancer, DVT on Lovenox, who was sent from his physician office for low blood pressure and not feeling well. Symptoms also included as sudden onset SOB x 1 day  and LE edema. In the ED work up revealed WBC 15.9, Hb 8.1, Na 127. Admitted to SDU for further management. Of note he found to have b/l moderate to large pleural effusions, for which pulmonary is consulted.   States he developed a sudden onset progressive LH/DZ associated with SOB x 1 day. Symptoms also fatigue and feeling tired. SOB only with activity. CARRION after walking 20-30 feet. No associated CP or wheezing. Denies orthopnea or PND, but +ve LE edema for the last 2 months. No cough, sputum or h/o hemoptysis.   Full ROS as below.   Overall is improved since admission.   Past Medical History  Past Medical History:   Diagnosis Date    Cancer (Multi)     colon cancer   Surgical History  Past Surgical History:   Procedure Laterality Date    COLONOSCOPY  05/06/2024    DR VALDEZ    OTHER SURGICAL HISTORY  06/07/2022    Meniscectomy    TUNNELED VENOUS PORT PLACEMENT  05/31/2024    PLACEMENT OF TUNNELED CENTR VENOUS CATHETER W/SQ PORT/ DR VALDEZ   Social History  Social History     Tobacco Use    Smoking status: Never    Smokeless tobacco: Never   Substance Use Topics    Alcohol use: Never    Drug use: Never   Family History  Family History   Problem Relation Name Age of Onset    Diabetes type II Mother      Hypertension Father      Heart disease Father          CABG    Macular degeneration Father      Ulcers Father      Breast cancer Mother's Sister  50 - 59    Cancer Father's Sister          rare cancer unknown type    Cancer Paternal Grandmother      Stroke  Paternal Grandfather      Other (MCAD) Daughter      No Known Problems Son       Current Outpatient Medications   Medication Instructions    enoxaparin (LOVENOX) 80 mg, subcutaneous, Every 12 hours    Allergies  Patient has no known allergies.  Review of Systems   Constitutional:  Positive for appetite change, fatigue and unexpected weight change. Negative for chills and fever.   HENT:  Negative for congestion, ear pain, postnasal drip, rhinorrhea, sinus pressure, sinus pain and sore throat.    Eyes:  Negative for pain, redness, itching and visual disturbance.   Respiratory:  Positive for shortness of breath. Negative for cough, chest tightness and wheezing.    Cardiovascular:  Positive for palpitations and leg swelling. Negative for chest pain.   Gastrointestinal:  Positive for diarrhea. Negative for abdominal pain, constipation, nausea and vomiting.   Genitourinary:  Positive for difficulty urinating. Negative for dysuria, frequency and hematuria.   Musculoskeletal:  Negative for arthralgias, back pain, myalgias and neck pain.   Skin:  Negative for pallor, rash and wound.   Neurological:  Positive for dizziness and light-headedness. Negative for seizures, syncope and headaches.   Psychiatric/Behavioral:  Negative for confusion and dysphoric mood. The patient is not nervous/anxious.    Scheduled medications  [Held by provider] enoxaparin, 80 mg, subcutaneous, q12h  perflutren lipid microspheres, 0.5-10 mL of dilution, intravenous, Once in imaging  perflutren protein A microsphere, 0.5 mL, intravenous, Once in imaging  piperacillin-tazobactam, 3.375 g, intravenous, q6h  sulfur hexafluoride microsphr, 2 mL, intravenous, Once in imaging  vancomycin, 1,250 mg, intravenous, q12h    Continuous medications     PRN medications  PRN medications: acetaminophen, acetaminophen, melatonin, ondansetron ODT **OR** ondansetron, vancomycin   Physical Exam  Constitutional:       General: He is not in acute distress.     Appearance:  "He is ill-appearing. He is not toxic-appearing.      Comments: Thin.    HENT:      Head: Normocephalic and atraumatic.      Nose:      Comments: On 2L NC     Mouth/Throat:      Mouth: Mucous membranes are moist.      Comments: Mallampati 3.   Eyes:      General: No scleral icterus.     Extraocular Movements: Extraocular movements intact.      Pupils: Pupils are equal, round, and reactive to light.   Cardiovascular:      Rate and Rhythm: Regular rhythm. Tachycardia present.      Heart sounds: No murmur heard.     No friction rub. No gallop.   Pulmonary:      Effort: Pulmonary effort is normal. No respiratory distress.      Breath sounds: No wheezing or rales.      Comments: Decreased/absent breath sounds b/l lower lungs, L > R.   Abdominal:      General: Abdomen is flat. Bowel sounds are normal. There is no distension.      Palpations: Abdomen is soft.      Tenderness: There is no abdominal tenderness.   Musculoskeletal:         General: No tenderness. Normal range of motion.      Cervical back: Normal range of motion and neck supple. No rigidity or tenderness.      Right lower leg: Edema (1+) present.      Left lower leg: Edema (1+) present.   Lymphadenopathy:      Cervical: No cervical adenopathy.   Skin:     General: Skin is warm and dry.      Coloration: Skin is not jaundiced.   Neurological:      General: No focal deficit present.      Mental Status: He is oriented to person, place, and time.      Cranial Nerves: No cranial nerve deficit.      Motor: No weakness.   Psychiatric:         Mood and Affect: Mood normal.         Behavior: Behavior normal.     Vital Signs  Blood pressure 82/59, pulse 101, temperature 36.6 °C (97.9 °F), temperature source Temporal, resp. rate 16, height 1.854 m (6' 1\"), weight 80.7 kg (178 lb), SpO2 100%.  Oxygen Therapy  SpO2: 100 %  Medical Gas Therapy: Supplemental oxygen  Medical Gas Delivery Method: Nasal cannula     Relevant Results  XR chest 1 view " 10/07/2024    Narrative  STUDY:  Chest Radiograph;  10/7/2024 at 3:38 PM.  INDICATION:  Shortness of breath.  COMPARISON:  XR chest 9/27/2024,5/31/2024; CTA chest 9/17/2024; CT CAP 8/22/2024.  ACCESSION NUMBER(S):  [id number]  ORDERING CLINICIAN:  DMITRIY CHIU  TECHNIQUE:  Frontal chest was obtained at 15:38 hours.  FINDINGS:  CARDIOMEDIASTINAL SILHOUETTE:  Cardiomediastinal silhouette is normal in size and configuration.    LUNGS:  There is some improvement in the atelectasis at the left base but  small residual atelectasis persists.  No definite effusion is seen on  the current study but there does appear to be some new infrahilar  infiltrate on the right.  An injection port remains on the left the  catheter extending to the mid superior vena cava.  There is no  pneumothorax..    ABDOMEN:  No remarkable upper abdominal findings.    BONES:  No acute osseous changes.    Impression  There is some improvement at the left base but small atelectasis  remains at the left base and there is a question of a new area of  alveolar infiltrate in the infrahilar area on the right..  Signed by Spencer Onofre MD    Results for orders placed or performed during the hospital encounter of 10/07/24 (from the past 24 hour(s))   MRSA Surveillance for Vancomycin De-escalation, PCR    Specimen: Anterior Nares; Swab   Result Value Ref Range    MRSA PCR Not Detected Not Detected   Urinalysis with Reflex Culture and Microscopic   Result Value Ref Range    Color, Urine Yellow Light-Yellow, Yellow, Dark-Yellow    Appearance, Urine Clear Clear    Specific Gravity, Urine 1.028 1.005 - 1.035    pH, Urine 6.0 5.0, 5.5, 6.0, 6.5, 7.0, 7.5, 8.0    Protein, Urine 20 (TRACE) NEGATIVE, 10 (TRACE), 20 (TRACE) mg/dL    Glucose, Urine Normal Normal mg/dL    Blood, Urine NEGATIVE NEGATIVE    Ketones, Urine NEGATIVE NEGATIVE mg/dL    Bilirubin, Urine NEGATIVE NEGATIVE    Urobilinogen, Urine 4 (2+) (A) Normal mg/dL    Nitrite, Urine NEGATIVE NEGATIVE     Leukocyte Esterase, Urine NEGATIVE NEGATIVE   Extra Urine Gray Tube   Result Value Ref Range    Extra Tube Hold for add-ons.    Urinalysis Microscopic   Result Value Ref Range    WBC, Urine 1-5 1-5, NONE /HPF    RBC, Urine 1-2 NONE, 1-2, 3-5 /HPF    Mucus, Urine 1+ Reference range not established. /LPF   Comprehensive metabolic panel   Result Value Ref Range    Glucose 67 (L) 74 - 99 mg/dL    Sodium 131 (L) 136 - 145 mmol/L    Potassium 3.7 3.5 - 5.3 mmol/L    Chloride 101 98 - 107 mmol/L    Bicarbonate 24 21 - 32 mmol/L    Anion Gap 10 10 - 20 mmol/L    Urea Nitrogen 11 6 - 23 mg/dL    Creatinine 0.36 (L) 0.50 - 1.30 mg/dL    eGFR >90 >60 mL/min/1.73m*2    Calcium 6.0 (L) 8.6 - 10.3 mg/dL    Albumin 1.5 (L) 3.4 - 5.0 g/dL    Alkaline Phosphatase 102 33 - 120 U/L    Total Protein <3.0 (L) 6.4 - 8.2 g/dL    AST 17 9 - 39 U/L    Bilirubin, Total 0.6 0.0 - 1.2 mg/dL    ALT 40 10 - 52 U/L   CBC and Auto Differential   Result Value Ref Range    WBC 10.9 4.4 - 11.3 x10*3/uL    nRBC 0.0 0.0 - 0.0 /100 WBCs    RBC 2.08 (L) 4.50 - 5.90 x10*6/uL    Hemoglobin 6.0 (LL) 13.5 - 17.5 g/dL    Hematocrit 18.2 (L) 41.0 - 52.0 %    MCV 88 80 - 100 fL    MCH 28.8 26.0 - 34.0 pg    MCHC 33.0 32.0 - 36.0 g/dL    RDW 17.5 (H) 11.5 - 14.5 %    Platelets 324 150 - 450 x10*3/uL    Neutrophils % 65.3 40.0 - 80.0 %    Immature Granulocytes %, Automated 0.6 0.0 - 0.9 %    Lymphocytes % 31.4 13.0 - 44.0 %    Monocytes % 2.2 2.0 - 10.0 %    Eosinophils % 0.4 0.0 - 6.0 %    Basophils % 0.1 0.0 - 2.0 %    Neutrophils Absolute 7.13 1.20 - 7.70 x10*3/uL    Immature Granulocytes Absolute, Automated 0.06 0.00 - 0.70 x10*3/uL    Lymphocytes Absolute 3.42 1.20 - 4.80 x10*3/uL    Monocytes Absolute 0.24 0.10 - 1.00 x10*3/uL    Eosinophils Absolute 0.04 0.00 - 0.70 x10*3/uL    Basophils Absolute 0.01 0.00 - 0.10 x10*3/uL   Protime-INR   Result Value Ref Range    Protime 35.1 (H) 9.8 - 12.8 seconds    INR 3.1 (H) 0.9 - 1.1   Lactate Dehydrogenase   Result  Value Ref Range     (H) 84 - 246 U/L   TSH   Result Value Ref Range    Thyroid Stimulating Hormone 3.31 0.44 - 3.98 mIU/L   Prepare RBC: 1 Units   Result Value Ref Range    PRODUCT CODE L0125Z62     Unit Number [id number]     Unit ABO O     Unit RH POS     XM INTEP COMP     Dispense Status IS     Blood Expiration Date 10/30/2024 11:59:00 PM EDT     PRODUCT BLOOD TYPE 5100     UNIT VOLUME 350    SST TOP   Result Value Ref Range    Extra Tube Hold for add-ons.    Type and screen   Result Value Ref Range    ABO TYPE O     Rh TYPE POS     ANTIBODY SCREEN NEG    Transthoracic Echo (TTE) Complete   Result Value Ref Range    AV pk jeimy 1.16 m/s    AV mn grad 2.7 mmHg    LVOT diam 2.02 cm    MV E/A ratio 0.75     LV EF 58 %    LVIDd 2.94 cm    Aortic Valve Area by Continuity of VTI 2.49 cm2    Aortic Valve Area by Continuity of Peak Velocity 2.17 cm2    AV pk grad 5.4 mmHg   VERIFY ABO/Rh Group Test   Result Value Ref Range    ABO TYPE O     Rh TYPE POS    Assessment/Plan   49 with recently diagnosed metastatic colon cancer, DVT on Lovenox, who was sent from his physician office for low blood pressure and not feeling well. Symptoms also included as sudden onset SOB x 1 day  and LE edema. In the ED work up revealed WBC 15.9, Hb 8.1, Na 127. Admitted to SDU for further management. Of note he found to have b/l moderate to large pleural effusions, for which pulmonary is consulted.     Respiratory failure: acute with hypoxia, due to below. Overall mild      Continue supplemental O2, wean off as tolerates      Home O2 evaluation before DC      BPH with IS, Acapella      Management of the individual causes as below    Pleural effusions: bilateral, likely due to severe protein calorie malnutrition as echo on 10/8, no significant L sided abnormalities. Serum albumin 1.5. However given h/o cancer and overall new from August 2024, will need diagnostic/therapeutic tap       Being evaluated by IR for thoracentesis        Please send the samples for LDH, total protein, cell count, bacterial, fungal culture and cytology       Pulmonary will FU on results.     Bilateral GGO: upper lobe predominant. Given also leukocytosis concerning for infectious etiology. However DD also includes pembrolizumab lung toxicity.       Continue broad spectrum antibiotics with vancomycin and Zosyn      FU with ID recommendations      Will check pro-calcitonin, if WNL, consider stopping antibiotics      If normal pro-calcitonin, might need further work up/treatment for pembrolizumab lung toxicity        History of VTE:        Continue Lovenox    Thank you for the consult.  Pulmonary will FU while in house.   Sarina Michelle MD

## 2024-10-08 NOTE — CONSULTS
"INFECTIOUS DISEASE INPATIENT INITIAL CONSULTATION    Referred By: Mrac Alanis    Reason For Consult: leukocytosis, ? infection; hx of colon cancer treated with immunotherapy     HPI:  This is a 49 y.o. male with PMH of Stage IV colon CA (liver mets, entero-colic fistula) who presented with weakness.    Was at oncology appt and had weakness and low BP there. Has shortness of breath, mostly with exertion. Low BP here and found to have bilateral pleural effusions - planned for thora eventually. WBC elevated but normalized now. On IV Zosyn. He feels better today, less tired. No abd pain, n/v/c/d, urinary issues.     Allergies:  Patient has no known allergies.     Vitals (Last 24 Hours):  Heart Rate:  [74-98]   Temp:  [36.4 °C (97.5 °F)-36.7 °C (98.1 °F)]   Resp:  [12-21]   BP: (77-95)/(55-68)   Height:  [185.4 cm (6' 1\")]   Weight:  [80.7 kg (178 lb)]   SpO2:  [93 %-98 %]      PHYSICAL EXAM:  Gen - NAD  Heart - RRR, no murmurs  Chest - Mediport present left side c/d/i  Lungs - diminished both lower lungs  Abd - soft, no ttp, BS present  Skin - no rash    MEDS:    Current Facility-Administered Medications:     acetaminophen (Tylenol) tablet 650 mg, 650 mg, oral, q4h PRN, Paula Moore MD    acetaminophen (Tylenol) tablet 650 mg, 650 mg, oral, q4h PRN, Paula Moore MD    [Held by provider] enoxaparin (Lovenox) syringe 80 mg, 80 mg, subcutaneous, q12h, Paula Moore MD    melatonin tablet 3 mg, 3 mg, oral, Nightly PRN, Paula Moore MD    ondansetron ODT (Zofran-ODT) disintegrating tablet 4 mg, 4 mg, oral, q8h PRN **OR** ondansetron (Zofran) injection 4 mg, 4 mg, intravenous, q8h PRN, Paula Moore MD    perflutren lipid microspheres (Definity) injection 0.5-10 mL of dilution, 0.5-10 mL of dilution, intravenous, Once in imaging, Paula Moore MD    perflutren protein A microsphere (Optison) injection 0.5 mL, 0.5 mL, intravenous, Once in imaging, Paula Moore MD    piperacillin-tazobactam (Zosyn) 3.375 g in " dextrose (iso) IV 50 mL, 3.375 g, intravenous, q6h, Paula Moore MD, Stopped at 10/08/24 1335    sulfur hexafluoride microsphr (Lumason) injection 24.28 mg, 2 mL, intravenous, Once in imaging, Paula Moore MD    vancomycin (Vancocin) in dextrose 5 % water (D5W) 250 mL IV 1,250 mg, 1,250 mg, intravenous, q12h, Anibal Magdaleno, PharmD, Stopped at 10/08/24 1156    vancomycin (Vancocin) pharmacy to dose - pharmacy monitoring, , miscellaneous, Daily PRN, Paula Moore MD     LABS:  Lab Results   Component Value Date    WBC 10.9 10/08/2024    HGB 6.0 (LL) 10/08/2024    HCT 18.2 (L) 10/08/2024    MCV 88 10/08/2024     10/08/2024      Results from last 72 hours   Lab Units 10/08/24  0625   SODIUM mmol/L 131*   POTASSIUM mmol/L 3.7   CHLORIDE mmol/L 101   CO2 mmol/L 24   BUN mg/dL 11   CREATININE mg/dL 0.36*   GLUCOSE mg/dL 67*   CALCIUM mg/dL 6.0*   ANION GAP mmol/L 10   EGFR mL/min/1.73m*2 >90     Results from last 72 hours   Lab Units 10/08/24  0625   ALK PHOS U/L 102   BILIRUBIN TOTAL mg/dL 0.6   PROTEIN TOTAL g/dL <3.0*   ALT U/L 40   AST U/L 17   ALBUMIN g/dL 1.5*     Estimated Creatinine Clearance: 125 mL/min (A) (by C-G formula based on SCr of 0.36 mg/dL (L)).    IMAGING:  CT C/A/P 10/7  IMPRESSION:  1. Increasing, now moderate to large bilateral layering pleural  effusions.  2. Groundglass opacities in the aerated upper lobes likely represent  edema.  3. Moderate air and fluid distention small bowel, new since last exam.  4. Known tumor mass in the distal transverse colon similar to prior,  with unchanged non-specific thickening of the more proximal colon.  5. Persistent marked hepatic steatosis with underlying metastatic  liver lesions.  6. Remainder as above.    ASSESSMENT/PLAN:    Leukocytosis - unclear if there is infection or not, blood cx pending. Seems more likely there is not as he appears quite stable in general. Suspect that generalized 3rd spacing of fluid is the main issue.    IV Zosyn fine  for now. Will monitor further. Thanks!    Pako Hendricks MD  [facility]  Office #[phone]

## 2024-10-08 NOTE — CONSULTS
Reason For Consult  ICU evaluation    History Of Present Illness  Vladimir Lawrence is a 49 y.o. male presenting with weakness .     Past Medical History  Metastatic colorectal cancer on immunotherapy (fistula to SB, mets to liver)  Recently complicated by hypoalbuminemia, hyponatremia, severe protein calorie malnutrition, bilateral pleural effusions, diarrhea)    Surgical History  He has a past surgical history that includes Other surgical history (06/07/2022); Colonoscopy (05/06/2024); and Tunneled venous port placement (05/31/2024).     Social History  He reports that he has never smoked. He has never used smokeless tobacco. He reports that he does not drink alcohol and does not use drugs.    Family History  Family History   Problem Relation Name Age of Onset    Diabetes type II Mother      Hypertension Father      Heart disease Father          CABG    Macular degeneration Father      Ulcers Father      Breast cancer Mother's Sister  50 - 59    Cancer Father's Sister          rare cancer unknown type    Cancer Paternal Grandmother      Stroke Paternal Grandfather      Other (MCAD) Daughter      No Known Problems Son          Allergies  Patient has no known allergies.    Review of Systems  Neuro: negative  HEENT: negative  Respiratory: positive for shortness of breath with exertion, negative for cough  Cardiac: negative for chest pain  GI: positive for diarrhea, positive for food fear  Renal: negative for dysuria  MSK: positive for weakness, positive for bilat LE edema     Physical Exam  Gen: awake, pleasant, A&Ox3, conversant, NAD, appears deconditioned  SR on monitor  Good air entry bilat, no wheeze or ronchi  Abd soft, ND, NT  Ext warm 3+ pitting edema to knees bilat, significant edema in dependent pelvic areas    Last Recorded Vitals  Blood pressure 85/63, pulse 83, temperature 36.2 °C (97.1 °F), resp. rate 15, SpO2 97%.    Relevant Results  Results for orders placed or performed during the hospital encounter of  10/07/24 (from the past 24 hour(s))   CBC and Auto Differential   Result Value Ref Range    WBC 15.9 (H) 4.4 - 11.3 x10*3/uL    nRBC 0.0 0.0 - 0.0 /100 WBCs    RBC 2.86 (L) 4.50 - 5.90 x10*6/uL    Hemoglobin 8.1 (L) 13.5 - 17.5 g/dL    Hematocrit 25.0 (L) 41.0 - 52.0 %    MCV 87 80 - 100 fL    MCH 28.3 26.0 - 34.0 pg    MCHC 32.4 32.0 - 36.0 g/dL    RDW 17.2 (H) 11.5 - 14.5 %    Platelets 406 150 - 450 x10*3/uL    Neutrophils % 71.9 40.0 - 80.0 %    Immature Granulocytes %, Automated 0.5 0.0 - 0.9 %    Lymphocytes % 25.6 13.0 - 44.0 %    Monocytes % 1.9 2.0 - 10.0 %    Eosinophils % 0.0 0.0 - 6.0 %    Basophils % 0.1 0.0 - 2.0 %    Neutrophils Absolute 11.42 (H) 1.20 - 7.70 x10*3/uL    Immature Granulocytes Absolute, Automated 0.08 0.00 - 0.70 x10*3/uL    Lymphocytes Absolute 4.07 1.20 - 4.80 x10*3/uL    Monocytes Absolute 0.30 0.10 - 1.00 x10*3/uL    Eosinophils Absolute 0.00 0.00 - 0.70 x10*3/uL    Basophils Absolute 0.01 0.00 - 0.10 x10*3/uL   Comprehensive metabolic panel   Result Value Ref Range    Glucose 150 (H) 74 - 99 mg/dL    Sodium 127 (L) 136 - 145 mmol/L    Potassium 3.9 3.5 - 5.3 mmol/L    Chloride 98 98 - 107 mmol/L    Bicarbonate 25 21 - 32 mmol/L    Anion Gap 8 (L) 10 - 20 mmol/L    Urea Nitrogen 15 6 - 23 mg/dL    Creatinine 0.44 (L) 0.50 - 1.30 mg/dL    eGFR >90 >60 mL/min/1.73m*2    Calcium 5.6 (LL) 8.6 - 10.3 mg/dL    Albumin 1.6 (L) 3.4 - 5.0 g/dL    Alkaline Phosphatase 151 (H) 33 - 120 U/L    Total Protein 3.0 (L) 6.4 - 8.2 g/dL    AST 20 9 - 39 U/L    Bilirubin, Total 0.9 0.0 - 1.2 mg/dL    ALT 53 (H) 10 - 52 U/L   Lipase   Result Value Ref Range    Lipase 6 (L) 9 - 82 U/L   Calcium, Ionized   Result Value Ref Range    POCT Calcium, Ionized 0.93 (L) 1.1 - 1.33 mmol/L   BLOOD GAS VENOUS FULL PANEL   Result Value Ref Range    POCT pH, Venous 7.37 7.33 - 7.43 pH    POCT pCO2, Venous 42 41 - 51 mm Hg    POCT pO2, Venous 31 (L) 35 - 45 mm Hg    POCT SO2, Venous 28 (L) 45 - 75 %    POCT Oxy  Hemoglobin, Venous 27.6 (L) 45.0 - 75.0 %    POCT Hematocrit Calculated, Venous 23.0 (L) 41.0 - 52.0 %    POCT Sodium, Venous 125 (L) 136 - 145 mmol/L    POCT Potassium, Venous 4.3 3.5 - 5.3 mmol/L    POCT Chloride, Venous 100 98 - 107 mmol/L    POCT Ionized Calicum, Venous 0.95 (L) 1.10 - 1.33 mmol/L    POCT Glucose, Venous 124 (H) 74 - 99 mg/dL    POCT Lactate, Venous 2.0 0.4 - 2.0 mmol/L    POCT Base Excess, Venous -0.9 -2.0 - 3.0 mmol/L    POCT HCO3 Calculated, Venous 24.3 22.0 - 26.0 mmol/L    POCT Hemoglobin, Venous 7.8 (L) 13.5 - 17.5 g/dL    POCT Anion Gap, Venous 5.0 (L) 10.0 - 25.0 mmol/L    Patient Temperature 37.0 degrees Celsius    FiO2 100 %   MRSA Surveillance for Vancomycin De-escalation, PCR    Specimen: Anterior Nares; Swab   Result Value Ref Range    MRSA PCR Not Detected Not Detected   Urinalysis with Reflex Culture and Microscopic   Result Value Ref Range    Color, Urine Yellow Light-Yellow, Yellow, Dark-Yellow    Appearance, Urine Clear Clear    Specific Gravity, Urine 1.028 1.005 - 1.035    pH, Urine 6.0 5.0, 5.5, 6.0, 6.5, 7.0, 7.5, 8.0    Protein, Urine 20 (TRACE) NEGATIVE, 10 (TRACE), 20 (TRACE) mg/dL    Glucose, Urine Normal Normal mg/dL    Blood, Urine NEGATIVE NEGATIVE    Ketones, Urine NEGATIVE NEGATIVE mg/dL    Bilirubin, Urine NEGATIVE NEGATIVE    Urobilinogen, Urine 4 (2+) (A) Normal mg/dL    Nitrite, Urine NEGATIVE NEGATIVE    Leukocyte Esterase, Urine NEGATIVE NEGATIVE   Urinalysis Microscopic   Result Value Ref Range    WBC, Urine 1-5 1-5, NONE /HPF    RBC, Urine 1-2 NONE, 1-2, 3-5 /HPF    Mucus, Urine 1+ Reference range not established. 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  Presenting from his oncology outpatient visit yesterday due to weakness and low BP.  On arrival he was tachycardic in  110 and BP was low baseline 80s/60s.  He was given 500cc 5% albumin for a dip in BP and he responded well.   Given his elevated WBC count I cannot exclude a new infectious process and agree with broad spectrum abx.  However his mentation is good and his vitals appear otherwise relatively stable to his baseline.  UA is unremarkable. Lungs appear congested but not consolidated.   He has not been eating well for some time and I think this has worsened a bit since his recent hospitalization for his liver infarction.  Due to his bowel fistula he suffers from diarrhea with eating and has developed a negative psychologic response in addition to a lack of appetite.    He is total body water overloaded and may benefit from a few days of 25% albumin dosing with lasix to try and assist with fluid removal.  This would have to be balanced against his BP but I do not see another way to realistically remove his third space fluid any time in the near future. His pleural effusions have increased in size and he may benefit from IR drainage.   I will place a consult for nutrition--he has not tolerated protein shakes in the past but perhaps other means of protein supplementation are possible.  I would also keep a close eye on electrolytes and ensure his K, Mg and Ca are all maintained as this will help with optimizing his gut function and BP.  He has been minimally active in recent weeks as his LE edema has worsened which has compounded his swelling. I will also place a consult for PT/OT to see if we can get him up and moving a bit.    At this time I feel the patient is appropriate for SDU level of care and does not have any specific ICU level needs. If he decompensates at all during his stay we are available to take over care at any time.  Thank you for this consult.      This patient continues to be at-risk for clinically significant deterioration / failure due to the above mentioned dysfunctional, unstable organ systems.  I have  personally identified and managed all complex critical care issues to prevent aforementioned clinical deterioration.  Critical care time is spent at bedside and/or the immediate area and has included, but is not limited to, the review of diagnostic tests, labs, radiographs, serial assessments of hemodynamics, respiratory status, ventilatory management, and family updates.  Time spent in procedures and teaching are reported separately.     Critical Care Time:  60 minutes    Mili Bettencourt MD

## 2024-10-09 ENCOUNTER — HOME CARE VISIT (OUTPATIENT)
Dept: HOME HEALTH SERVICES | Facility: HOME HEALTH | Age: 49
End: 2024-10-09
Payer: COMMERCIAL

## 2024-10-09 ENCOUNTER — APPOINTMENT (OUTPATIENT)
Dept: RADIOLOGY | Facility: HOSPITAL | Age: 49
End: 2024-10-09
Payer: COMMERCIAL

## 2024-10-09 LAB
ANION GAP SERPL CALC-SCNC: 9 MMOL/L (ref 10–20)
BASOPHILS NFR FLD MANUAL: 0 %
BLASTS NFR FLD MANUAL: 0 %
BUN SERPL-MCNC: 10 MG/DL (ref 6–23)
CALCIUM SERPL-MCNC: 6 MG/DL (ref 8.6–10.3)
CHLORIDE SERPL-SCNC: 99 MMOL/L (ref 98–107)
CLARITY FLD: CLEAR
CO2 SERPL-SCNC: 26 MMOL/L (ref 21–32)
COLOR FLD: NORMAL
CREAT SERPL-MCNC: 0.42 MG/DL (ref 0.5–1.3)
EGFRCR SERPLBLD CKD-EPI 2021: >90 ML/MIN/1.73M*2
EOSINOPHIL NFR FLD MANUAL: 0 %
ERYTHROCYTE [DISTWIDTH] IN BLOOD BY AUTOMATED COUNT: 17.2 % (ref 11.5–14.5)
GLUCOSE SERPL-MCNC: 74 MG/DL (ref 74–99)
HCT VFR BLD AUTO: 25.6 % (ref 41–52)
HGB BLD-MCNC: 8.5 G/DL (ref 13.5–17.5)
IMMATURE GRANULOCYTES IN FLUID: 0 %
INR PPP: 2.7 (ref 0.9–1.1)
LYMPHOCYTES NFR FLD MANUAL: 20 %
MCH RBC QN AUTO: 28.3 PG (ref 26–34)
MCHC RBC AUTO-ENTMCNC: 33.2 G/DL (ref 32–36)
MCV RBC AUTO: 85 FL (ref 80–100)
MONOS+MACROS NFR FLD MANUAL: 66 %
NEUTROPHILS NFR FLD MANUAL: 14 %
NRBC BLD-RTO: 0 /100 WBCS (ref 0–0)
OTHER CELLS NFR FLD MANUAL: 0 %
PLASMA CELLS NFR FLD MANUAL: 0 %
PLATELET # BLD AUTO: 326 X10*3/UL (ref 150–450)
POTASSIUM SERPL-SCNC: 3.4 MMOL/L (ref 3.5–5.3)
PROCALCITONIN SERPL-MCNC: 0.07 NG/ML
PROTHROMBIN TIME: 30.6 SECONDS (ref 9.8–12.8)
RBC # BLD AUTO: 3 X10*6/UL (ref 4.5–5.9)
RBC # FLD AUTO: 0 /UL
SODIUM SERPL-SCNC: 131 MMOL/L (ref 136–145)
TOTAL CELLS COUNTED FLD: 100
VANCOMYCIN SERPL-MCNC: 24.4 UG/ML (ref 5–20)
WBC # BLD AUTO: 12.6 X10*3/UL (ref 4.4–11.3)
WBC # FLD AUTO: 38 /UL

## 2024-10-09 PROCEDURE — 71045 X-RAY EXAM CHEST 1 VIEW: CPT

## 2024-10-09 PROCEDURE — 36415 COLL VENOUS BLD VENIPUNCTURE: CPT | Performed by: INTERNAL MEDICINE

## 2024-10-09 PROCEDURE — 2500000001 HC RX 250 WO HCPCS SELF ADMINISTERED DRUGS (ALT 637 FOR MEDICARE OP): Performed by: HOSPITALIST

## 2024-10-09 PROCEDURE — 87040 BLOOD CULTURE FOR BACTERIA: CPT | Mod: AHULAB | Performed by: INTERNAL MEDICINE

## 2024-10-09 PROCEDURE — 84145 PROCALCITONIN (PCT): CPT | Mod: AHULAB | Performed by: CLINICAL NURSE SPECIALIST

## 2024-10-09 PROCEDURE — 88112 CYTOPATH CELL ENHANCE TECH: CPT | Performed by: PATHOLOGY

## 2024-10-09 PROCEDURE — 2500000005 HC RX 250 GENERAL PHARMACY W/O HCPCS: Performed by: PHARMACIST

## 2024-10-09 PROCEDURE — 89051 BODY FLUID CELL COUNT: CPT | Performed by: HOSPITALIST

## 2024-10-09 PROCEDURE — 2500000004 HC RX 250 GENERAL PHARMACY W/ HCPCS (ALT 636 FOR OP/ED)

## 2024-10-09 PROCEDURE — 80202 ASSAY OF VANCOMYCIN: CPT | Performed by: PHARMACIST

## 2024-10-09 PROCEDURE — 2500000004 HC RX 250 GENERAL PHARMACY W/ HCPCS (ALT 636 FOR OP/ED): Performed by: INTERNAL MEDICINE

## 2024-10-09 PROCEDURE — 85610 PROTHROMBIN TIME: CPT

## 2024-10-09 PROCEDURE — 97161 PT EVAL LOW COMPLEX 20 MIN: CPT | Mod: GP

## 2024-10-09 PROCEDURE — 99232 SBSQ HOSP IP/OBS MODERATE 35: CPT | Performed by: CLINICAL NURSE SPECIALIST

## 2024-10-09 PROCEDURE — 94667 MNPJ CHEST WALL 1ST: CPT

## 2024-10-09 PROCEDURE — 88112 CYTOPATH CELL ENHANCE TECH: CPT | Mod: TC | Performed by: HOSPITALIST

## 2024-10-09 PROCEDURE — 9420000001 HC RT PATIENT EDUCATION 5 MIN

## 2024-10-09 PROCEDURE — 99233 SBSQ HOSP IP/OBS HIGH 50: CPT | Performed by: INTERNAL MEDICINE

## 2024-10-09 PROCEDURE — 32555 ASPIRATE PLEURA W/ IMAGING: CPT

## 2024-10-09 PROCEDURE — 2500000004 HC RX 250 GENERAL PHARMACY W/ HCPCS (ALT 636 FOR OP/ED): Performed by: HOSPITALIST

## 2024-10-09 PROCEDURE — 87205 SMEAR GRAM STAIN: CPT | Mod: AHULAB | Performed by: HOSPITALIST

## 2024-10-09 PROCEDURE — 83615 LACTATE (LD) (LDH) ENZYME: CPT | Mod: AHULAB | Performed by: HOSPITALIST

## 2024-10-09 PROCEDURE — 87102 FUNGUS ISOLATION CULTURE: CPT | Mod: AHULAB | Performed by: CLINICAL NURSE SPECIALIST

## 2024-10-09 PROCEDURE — 80048 BASIC METABOLIC PNL TOTAL CA: CPT | Performed by: PHARMACIST

## 2024-10-09 PROCEDURE — 2500000004 HC RX 250 GENERAL PHARMACY W/ HCPCS (ALT 636 FOR OP/ED): Performed by: PHARMACIST

## 2024-10-09 PROCEDURE — 1200000002 HC GENERAL ROOM WITH TELEMETRY DAILY

## 2024-10-09 PROCEDURE — 97165 OT EVAL LOW COMPLEX 30 MIN: CPT | Mod: GO

## 2024-10-09 PROCEDURE — 85027 COMPLETE CBC AUTOMATED: CPT | Performed by: INTERNAL MEDICINE

## 2024-10-09 PROCEDURE — 0W9B3ZZ DRAINAGE OF LEFT PLEURAL CAVITY, PERCUTANEOUS APPROACH: ICD-10-PCS

## 2024-10-09 PROCEDURE — 71045 X-RAY EXAM CHEST 1 VIEW: CPT | Performed by: RADIOLOGY

## 2024-10-09 PROCEDURE — 84157 ASSAY OF PROTEIN OTHER: CPT | Mod: AHULAB | Performed by: HOSPITALIST

## 2024-10-09 RX ORDER — HYDROMORPHONE HYDROCHLORIDE 0.2 MG/ML
0.2 INJECTION INTRAMUSCULAR; INTRAVENOUS; SUBCUTANEOUS EVERY 4 HOURS PRN
Status: DISCONTINUED | OUTPATIENT
Start: 2024-10-09 | End: 2024-10-15

## 2024-10-09 RX ORDER — POTASSIUM CHLORIDE 14.9 MG/ML
20 INJECTION INTRAVENOUS ONCE
Status: COMPLETED | OUTPATIENT
Start: 2024-10-09 | End: 2024-10-09

## 2024-10-09 RX ORDER — LIDOCAINE HYDROCHLORIDE 10 MG/ML
INJECTION, SOLUTION EPIDURAL; INFILTRATION; INTRACAUDAL; PERINEURAL
Status: COMPLETED | OUTPATIENT
Start: 2024-10-09 | End: 2024-10-09

## 2024-10-09 RX ORDER — POTASSIUM CHLORIDE 20 MEQ/1
40 TABLET, EXTENDED RELEASE ORAL ONCE
Status: DISCONTINUED | OUTPATIENT
Start: 2024-10-09 | End: 2024-10-09

## 2024-10-09 ASSESSMENT — PAIN SCALES - GENERAL
PAINLEVEL_OUTOF10: 5 - MODERATE PAIN
PAINLEVEL_OUTOF10: 0 - NO PAIN
PAINLEVEL_OUTOF10: 1
PAINLEVEL_OUTOF10: 0 - NO PAIN
PAINLEVEL_OUTOF10: 4
PAINLEVEL_OUTOF10: 3
PAINLEVEL_OUTOF10: 4
PAINLEVEL_OUTOF10: 5 - MODERATE PAIN
PAINLEVEL_OUTOF10: 0 - NO PAIN

## 2024-10-09 ASSESSMENT — COGNITIVE AND FUNCTIONAL STATUS - GENERAL
TOILETING: TOTAL
MOVING FROM LYING ON BACK TO SITTING ON SIDE OF FLAT BED WITH BEDRAILS: A LOT
MOBILITY SCORE: 8
MOVING TO AND FROM BED TO CHAIR: TOTAL
WALKING IN HOSPITAL ROOM: TOTAL
DRESSING REGULAR LOWER BODY CLOTHING: TOTAL
DRESSING REGULAR UPPER BODY CLOTHING: A LITTLE
TURNING FROM BACK TO SIDE WHILE IN FLAT BAD: A LOT
HELP NEEDED FOR BATHING: A LOT
DAILY ACTIVITIY SCORE: 15
CLIMB 3 TO 5 STEPS WITH RAILING: TOTAL
STANDING UP FROM CHAIR USING ARMS: TOTAL

## 2024-10-09 ASSESSMENT — PAIN - FUNCTIONAL ASSESSMENT
PAIN_FUNCTIONAL_ASSESSMENT: 0-10

## 2024-10-09 ASSESSMENT — ACTIVITIES OF DAILY LIVING (ADL)
BATHING_ASSISTANCE: MAXIMAL
ADL_ASSISTANCE: NEEDS ASSISTANCE

## 2024-10-09 ASSESSMENT — PAIN DESCRIPTION - DESCRIPTORS
DESCRIPTORS: DULL
DESCRIPTORS: DULL

## 2024-10-09 ASSESSMENT — PAIN DESCRIPTION - LOCATION: LOCATION: ABDOMEN

## 2024-10-09 NOTE — PROGRESS NOTES
"Yon aLwrence \"Vladimir\" is a 49 y.o. male on day 1 of admission presenting with Large pleural effusion.    Subjective   Patient seen and examined with parents at bedside.  Patient is stable on room air.  Reports mild to moderate abdominal pain that he attributes to his position in bed.  Denies active chest pain, cough, fever, chills, nausea and emesis. S/P left thoracentesis this afternoon for 1100 mL of straw yellow fluid.  Patient reports no difference in his breathing after the procedure.    Objective     Physical Exam    Constitutional:   Cachectic, NAD, cooperative  HENT: Atraumatic, moist mucous membranes  Eyes: PERRL, nonicteric  Neck: Supple, no JVD  Cardiovascular: S1, S2 normal, underlying regular with PACs, HR 70s, no murmur appreciated  Pulmonary: Fair air entry with slightly diminished bases, right greater than left, otherwise clear; no conversational dyspnea noted  Abdominal: Soft, nontender, nondistended, + BS  Musculoskeletal: Minimal active movement with generalized weakness and decreased muscle mass  Extremities:   +1 pitting BLE edema, +1 pitting dependent edema bilateral antecubital area  Lymphadenopathy: No nuchal LAP  Skin: Warm and dry with areas of ecchymosis  Neurological: Alert and oriented with clear and appropriate speech; no focal deficits noted  Psychiatric:     Appropriate mood and behavior    Medications:  [Held by provider] enoxaparin, 80 mg, subcutaneous, q12h  perflutren lipid microspheres, 0.5-10 mL of dilution, intravenous, Once in imaging  perflutren protein A microsphere, 0.5 mL, intravenous, Once in imaging  piperacillin-tazobactam, 3.375 g, intravenous, q6h  potassium chloride, 20 mEq, intravenous, Once  sulfur hexafluoride microsphr, 2 mL, intravenous, Once in imaging    PRN medications: acetaminophen, acetaminophen, HYDROmorphone, HYDROmorphone, melatonin, ondansetron ODT **OR** ondansetron      Last Recorded Vitals  Blood pressure 92/57, pulse 91, temperature 36.8 °C (98.3 " "°F), temperature source Temporal, resp. rate 16, height 1.854 m (6' 1\"), weight 80.7 kg (178 lb), SpO2 91%.  Intake/Output last 3 Shifts:  I/O last 3 completed shifts:  In: 1849 (22.9 mL/kg) [P.O.:300; Blood:299; IV Piggyback:1250]  Out: 3600 (44.6 mL/kg) [Urine:3600 (1.2 mL/kg/hr)]  Weight: 80.7 kg     Relevant Results  Results for orders placed or performed during the hospital encounter of 10/07/24 (from the past 24 hour(s))   VERIFY ABO/Rh Group Test   Result Value Ref Range    ABO TYPE O     Rh TYPE POS    Vancomycin   Result Value Ref Range    Vancomycin 24.4 (H) 5.0 - 20.0 ug/mL   Basic Metabolic Panel   Result Value Ref Range    Glucose 74 74 - 99 mg/dL    Sodium 131 (L) 136 - 145 mmol/L    Potassium 3.4 (L) 3.5 - 5.3 mmol/L    Chloride 99 98 - 107 mmol/L    Bicarbonate 26 21 - 32 mmol/L    Anion Gap 9 (L) 10 - 20 mmol/L    Urea Nitrogen 10 6 - 23 mg/dL    Creatinine 0.42 (L) 0.50 - 1.30 mg/dL    eGFR >90 >60 mL/min/1.73m*2    Calcium 6.0 (L) 8.6 - 10.3 mg/dL   CBC   Result Value Ref Range    WBC 12.6 (H) 4.4 - 11.3 x10*3/uL    nRBC 0.0 0.0 - 0.0 /100 WBCs    RBC 3.00 (L) 4.50 - 5.90 x10*6/uL    Hemoglobin 8.5 (L) 13.5 - 17.5 g/dL    Hematocrit 25.6 (L) 41.0 - 52.0 %    MCV 85 80 - 100 fL    MCH 28.3 26.0 - 34.0 pg    MCHC 33.2 32.0 - 36.0 g/dL    RDW 17.2 (H) 11.5 - 14.5 %    Platelets 326 150 - 450 x10*3/uL   Protime-INR   Result Value Ref Range    Protime 30.6 (H) 9.8 - 12.8 seconds    INR 2.7 (H) 0.9 - 1.1      Transthoracic Echo (TTE) Complete  Result Date: 10/8/2024  PHYSICIAN INTERPRETATION: Left Ventricle: Left ventricular ejection fraction is normal, by visual estimate at 55-60%. There are no regional left ventricular wall motion abnormalities. The left ventricular cavity size is decreased. The left ventricular septal wall thickness is normal. There is normal left ventricular posterior wall thickness. Left ventricular diastolic filling was indeterminate. Left Atrium: The left atrium was not well " visualized. Right Ventricle: The right ventricle was not well visualized. Unable to determine right ventricular systolic function. Right Atrium: The right atrium was not well visualized. Aortic Valve: The aortic valve was not well visualized. The aortic valve dimensionless index is 0.78. There is indeterminate aortic valve regurgitation. The peak instantaneous gradient of the aortic valve is 5.4 mmHg. The mean gradient of the aortic valve is 2.7 mmHg. Mitral Valve: The mitral valve is normal in structure. There is no evidence of mitral valve regurgitation. Tricuspid Valve: The tricuspid valve is structurally normal. No evidence of tricuspid regurgitation. The right ventricular systolic pressure is unable to be estimated. Pulmonic Valve: The pulmonic valve is not well visualized. The pulmonic valve regurgitation was not well visualized. Pericardium: There is no pericardial effusion noted. Pleural: There is a large left pleural effusion. Aorta: The aortic root is normal. Systemic Veins: The inferior vena cava appears dilated. In comparison to the previous echocardiogram(s): There are no prior studies on this patient for comparison purposes.  CONCLUSIONS:  1. Poorly visualized anatomical structures due to suboptimal image quality.  2. Left ventricular ejection fraction is normal, by visual estimate at 55-60%.  3. Left ventricular diastolic filling was indeterminate.  4. Left ventricular cavity size is decreased.  5. Unable to determine right ventricular systolic function.  6. There is a large pleural effusion.  7. Technically challenging echocardiogram due to presence of Large Pleural Effusion displacing the heart. Consider repeating Echocardiogram when effusion drained. QUANTITATIVE DATA SUMMARY:  2D MEASUREMENTS:         Normal Ranges: LAs:             2.60 cm (2.7-4.0cm) IVSd:            0.96 cm (0.6-1.1cm) LVPWd:           0.97 cm (0.6-1.1cm) LVIDd:           2.94 cm (3.9-5.9cm) LVIDs:           2.00 cm LV Mass  Index:   38 g/m2 LV % FS          32.0 %  AORTA MEASUREMENTS:         Normal Ranges: Ao Sinus, d:        3.40 cm (2.1-3.5cm)  LV SYSTOLIC FUNCTION BY 2D PLANIMETRY (MOD):                      Normal Ranges: EF-Visual:      58 % LV EF Reported: 58 %  LV DIASTOLIC FUNCTION:             Normal Ranges: MV Peak E:             0.42 m/s    (0.7-1.2 m/s) MV Peak A:             0.57 m/s    (0.42-0.7 m/s) E/A Ratio:             0.75        (1.0-2.2) MV A Dur:              106.57 msec  MITRAL VALVE:          Normal Ranges: MV DT:        179 msec (150-240msec)  AORTIC VALVE:                     Normal Ranges: AoV Vmax:                1.16 m/s (<=1.7m/s) AoV Peak P.4 mmHg (<20mmHg) AoV Mean P.7 mmHg (1.7-11.5mmHg) LVOT Max Julien:            0.78 m/s (<=1.1m/s) AoV VTI:                 19.47 cm (18-25cm) LVOT VTI:                15.11 cm LVOT Diameter:           2.02 cm  (1.8-2.4cm) AoV Area, VTI:           2.49 cm2 (2.5-5.5cm2) AoV Area,Vmax:           2.17 cm2 (2.5-4.5cm2) AoV Dimensionless Index: 0.78  TRICUSPID VALVE/RVSP:         Normal Ranges: IVC Diam:             2.70 cm  [id number] Braulio Adams DO Electronically signed on 10/8/2024 at 3:01:01 PM  ** Final **     CT chest abdomen pelvis w IV contrast  Result Date: 10/7/2024  STUDY: CT Chest, Abdomen, and Pelvis with IV Contrast; 10/07/2024 6:40 pm INDICATION: Hypotension.  Leukocytosis.  Metastatic colon cancer. COMPARISON: CTA Chest 2024;  CT A/P 2024;  CT CAP 2024. ACCESSION NUMBER(S): [id number] ORDERING CLINICIAN: CAMERON JOSHI TECHNIQUE: CT of the chest, abdomen, and pelvis was performed.  Contiguous axial images were obtained at 3 mm slice thickness through the chest, abdomen, and pelvis.  Coronal and sagittal reconstructions at 3 mm slice thickness were performed.  Omnipaque 350, 75 mL was administered intravenously. FINDINGS: CHEST: There are increasing, now moderate to large bilateral layering pleural effusions.  There is compressive atelectasis in the adjacent lower lobes. Ground glass opacities in the aerated upper lobes likely represent edema. There is no pneumothorax. The heart is normal size and the thoracic aorta is normal caliber. There is no pericardial effusion. There are no detectable central pulmonary emboli. There is no mediastinal mass or lymphadenopathy. ABDOMEN: The background liver demonstrates marked steatosis, with underlying lesions in both lobes consistent with the known metastases. These are similar to the recent exam. Spleen is normal size. Pancreas is moderately atrophic. Adrenal glands are normal. Gallbladder is normally distended with normal caliber bile ducts. Kidneys and adrenal glands are unremarkable. There is no hydronephrosis. There is no abdominal or retroperitoneal lymphadenopathy. Abdominal aorta and IVC are normal caliber and patent. There is no ascites. Stomach is nondistended. There is moderate air and fluid distention in the small bowel without thickening or obstruction. Proximal colon demonstrates nonspecific thickening, similar to prior. Known tumor mass in the distal transverse colon is similar to prior. There is no abscess or free air. PELVIS: Minimal ascites collects dependently. The bladder is normally distended. Prostate gland is normal size. Deep pelvic veins are patent. Inguinal rings are minimally patulous containing fat. BONES: Bony structures are intact. There is minimal spondylosis in the spine. There are no blastic or lytic lesions. There is moderate anasarca throughout the soft tissues.    1. Increasing, now moderate to large bilateral layering pleural effusions. 2. Groundglass opacities in the aerated upper lobes likely represent edema. 3. Moderate air and fluid distention small bowel, new since last exam. 4. Known tumor mass in the distal transverse colon similar to prior, with unchanged non-specific thickening of the more proximal colon. 5. Persistent marked hepatic  steatosis with underlying metastatic liver lesions. 6. Remainder as above. Signed by Teja Lacey MD    XR chest 1 view  Result Date: 10/7/2024  STUDY: Chest Radiograph;  10/7/2024 at 3:38 PM. INDICATION: Shortness of breath. COMPARISON: XR chest 9/27/2024,5/31/2024; CTA chest 9/17/2024; CT CAP 8/22/2024. ACCESSION NUMBER(S): [id number] ORDERING CLINICIAN: DMITRIY CHIU TECHNIQUE:  Frontal chest was obtained at 15:38 hours. FINDINGS: CARDIOMEDIASTINAL SILHOUETTE: Cardiomediastinal silhouette is normal in size and configuration.  LUNGS: There is some improvement in the atelectasis at the left base but small residual atelectasis persists.  No definite effusion is seen on the current study but there does appear to be some new infrahilar infiltrate on the right.  An injection port remains on the left the catheter extending to the mid superior vena cava.  There is no pneumothorax..  ABDOMEN: No remarkable upper abdominal findings.  BONES: No acute osseous changes.    There is some improvement at the left base but small atelectasis remains at the left base and there is a question of a new area of alveolar infiltrate in the infrahilar area on the right.. Signed by Spencer Onofre MD    Assessment/Plan   Assessment & Plan  Large pleural effusion    49 with recently diagnosed metastatic colon cancer, DVT on Lovenox, who was sent from his physician office for low blood pressure and not feeling well. Symptoms also included as sudden onset SOB x 1 day  and LE edema. In the ED work up revealed WBC 15.9, Hb 8.1, Na 127. Admitted to SDU for further management. Of note he found to have b/l moderate to large pleural effusions, for which pulmonary is consulted.      Respiratory failure: acute with hypoxia, due to below. Overall mild      Continue supplemental O2, wean off as tolerates      Home O2 evaluation before DC      BPH with IS, Acapella (ordered)      Management of the individual causes as below     Pleural effusions:  bilateral, likely due to severe protein calorie malnutrition as echo on 10/8, no significant L sided abnormalities. Serum albumin 1.5. However given h/o cancer and overall new from August 2024, will need diagnostic/therapeutic tap       Being evaluated by IR for thoracentesis -left Thora completed 10/9 at 1534 1100 mL straw yellow fluid removed       Pleural fluid sample sent LDH, total protein, cell count, bacterial, fungal culture and cytology all pending       Pulmonary will FU on results.      Bilateral GGO: upper lobe predominant. Given also leukocytosis concerning for infectious etiology. However DD also includes pembrolizumab lung toxicity.       Continue broad spectrum antibiotics with vancomycin and Zosyn      FU with ID recommendations      Will check pro-calcitonin, if WNL, consider stopping antibiotics; procal 0.07      If normal pro-calcitonin, might need further work up/treatment for pembrolizumab lung toxicity        History of VTE:        Continue Lovenox    I spent 35 minutes in the professional and overall care of this patient.   Mallory Smith, APRN-CNS

## 2024-10-09 NOTE — PROGRESS NOTES
10/09/24 1648   Discharge Planning   Expected Discharge Disposition Home H          Patient received blood transfusion last night and his H/H this morning was 25.6/ 8.5. He went for left thoracentesis and 1,100 ml's of fluid was removed. PT/OT rec mod .Will talk to patient when he is available and in the room. Will continue to follow for discharge needs.

## 2024-10-09 NOTE — PROGRESS NOTES
"Occupational Therapy    Evaluation    Patient Name: Yon Lawrence \"Vladimir\"  MRN: [id number]  Department: [place] [address]  Room: [address] [place] [address]  Today's Date: 10/9/2024  Time Calculation  Start Time: 1030  Stop Time: 1053  Time Calculation (min): 23 min    Assessment  IP OT Assessment  OT Assessment: Pt seen for OT eval. Pt demopnstrates with decreased endurance, decreased UE strength, assist with mobility and ADLS. Pt appears to be below baseline function at this time. Pt may benefit from MOd intensity therapy at this time. Per chart pt anticipating returning home with [place]  Prognosis: Good  Barriers to Discharge: None  Evaluation/Treatment Tolerance: Patient limited by fatigue, Patient limited by pain  Medical Staff Made Aware: Yes  End of Session Communication: Bedside nurse  End of Session Patient Position: Bed, 3 rail up, Alarm on  Plan:  Treatment Interventions: ADL retraining, Functional transfer training, UE strengthening/ROM, Endurance training, Equipment evaluation/education  OT Frequency: 3 times per week  OT Discharge Recommendations: Moderate intensity level of continued care (Per chart family anticipating home with [place])  Equipment Recommended upon Discharge: Wheeled walker, Wheelchair, Bedside commode  OT Recommended Transfer Status: Maximum assist  OT - OK to Discharge: Yes    Subjective   Current Problem:  1. Large pleural effusion        2. Bilateral lower extremity edema  Transthoracic Echo (TTE) Complete    Transthoracic Echo (TTE) Complete        General:  General  Reason for Referral: Large pleural effusion  Referred By: NANCY Alanis MD  Past Medical History Relevant to Rehab:   Past Medical History:   Diagnosis Date    Cancer (Multi)     colon cancer       Prior to Session Communication: Bedside nurse  Patient Position Received: Bed, 3 rail up, Alarm on  Preferred Learning Style: auditory, kinesthetic, verbal, visual, written  General Comment: Pt pleasant and cooperative, agreeable to OT eval  Precautions:  Medical " Precautions: Fall precautions  Pain:  Pain Assessment  Pain Assessment: 0-10  0-10 (Numeric) Pain Score: 4  Pain Type: Acute pain  Pain Location: Leg  Pain Orientation: Right, Left  Pain Descriptors: Tightness, Throbbing, Heaviness  Pain Frequency: Intermittent  Pain Interventions: Repositioned, Ambulation/increased activity, Distraction (nursing notified of pain)    Objective   Cognition:  Overall Cognitive Status: Within Functional Limits  Orientation Level: Oriented X4  Attention: Within Functional Limits  Memory: Within Funtional Limits  Insight: Within function limits  Impulsive: Within functional limits           Home Living:  Type of Home: House  Lives With: Spouse  Home Adaptive Equipment: Walker rolling or standard, Hospital bed  Home Layout: Two level, 1/2 bath on main level, Stairs to alternate level with rails  Alternate Level Stairs-Rails: Both  Alternate Level Stairs-Number of Steps: 13 (Per pt he has been staying on the first floor. Hospital bed delivered Monday)  Home Access: Stairs to enter with rails, Ramped entrance  Entrance Stairs-Rails: Both  Entrance Stairs-Number of Steps: 1  Bathroom Shower/Tub: Tub/shower unit  Bathroom Toilet: Handicapped height  Bathroom Equipment: Shower chair with back  Home Living Comments: Per pt he has been staying on first floor, just rec'd hospital bed. Recieves assist with LE ADLS   Prior Function:  Level of [place]: Needs assistance with ADLs, Needs assistance with homemaking  Receives Help From: Family, Friends  ADL Assistance: Needs assistance  Bath:  (sponge bathing assist with LE)  Dressing:  (assist to don socks, shoes and pants able to assist to pull pants up)  Homemaking Assistance: Needs assistance  Homemaking Assistance Comments: wife completes all IADLs  Ambulatory Assistance: Independent (wheeled walker. Reports in last 2 weeks has required assist to get out of chair and out of bed)  Hand Dominance: Right  IADL History:  Homemaking  Responsibilities: No  Current License: Yes  Mode of Transportation: Family, Friends, Car  ADL:  Eating Assistance: Independent  Grooming Assistance: Stand by  Grooming Deficit: Increased time to complete, Shaving, Wash/dry face, Teeth care  Bathing Assistance: Maximal  Bathing Deficit: Left lower leg including foot, Right lower leg including foot, Buttocks  UE Dressing Assistance: Minimal  UE Dressing Deficit: Pull around back, Pull over head, Pull down in back  LE Dressing Assistance: Maximal  LE Dressing Deficit: Don/doff L sock, Don/doff R sock, Increased time to complete, Setup  Toileting Assistance with Device: Total  Toileting Deficit: Urinary Catheter  Activity Tolerance:  Endurance: Tolerates 10 - 20 min exercise with multiple rests  Activity Tolerance Comments: increased fatigue with activity  Bed Mobility/Transfers: Bed Mobility  Bed Mobility: Yes  Bed Mobility 1  Bed Mobility 1: Supine to sitting, Sitting to supine  Level of Assistance 1: Moderate assistance, Minimal verbal cues, Minimal tactile cues  Bed Mobility 2  Bed Mobility  2: Scooting  Level of Assistance 2: Minimum assistance  Bed Mobility Comments 2: scooting to HOB while sitting EOB    Transfers  Transfer: Yes  Transfer 1  Technique 1: Sit to stand, Stand to sit  Transfer Device 1: Walker, Gait belt  Transfer Level of Assistance 1: Maximum assistance  Trials/Comments 1: Not able to comple to full standing, c/o pain/weakness  in legs  Transfers 2  Technique 2: Sit to stand, Stand to sit  Transfer Device 2: Gait belt  Transfer Level of Assistance 2: Maximum assistance, Minimal verbal cues, Moderate tactile cues  Trials/Comments 2: knees blocked, unable to come to full standing due to report of leg pain/weakness      Functional Mobility:  Functional Mobility  Functional Mobility Performed: No  Sitting Balance:  Static Sitting Balance  Static Sitting-Balance Support: Feet supported  Static Sitting-Level of Assistance: Close supervision  Dynamic  Sitting Balance  Dynamic Sitting-Level of Assistance: Close supervision  Dynamic Sitting-Balance: Reaching for objects  IADL's:   Homemaking Responsibilities: No  Current License: Yes  Mode of Transportation: Family, Friends, Car  Vision: Vision - Basic Assessment  Current Vision: Wears glasses all the time  Sensation:  Light Touch: No apparent deficits  Sharp/Dull: No apparent deficits  Sensation Comment: intact  Strength:  Strength Comments: B UE 4/5  Perception:     Coordination:  Movements are Fluid and Coordinated: Yes  Finger to Nose: Intact  Finger to Target: Intact  Coordination Comment: intact   Hand Function:  Hand Function  Gross Grasp: Functional  Coordination: Functional  Extremities: RUE   RUE : Within Functional Limits and LUE   LUE: Within Functional Limits    Outcome Measures: [facility] Daily Activity  Putting on and taking off regular lower body clothing: Total  Bathing (including washing, rinsing, drying): A lot  Putting on and taking off regular upper body clothing: A little  Toileting, which includes using toilet, bedpan or urinal: Total  Taking care of personal grooming such as brushing teeth: None  Eating Meals: None  Daily Activity - Total Score: 15      Education Documentation  Handouts, taught by Jana Velasco OT at 10/9/2024 11:23 AM.  Learner: Patient  Readiness: Acceptance  Method: Explanation  Response: Verbalizes Understanding, Needs Reinforcement    Home Exercise Program, taught by Jana Velasco OT at 10/9/2024 11:23 AM.  Learner: Patient  Readiness: Acceptance  Method: Explanation  Response: Verbalizes Understanding, Needs Reinforcement    ADL Training, taught by Jana Velasco OT at 10/9/2024 11:23 AM.  Learner: Patient  Readiness: Acceptance  Method: Explanation  Response: Verbalizes Understanding, Needs Reinforcement    Education Comments  No comments found.      Goals:   Encounter Problems       Encounter Problems (Active)       ADLs       Patient will perform UB  and LB bathing 75% with moderate assist level of assistance and adaptive equipment prn . (Progressing)       Start:  10/09/24            Patient with complete upper body dressing with contact guard assist level of assistance donning and doffing all UE clothes with no adaptive equipment while supported sitting (Progressing)       Start:  10/09/24            Patient with complete lower body dressing with moderate assist level of assistance donning and doffing all LE clothes  with reacher, shoe horn, sock-aid, and dressing stick  while supported sitting (Progressing)       Start:  10/09/24            Pt will tolerate 30 min OT tx session w/o subj/obj c/o fatigue/SOB with activity to increase independence  (Progressing)       Start:  10/09/24               EXERCISE/STRENGTHENING       Patient will complete BUE exercises for 3 sets and 10 reps in order to improve strength and activity for ADL performance.  (Progressing)       Start:  10/09/24               TRANSFERS       Patient will perform bed mobility contact guard assist level of assistance and bed rails in order to improve safety and independence with mobility (Progressing)       Start:  10/09/24            Patient will complete functional transfer to stand  with front wheeled walker with moderate assist level of assistance. (Progressing)       Start:  10/09/24

## 2024-10-09 NOTE — CONSULTS
Vancomycin Dosing by Pharmacy- Cessation of Therapy    Consult to pharmacy for vancomycin dosing has been discontinued by the prescriber, pharmacy will sign off at this time.    Please call pharmacy if there are further questions or re-enter a consult if vancomycin is resumed.     Ivette Adams [facility]

## 2024-10-09 NOTE — CARE PLAN
The patient's goals for the shift include Pt. will have a safe, restful and uneventful evening    The clinical goals for the shift include Pt. will remain HDS this shift with improved H+H this shift      Problem: Pain - Adult  Goal: Verbalizes/displays adequate comfort level or baseline comfort level  Outcome: Progressing     Problem: Safety - Adult  Goal: Free from fall injury  Outcome: Progressing     Problem: Discharge Planning  Goal: Discharge to home or other facility with appropriate resources  Outcome: Progressing     Problem: Chronic Conditions and Co-morbidities  Goal: Patient's chronic conditions and co-morbidity symptoms are monitored and maintained or improved  Outcome: Progressing     Problem: Fall/Injury  Goal: Not fall by end of shift  Outcome: Progressing  Goal: Be free from injury by end of the shift  Outcome: Progressing  Goal: Verbalize understanding of personal risk factors for fall in the hospital  Outcome: Progressing  Goal: Verbalize understanding of risk factor reduction measures to prevent injury from fall in the home  Outcome: Progressing  Goal: Use assistive devices by end of the shift  Outcome: Progressing  Goal: Pace activities to prevent fatigue by end of the shift  Outcome: Progressing     Problem: Nutrition  Goal: Less than 5 days NPO/clear liquids  Outcome: Progressing  Goal: Oral intake greater than 50%  Outcome: Progressing  Goal: Oral intake greater 75%  Outcome: Progressing  Goal: Consume prescribed supplement  Outcome: Progressing  Goal: Adequate PO fluid intake  Outcome: Progressing  Goal: Nutrition support goals are met within 48 hrs  Outcome: Progressing  Goal: Nutrition support is meeting 75% of nutrient needs  Outcome: Progressing  Goal: Tube feed tolerance  Outcome: Progressing  Goal: BG  mg/dL  Outcome: Progressing  Goal: Lab values WNL  Outcome: Progressing  Goal: Electrolytes WNL  Outcome: Progressing  Goal: Promote healing  Outcome: Progressing  Goal: Maintain  stable weight  Outcome: Progressing  Goal: Reduce weight from edema/fluid  Outcome: Progressing  Goal: Gradual weight gain  Outcome: Progressing  Goal: Improve ostomy output  Outcome: Progressing

## 2024-10-09 NOTE — PROGRESS NOTES
"  INFECTIOUS DISEASE DAILY PROGRESS NOTE    SUBJECTIVE:    No overnight events. No new complaints. Afebrile. No rash/itching/diarrhea.    OBJECTIVE:  VITALS (Last 24 Hours)  BP 92/57 (BP Location: Right arm)   Pulse 91   Temp 36.8 °C (98.3 °F) (Temporal)   Resp 16   Ht 1.854 m (6' 1\")   Wt 80.7 kg (178 lb)   SpO2 95%   BMI 23.48 kg/m²     PHYSICAL EXAM:  Gen - NAD  Chest - Mediport present left side c/d/i  Lungs - diminished both lower lungs  Abd - soft, no ttp, BS present  Skin - no rashes    ABX: IV Vanc/Zosyn    LABS:  Lab Results   Component Value Date    WBC 12.6 (H) 10/09/2024    HGB 8.5 (L) 10/09/2024    HCT 25.6 (L) 10/09/2024    MCV 85 10/09/2024     10/09/2024     Lab Results   Component Value Date    GLUCOSE 74 10/09/2024    CALCIUM 6.0 (L) 10/09/2024     (L) 10/09/2024    K 3.4 (L) 10/09/2024    CO2 26 10/09/2024    CL 99 10/09/2024    BUN 10 10/09/2024    CREATININE 0.42 (L) 10/09/2024     Results from last 72 hours   Lab Units 10/08/24  0625   ALK PHOS U/L 102   BILIRUBIN TOTAL mg/dL 0.6   PROTEIN TOTAL g/dL <3.0*   ALT U/L 40   AST U/L 17   ALBUMIN g/dL 1.5*     Estimated Creatinine Clearance: 125 mL/min (A) (by C-G formula based on SCr of 0.42 mg/dL (L)).      ASSESSMENT/PLAN:    Leukocytosis - up a bit again today to 12.6. Blood cx is NGTD. I will stop Vancomycin and continue Zosyn pending thora and pleural fluid analysis.    Monitoring for adverse effects of abx such as rash/itching/diarrhea.     Will follow.    Pako Hendricks MD  [facility]  Office #[phone]      "

## 2024-10-09 NOTE — PROGRESS NOTES
"Yon Lawrence \"Vladimir\" is a 49 y.o. male on day 1 of admission presenting with Large pleural effusion.    Subjective   Ending thoracentesis will need pleural fluid analysis, white count is improved down to 12.6, hemoglobin stable at 8.5, patient has bilateral lower extremity edema blood pressures are in the 90s therefore cannot give IV Lasix at this time he is comfortable on room air complaints of abdominal pain added IV Dilaudid for pain.       Objective     Physical Exam  Vitals reviewed.   Constitutional:       Appearance: Normal appearance.   HENT:      Head: Normocephalic.      Right Ear: Tympanic membrane normal.      Nose: Nose normal.   Pulmonary:      Comments: Crackles bilaterally    Abdominal:      General: Abdomen is flat. Bowel sounds are normal.      Palpations: Abdomen is soft.   Musculoskeletal:      Comments: Bilateral LE edema     Skin:     General: Skin is warm.      Capillary Refill: Capillary refill takes less than 2 seconds.   Neurological:      General: No focal deficit present.      Mental Status: He is alert.         Last Recorded Vitals  Blood pressure 92/57, pulse 91, temperature 36.8 °C (98.3 °F), temperature source Temporal, resp. rate 16, height 1.854 m (6' 1\"), weight 80.7 kg (178 lb), SpO2 91%.  Intake/Output last 3 Shifts:  I/O last 3 completed shifts:  In: 1849 (22.9 mL/kg) [P.O.:300; Blood:299; IV Piggyback:1250]  Out: 3600 (44.6 mL/kg) [Urine:3600 (1.2 mL/kg/hr)]  Weight: 80.7 kg     Relevant Results  Results for orders placed or performed during the hospital encounter of 10/07/24 (from the past 24 hour(s))   VERIFY ABO/Rh Group Test   Result Value Ref Range    ABO TYPE O     Rh TYPE POS    Vancomycin   Result Value Ref Range    Vancomycin 24.4 (H) 5.0 - 20.0 ug/mL   Basic Metabolic Panel   Result Value Ref Range    Glucose 74 74 - 99 mg/dL    Sodium 131 (L) 136 - 145 mmol/L    Potassium 3.4 (L) 3.5 - 5.3 mmol/L    Chloride 99 98 - 107 mmol/L    Bicarbonate 26 21 - 32 mmol/L    " Anion Gap 9 (L) 10 - 20 mmol/L    Urea Nitrogen 10 6 - 23 mg/dL    Creatinine 0.42 (L) 0.50 - 1.30 mg/dL    eGFR >90 >60 mL/min/1.73m*2    Calcium 6.0 (L) 8.6 - 10.3 mg/dL   CBC   Result Value Ref Range    WBC 12.6 (H) 4.4 - 11.3 x10*3/uL    nRBC 0.0 0.0 - 0.0 /100 WBCs    RBC 3.00 (L) 4.50 - 5.90 x10*6/uL    Hemoglobin 8.5 (L) 13.5 - 17.5 g/dL    Hematocrit 25.6 (L) 41.0 - 52.0 %    MCV 85 80 - 100 fL    MCH 28.3 26.0 - 34.0 pg    MCHC 33.2 32.0 - 36.0 g/dL    RDW 17.2 (H) 11.5 - 14.5 %    Platelets 326 150 - 450 x10*3/uL   Protime-INR   Result Value Ref Range    Protime 30.6 (H) 9.8 - 12.8 seconds    INR 2.7 (H) 0.9 - 1.1       Imaging Results    Ct chest/abd/pelvis:  IMPRESSION:  1. Increasing, now moderate to large bilateral layering pleural  effusions.  2. Groundglass opacities in the aerated upper lobes likely represent  edema.  3. Moderate air and fluid distention small bowel, new since last exam.  4. Known tumor mass in the distal transverse colon similar to prior,  with unchanged non-specific thickening of the more proximal colon.  5. Persistent marked hepatic steatosis with underlying metastatic  liver lesions.  6. Remainder as above.    Medications:  [Held by provider] enoxaparin, 80 mg, subcutaneous, q12h  perflutren lipid microspheres, 0.5-10 mL of dilution, intravenous, Once in imaging  perflutren protein A microsphere, 0.5 mL, intravenous, Once in imaging  piperacillin-tazobactam, 3.375 g, intravenous, q6h  potassium chloride, 20 mEq, intravenous, Once  sulfur hexafluoride microsphr, 2 mL, intravenous, Once in imaging       PRN medications: acetaminophen, acetaminophen, HYDROmorphone, HYDROmorphone, melatonin, ondansetron ODT **OR** ondansetron     Assessment/Plan   Leukocytosis  - uncertain etiology  - on iv zosyn until pleural fluid analysis  - ID on board  -bcx negative to date     2. Hypotension  -Chronically low, suspect 2/2 decreased oncotic pressure in the setting of low albumin      3.  Bilateral pleural effusions  - have ordered IR consult for thoracentesis, will likely need bilateral thoracenteses during the next few days (delayed given high INR)   - cytology, labs ordered  - Pulmonary consult on board     3. Persistent bilateral LE edema  - suspect 2/2 low albumin   -hold diuresis due to low bps     4. Urine retention in the ED  - [place] placed  - Urology consult  - Avoid flomax secondary to hypotension     5. Metastatic colon cancer  - Last Pembrolizumab 5 days ago     6. 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 Unable to absorb nutrition well.   -nutrition on board    7. Anemia  -s/p one unit of blood  -hgb stable now    DVT Prophylaxis:  ANTONI Mclean MD  [facility]

## 2024-10-09 NOTE — PROGRESS NOTES
Physical Therapy    Physical Therapy Evaluation  Patient Name: Vladimir Lawrence  MRN: [id number]  Department: [facility]  Room: [facility]/413-A  Today's Date: 10/9/2024   Time Calculation  Start Time: 1420  Stop Time: 1436  Time Calculation (min): 16 min    Assessment/Plan   PT Assessment  PT Assessment Results: Decreased strength, Decreased endurance, Impaired balance, Decreased mobility  Rehab Prognosis: Good  Barriers to Discharge: wife works, -24/7  Evaluation/Treatment Tolerance: Patient limited by fatigue  Medical Staff Made Aware: Yes  Strengths: Ability to acquire knowledge, Access to adaptive/assistive products, Insight into problems, Support and attitude of living partners  Barriers to Participation: Comorbidities  End of Session Communication: Bedside nurse  Assessment Comment: Pt presents with weakness, decreased ambulation and transfers, and decreased activity tolerance; can benefit from skilled PT intervention to assist with discharge planning and address the aforementioned issues to enable the pt to return to their prior level of function, which was independent with ww.  End of Session Patient Position: Bed, 4 rail up, Alarm off, caregiver present (pt with transport and nurse)   IP OR SWING BED PT PLAN  Inpatient or Swing Bed: Inpatient  PT Plan  Treatment/Interventions: Bed mobility, Transfer training, Balance training, Neuromuscular re-education, Strengthening, Therapeutic exercise, Therapeutic activity  PT Plan: Ongoing PT  PT Frequency: 3 times per week  PT Discharge Recommendations: Moderate intensity level of continued care  Equipment Recommended upon Discharge: Wheelchair  PT Recommended Transfer Status: Assist x1 (to EOB; total assist for OOB)  PT - OK to Discharge: Yes (PT POC established)      Subjective     General Visit Information:  General  Reason for Referral: Pt admitted with hypotension, BLE edema and large pleral effusion. Recent dx of colon CA with mets.  Past Medical History Relevant to Rehab:    Past Medical History:   Diagnosis Date    Cancer (Multi)     colon cancer     Past Surgical History:   Procedure Laterality Date    COLONOSCOPY  05/06/2024    DR VALDEZ    OTHER SURGICAL HISTORY  06/07/2022    Meniscectomy    TUNNELED VENOUS PORT PLACEMENT  05/31/2024    PLACEMENT OF TUNNELED CENTR VENOUS CATHETER W/SQ PORT/ DR VALDEZ       Family/Caregiver Present: No  Prior to Session Communication: Bedside nurse  Patient Position Received: Bed, 3 rail up, Alarm on  Preferred Learning Style: auditory, kinesthetic  General Comment: Pt is pleasant and cooperative, agreeable to PT eval. Transport came near end of eval to take pt to ultrasound.  Home Living:  Home Living  Type of Home: House  Lives With: Spouse  Home Adaptive Equipment: Walker rolling or standard, Hospital bed (shower chair)  Home Layout: Two level, 1/2 bath on main level, Stairs to alternate level with rails (hospital bed just delivered, planning to stay on 1st floor)  Alternate Level Stairs-Rails: Both  Alternate Level Stairs-Number of Steps: 13  Home Access: Ramped entrance  Prior Level of Function:  Prior Function Per Pt/Caregiver Report  Prior Function Comments: Up until 2 weeks ago, was able to amb with a ww mod indep. He has needed assist x 2 to get into chair this past week.  Precautions:  Precautions  Medical Precautions: Fall precautions  Precautions Comment: barksdale cath, IV    Objective   Pain:  Pain Assessment  Pain Assessment: 0-10  0-10 (Numeric) Pain Score: 1  Pain Type: Chronic pain  Pain Location: Abdomen  Cognition:  Cognition  Overall Cognitive Status: Within Functional Limits  Orientation Level: Oriented X4  Attention: Within Functional Limits  Memory: Within Funtional Limits  Problem Solving: Within Functional Limits  Safety/Judgement: Within Functional Limits  Insight: Within function limits  Impulsive: Within functional limits    General Assessments:   Activity Tolerance  Endurance: Tolerates 10 - 20 min exercise with multiple  rests    Sensation  Sensation Comment: denies any deficits    Perception  Inattention/Neglect: Appears intact  Initiation: Appears intact  Motor Planning: Appears intact    Coordination  Movements are Fluid and Coordinated: Yes    Static Sitting Balance  Static Sitting-Balance Support: Feet supported, No upper extremity supported  Static Sitting-Level of Assistance: Distant supervision  Dynamic Sitting Balance  Dynamic Sitting-Balance Support: Bilateral upper extremity supported, Feet unsupported  Dynamic Sitting-Level of Assistance: Distant supervision  Dynamic Sitting-Balance: Trunk control activities    Functional Assessments:  Bed Mobility  Bed Mobility: Yes  Bed Mobility 1  Bed Mobility 1: Supine to sitting, Sitting to supine  Level of Assistance 1: Maximum assistance, Minimal verbal cues, Minimal tactile cues  Bed Mobility Comments 1: Pt educated in bed mobility technique moving supine->sit via sidelying; pt requires minimal VC's for technique and proper UE placements for upper body initiate rolling and to use R UE to reach over for opposite bedrail, and to use BUE to push up into sitting from sidelying. Pt needs assist with BLE and lifting trunk off surface of the bed.  Bed Mobility 2  Bed Mobility Comments 2: Pt unable to scoot towards HOB while sitting EOB due to fatigue.    Transfers  Transfer: No (Transport arrived to take pt to ultrasound. unsuccessful Attempts made earlier with OT. Pt demonstrates very weak BLE, likely unable to stand with PT.)       Extremity/Trunk Assessments:  RUE   RUE :  (grossly 4/5)  LUE   LUE:  (grossly 4/5)  RLE   RLE : Exceptions to WFL  Strength RLE  R Hip Flexion: 2+/5  R Knee Extension: 3-/5  R Ankle Dorsiflexion: 3+/5  R Ankle Plantar Flexion: 3+/5  LLE   LLE : Exceptions to WFL  Strength LLE  L Hip Flexion: 2+/5  L Knee Extension: 3-/5  L Ankle Dorsiflexion: 3+/5  L Ankle Plantar Flexion: 3+/5  Treatments:  Therapeutic Exercise  Therapeutic Exercise Performed:  Yes  Therapeutic Exercise Activity 1: seated TE at EOB: B AP, LAQ, hip flexion x 10 reps  Outcome Measures:  [facility] Basic Mobility  Turning from your back to your side while in a flat bed without using bedrails: A lot  Moving from lying on your back to sitting on the side of a flat bed without using bedrails: A lot  Moving to and from bed to chair (including a wheelchair): Total  Standing up from a chair using your arms (e.g. wheelchair or bedside chair): Total  To walk in hospital room: Total  Climbing 3-5 steps with railing: Total  Basic Mobility - Total Score: 8    Encounter Problems       Encounter Problems (Active)       Balance       STG - Maintains static standing balance with ww with mod A x 2 x 3 minutes       Start:  10/09/24    Expected End:  10/23/24       INTERVENTIONS:  1. Practice standing with minimal support.  2. Educate patient about standing tolerance.  3. Educate patient about independence with gait, transfers, and ADL's.  4. Educate patient about use of assistive device.  5. Educate patient about self-directed care.            Mobility       Increase BLE strength to attain functional goals achieved through supine and seated TE.        Start:  10/09/24    Expected End:  10/23/24               PT Transfers       STG - Transfer from bed to chair with ww with mod A x 2       Start:  10/09/24    Expected End:  10/23/24            STG - Patient to transfer to and from sit to supine with min A       Start:  10/09/24    Expected End:  10/23/24            STG - Patient will transfer sit to and from stand with mod A x 2 with ww       Start:  10/09/24    Expected End:  10/23/24                   Education Documentation  Body Mechanics, taught by Renée Hector, PT at 10/9/2024  3:08 PM.  Learner: Patient  Readiness: Acceptance  Method: Explanation  Response: Verbalizes Understanding, Demonstrated Understanding    Home Exercise Program, taught by Renée Hector, PT at 10/9/2024  3:08 PM.  Learner:  Patient  Readiness: Acceptance  Method: Explanation  Response: Verbalizes Understanding, Demonstrated Understanding    Mobility Training, taught by Renée Hector PT at 10/9/2024  3:08 PM.  Learner: Patient  Readiness: Acceptance  Method: Explanation  Response: Verbalizes Understanding, Demonstrated Understanding    Education Comments  No comments found.

## 2024-10-09 NOTE — POST-PROCEDURE NOTE
Interventional Radiology Brief Postprocedure Note    Attending: Payam Wade CNP    Assistant: none    Diagnosis: left sided pleural effusion    Description of procedure: Ultrasound guided thoracentesis was preformed on the left.  1,100 mL of straw-yellow fluid was drained.        Anesthesia:  Local    Complications: None    Estimated Blood Loss: none    Medications  As of 10/09/24 1526      albumin human 5 % infusion 25 g (g) Total dose:  0 g* Dosing weight:  79.8   *Administration not included in total     Date/Time Rate/Dose/Volume Action       10/07/24  1619 *25 g - 250 mL/hr (over 120 min) Missed               albumin human 5 % infusion 12.5 g (mL/hr) Total volume:  Not documented* Dosing weight:  79.8   *Total volume has not been documented. View each administration to see the amount administered.     Date/Time Rate/Dose/Volume Action       10/07/24  1534 12.5 g - 125 mL/hr (over 120 min) New Bag      1725 12.5 g - 125 mL/hr (over 120 min) New Bag      1732  (over 120 min) Stopped      1925  (over 120 min) Stopped               vancomycin (Vancocin) in dextrose 5 % water (D5W) 250 mL IV 1,250 mg (mL/hr) Total volume:  Not documented* Dosing weight:  79.8   *Total volume has not been documented. View each administration to see the amount administered.     Date/Time Rate/Dose/Volume Action       10/07/24  2011 1,250 mg - 200 mL/hr (over 75 min) New Bag      2126  (over 75 min) Stopped               vancomycin (Vancocin) in dextrose 5 % water (D5W) 250 mL IV 1,250 mg (mL/hr) Total dose:  2,500 mg* Dosing weight:  80.7   *From user-documented volume     Date/Time Rate/Dose/Volume Action       10/08/24  1014 1,250 mg - 200 mL/hr (over 75 min) New Bag      1156  (over 75 min) Stopped      2109 1,250 mg - 200 mL/hr (over 75 min) New Bag     10/09/24  0001 500 mL Stopped      0910 1,250 mg - 200 mL/hr (over 75 min) New Bag      1256  (over 75 min) Stopped               piperacillin-tazobactam (Zosyn) 3.375 g in  dextrose (iso) IV 50 mL (g) Total dose:  20.25 g Dosing weight:  79.8      Date/Time Rate/Dose/Volume Action       10/07/24  1727 3.375 g (over 30 min) New Bag      1748  (over 30 min) Stopped     10/08/24  0646 3.375 g (over 30 min) New Bag      0727 50 mL Stopped      1244 3.375 g (over 30 min) New Bag      1335  (over 30 min) Stopped      1807 3.375 g (over 30 min) New Bag      1929 100 mL Stopped     10/09/24  0049 3.375 g (over 30 min) New Bag      0149 50 mL Stopped      0627 3.375 g (over 30 min) - 50 mL New Bag      0714  (over 30 min) Stopped      1301 3.375 g (over 30 min) New Bag      1339  (over 30 min) Stopped               calcium gluconate 2 g in sodium chloride (iso)  mL (mL/hr) Total volume:  Not documented* Dosing weight:  79.8   *Total volume has not been documented. View each administration to see the amount administered.     Date/Time Rate/Dose/Volume Action       10/07/24  1854 2 g - 100 mL/hr (over 60 min) New Bag      1954  (over 60 min) Stopped               iohexol (OMNIPaque) 350 mg iodine/mL solution 75 mL (mL) Total volume:  75 mL Dosing weight:  79.8      Date/Time Rate/Dose/Volume Action       10/07/24  1825 75 mL Given               enoxaparin (Lovenox) syringe 80 mg (mg) Total dose:  Cannot be calculated* Dosing weight:  79.8   *Administration dose not documented     Date/Time Rate/Dose/Volume Action       10/08/24  0618 *Not included in total Held by provider      0645 *80 mg Missed      1845 *80 mg Missed     10/09/24  0645 *80 mg Missed               acetaminophen (Tylenol) tablet 650 mg (mg) Total dose:  650 mg Dosing weight:  79.8      Date/Time Rate/Dose/Volume Action       10/09/24  0923 650 mg Given               sodium chloride 0.9 % bolus 500 mL (mL/hr) Total volume:  500 mL* Dosing weight:  79.8   *From user-documented volume     Date/Time Rate/Dose/Volume Action       10/08/24  0322 500 mL - 250 mL/hr (over 120 min) New Bag      0546 500 mL Stopped                furosemide (Lasix) injection 40 mg (mg) Total dose:  40 mg Dosing weight:  80.7      Date/Time Rate/Dose/Volume Action       10/08/24  1243 40 mg Given               potassium chloride 20 mEq in sterile water for injection 100 mL (mL/hr) Total volume:  Not documented* Dosing weight:  80.7   *Total volume has not been documented. View each administration to see the amount administered.     Date/Time Rate/Dose/Volume Action       10/09/24  0936 20 mEq - 50 mL/hr (over 120 min) New Bag      1256  (over 120 min) Stopped      1339 20 mEq - 50 mL/hr (over 120 min) New Bag               HYDROmorphone (Dilaudid) injection 0.4 mg (mg) Total dose:  0.4 mg Dosing weight:  80.7      Date/Time Rate/Dose/Volume Action       10/09/24  1143 0.4 mg Given               lidocaine PF (Xylocaine) 10 mg/mL (1 %) injection (mL) Total volume:  10 mL      Date/Time Rate/Dose/Volume Action       10/09/24  1516 10 mL Given                   Specimens collected      See detailed result report with images in PACS.    The patient tolerated the procedure well without incident or complication and is in stable condition.

## 2024-10-10 ENCOUNTER — APPOINTMENT (OUTPATIENT)
Dept: RADIOLOGY | Facility: HOSPITAL | Age: 49
End: 2024-10-10
Payer: COMMERCIAL

## 2024-10-10 LAB
ANION GAP SERPL CALC-SCNC: 10 MMOL/L (ref 10–20)
BASOPHILS NFR FLD MANUAL: 0 %
BLASTS NFR FLD MANUAL: 0 %
BUN SERPL-MCNC: 13 MG/DL (ref 6–23)
CALCIUM SERPL-MCNC: 6.3 MG/DL (ref 8.6–10.3)
CHLORIDE SERPL-SCNC: 101 MMOL/L (ref 98–107)
CLARITY FLD: CLEAR
CO2 SERPL-SCNC: 27 MMOL/L (ref 21–32)
COLOR FLD: NORMAL
CREAT SERPL-MCNC: 0.41 MG/DL (ref 0.5–1.3)
EGFRCR SERPLBLD CKD-EPI 2021: >90 ML/MIN/1.73M*2
EOSINOPHIL NFR FLD MANUAL: 0 %
GLUCOSE BLD MANUAL STRIP-MCNC: 71 MG/DL (ref 74–99)
GLUCOSE FLD-MCNC: 86 MG/DL
GLUCOSE SERPL-MCNC: 70 MG/DL (ref 74–99)
HOLD SPECIMEN: NORMAL
IMMATURE GRANULOCYTES IN FLUID: 0 %
LABORATORY COMMENT REPORT: NORMAL
LABORATORY COMMENT REPORT: NORMAL
LDH FLD L TO P-CCNC: 62 U/L
LDH FLD L TO P-CCNC: 80 U/L
LDH SERPL L TO P-CCNC: 325 U/L (ref 84–246)
LYMPHOCYTES NFR FLD MANUAL: 6 %
MONOS+MACROS NFR FLD MANUAL: 84 %
NEUTROPHILS NFR FLD MANUAL: 10 %
OTHER CELLS NFR FLD MANUAL: 0 %
PATH REPORT.FINAL DX SPEC: NORMAL
PATH REPORT.GROSS SPEC: NORMAL
PATH REPORT.RELEVANT HX SPEC: NORMAL
PATH REPORT.TOTAL CANCER: NORMAL
PH FLD: 7.15 [PH]
PLASMA CELLS NFR FLD MANUAL: 0 %
POTASSIUM SERPL-SCNC: 3.5 MMOL/L (ref 3.5–5.3)
PROT FLD-MCNC: 0.5 G/DL
PROT FLD-MCNC: <0.5 G/DL
RBC # FLD AUTO: 5 /UL
SODIUM SERPL-SCNC: 134 MMOL/L (ref 136–145)
TOTAL CELLS COUNTED FLD: 100
WBC # FLD AUTO: 46 /UL

## 2024-10-10 PROCEDURE — 94668 MNPJ CHEST WALL SBSQ: CPT

## 2024-10-10 PROCEDURE — 1200000002 HC GENERAL ROOM WITH TELEMETRY DAILY

## 2024-10-10 PROCEDURE — 83615 LACTATE (LD) (LDH) ENZYME: CPT | Performed by: INTERNAL MEDICINE

## 2024-10-10 PROCEDURE — 99233 SBSQ HOSP IP/OBS HIGH 50: CPT | Performed by: INTERNAL MEDICINE

## 2024-10-10 PROCEDURE — 82945 GLUCOSE OTHER FLUID: CPT | Mod: AHULAB | Performed by: INTERNAL MEDICINE

## 2024-10-10 PROCEDURE — 0W993ZZ DRAINAGE OF RIGHT PLEURAL CAVITY, PERCUTANEOUS APPROACH: ICD-10-PCS

## 2024-10-10 PROCEDURE — 32555 ASPIRATE PLEURA W/ IMAGING: CPT

## 2024-10-10 PROCEDURE — 88112 CYTOPATH CELL ENHANCE TECH: CPT | Mod: TC | Performed by: INTERNAL MEDICINE

## 2024-10-10 PROCEDURE — 89051 BODY FLUID CELL COUNT: CPT | Performed by: INTERNAL MEDICINE

## 2024-10-10 PROCEDURE — 2500000004 HC RX 250 GENERAL PHARMACY W/ HCPCS (ALT 636 FOR OP/ED)

## 2024-10-10 PROCEDURE — 82947 ASSAY GLUCOSE BLOOD QUANT: CPT

## 2024-10-10 PROCEDURE — 71045 X-RAY EXAM CHEST 1 VIEW: CPT | Performed by: RADIOLOGY

## 2024-10-10 PROCEDURE — 80048 BASIC METABOLIC PNL TOTAL CA: CPT | Performed by: PHARMACIST

## 2024-10-10 PROCEDURE — 83986 ASSAY PH BODY FLUID NOS: CPT | Mod: AHULAB | Performed by: INTERNAL MEDICINE

## 2024-10-10 PROCEDURE — 87070 CULTURE OTHR SPECIMN AEROBIC: CPT | Mod: AHULAB | Performed by: INTERNAL MEDICINE

## 2024-10-10 PROCEDURE — 71045 X-RAY EXAM CHEST 1 VIEW: CPT

## 2024-10-10 PROCEDURE — 87102 FUNGUS ISOLATION CULTURE: CPT | Mod: AHULAB | Performed by: INTERNAL MEDICINE

## 2024-10-10 PROCEDURE — 2500000004 HC RX 250 GENERAL PHARMACY W/ HCPCS (ALT 636 FOR OP/ED): Performed by: HOSPITALIST

## 2024-10-10 PROCEDURE — 9420000001 HC RT PATIENT EDUCATION 5 MIN

## 2024-10-10 PROCEDURE — 87116 MYCOBACTERIA CULTURE: CPT | Mod: AHULAB | Performed by: INTERNAL MEDICINE

## 2024-10-10 PROCEDURE — 84157 ASSAY OF PROTEIN OTHER: CPT | Mod: AHULAB | Performed by: INTERNAL MEDICINE

## 2024-10-10 PROCEDURE — 83615 LACTATE (LD) (LDH) ENZYME: CPT | Mod: AHULAB | Performed by: INTERNAL MEDICINE

## 2024-10-10 PROCEDURE — 88112 CYTOPATH CELL ENHANCE TECH: CPT | Performed by: PATHOLOGY

## 2024-10-10 RX ORDER — LIDOCAINE HYDROCHLORIDE 10 MG/ML
INJECTION, SOLUTION EPIDURAL; INFILTRATION; INTRACAUDAL; PERINEURAL
Status: COMPLETED | OUTPATIENT
Start: 2024-10-10 | End: 2024-10-10

## 2024-10-10 ASSESSMENT — PAIN SCALES - GENERAL
PAINLEVEL_OUTOF10: 0 - NO PAIN
PAINLEVEL_OUTOF10: 2
PAINLEVEL_OUTOF10: 0 - NO PAIN
PAINLEVEL_OUTOF10: 0 - NO PAIN

## 2024-10-10 ASSESSMENT — COGNITIVE AND FUNCTIONAL STATUS - GENERAL
DAILY ACTIVITIY SCORE: 12
WALKING IN HOSPITAL ROOM: A LOT
HELP NEEDED FOR BATHING: A LOT
MOVING FROM LYING ON BACK TO SITTING ON SIDE OF FLAT BED WITH BEDRAILS: A LOT
STANDING UP FROM CHAIR USING ARMS: A LOT
DRESSING REGULAR UPPER BODY CLOTHING: A LOT
EATING MEALS: A LOT
MOVING FROM LYING ON BACK TO SITTING ON SIDE OF FLAT BED WITH BEDRAILS: A LOT
PERSONAL GROOMING: A LOT
MOBILITY SCORE: 12
DRESSING REGULAR LOWER BODY CLOTHING: A LOT
MOVING TO AND FROM BED TO CHAIR: A LOT
DRESSING REGULAR UPPER BODY CLOTHING: A LOT
EATING MEALS: A LOT
STANDING UP FROM CHAIR USING ARMS: A LOT
TURNING FROM BACK TO SIDE WHILE IN FLAT BAD: A LOT
STANDING UP FROM CHAIR USING ARMS: A LOT
PERSONAL GROOMING: A LOT
DAILY ACTIVITIY SCORE: 12
MOBILITY SCORE: 12
MOBILITY SCORE: 11
WALKING IN HOSPITAL ROOM: A LOT
EATING MEALS: A LITTLE
MOVING FROM LYING ON BACK TO SITTING ON SIDE OF FLAT BED WITH BEDRAILS: A LOT
TOILETING: TOTAL
TURNING FROM BACK TO SIDE WHILE IN FLAT BAD: A LOT
DRESSING REGULAR LOWER BODY CLOTHING: A LOT
TURNING FROM BACK TO SIDE WHILE IN FLAT BAD: A LOT
HELP NEEDED FOR BATHING: A LOT
DRESSING REGULAR UPPER BODY CLOTHING: A LOT
CLIMB 3 TO 5 STEPS WITH RAILING: TOTAL
TOILETING: A LOT
DAILY ACTIVITIY SCORE: 12
DRESSING REGULAR LOWER BODY CLOTHING: A LOT
WALKING IN HOSPITAL ROOM: A LOT
HELP NEEDED FOR BATHING: A LOT
TOILETING: A LOT
CLIMB 3 TO 5 STEPS WITH RAILING: A LOT
MOVING TO AND FROM BED TO CHAIR: A LOT
MOVING TO AND FROM BED TO CHAIR: A LOT
CLIMB 3 TO 5 STEPS WITH RAILING: A LOT
PERSONAL GROOMING: A LOT

## 2024-10-10 ASSESSMENT — PAIN - FUNCTIONAL ASSESSMENT
PAIN_FUNCTIONAL_ASSESSMENT: 0-10

## 2024-10-10 NOTE — CARE PLAN
The patient's goals for the shift include Pt. will have a safe, restful and uneventful evening    The clinical goals for the shift include patient pain will be controlled this shift.     1900: Over the shift, the patient did make progress toward the following goals.

## 2024-10-10 NOTE — CARE PLAN
Problem: Discharge Planning  Goal: Discharge to home or other facility with appropriate resources  Outcome: Progressing   The patient's goals for the shift include Pt. will have a safe, restful and uneventful evening    The clinical goals for the shift include Pt. will remain HDS this shift with improved H+H this shift    Over the shift, the patient did not make progress toward the following goals. Barriers to progression include   Problem: Discharge Planning  Goal: Discharge to home or other facility with appropriate resources  Outcome: Progressing   . Recommendations to address these barriers include

## 2024-10-10 NOTE — PROGRESS NOTES
MEDICAL ONCOLOGY INITIAL OUTPATIENT CONSULTATION NOTE  [facility], Gastrointestinal Oncology    Patient Name:  Yon Lawrence  MRN:  [id number]  :  1975    Referring Provider: Billie Finn MD  Care Team: Patient Care Team:  Pedro Bazzi MD as PCP - General (Family Medicine)  Pedro Bazzi MD as PCP - MMO ACO PCP  Billie Finn MD as Consulting Physician (Hematology and Oncology)  Adry Carrera MD PhD as Consulting Physician (Hematology and Oncology)  Billie Finn MD as Referring Physician (Hematology and Oncology)  Date of Service: 10/7/2024     IDENTIFYING DATA:   Cancer Staging   Adenocarcinoma of transverse colon (Multi)  Staging form: Colon and Rectum, AJCC 8th Edition  - Clinical: Stage IVC (cTX, cNX, cM1c) - Signed by Billie Finn MD on 2024    Yon Lawrence is a 49 y.o.-year-old with metastatic distal transverse colon adenocarcinoma, dMMR, MLH1 promoter hypermethylation detected    INTERVAL HISTORY:  Patient presents for follow up accompanied by his wife. At the time of follow up on 24, he reported significant increase in his weakness and shortness of breath with exertion. Recommended at that time that he present to the ED for workup due to shortness of breath, borderline hypotension, weakness and hypocalcemia. He declined to present initially but then went in on 24 due to worsening shortness of breath. CT angio chest was negative for PE but CT abdomen/pelvis showed profound and diffuse decrease in hepatic parenchymal density with concern for filling defects in the branches of the hepatic artery. He was admitted and transferred to [facility], where he was managed with albumin, lasix and PRBC transfusion.    He received C4 pembrolizumab on 24, but presented to the ED again 24 with weakness and orange urine. He was released but has been feeling poorly. He present today for follow up, hypotensive and unwell appearing. He is cachectic  and admits he has had little oral intake over the past few days and is not urinating. He is having difficulty caring for himself at home. He was much more lethargic over the past 2 days. Vitals notable for hypotension and patient is dizzy. EMS was called and patient brought to [facility].    ONCOLOGY HISTORY:  4/30/24: CT abdomen/pelvis showed a large necrotic mass occupying the left upper quadrant of the abdomen, likely arising from the distal transverse colon, as well as multiple mesenteric nodules consistent with metastatic lesions and multiple liver mass lesions consistent with metastasis  5/6/24: colonoscopy with malignant distal transverse colon mass measuring 10cm, invasive adenocarcinoma, MMR IHC pending  5/7/24: CT chest with no intrathoracic metastasis  5/16/24: CT abdomen/pelvis with stable to mild interval enlargement of the distal transverse colonic mass measuring 10.3 x 9.3 x 8.8 cm with invasion of and fistulization with an adjacent loop of jejunum with passage of oral contrast from the jejunum into the colon at the level of the mass. Mass noted to abut the anterior abdominal wall without evidence of direct invasion, mesenteric stranding and nodularity concerning for peritoneal carcinomatosis, increase in size and number of diffuse liver metastases from 4/30/24    REGIMEN #1: pembrolizumab  6/3/24: C1D1 pembrolizumab  6/24/24: C2D1 pembrolizumab  7/12/24: C3D1 pembrolizumab   -- Received C3 pembrolizumab on 7/12/24 and when he presented for follow up for consideration of C4 on 8/6/24, he reported up to 6 episodes/day of diarrhea and was started on prednisone 1mg/kg/day x1 week followed by taper with improvement in the diarrhea. Subsequent CT 8/22/24 showed decreased burden of disease in the colon, decreased tumor burden in the liver and persistent fistulization between the primary tumor and small bowel. Plan as of 8/26/24 follow up was to schedule the patient with gastroenterology and hold  pembrolizumab.  2024-24: treatment on hold for diarrhea  24: C4D1 pembrolizumab    PAST MEDICAL HISTORY:  Past Medical History:   Diagnosis Date    Cancer (Multi)     colon cancer       PAST SURGICAL HISTORY:  Past Surgical History:   Procedure Laterality Date    COLONOSCOPY  2024    DR VALDEZ    OTHER SURGICAL HISTORY  2022    Meniscectomy    TUNNELED VENOUS PORT PLACEMENT  2024    PLACEMENT OF TUNNELED CENTR VENOUS CATHETER W/SQ PORT/ DR VALDEZ       ALLERGIES:  No Known Allergies    MEDICATIONS:  Current Outpatient Medications   Medication Instructions    enoxaparin (LOVENOX) 80 mg, subcutaneous, Every 12 hours       SOCIAL HISTORY:  Social History     Tobacco Use    Smoking status: Never    Smokeless tobacco: Never   Substance Use Topics    Alcohol use: Never     Social History     Social History Narrative    Lives with wife and 2 children. Works as a  at [employer]. Coaches basketball over the summer.      FAMILY HISTORY:  Family History   Problem Relation Name Age of Onset    Diabetes type II Mother      Hypertension Father      Heart disease Father          CABG    Macular degeneration Father      Ulcers Father      Breast cancer Mother's Sister  50 - 59    Cancer Father's Sister          rare cancer unknown type    Cancer Paternal Grandmother      Stroke Paternal Grandfather      Other (MCAD) Daughter      No Known Problems Son          REVIEW OF SYSTEMS:  10-pt ROS reviewed and negative except as mentioned above.    PERFORMANCE STATUS:  Karnofsky Performance Score/ECO, Able to carry on normal activity; minor signs or symptoms of disease (ECOG equivalent 0)    PHYSICAL EXAMINATION:  BP 86/62   Pulse (!) 116   Temp 36.2 °C (97.2 °F)   Resp 20    Wt Readings from Last 5 Encounters:   10/08/24 80.7 kg (178 lb)   10/04/24 79.8 kg (176 lb)   24 79.8 kg (176 lb)   24 79.9 kg (176 lb 2.4 oz)   24 84.6 kg (186 lb 8 oz)      PE - deferred, telephone visit.     PATHOLOGY:     Surgical pathology 5/6/24:  FINAL DIAGNOSIS   A. COLON - TRANSVERSE, MASS BIOPSY:   Invasive adenocarcinoma     MMR studies pending; separate report will follow     : The images have been reviewed at the GI consensus conference on May 15, 2024 and diagnosis of adenocarcinoma is confirmed.  Dr. MONROE Asif has been notified via secure chat on 5/15/2024 at 11:51 am     B. RECTUM POLYP, POLYPECTOMY:   Hyperplastic polyp   Electronically signed by Servando Lawton MD on 5/15/2024 at 1155        By the signature on this report, the individual or group listed as making the Final Interpretation/Diagnosis certifies that they have reviewed this case.    Addendum            MISMATCH REPAIR PROTEIN EXPRESSION                 Protein:  Result                MLH-1:   Expression Absent                                            PMS-2:  Expression Absent                                      MSH-2:  Expression Present                                   MSH-6:  Expression Present                                       Neoplasm with combined loss of MLH-1/PMS-2 mismatch repair protein expression.     Assessment for BRAF mutation or methylation of the MLH1 promoter status has been ordered.     The loss of MLH-2/PMS-2 protein expression has been identified (normal internal controls stain appropriately).  Loss of expression of the MLH-1 gene and hence protein expression, is often associated with the concurrent loss of protein expression in PMS-2.     Reference Range: A result is reported as Expression Present if any tumor nuclei are stained positive.     MLH1 Promoter Methylation Result       Specimen:  FFPE [id number]  Estimated Tumor Content: 40%        RESULT: Positive for MLH1 Promoter Methylation       NGS:   ** Copied from [employer] on 18-Jun-2024 12:01PM by Billie Finn **    Test Name: "[employer] (XT.V4)  Laboratory Name: [facility]  Specimen Source:  Colon, transverse  Collection Date: 06-May-2024  Cancer Type: Invasive adenocarcinoma  Report Id: [id number]    Result Interpretation:  * Lab Notes: Tumor Percentage: 30%  * Germline Notes: No potential germline variants were found in the limited set of genes on which we report.    Molecular Findings:  * Gene: BRCA2, Variant: Frameshift - LOF, Potentially Actionable, p.I605fs (Allele Frequency - 7.9%)  * Gene: [place]STON, Variant: Frameshift - LOF, Potentially Actionable, p.F357fs (Allele Frequency - 7.6%)  * Gene: PIK3CA, Variant: Missense variant (exon 1) - GOF, Potentially Actionable, p.R88Q (Allele Frequency - 6.5%)  * Gene: PIK3CA, Variant: Missense variant (exon 20) - GOF, Potentially Actionable, p.C1876Z (Allele Frequency - 6.4%)  * Gene: CTNNB1, Variant: Missense variant - GOF, Biologically Relevant, p.T41A (Allele Frequency - 28.5%)  * Gene: MLH1, Variant: Frameshift - LOF, Biologically Relevant, p.R9fs (Allele Frequency - 26.4%)  * Gene: MSH3, Variant: Frameshift - LOF, Biologically Relevant, p.K383fs (Allele Frequency - 16.2%)  * Gene: HNF1A, Variant: Frameshift - LOF, Biologically Relevant, p.P291fs (Allele Frequency - 10.3%)  * Gene: PIK3R2, Variant: Missense variant - GOF, Biologically Relevant, p.G373R (Allele Frequency - 8.8%)  * Gene: PTEN, Variant: Frameshift - LOF, Biologically Relevant, p.K267fs (Allele Frequency - 8.6%)  * Gene: PTEN, Variant: Frameshift - LOF, Biologically Relevant, p.C250fs (Allele Frequency - 6%)  * Gene: FBXW7, Variant: Stop gain - LOF, Biologically Relevant, p.R278*, Nonsense (Allele Frequency - 8.5%)  * Gene: PTCH1, Variant: Frameshift - LOF, Biologically Relevant, p.M0815ua (Allele Frequency - 8.3%)  * Gene: PTCH1, Variant: Stop gain - LOF, Biologically Relevant, p.Q728*, Nonsense (Allele Frequency - 6.6%)  * Gene: ABE8SP6, Variant: Stop gain - LOF, Biologically Relevant, p.R216*, Nonsense (Allele Frequency - 8.1%)  * Gene: CHD2, Variant: Frameshift - LOF, Biologically  Relevant, p.V175fs (Allele Frequency - 7.9%)  * Gene: PIK3R1, Variant: Frameshift - LOF, Biologically Relevant, p.S460fs (Allele Frequency - 7.7%)  * Gene: PMS2, Variant: Frameshift - LOF, Biologically Relevant, p.D414fs (Allele Frequency - 7.6%)  * Gene: NOTCH1, Variant: Frameshift - GOF, Biologically Relevant, p.M8626my (Allele Frequency - 7.6%)  * Gene: APC, Variant: Frameshift - LOF, Biologically Relevant, p.A759fs (Allele Frequency - 7.5%)  * Gene: TP53, Variant: Frameshift - LOF, Biologically Relevant, p.P152fs (Allele Frequency - 7.4%)  * Gene: ARID1A, Variant: Frameshift - LOF, Biologically Relevant, p.H2192oi (Allele Frequency - 7.3%)  * Gene: ZFHX3, Variant: Frameshift - LOF, Biologically Relevant, p.D121fs (Allele Frequency - 6.8%)  * Gene: INPP4B, Variant: Frameshift - LOF, Biologically Relevant, p.N299fs (Allele Frequency - 6.5%)  * Gene: NBN, Variant: Frameshift - LOF, Biologically Relevant, p.R466fs (Allele Frequency - 6.5%)  * Gene: ACVR1B, Variant: Stop gain - LOF, Biologically Relevant, p.R485*, Nonsense (Allele Frequency - 6.5%)  * Gene: MAP2K4, Variant: Frameshift - LOF, Biologically Relevant, p.N233fs (Allele Frequency - 6.2%)  * Gene: NF1, Variant: Stop gain - LOF, Biologically Relevant, p.*, Nonsense (Allele Frequency - 6.2%)  * Biomarker: Microsatellite Instability, Status: high  * Biomarker: Tumor Mutation Harleigh (51.6 Muts/Mb, Percentile: 99)  * Disease associated genes/variants with no reportable findings:     * BRAF    * KRAS    * NRAS  * 80 variants of unknown significance    ** End of copied information **    IMAGING DATA:   CT abdomen/pelvis 9/17/24:  IMPRESSION:  Global abnormal appearing liver is compatible with  ischemia/infarction and obscures the patient's known underlying  metastatic lesions. No portal or hepatic vein occlusion is noted in  the ischemia is most likely related to hepatic artery thrombosis  which appears to be present in branches to the right and left  hepatic  lobes in the ivonne hepatis.      Patient's known tumor of the distal transverse colon has a associated  mesenteric desmoplastic reaction and there are adjacent enlarged  mesenteric lymph nodes consistent with locally advanced and locally  metastatic disease.      Pleural effusions, ascites and diffuse subcutaneous edema      US liver with doppler 9/18/24:  IMPRESSION:  1. Hepatomegaly and echogenic hepatic parenchyma suggestive of fatty  infiltration. Several hepatic lesions are visualized and described  above, better characterized on recent CTs.  2. Poor visualization of the right hepatic artery and right hepatic  vein, potentially related to technique or patient condition, rather  than vascular abnormality. Otherwise normal patent hepatic  vasculature.  3. Right pleural effusion and small volume ascites.    CT enterography 9/19/24:  IMPRESSION:  1. Similar-appearing 5.0 x 4.8 cm mass at the splenic flexure, with  associated enlarged adjacent mesenteric lymph nodes. Full  characterization of the fistula tracts with the adjacent small bowel  is limited on this exam given lack of enteric contrast. No new fluid  collection.  2. There is decreased attenuation of the liver consistent with  hepatic steatosis which is similar to the prior study. Given this  finding, patient's known metastatic disease liver is not well  evaluated on this study.  3. Diffuse edema of the abdominal body wall soft tissue, pleural  effusions, and ascites which are consistent with volume overload  state.  4. Additional chronic findings as described above.        LABORATORY DATA:    Last CBC w. Diff.:    Lab Results   Component Value Date/Time    WBC 12.6 (H) 10/09/2024 0528    NRBC 0.0 10/09/2024 0528    RBC 3.00 (L) 10/09/2024 0528    HGB 8.5 (L) 10/09/2024 0528    HCT 25.6 (L) 10/09/2024 0528    MCV 85 10/09/2024 0528    MCH 28.3 10/09/2024 0528    MCHC 33.2 10/09/2024 0528    RDW 17.2 (H) 10/09/2024 0528     10/09/2024 0528     NEUTOPHILPCT 65.3 10/08/2024 0625    IGPCT 0.6 10/08/2024 0625    LYMPHOPCT 31.4 10/08/2024 0625    LYMPHOPCT 35.0 09/16/2024 1506    MONOPCT 2.2 10/08/2024 0625    MONOPCT 2.0 09/16/2024 1506    EOSPCT 0.4 10/08/2024 0625    EOSPCT 0.0 09/16/2024 1506    BASOPCT 0.1 10/08/2024 0625    BASOPCT 0.0 09/16/2024 1506    NEUTROABS 7.13 10/08/2024 0625    IGABSOL 0.06 10/08/2024 0625    LYMPHSABS 3.42 10/08/2024 0625    MONOSABS 0.24 10/08/2024 0625    EOSABS 0.04 10/08/2024 0625    EOSABS 0.00 09/16/2024 1506    BASOSABS 0.01 10/08/2024 0625    BASOSABS 0.00 09/16/2024 1506     Last CMP:     Lab Results   Component Value Date/Time    GLUCOSE 74 10/09/2024 0528     (L) 10/09/2024 0528    K 3.4 (L) 10/09/2024 0528    CL 99 10/09/2024 0528    CO2 26 10/09/2024 0528    ANIONGAP 9 (L) 10/09/2024 0528    BUN 10 10/09/2024 0528    CREATININE 0.42 (L) 10/09/2024 0528    EGFR >90 10/09/2024 0528    CALCIUM 6.0 (L) 10/09/2024 0528    ALBUMIN 1.5 (L) 10/08/2024 0625    ALKPHOS 102 10/08/2024 0625    PROT <3.0 (L) 10/08/2024 0625    AST 17 10/08/2024 0625    BILITOT 0.6 10/08/2024 0625    ALT 40 10/08/2024 0625       [facility]   Date/Time Value Ref Range Status   09/16/2024 03:06 PM 3.4 ug/L Final   08/05/2024 11:03 AM 2.8 ug/L Final   07/08/2024 02:52 PM 6.0 ug/L Final   06/17/2024 12:44 PM 52.4 ug/L Final   05/20/2024 11:17 AM 19.1 ug/L Final   05/02/2024 04:04 PM 6.7 ug/L Final     Signatera:  5/20/24: 538.67 MTM/mL  6/17/24: 654.58 MTM/mL  7/8/24: 98.06 MTM/mL  9/26/24: 19.25 MTM/mL    All pertinent pathology, imaging, and labs were personally reviewed and interpreted in clinic. Findings as per HPI and EMR.    ASSESSMENT:  Yon Lawrence is a 49 y.o. male with Adenocarcinoma of transverse colon (Multi), Clinical: Stage IVC (cTX, cNX, cM1c).      IHC revealed patient's tumor is dMMR with MLH1 and PMS2 loss and this was confirmed by NGS showing microsatellite instability and high tumor mutation burden as above.  Recommended single agent PD-1 blockade with pembrolizumab in place of FOLFOX as first-line therapy. Reviewed side effects of pembrolizumab in detail, including but not limited to diarrhea, skin rash, colitis, thyroiditis, and rare but life-threatening immune-related adverse events including but not limited to myocarditis, pneumonitis, encephalitis, hypophysitis. Emphasized recommendation to call with any new symptoms.     With regard to travel, patient and wife asked previously about safety of traveling to [place] and [place]. Explained my main concern at this point is the risk of unexpected change in his health due to risk of colonic perforation and existing fistula. Emphasized importance of knowing where to seek emergency medical care if they are traveling out of town. With regard to the treatment with pembrolizumab, there is no specific contraindication to travel.    CT 8/22/24 showed decreased burden of disease in the colon and some decrease in tumor burden in the liver. There is still fistulization between the primary tumor and the small bowel.     He is no longer having diarrhea, which resolved on prednisone 1mg/kg/day followed by a taper (approx 2 weeks total). Based on the increase of 4-6 stools per day over baseline, without abdominal pain or hematochezia, the patient had suspected grade 2 immune related diarrhea. However, discussed that the differential diagnosis includes infection, dietary change and/or increased diarrhea related to fistula and opening of the colon at the site of tumor response. Initially, decision was made to hold immunotherapy pending additional workup. However, the patient is increasingly symptomatic from his disease with worsening clinical parameters (energy, malaise, shortness of breath, anasarca) and laboratory parameters (hypoalbuminemia, elevated LFTs) as of 9/16/24. Suspect these changes are related to disease progression since patient has been off of treatment for  greater than 2 months. We discussed the risks and benefits of proceeding with further immunotherapy. Specifically explained that immune-related colitis can be life-threatening. We agreed to proceed with further PD-1 blockade as scheduled (initially for 9/18/24, delayed to 9/26/24 due to hospitalization) given that patient is also at risk of life-threatening complication from his disease at this point and diarrhea has resolved. Discussed case with Dr. Ha Go in gastroenterology, who will see the patient next week, agreed no prophylactic steroids indicated given that the diagnosis of immune-related diarrhea/colitis was not confirmed.    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 It is unclear whether his malnutrition with hypoalbuminemia and weight loss hs contributing to the profound hepatic steatosis now seen on his imaging.        PLAN:  #Hypotension:  - EMS called to bring patient to the ED    #Adenocarcinoma of transverse colon, stage IVC, dMMR, MLH1 promoter hypermethylation detected, no BRAF mut detected:  - [facility] xT reviewed as above  - Sign[facility]a ctDNA monitoring to help assess response to treatment  - MMR IHC shows dMMR with MLH1 and PMS2 loss, +MLH1 promoter hypermethylation - explained low likelihood of Rashid syndrome but would still recommend genetics referral given young age at diagnosis  - Most recent pembrolizumab C4 on 9/26/24, next due 10/17/24    #Hypoalbuminemia:  - Suspect combination of GI loss and malnutrition  - Encouraged increased protein intake    #Abdominal pain:  - LUQ pain much improved     #Anemia:  - Most likely related to chronic ongoing blood loss from primary tumor   - Folate and vitamin B12 normal on 6/17/24  - Transferrin saturation low at 5% with normal ferritin 291, overall likely mixed picture of iron deficiency and anemia of chronic inflammation--although mixed results and initially held off  on iron supplementation, hgb back down to 8.3 on 6/20/24, decision made to proceed with IV iron 200mg x5  - Increased energy and improved color as of 7/1/24    #Elevated alk phos, AST, ALT:  - Given the patient's overall burden of disease, with predominant alk phos elevation suggestive of an infiltrative process, suspect this is related to disease progression as patient only just started treatment less than 6 weeks ago  - Continue to monitor closely as the differential diagnosis includes immune-related adverse event (less likely based on pattern and timing) and vascular obstruction  - LFTs improving as of 7/12/24     #Frequent nocturnal urination:  - Urinalysis shows no evidence of UTI though significant bilirubin in the urine  - Referral to urology     #Leukocytosis:  - 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 Mckeon and at tumor board    #Early onset colorectal cancer:  - [facility] xG for germline testing due to young age at diagnosis, already referred for genetic counseling - virtual October 2024  - Recommend screening of first-degree relatives with colonoscopy 10 years prior to the patient's age of diagnosis (no later than age 36)  - Patient declined referral to Young Adult Oncology program/oncofertility at this time

## 2024-10-10 NOTE — PROGRESS NOTES
"Yon Lawrence \"Vladimir\" is a 49 y.o. male on day 2 of admission presenting with Large pleural effusion.    Subjective   That is post left thoracentesis performed on October 9 net -1.1 L.  Repeat plan for right side as well.  Patient states he has been deconditioned over the past week or week and a half, is open to rehab but will discuss it with his wife.       Objective     Physical Exam  Vitals reviewed.   Constitutional:       Appearance: Normal appearance.   HENT:      Head: Normocephalic.      Right Ear: Tympanic membrane normal.      Nose: Nose normal.   Pulmonary:      Comments: Crackles bilaterally    Abdominal:      General: Abdomen is flat. Bowel sounds are normal.      Palpations: Abdomen is soft.   Musculoskeletal:      Comments: Bilateral LE edema     Skin:     General: Skin is warm.      Capillary Refill: Capillary refill takes less than 2 seconds.   Neurological:      General: No focal deficit present.      Mental Status: He is alert.         Last Recorded Vitals  Blood pressure 98/70, pulse 80, temperature 36.4 °C (97.6 °F), temperature source Oral, resp. rate 16, height 1.854 m (6' 1\"), weight 80.7 kg (178 lb), SpO2 95%.  Intake/Output last 3 Shifts:  I/O last 3 completed shifts:  In: 1579 (19.6 mL/kg) [P.O.:480; Blood:299; IV Piggyback:800]  Out: 945 (11.7 mL/kg) [Urine:945 (0.3 mL/kg/hr)]  Weight: 80.7 kg     Relevant Results  Results for orders placed or performed during the hospital encounter of 10/07/24 (from the past 24 hour(s))   Cytology (Non-Gynecologic)   Result Value Ref Range    Case Report       Non-gynecologic Cytology                          Case: [id number]                                   Authorizing Provider:  Paula Moore MD          Collected:           10/09/2024 1526              Ordering Location:     [facility]    Received:            10/09/2024 1543                                     Bldg A 4                                                                   "   Pathologist:           Kem Boone DO                                                          Specimen:    PLEURAL FLUID LEFT SIDE                                                                    Final Cytological Interpretation       A. PLEURAL FLUID LEFT SIDE   Atypical   Atypical cells present in a background of reactive mesothelial cells and chronic inflammation, see note    Note: No cellblock is available for further characterization due to insufficient material.  Clinical correlation is needed with consideration for repeat sampling if fluid reaccumulates.                    Slide(s) initially screened by CYNDY Finley at [address] [phone]  By the signature on this report, the individual or group listed as making the Final Interpretation/Diagnosis certifies that they have reviewed this case.       Clinical History       pt with metastatic colon cancer with bilateral pleural effusions.      Specimen Description       A. PLEURAL FLUID LEFT SIDE.  Received 1000 ml yellow clear fluid with no particles in sterile bottle. A total volume of 200 ml has been sent for cytological analysis to the cytology department at [facility].    Qns for cell block        Specimen Processing Detail       A1 Slides Only (No Block)   A1-1 Pap Stain NGYN ThinPrep      Sterile Fluid Culture/Smear    Specimen: Pleural; Fluid   Result Value Ref Range    Sterile Fluid Culture/Smear No growth to date     Gram Stain (1+) Rare Polymorphonuclear leukocytes     Gram Stain No organisms seen    Fungal Culture/Smear    Specimen: Pleural; Fluid   Result Value Ref Range    Fungal Culture/Smear       Culture in progress, a report will be issued when positive or after 2 weeks of incubation.   Lactate Dehydrogenase, Fluid   Result Value Ref Range    LD, Fluid 62 Not established. U/L   Body Fluid Cell Count   Result Value Ref Range    Color, Fluid Straw  Colorless, Straw, Yellow    Clarity, Fluid Clear Clear    WBC, Fluid 38 See Comment /uL    RBC, Fluid 0 0  /uL /uL   Body Fluid Differential   Result Value Ref Range    Neutrophils %, Manual, Fluid 14 <25 % %    Lymphocytes %, Manual, Fluid 20 <75 % %    Mono/Macrophages %, Manual, Fluid 66 <70 % %    Eosinophils %, Manual, Fluid 0 0 % %    Basophils %, Manual, Fluid 0 0 % %    Immature Granulocytes %, Manual, Fluid 0 0 % %    Blasts %, Manual, Fluid 0 0 % %    Unclassified Cells %, Manual, Fluid 0 0 % %    Plasma Cells %, Manual, Fluid 0 0 % %    Total Cells Counted, Fluid 100    Protein, Total Fluid   Result Value Ref Range    Protein, Total Fluid <0.5 Not established g/dL   Basic Metabolic Panel   Result Value Ref Range    Glucose 70 (L) 74 - 99 mg/dL    Sodium 134 (L) 136 - 145 mmol/L    Potassium 3.5 3.5 - 5.3 mmol/L    Chloride 101 98 - 107 mmol/L    Bicarbonate 27 21 - 32 mmol/L    Anion Gap 10 10 - 20 mmol/L    Urea Nitrogen 13 6 - 23 mg/dL    Creatinine 0.41 (L) 0.50 - 1.30 mg/dL    eGFR >90 >60 mL/min/1.73m*2    Calcium 6.3 (L) 8.6 - 10.3 mg/dL   POCT GLUCOSE   Result Value Ref Range    POCT Glucose 71 (L) 74 - 99 mg/dL       Imaging Results    Ct chest/abd/pelvis:  IMPRESSION:  1. Increasing, now moderate to large bilateral layering pleural  effusions.  2. Groundglass opacities in the aerated upper lobes likely represent  edema.  3. Moderate air and fluid distention small bowel, new since last exam.  4. Known tumor mass in the distal transverse colon similar to prior,  with unchanged non-specific thickening of the more proximal colon.  5. Persistent marked hepatic steatosis with underlying metastatic  liver lesions.  6. Remainder as above.    Medications:  [Held by provider] enoxaparin, 80 mg, subcutaneous, q12h  perflutren lipid microspheres, 0.5-10 mL of dilution, intravenous, Once in imaging  perflutren protein A microsphere, 0.5 mL, intravenous, Once in imaging  sulfur hexafluoride microsphr, 2 mL,  intravenous, Once in imaging       PRN medications: acetaminophen, acetaminophen, HYDROmorphone, HYDROmorphone, melatonin, ondansetron ODT **OR** ondansetron     Assessment/Plan   Leukocytosis  - pleural fluid negative  -dc iv zosyn per ID     2. Hypotension  -Chronically low, suspect 2/2 decreased oncotic pressure in the setting of low albumin      3. Bilateral pleural effusions  - s/p left thoracentesis transudative 10/0 1.1L  -right thoracentesis today     3. Persistent bilateral LE edema  - suspect 2/2 low albumin   -hold diuresis due to low bps     4. Urine retention in the ED  - barksdale placed  - Urology consult  - Avoid flomax secondary to hypotension     5. Metastatic colon cancer  - Last Pembrolizumab 5 days ago     6. 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 Unable to absorb nutrition well.   -nutrition on board    7. Anemia  -s/p one unit of blood  -hgb stable now    Plan for dc to rehab    DVT Prophylaxis:  ANTONI Mclean MD  [facility]

## 2024-10-10 NOTE — PROGRESS NOTES
10/10/24 1310   Discharge Planning   Expected Discharge Disposition Home H        Revisited  patient at bedside to discus discharge plan. Explained to patient about PT/OT rec for mod and SNF placement. Patient agreed to look at the list of SNF's. SW printed and gave him list. Patient stated he will talk to his wife when she comes in and will make his decision. Will continue to follow for discharge needs.

## 2024-10-10 NOTE — PROGRESS NOTES
"  INFECTIOUS DISEASE DAILY PROGRESS NOTE    SUBJECTIVE:    No overnight issues. Had thora - analysis reviewed and is a transudate. Low WBC count.    OBJECTIVE:  VITALS (Last 24 Hours)  /72 (BP Location: Right arm, Patient Position: Lying)   Pulse 80   Temp 36.7 °C (98.1 °F) (Oral)   Resp 16   Ht 1.854 m (6' 1\")   Wt 80.7 kg (178 lb)   SpO2 95%   BMI 23.48 kg/m²     PHYSICAL EXAM:  Gen - NAD  Chest - Mediport present left side c/d/i  Lungs - left side more clear, no wheezing  Abd - soft, no ttp, BS present  Skin - no rash    ABX: IV Zosyn    LABS:  Lab Results   Component Value Date    WBC 12.6 (H) 10/09/2024    HGB 8.5 (L) 10/09/2024    HCT 25.6 (L) 10/09/2024    MCV 85 10/09/2024     10/09/2024     Lab Results   Component Value Date    GLUCOSE 70 (L) 10/10/2024    CALCIUM 6.3 (L) 10/10/2024     (L) 10/10/2024    K 3.5 10/10/2024    CO2 27 10/10/2024     10/10/2024    BUN 13 10/10/2024    CREATININE 0.41 (L) 10/10/2024     Results from last 72 hours   Lab Units 10/08/24  0625   ALK PHOS U/L 102   BILIRUBIN TOTAL mg/dL 0.6   PROTEIN TOTAL g/dL <3.0*   ALT U/L 40   AST U/L 17   ALBUMIN g/dL 1.5*     Estimated Creatinine Clearance: 125 mL/min (A) (by C-G formula based on SCr of 0.41 mg/dL (L)).      ASSESSMENT/PLAN:    Leukocytosis  Bilateral Pleural Effusions  Metastatic Colon CA    Thora results not consistent with infection. PCT is normal. I have stopped Zosyn and will observe off abx.    Monitoring for adverse effects of abx such as rash/itching/diarrhea - none.    Will follow.    Pako Hendricks MD  [facility]  Office #[phone]      "

## 2024-10-10 NOTE — PROGRESS NOTES
"Yon Lawrence \"Vladimir\" is a 49 y.o. male on day 2 of admission presenting with Large pleural effusion.  Patient with recently diagnosed metastatic colon cancer, DVT on Lovenox, who was sent from his physician office for low blood pressure and not feeling well. Symptoms also included as sudden onset SOB x 1 day  and LE edema. In the ED work up revealed WBC 15.9, Hb 8.1, Na 127. Admitted to SDU for further management. Of note he found to have b/l moderate to large pleural effusions, for which pulmonary is consulted.   Subjective   No acute overnight events. S/p L sided thoracentesis with removal of 1100 ml of straw color fluid. Oxygen requirements improved and transitioned to RA.     Minimal improvement in the SOB. Cough very mild. Feels LE edema is worse. No pains.   Objective   Scheduled medications  [Held by provider] enoxaparin, 80 mg, subcutaneous, q12h  perflutren lipid microspheres, 0.5-10 mL of dilution, intravenous, Once in imaging  perflutren protein A microsphere, 0.5 mL, intravenous, Once in imaging  sulfur hexafluoride microsphr, 2 mL, intravenous, Once in imaging    Continuous medications     PRN medications  PRN medications: acetaminophen, acetaminophen, HYDROmorphone, HYDROmorphone, melatonin, ondansetron ODT **OR** ondansetron   Physical Exam  Constitutional:       General: He is not in acute distress.     Appearance: He is ill-appearing. He is not toxic-appearing.      Comments: Thin.    HENT:      Head: Normocephalic and atraumatic.      Nose:      Comments: On RA.      Mouth/Throat:      Mouth: Mucous membranes are moist.      Comments: Mallampati 3.   Eyes:      General: No scleral icterus.     Extraocular Movements: Extraocular movements intact.      Pupils: Pupils are equal, round, and reactive to light.   Cardiovascular:      Rate and Rhythm: Normal rate and regular rhythm.      Heart sounds: No murmur heard.     No friction rub. No gallop.   Pulmonary:      Effort: Pulmonary effort is normal. " "No respiratory distress.      Breath sounds: Normal breath sounds. No wheezing or rales.   Abdominal:      General: Abdomen is flat. Bowel sounds are normal. There is no distension.      Palpations: Abdomen is soft.      Tenderness: There is no abdominal tenderness.   Musculoskeletal:         General: No swelling or tenderness. Normal range of motion.      Cervical back: Normal range of motion and neck supple. No rigidity or tenderness.      Right lower leg: Edema (2+) present.      Left lower leg: Edema (2+) present.   Lymphadenopathy:      Cervical: No cervical adenopathy.   Skin:     General: Skin is warm and dry.      Coloration: Skin is not jaundiced.   Neurological:      General: No focal deficit present.      Mental Status: He is alert and oriented to person, place, and time.      Cranial Nerves: No cranial nerve deficit.      Motor: No weakness.   Psychiatric:         Mood and Affect: Mood normal.         Behavior: Behavior normal.     Last Recorded Vitals  Blood pressure 94/54, pulse 84, temperature 36.4 °C (97.6 °F), temperature source Oral, resp. rate 16, height 1.854 m (6' 1\"), weight 80.7 kg (178 lb), SpO2 96%.  Intake/Output last 3 Shifts:  I/O last 3 completed shifts:  In: 1579 (19.6 mL/kg) [P.O.:480; Blood:299; IV Piggyback:800]  Out: 945 (11.7 mL/kg) [Urine:945 (0.3 mL/kg/hr)]  Weight: 80.7 kg   Relevant Results  Results for orders placed or performed during the hospital encounter of 10/07/24 (from the past 24 hour(s))   Cytology (Non-Gynecologic)   Result Value Ref Range    Case Report       Non-gynecologic Cytology                          Case: [id number]                                   Authorizing Provider:  Paula Moore MD          Collected:           10/09/2024 1520              Ordering Location:     [facility]    Received:            10/09/2024 1543                                     Bldg A 4                                                                     Pathologist:   "         Kem Boone,                                                           Specimen:    PLEURAL FLUID LEFT SIDE                                                                    Final Cytological Interpretation       A. PLEURAL FLUID LEFT SIDE   Atypical   Atypical cells present in a background of reactive mesothelial cells and chronic inflammation, see note    Note: No cellblock is available for further characterization due to insufficient material.  Clinical correlation is needed with consideration for repeat sampling if fluid reaccumulates.                    Slide(s) initially screened by CYNDY Finley at [address] [phone]  By the signature on this report, the individual or group listed as making the Final Interpretation/Diagnosis certifies that they have reviewed this case.       Clinical History       pt with metastatic colon cancer with bilateral pleural effusions.      Specimen Description       A. PLEURAL FLUID LEFT SIDE.  Received 1000 ml yellow clear fluid with no particles in sterile bottle. A total volume of 200 ml has been sent for cytological analysis to the cytology department at [facility].    Qns for cell block        Specimen Processing Detail       A1 Slides Only (No Block)   A1-1 Pap Stain NGYN ThinPrep      Sterile Fluid Culture/Smear    Specimen: Pleural; Fluid   Result Value Ref Range    Sterile Fluid Culture/Smear No growth to date     Gram Stain (1+) Rare Polymorphonuclear leukocytes     Gram Stain No organisms seen    Fungal Culture/Smear    Specimen: Pleural; Fluid   Result Value Ref Range    Fungal Culture/Smear       Culture in progress, a report will be issued when positive or after 2 weeks of incubation.   Lactate Dehydrogenase, Fluid   Result Value Ref Range    LD, Fluid 62 Not established. U/L   Body Fluid Cell Count   Result Value Ref Range    Color, Fluid Straw Colorless, Straw, Yellow     Clarity, Fluid Clear Clear    WBC, Fluid 38 See Comment /uL    RBC, Fluid 0 0  /uL /uL   Body Fluid Differential   Result Value Ref Range    Neutrophils %, Manual, Fluid 14 <25 % %    Lymphocytes %, Manual, Fluid 20 <75 % %    Mono/Macrophages %, Manual, Fluid 66 <70 % %    Eosinophils %, Manual, Fluid 0 0 % %    Basophils %, Manual, Fluid 0 0 % %    Immature Granulocytes %, Manual, Fluid 0 0 % %    Blasts %, Manual, Fluid 0 0 % %    Unclassified Cells %, Manual, Fluid 0 0 % %    Plasma Cells %, Manual, Fluid 0 0 % %    Total Cells Counted, Fluid 100    Protein, Total Fluid   Result Value Ref Range    Protein, Total Fluid <0.5 Not established g/dL   Basic Metabolic Panel   Result Value Ref Range    Glucose 70 (L) 74 - 99 mg/dL    Sodium 134 (L) 136 - 145 mmol/L    Potassium 3.5 3.5 - 5.3 mmol/L    Chloride 101 98 - 107 mmol/L    Bicarbonate 27 21 - 32 mmol/L    Anion Gap 10 10 - 20 mmol/L    Urea Nitrogen 13 6 - 23 mg/dL    Creatinine 0.41 (L) 0.50 - 1.30 mg/dL    eGFR >90 >60 mL/min/1.73m*2    Calcium 6.3 (L) 8.6 - 10.3 mg/dL   Lactate Dehydrogenase   Result Value Ref Range     (H) 84 - 246 U/L   POCT GLUCOSE   Result Value Ref Range    POCT Glucose 71 (L) 74 - 99 mg/dL   US thoracentesis    Result Date: 10/9/2024  Interpreted By:  Payam Wade, STUDY: US [id number] pm   INDICATION: Signs/Symptoms:thoracentesis   COMPARISON: CT chest 10/7/2024   ACCESSION NUMBER(S): [id number]   ORDERING CLINICIAN: CM WILKES   TECHNIQUE: INTERVENTIONALIST(S): Payam Wade   CONSENT: The patient/patient's POA/next of kin was informed of the nature of the proposed procedure. The purposes, alternatives, risks, and benefits were explained and discussed. All questions were answered and consent was obtained.   SEDATION: None   MEDICATION/CONTRAST: 1% lidocaine was used to anesthetize subcutaneously.   TIME OUT: A time out was performed immediately prior to procedure start with the interventional team,  correctly identifying the patient name, date of birth, MRN, procedure, anatomy (including marking of site and side), patient position, procedure consent form, relevant laboratory and imaging test results, antibiotic administration, safety precautions, and procedure-specific equipment needs.   FINDINGS: The patient was placed in the sitting position.   The pleural space was examined with grey scale ultrasound, and the most accessible fluid identified and marked for thoracentesis.   The skin was prepped and draped in usual manner. Local anesthesia with lidocaine was administered and a left-sided thoracentesis was performed.  A 5 Ukrainian One-Step Valved thoracentesis needle/catheter was then placed where marked. Approximately 1,100 mL of yellowish colored fluid was removed.  The needle/catheter was then withdrawn.   The patient tolerated the procedure well and there were no immediate complications. Specimen(s) sent to the laboratory for further evaluation, per the requesting team.       Uneventful thoracentesis, as detailed above. Left pleural space, 1,100 mL   I personally performed and/or directly supervised this study and was present for the entire procedure.   Performed and dictated at [facility].   Signed by: Payam Wade 10/9/2024 5:24 PM Dictation workstation:   [id number]    XR chest 1 view    Result Date: 10/9/2024  Interpreted By:  Eugene Hagan, STUDY: XR CHEST 1 VIEW;  10/9/2024 4:07 pm   INDICATION: Signs/Symptoms:post thoracentesis.     COMPARISON: 10/07/2024   ACCESSION NUMBER(S): [id number]   ORDERING CLINICIAN: CM WILKES   FINDINGS: AP radiograph of the chest was provided.   There is a left-sided MediPort noted.   CARDIOMEDIASTINAL SILHOUETTE: Cardiomediastinal silhouette is normal in size and configuration.   LUNGS: There is a right pleural effusion noted with overlying compressive atelectasis versus consolidation.   ABDOMEN: No remarkable upper abdominal  findings.   BONES: No acute osseous changes.       1.  There is a right pleural effusion noted with overlying compressive atelectasis versus consolidation.       MACRO: None   Signed by: Eugene Hagan 10/9/2024 4:11 PM Dictation workstation:   [id number]    Assessment/Plan   Assessment & Plan  Large pleural effusion    49 with recently diagnosed metastatic colon cancer, DVT on Lovenox, who was sent from his physician office for low blood pressure and not feeling well. Symptoms also included as sudden onset SOB x 1 day  and LE edema. In the ED work up revealed WBC 15.9, Hb 8.1, Na 127. Admitted to SDU for further management. Of note he found to have b/l moderate to large pleural effusions, for which pulmonary is consulted.      Respiratory failure: acute with hypoxia, due to below. Overall mild an now is resolved.       Supplemental O2 as needed      Home O2 evaluation before DC      BPH with IS, Acapella      Management of the individual causes as below     Pleural effusions: bilateral, likely due to severe protein calorie malnutrition as echo on 10/8, no significant L sided abnormalities. Serum albumin 1.5. However given h/o cancer and overall new from August 2024, will need diagnostic/therapeutic tap       S/p b/l thoracentesis, analysis consistent with transudate       Will FU bacterial, fungal culture and cytology       Would refrain from further thoracentesis as this is likely due to malnutrition       Dietician consult to improve nutritional status.      Bilateral GGO: upper lobe predominant. Given also leukocytosis concerning for infectious etiology. However DD also includes pembrolizumab lung toxicity. Pro-calcitonin WNL      Antibiotics stopped.       FU with ID recommendations      If worsening might need further work up/treatment for pembrolizumab lung toxicity        History of VTE:        Continue Lovenox     Pulmonary will FU while in house.   Sarina Michelle MD

## 2024-10-10 NOTE — PROGRESS NOTES
"Occupational Therapy                 Therapy Communication Note    Patient Name: Yon Lawrence \"Vladimir\"  MRN: [id number]  Department: [facility] A [facility]  Room: [address] [place] [address]  Today's Date: 10/10/2024     Discipline: Occupational Therapy    Missed Visit Reason: Missed Visit Reason: Patient in a medical procedure (Pt off division for Ultrasound thoracentisis. unable to be seen for OT tx at this time)    Missed Time: Attempt      "

## 2024-10-10 NOTE — POST-PROCEDURE NOTE
Interventional Radiology Brief Postprocedure Note    Attending: Payam Wade CNP    Assistant: none    Diagnosis: right pleural effusion    Description of procedure: Ultrasound guided thoracentesis was preformed on the right.  550 mL of straw-yellow fluid was drained.        Anesthesia:  Local    Complications: None    Estimated Blood Loss: none    Medications  As of 10/10/24 1509      albumin human 5 % infusion 25 g (g) Total dose:  0 g* Dosing weight:  79.8   *Administration not included in total     Date/Time Rate/Dose/Volume Action       10/07/24  1619 *25 g - 250 mL/hr (over 120 min) Missed               albumin human 5 % infusion 12.5 g (mL/hr) Total volume:  Not documented* Dosing weight:  79.8   *Total volume has not been documented. View each administration to see the amount administered.     Date/Time Rate/Dose/Volume Action       10/07/24  1534 12.5 g - 125 mL/hr (over 120 min) New Bag      1725 12.5 g - 125 mL/hr (over 120 min) New Bag      1732  (over 120 min) Stopped      1925  (over 120 min) Stopped               vancomycin (Vancocin) in dextrose 5 % water (D5W) 250 mL IV 1,250 mg (mL/hr) Total volume:  Not documented* Dosing weight:  79.8   *Total volume has not been documented. View each administration to see the amount administered.     Date/Time Rate/Dose/Volume Action       10/07/24  2011 1,250 mg - 200 mL/hr (over 75 min) New Bag      2126  (over 75 min) Stopped               vancomycin (Vancocin) in dextrose 5 % water (D5W) 250 mL IV 1,250 mg (mL/hr) Total dose:  2,500 mg* Dosing weight:  80.7   *From user-documented volume     Date/Time Rate/Dose/Volume Action       10/08/24  1014 1,250 mg - 200 mL/hr (over 75 min) New Bag      1156  (over 75 min) Stopped      2109 1,250 mg - 200 mL/hr (over 75 min) New Bag     10/09/24  0001 500 mL Stopped      0910 1,250 mg - 200 mL/hr (over 75 min) New Bag      1256  (over 75 min) Stopped               piperacillin-tazobactam (Zosyn) 3.375 g in dextrose  (iso) IV 50 mL (g) Total dose:  27 g Dosing weight:  79.8      Date/Time Rate/Dose/Volume Action       10/07/24  1727 3.375 g (over 30 min) New Bag      1748  (over 30 min) Stopped     10/08/24  0646 3.375 g (over 30 min) New Bag      0727 50 mL Stopped      1244 3.375 g (over 30 min) New Bag      1335  (over 30 min) Stopped      1807 3.375 g (over 30 min) New Bag      1929 100 mL Stopped     10/09/24  0049 3.375 g (over 30 min) New Bag      0149 50 mL Stopped      0627 3.375 g (over 30 min) - 50 mL New Bag      0714  (over 30 min) Stopped      1301 3.375 g (over 30 min) New Bag      1339  (over 30 min) Stopped      1809 3.375 g (over 30 min) New Bag      2010  (over 30 min) Stopped     10/10/24  0002 3.375 g (over 30 min) - 50 mL New Bag      0031  (over 30 min) Stopped      0601 3.375 g (over 30 min) - 50 mL New Bag      0645  (over 30 min) Stopped               calcium gluconate 2 g in sodium chloride (iso)  mL (mL/hr) Total volume:  Not documented* Dosing weight:  79.8   *Total volume has not been documented. View each administration to see the amount administered.     Date/Time Rate/Dose/Volume Action       10/07/24  1854 2 g - 100 mL/hr (over 60 min) New Bag      1954  (over 60 min) Stopped               iohexol (OMNIPaque) 350 mg iodine/mL solution 75 mL (mL) Total volume:  75 mL Dosing weight:  79.8      Date/Time Rate/Dose/Volume Action       10/07/24  1825 75 mL Given               enoxaparin (Lovenox) syringe 80 mg (mg) Total dose:  Cannot be calculated* Dosing weight:  79.8   *Administration dose not documented     Date/Time Rate/Dose/Volume Action       10/08/24  0618 *Not included in total Held by provider      0645 *80 mg Missed      1845 *80 mg Missed     10/09/24  0645 *80 mg Missed      1845 *Not included in total Automatically Held     10/10/24  0645 *80 mg Missed               acetaminophen (Tylenol) tablet 650 mg (mg) Total dose:  650 mg Dosing weight:  79.8      Date/Time Rate/Dose/Volume  Action       10/09/24  0923 650 mg Given               sodium chloride 0.9 % bolus 500 mL (mL/hr) Total volume:  500 mL* Dosing weight:  79.8   *From user-documented volume     Date/Time Rate/Dose/Volume Action       10/08/24  0322 500 mL - 250 mL/hr (over 120 min) New Bag      0546 500 mL Stopped               furosemide (Lasix) injection 40 mg (mg) Total dose:  40 mg Dosing weight:  80.7      Date/Time Rate/Dose/Volume Action       10/08/24  1243 40 mg Given               potassium chloride 20 mEq in sterile water for injection 100 mL (mL/hr) Total volume:  Not documented* Dosing weight:  80.7   *Total volume has not been documented. View each administration to see the amount administered.     Date/Time Rate/Dose/Volume Action       10/09/24  0936 20 mEq - 50 mL/hr (over 120 min) New Bag      1256  (over 120 min) Stopped      1339 20 mEq - 50 mL/hr (over 120 min) New Bag      1808  (over 120 min) Stopped               HYDROmorphone PF (Dilaudid) injection 0.2 mg (mg) Total dose:  0.4 mg Dosing weight:  80.7      Date/Time Rate/Dose/Volume Action       10/09/24  1814 0.2 mg Given      2329 0.2 mg Given               HYDROmorphone (Dilaudid) injection 0.4 mg (mg) Total dose:  0.4 mg Dosing weight:  80.7      Date/Time Rate/Dose/Volume Action       10/09/24  1143 0.4 mg Given               lidocaine PF (Xylocaine) 10 mg/mL (1 %) injection (mL) Total volume:  20 mL      Date/Time Rate/Dose/Volume Action       10/09/24  1516 10 mL Given     10/10/24  1456 10 mL Given                   Specimens collected      See detailed result report with images in PACS.    The patient tolerated the procedure well without incident or complication and is in stable condition.

## 2024-10-11 ENCOUNTER — APPOINTMENT (OUTPATIENT)
Dept: CARDIOLOGY | Facility: HOSPITAL | Age: 49
End: 2024-10-11
Payer: COMMERCIAL

## 2024-10-11 LAB
ACID FAST STN SPEC: NORMAL
ANION GAP SERPL CALC-SCNC: 7 MMOL/L (ref 10–20)
BUN SERPL-MCNC: 12 MG/DL (ref 6–23)
CALCIUM SERPL-MCNC: 6.1 MG/DL (ref 8.6–10.3)
CHLORIDE SERPL-SCNC: 102 MMOL/L (ref 98–107)
CO2 SERPL-SCNC: 27 MMOL/L (ref 21–32)
CREAT SERPL-MCNC: 0.33 MG/DL (ref 0.5–1.3)
EGFRCR SERPLBLD CKD-EPI 2021: >90 ML/MIN/1.73M*2
FUNGUS SPEC CULT: NORMAL
FUNGUS SPEC FUNGUS STN: NORMAL
GLUCOSE SERPL-MCNC: 71 MG/DL (ref 74–99)
MYCOBACTERIUM SPEC CULT: NORMAL
POTASSIUM SERPL-SCNC: 3.4 MMOL/L (ref 3.5–5.3)
SODIUM SERPL-SCNC: 133 MMOL/L (ref 136–145)

## 2024-10-11 PROCEDURE — 97530 THERAPEUTIC ACTIVITIES: CPT | Mod: GP | Performed by: PHYSICAL THERAPIST

## 2024-10-11 PROCEDURE — 97530 THERAPEUTIC ACTIVITIES: CPT | Mod: GO

## 2024-10-11 PROCEDURE — 99233 SBSQ HOSP IP/OBS HIGH 50: CPT | Performed by: INTERNAL MEDICINE

## 2024-10-11 PROCEDURE — 94668 MNPJ CHEST WALL SBSQ: CPT

## 2024-10-11 PROCEDURE — 2500000002 HC RX 250 W HCPCS SELF ADMINISTERED DRUGS (ALT 637 FOR MEDICARE OP, ALT 636 FOR OP/ED): Performed by: INTERNAL MEDICINE

## 2024-10-11 PROCEDURE — 1100000001 HC PRIVATE ROOM DAILY

## 2024-10-11 PROCEDURE — 80048 BASIC METABOLIC PNL TOTAL CA: CPT | Performed by: PHARMACIST

## 2024-10-11 PROCEDURE — 99232 SBSQ HOSP IP/OBS MODERATE 35: CPT | Performed by: INTERNAL MEDICINE

## 2024-10-11 PROCEDURE — 2500000004 HC RX 250 GENERAL PHARMACY W/ HCPCS (ALT 636 FOR OP/ED): Performed by: INTERNAL MEDICINE

## 2024-10-11 PROCEDURE — 93005 ELECTROCARDIOGRAM TRACING: CPT

## 2024-10-11 RX ORDER — POTASSIUM CHLORIDE 20 MEQ/1
20 TABLET, EXTENDED RELEASE ORAL ONCE
Status: COMPLETED | OUTPATIENT
Start: 2024-10-11 | End: 2024-10-11

## 2024-10-11 ASSESSMENT — COGNITIVE AND FUNCTIONAL STATUS - GENERAL
PERSONAL GROOMING: A LOT
TOILETING: TOTAL
TOILETING: A LOT
EATING MEALS: A LITTLE
DAILY ACTIVITIY SCORE: 12
DRESSING REGULAR UPPER BODY CLOTHING: A LOT
DAILY ACTIVITIY SCORE: 12
STANDING UP FROM CHAIR USING ARMS: A LOT
WALKING IN HOSPITAL ROOM: A LITTLE
TURNING FROM BACK TO SIDE WHILE IN FLAT BAD: A LOT
MOVING FROM LYING ON BACK TO SITTING ON SIDE OF FLAT BED WITH BEDRAILS: A LOT
PERSONAL GROOMING: A LOT
STANDING UP FROM CHAIR USING ARMS: A LOT
MOBILITY SCORE: 12
MOVING TO AND FROM BED TO CHAIR: A LOT
PERSONAL GROOMING: A LOT
MOVING TO AND FROM BED TO CHAIR: A LOT
MOVING FROM LYING ON BACK TO SITTING ON SIDE OF FLAT BED WITH BEDRAILS: A LITTLE
WALKING IN HOSPITAL ROOM: A LOT
HELP NEEDED FOR BATHING: A LOT
HELP NEEDED FOR BATHING: A LOT
WALKING IN HOSPITAL ROOM: A LOT
HELP NEEDED FOR BATHING: A LOT
MOVING TO AND FROM BED TO CHAIR: A LOT
DAILY ACTIVITIY SCORE: 12
DRESSING REGULAR UPPER BODY CLOTHING: A LOT
EATING MEALS: A LOT
HELP NEEDED FOR BATHING: A LOT
DRESSING REGULAR UPPER BODY CLOTHING: A LOT
TOILETING: A LOT
MOBILITY SCORE: 12
TOILETING: A LOT
STANDING UP FROM CHAIR USING ARMS: A LOT
WALKING IN HOSPITAL ROOM: A LOT
CLIMB 3 TO 5 STEPS WITH RAILING: A LOT
MOBILITY SCORE: 12
TURNING FROM BACK TO SIDE WHILE IN FLAT BAD: A LOT
DRESSING REGULAR LOWER BODY CLOTHING: A LOT
CLIMB 3 TO 5 STEPS WITH RAILING: TOTAL
DRESSING REGULAR LOWER BODY CLOTHING: TOTAL
EATING MEALS: A LOT
PERSONAL GROOMING: A LITTLE
MOVING FROM LYING ON BACK TO SITTING ON SIDE OF FLAT BED WITH BEDRAILS: A LOT
STANDING UP FROM CHAIR USING ARMS: A LOT
EATING MEALS: A LOT
MOBILITY SCORE: 13
CLIMB 3 TO 5 STEPS WITH RAILING: A LOT
MOVING FROM LYING ON BACK TO SITTING ON SIDE OF FLAT BED WITH BEDRAILS: A LOT
MOVING TO AND FROM BED TO CHAIR: A LOT
DRESSING REGULAR LOWER BODY CLOTHING: A LOT
DRESSING REGULAR LOWER BODY CLOTHING: A LOT
DRESSING REGULAR UPPER BODY CLOTHING: A LOT
TURNING FROM BACK TO SIDE WHILE IN FLAT BAD: A LOT
CLIMB 3 TO 5 STEPS WITH RAILING: A LOT
TURNING FROM BACK TO SIDE WHILE IN FLAT BAD: A LOT
DAILY ACTIVITIY SCORE: 12

## 2024-10-11 ASSESSMENT — PAIN SCALES - GENERAL
PAINLEVEL_OUTOF10: 0 - NO PAIN

## 2024-10-11 ASSESSMENT — PAIN - FUNCTIONAL ASSESSMENT
PAIN_FUNCTIONAL_ASSESSMENT: 0-10

## 2024-10-11 NOTE — CARE PLAN
The patient's goals for the shift include Pt. will have a safe, restful and uneventful evening    The clinical goals for the shift include Patient will remain free from falls this shift.      Problem: Discharge Planning  Goal: Discharge to home or other facility with appropriate resources  Outcome: Progressing     Problem: Chronic Conditions and Co-morbidities  Goal: Patient's chronic conditions and co-morbidity symptoms are monitored and maintained or improved  Outcome: Progressing     Problem: Fall/Injury  Goal: Use assistive devices by end of the shift  Outcome: Progressing  Goal: Pace activities to prevent fatigue by end of the shift  Outcome: Progressing     Problem: Nutrition  Goal: Less than 5 days NPO/clear liquids  Outcome: Progressing  Goal: Oral intake greater 75%  Outcome: Progressing  Goal: Consume prescribed supplement  Outcome: Progressing  Goal: Adequate PO fluid intake  Outcome: Progressing  Goal: Nutrition support goals are met within 48 hrs  Outcome: Progressing  Goal: Nutrition support is meeting 75% of nutrient needs  Outcome: Progressing  Goal: Tube feed tolerance  Outcome: Progressing  Goal: BG  mg/dL  Outcome: Progressing  Goal: Lab values WNL  Outcome: Progressing  Goal: Electrolytes WNL  Outcome: Progressing  Goal: Maintain stable weight  Outcome: Progressing  Goal: Reduce weight from edema/fluid  Outcome: Progressing  Goal: Gradual weight gain  Outcome: Progressing  Goal: Improve ostomy output  Outcome: Progressing

## 2024-10-11 NOTE — CONSULTS
Nutrition Assessment Note    Reason for Assessment  Reason for Assessment: Admission nursing screening    Pt admitted for:  Bilateral lower extremity edema [R60.0]  Large pleural effusion [J90]    MST triggered for weight loss and eating poorly due to decreased appetite.  Pt known to nutrition services.  Chart reviewed and pt visited, family at bedside.  Pt trying to eat more, does get in about 2 meals a day.  Discussed high protein diet and foods that are high in protein.    Pt agreeable to supplement while admitted.    Past Medical History:   Diagnosis Date    Cancer (Multi)     colon cancer     Results for orders placed or performed during the hospital encounter of 10/07/24 (from the past 24 hour(s))   Basic Metabolic Panel   Result Value Ref Range    Glucose 71 (L) 74 - 99 mg/dL    Sodium 133 (L) 136 - 145 mmol/L    Potassium 3.4 (L) 3.5 - 5.3 mmol/L    Chloride 102 98 - 107 mmol/L    Bicarbonate 27 21 - 32 mmol/L    Anion Gap 7 (L) 10 - 20 mmol/L    Urea Nitrogen 12 6 - 23 mg/dL    Creatinine 0.33 (L) 0.50 - 1.30 mg/dL    eGFR >90 >60 mL/min/1.73m*2    Calcium 6.1 (L) 8.6 - 10.3 mg/dL     Scheduled medications  enoxaparin, 80 mg, subcutaneous, q12h  perflutren lipid microspheres, 0.5-10 mL of dilution, intravenous, Once in imaging  perflutren protein A microsphere, 0.5 mL, intravenous, Once in imaging  sulfur hexafluoride microsphr, 2 mL, intravenous, Once in imaging      Continuous medications     PRN medications  PRN medications: acetaminophen, acetaminophen, HYDROmorphone, HYDROmorphone, melatonin, ondansetron ODT **OR** ondansetron  Dietary Orders (From admission, onward)       Start     Ordered    10/11/24 1548  Oral nutritional supplements  Until discontinued        Question Answer Comment   Deliver with All meals    Select supplement: Ensure Clear        10/11/24 1547    10/08/24 1205  Adult diet Regular  Diet effective now        Question:  Diet type  Answer:  Regular    10/08/24 1201               "      History:  Food and Nutrient History  Energy Intake: Fair 50-75 %  Food and Nutrient History: 2 meals a day    Anthropometrics:  Height: 185.4 cm (6' 1\")  Weight: 80.7 kg (178 lb)  BMI (Calculated): 23.49    Wt Readings from Last 33 Encounters:   10/08/24 80.7 kg (178 lb)   10/04/24 79.8 kg (176 lb)   09/27/24 79.8 kg (176 lb)   09/26/24 79.9 kg (176 lb 2.4 oz)   09/18/24 84.6 kg (186 lb 8 oz)   09/17/24 86.2 kg (190 lb)   09/16/24 86.4 kg (190 lb 7.6 oz)   09/05/24 79.4 kg (175 lb)   08/24/24 79.4 kg (175 lb)   08/23/24 79.7 kg (175 lb 11.3 oz)   08/05/24 78.9 kg (173 lb 15.1 oz)   07/31/24 78.5 kg (173 lb)   07/12/24 80.9 kg (178 lb 5.6 oz)   07/08/24 81.5 kg (179 lb 10.8 oz)   07/05/24 80 kg (176 lb 5.9 oz)   07/03/24 81.4 kg (179 lb 7.3 oz)   07/01/24 81.1 kg (178 lb 12.7 oz)   07/01/24 80.4 kg (177 lb 4 oz)   06/27/24 81.6 kg (179 lb 14.3 oz)   06/24/24 81.2 kg (179 lb 0.2 oz)   06/20/24 81.6 kg (179 lb 14.3 oz)   06/17/24 82.3 kg (181 lb 7 oz)   06/10/24 84.3 kg (185 lb 13.6 oz)   06/03/24 86.2 kg (190 lb 0.6 oz)   05/31/24 86.9 kg (191 lb 9.3 oz)   05/28/24 87.5 kg (193 lb)   05/20/24 87.6 kg (193 lb 2 oz)   05/14/24 88 kg (194 lb)   05/06/24 88.1 kg (194 lb 3.6 oz)   05/02/24 92.5 kg (204 lb)   03/14/24 95.3 kg (210 lb)   02/19/24 94.8 kg (208 lb 14.4 oz)   12/21/23 100 kg (220 lb 11.2 oz)     Weight Change  Significant Weight Loss: Yes  Interpretation of Weight Loss: >10% in 6 months    Estimated Energy Needs  Total Energy Estimated Needs (kCal): 2420 kCal  Total Estimated Energy Need per Day (kCal/kg): 2825 kCal/kg  Method for Estimating Needs: 30-35    Estimated Protein Needs  Total Protein Estimated Needs (g): 80 g  Total Protein Estimated Needs (g/kg): 120 g/kg  Method for Estimating Needs: 1.0-1.5    Estimated Fluid Needs  Method for Estimating Needs: 1ml/kcal or per MD    Nutrition Focused Physical Findings:  Subcutaneous Fat Loss  Orbital Fat Pads: Well nourished (slightly bulging fat " pads)  Buccal Fat Pads: Mild-Moderate (flat cheeks, minimal bounce)    Muscle Wasting  Temporalis: Mild-Moderate (slight depression)  Pectoralis (Clavicular Region): Severe (protruding prominent clavicle)    Edema  Edema: +3 moderate  Edema Location: RLE, LLE    Physical Findings (Nutrition Deficiency/Toxicity)  Skin: Positive (incision)     Nutrition Diagnosis   Malnutrition Diagnosis  Patient has Malnutrition Diagnosis: Yes  Diagnosis Status: New  Malnutrition Diagnosis: Severe malnutrition related to chronic disease or condition  As Evidenced by: > 10% weight loss in 6 months. prolonged poor intake of < 75% of estimated energy needs in > 1 month, mild subcutaneous fat loss, moderate to severe muscle wasting and moderate fluid accumulation present      Nutrition Interventions/Recommendations   Food and/or Nutrient Delivery Interventions  Goal: > 75% of meals consumed     Medical Food Supplement: Commercial beverage  Goal: Ensure Clear TID for encouraged intake    Additional Interventions: Consider appetite stimulant    Education Documentation  No documentation found.      Nutrition Monitoring and Evaluation   Food and Nutrient Related History  Energy Intake: Estimated energy intake    Fluid Intake: Estimated fluid intake    Amount of Food: Estimated amout of food, Medical food intake    Mealtime Behavior: Limited number of accepted foods    Anthropometrics: Body Composition/Growth/Weight History  Weight Change: Weight gain, Weight loss    Biochemical Data, Medical Tests and Procedures  Electrolyte and Renal Panel: Other (Comment)  Criteria: as clinically indicated    Gastrointestinal Profile: Other (Comment)  Criteria: as clinically indicated    Glucose/Endocrine Profile: Other (Comment)  Criteria: as clinically indicated    Nutritional Anemia Profile: Other (Comment)  Criteria: as clinically indicated    Vitamin Profile: Other (Comment)  Criteria: as clinically indicated    Nutrition Focused Physical  Findings  Adipose: Loss of subcutaneous fat    Digestive System: Decrease in appetite, Diarrhea    Muscles: Muscle atrophy    Other: Fluid Accumulation, Stool output, Urine volume, Overall appearance    Follow Up  Time Spent (min): 60 minutes  Last Date of Nutrition Visit: 10/11/24  Nutrition Follow-Up Needed?: Dietitian to reassess per policy  Follow up Comment: JOHNNY CAMPBELLM

## 2024-10-11 NOTE — PROGRESS NOTES
"  INFECTIOUS DISEASE DAILY PROGRESS NOTE    SUBJECTIVE:    Right thora done yesterday. Analysis not consistent with infection either.    OBJECTIVE:  VITALS (Last 24 Hours)  /69 (BP Location: Left arm, Patient Position: Lying)   Pulse 93   Temp 36.7 °C (98 °F) (Oral)   Resp 18   Ht 1.854 m (6' 1\")   Wt 80.7 kg (178 lb)   SpO2 98%   BMI 23.48 kg/m²     PHYSICAL EXAM:  Gen - NAD  Chest - Mediport present left side c/d/i  Lungs - no wheezing  Abd - soft, no ttp, BS present  Skin - no rash    ABX: None    LABS:  Lab Results   Component Value Date    WBC 12.6 (H) 10/09/2024    HGB 8.5 (L) 10/09/2024    HCT 25.6 (L) 10/09/2024    MCV 85 10/09/2024     10/09/2024     Lab Results   Component Value Date    GLUCOSE 71 (L) 10/11/2024    CALCIUM 6.1 (L) 10/11/2024     (L) 10/11/2024    K 3.4 (L) 10/11/2024    CO2 27 10/11/2024     10/11/2024    BUN 12 10/11/2024    CREATININE 0.33 (L) 10/11/2024           Estimated Creatinine Clearance: 125 mL/min (A) (by C-G formula based on SCr of 0.33 mg/dL (L)).      ASSESSMENT/PLAN:    Leukocytosis  Bilateral Pleural Effusions  Metastatic Colon CA    Stable off abx. Right thora pleural fluid analysis also negative for infection.    Will sign off. Please call back with questions. Thanks!    Pako Hendricks MD  [facility]  Office #[phone]      "

## 2024-10-11 NOTE — PROGRESS NOTES
"Yon Lawrence \"Roger" is a 49 y.o. male on day 3 of admission presenting with Large pleural effusion.  Patient with recently diagnosed metastatic colon cancer, DVT on Lovenox, who was sent from his physician office for low blood pressure and not feeling well. Symptoms also included as sudden onset SOB x 1 day  and LE edema. In the ED work up revealed WBC 15.9, Hb 8.1, Na 127. Admitted to SDU for further management. Of note he found to have b/l moderate to large pleural effusions, for which pulmonary is consulted.   Subjective   No acute overnight events. Remains on RA    Overall is feeling the same as yesterday. SOB improved, still CARRION with minimal activity. Denies cough, sputum wheezing or any other complaints.    Objective   Scheduled medications  enoxaparin, 80 mg, subcutaneous, q12h  perflutren lipid microspheres, 0.5-10 mL of dilution, intravenous, Once in imaging  perflutren protein A microsphere, 0.5 mL, intravenous, Once in imaging  sulfur hexafluoride microsphr, 2 mL, intravenous, Once in imaging    Continuous medications     PRN medications  PRN medications: acetaminophen, acetaminophen, HYDROmorphone, HYDROmorphone, melatonin, ondansetron ODT **OR** ondansetron   Physical Exam  Constitutional:       General: He is not in acute distress.     Appearance: He is ill-appearing. He is not toxic-appearing.      Comments: Thin.    HENT:      Head: Normocephalic and atraumatic.      Nose:      Comments: On RA.      Mouth/Throat:      Mouth: Mucous membranes are moist.      Comments: Mallampati 3.   Eyes:      General: No scleral icterus.     Extraocular Movements: Extraocular movements intact.      Pupils: Pupils are equal, round, and reactive to light.   Cardiovascular:      Rate and Rhythm: Normal rate and regular rhythm.      Heart sounds: No murmur heard.     No friction rub. No gallop.   Pulmonary:      Effort: Pulmonary effort is normal. No respiratory distress.      Breath sounds: Normal breath sounds. No " "wheezing or rales.   Abdominal:      General: Abdomen is flat. Bowel sounds are normal. There is no distension.      Palpations: Abdomen is soft.      Tenderness: There is no abdominal tenderness.   Musculoskeletal:         General: No swelling or tenderness. Normal range of motion.      Cervical back: Normal range of motion and neck supple. No rigidity or tenderness.      Right lower leg: Edema (1+) present.      Left lower leg: Edema (1+) present.   Lymphadenopathy:      Cervical: No cervical adenopathy.   Skin:     General: Skin is warm and dry.      Coloration: Skin is not jaundiced.   Neurological:      General: No focal deficit present.      Mental Status: He is alert and oriented to person, place, and time.      Cranial Nerves: No cranial nerve deficit.      Motor: No weakness.   Psychiatric:         Mood and Affect: Mood normal.         Behavior: Behavior normal.     Last Recorded Vitals  Blood pressure 101/68, pulse 91, temperature 37 °C (98.6 °F), temperature source Temporal, resp. rate 20, height 1.854 m (6' 1\"), weight 80.7 kg (178 lb), SpO2 96%.  Intake/Output last 3 Shifts:  I/O last 3 completed shifts:  In: 580 (7.2 mL/kg) [P.O.:480; IV Piggyback:100]  Out: 410 (5.1 mL/kg) [Urine:410 (0.1 mL/kg/hr)]  Weight: 80.7 kg   Relevant Results  Results for orders placed or performed during the hospital encounter of 10/07/24 (from the past 24 hour(s))   Basic Metabolic Panel   Result Value Ref Range    Glucose 71 (L) 74 - 99 mg/dL    Sodium 133 (L) 136 - 145 mmol/L    Potassium 3.4 (L) 3.5 - 5.3 mmol/L    Chloride 102 98 - 107 mmol/L    Bicarbonate 27 21 - 32 mmol/L    Anion Gap 7 (L) 10 - 20 mmol/L    Urea Nitrogen 12 6 - 23 mg/dL    Creatinine 0.33 (L) 0.50 - 1.30 mg/dL    eGFR >90 >60 mL/min/1.73m*2    Calcium 6.1 (L) 8.6 - 10.3 mg/dL   US thoracentesis    Result Date: 10/10/2024  Interpreted By:  Payam Wade, STUDY: US [id number] pm   INDICATION: Signs/Symptoms:thoracentesis right   " COMPARISON: US guided thoracentesis 10/9/2024   ACCESSION NUMBER(S): [id number]   ORDERING CLINICIAN: TONY MARADIAGA   TECHNIQUE: INTERVENTIONALIST(S): Payam Wade   CONSENT: The patient/patient's POA/next of kin was informed of the nature of the proposed procedure. The purposes, alternatives, risks, and benefits were explained and discussed. All questions were answered and consent was obtained.   SEDATION: None   MEDICATION/CONTRAST: 1% lidocaine was used to anesthetize subcutaneously.   TIME OUT: A time out was performed immediately prior to procedure start with the interventional team, correctly identifying the patient name, date of birth, MRN, procedure, anatomy (including marking of site and side), patient position, procedure consent form, relevant laboratory and imaging test results, antibiotic administration, safety precautions, and procedure-specific equipment needs.   FINDINGS: The patient was placed in the sitting position.   The pleural space was examined with grey scale ultrasound, and the most accessible fluid identified and marked for thoracentesis.   The skin was prepped and draped in usual manner. Local anesthesia with lidocaine was administered and a right-sided thoracentesis was performed.  A 5 Tajik One-Step Valved thoracentesis needle/catheter was then placed where marked. Approximately 550 mL of yellowish colored fluid was removed.  The needle/catheter was then withdrawn.   The patient tolerated the procedure well and there were no immediate complications. Specimen(s) sent to the laboratory for further evaluation, per the requesting team.       Uneventful thoracentesis, as detailed above. Right pleural space, 550 mL   I personally performed and/or directly supervised this study and was present for the entire procedure.   Performed and dictated at [facility].   Signed by: Payam Wade 10/10/2024 5:07 PM Dictation workstation:   [id number]    XR chest 1  view    Result Date: 10/10/2024  Interpreted By:  Stephon Peck, STUDY: XR CHEST 1 VIEW;  10/10/2024 3:10 pm   INDICATION: Signs/Symptoms:S/P RIGHT THORA.   COMPARISON: CT scan chest from 10/07/2024. Chest x-rays, most recent from 10/09/2024. Correlation with ultrasound images from right-sided thoracentesis earlier today.   ACCESSION NUMBER(S): [id number]   ORDERING CLINICIAN: LIA SALES   TECHNIQUE: Single AP portable view of the chest was obtained.   FINDINGS: MEDIASTINUM/ LUNGS/ PRECIOUS:   Stable left-sided central venous MediPort. No cardiomegaly. No vascular congestion. No discernible pleural effusion on either side. Decrease in atelectasis at the lung bases. There is mild nodular infiltrative opacity peripherally in either the right middle lobe or right lower lobe, mildly progressed. There is also perihilar haziness on the right, slightly progressed. No pneumothorax. No tracheal deviation.     BONES: No lytic or blastic destructive bone lesion.   UPPER ABDOMEN: Grossly intact.       Immediately status post right-sided thoracentesis. No residual pleural effusion evident. No pneumothorax.   There is mild perihilar haziness on the right which could be due to atelectasis or pneumonia. There is also slight progression of nodular infiltrative density peripherally in either the right middle lobe or right lower lobe which is suspicious for small pneumonia.   Stable left-sided central venous MediPort.   MACRO: None   Signed by: Stephon Peck 10/10/2024 3:15 PM Dictation workstation:   [id number]    Assessment/Plan   Assessment & Plan  Large pleural effusion    49 with recently diagnosed metastatic colon cancer, DVT on Lovenox, who was sent from his physician office for low blood pressure and not feeling well. Symptoms also included as sudden onset SOB x 1 day  and LE edema. In the ED work up revealed WBC 15.9, Hb 8.1, Na 127. Admitted to SDU for further management. Of note he found to have b/l moderate to large  pleural effusions, for which pulmonary is consulted.      Respiratory failure: acute with hypoxia, due to below. Overall mild an now is resolved.       Supplemental O2 as needed      Home O2 evaluation before DC      BPH with IS, Acapella      Management of the individual causes as below     Pleural effusions: bilateral, likely due to severe protein calorie malnutrition as echo on 10/8, no significant L sided abnormalities. Serum albumin 1.5. However given h/o cancer and overall new from August 2024, will need diagnostic/therapeutic tap       S/p b/l thoracentesis, analysis consistent with transudate       Will FU bacterial, fungal culture and cytology       Would refrain from further thoracentesis as this is likely due to malnutrition       Dietician consult to improve nutritional status.      Bilateral GGO: upper lobe predominant. Given also leukocytosis concerning for infectious etiology. However DD also includes pembrolizumab lung toxicity. Pro-calcitonin WNL      Antibiotics stopped.       FU with ID recommendations      If worsening might need further work up/treatment for pembrolizumab lung toxicity        History of VTE:        Continue Lovenox     Pulmonary will FU while in house.   Sarina Michelle MD       Hypercholesterolemia Monitoring: I explained this is common when taking isotretinoin. We will monitor closely.

## 2024-10-11 NOTE — NURSING NOTE
No new orders related to episode of tachycardia this am. Oncoming RN notified. Patient remains asymptomatic.

## 2024-10-11 NOTE — NURSING NOTE
Patient had an episode of tachycardia at 0702. Patient is asymptomatic. . Message left with Dr. Mclean via secure chat. Awaiting further instruction.

## 2024-10-11 NOTE — PROGRESS NOTES
10/11/24 1558   Discharge Planning   Expected Discharge Disposition SNF     Spoke with patient's wife. Notified her of responses- no accepting facility at this time. Wife identified additional choices.

## 2024-10-11 NOTE — PROGRESS NOTES
"Physical Therapy    Physical Therapy Treatment    Patient Name: Yon Lawrence \"Vladimir\"  MRN: [id number]  Department: [place] [address]  Room: [address] [place] [address]  Today's Date: 10/11/2024  Time Calculation  Start Time: 1138  Stop Time: 1159  Time Calculation (min): 21 min         Assessment/Plan   PT Assessment  PT Assessment Results: Decreased strength, Decreased endurance, Impaired balance, Decreased mobility  Rehab Prognosis: Good  Barriers to Discharge: wife works, -24/7  Evaluation/Treatment Tolerance: Patient limited by fatigue (and dizziness)  Medical Staff Made Aware: Yes  Strengths: Ability to acquire knowledge, Access to adaptive/assistive products, Capable of completing ADLs semi/independent, Living arrangement secure, Support and attitude of living partners, Support of Caregivers, Rehab experience  Barriers to Participation: Comorbidities  End of Session Communication: Bedside nurse  Assessment Comment: Pt continues to be limited by fatigue and dizziness, but progressing well with all goals, requiring less assist with bed mobility this visit; was also able to stand and take a few steps with walker, a skill not previously noted.  Pt continues to be at high risk for long term functional decline without continued skilled PT intervention in the hospital as well as after discharge.  End of Session Patient Position: Bed, 3 rail up, Alarm on (needs in reach)  PT Plan  Inpatient/Swing Bed or Outpatient: Inpatient  PT Plan  Treatment/Interventions: Bed mobility, Transfer training, Gait training, Balance training, Neuromuscular re-education, Strengthening, Endurance training, Therapeutic exercise, Therapeutic activity  PT Plan: Ongoing PT  PT Frequency: 3 times per week  PT Discharge Recommendations: Moderate intensity level of continued care  Equipment Recommended upon Discharge:  (TBD)  PT Recommended Transfer Status: Assist x2, Assistive device (min assist of 2 with walker to stand abd take a few steps)  PT - OK to Discharge: Yes " (per PT POC)      General Visit Information:   PT  Visit  PT Received On: 10/11/24  Response to Previous Treatment: Patient with no complaints from previous session.  General  Reason for Referral: Pt admitted with hypotension, BLE edema and large pleral effusion. Recent dx of colon CA with mets.  Referred By: NANCY Alanis MD  Past Medical History Relevant to Rehab:   Past Medical History:   Diagnosis Date    Cancer (Multi)     colon cancer     Past Surgical History:   Procedure Laterality Date    COLONOSCOPY  05/06/2024    DR VALDEZ    OTHER SURGICAL HISTORY  06/07/2022    Meniscectomy    TUNNELED VENOUS PORT PLACEMENT  05/31/2024    PLACEMENT OF TUNNELED CENTR VENOUS CATHETER W/SQ PORT/ DR VALDEZ       Family/Caregiver Present: No  Co-Treatment: OT  Co-Treatment Reason: to maximize safety and participation with skilled mobility  Prior to Session Communication: Bedside nurse  Patient Position Received: Bed, 3 rail up, Alarm off, not on at start of session  Preferred Learning Style: auditory, kinesthetic  General Comment: Pt is pleasant and cooperative, agreeable to PT tx.    Subjective   Precautions:  Precautions  Medical Precautions: Fall precautions    Vital Signs (Past 2hrs)        Date/Time Vitals Session Patient Position Pulse Resp SpO2 BP MAP (mmHg)    10/11/24 1138 During PT  Sitting  134  --  99 %  96/57  67                   Vital Signs Comment: pt reports feeling dizzy/lightheaded     Objective   Pain:  Pain Assessment  Pain Assessment: 0-10  0-10 (Numeric) Pain Score: 0 - No pain  Cognition:  Cognition  Overall Cognitive Status: Within Functional Limits  Orientation Level: Oriented X4    General Observation  General Observation: Significant pitting edema in B LE's noted    Coordination:  Movements are Fluid and Coordinated: Yes    Postural Control:  Postural Control  Postural Control: Within Functional Limits  Static Sitting Balance  Static Sitting-Balance Support: Feet supported, No upper extremity  "supported  Static Sitting-Level of Assistance: Distant supervision  Static Sitting-Comment/Number of Minutes: on EOB  Dynamic Sitting Balance  Dynamic Sitting-Balance Support: Feet supported, No upper extremity supported  Dynamic Sitting-Level of Assistance: Distant supervision  Dynamic Sitting-Balance:  (seated open chair exercise B LE\"s)  Dynamic Sitting-Comments: on EOB  Static Standing Balance  Static Standing-Balance Support: Bilateral upper extremity supported  Static Standing-Level of Assistance: Minimum assistance  Static Standing-Comment/Number of Minutes: with RW  Dynamic Standing Balance  Dynamic Standing-Balance Support: Bilateral upper extremity supported  Dynamic Standing-Level of Assistance: Minimum assistance  Dynamic Standing-Balance:  (stepping backward)  Dynamic Standing-Comments: with RW    Activity Tolerance:  Activity Tolerance  Endurance: Tolerates 10 - 20 min exercise with multiple rests  Early Mobility/Exercise Safety Screen: Proceed with mobilization - No exclusion criteria met  Activity Tolerance Comments: fair; easily fatigues; reports dizziness sitting on EOB  Treatments:  Therapeutic Exercise  Therapeutic Exercise Performed: Yes  Therapeutic Exercise Activity 1: Pt instructed in seated B LE therapeutic exercises to improve LE strength for functional mobility tasks, as well as to prevent blood clot formation; performed 1x10 reps of seated B LE LAQ's; required min vc's for proper form and to move through full ROM to gain max benefit of the exercises.     Therapeutic Activity  Therapeutic Activity Performed: Yes  Therapeutic Activity 1: Pt educated in safe bed mobility technique moving supine->sit via sidelying; pt requires minimal VC's for technique and proper UE placements for upper body initiate rolling and to use R UE to push up into sitting from sidelying.    Pt provided instruction in safe sit<->stand technique to enable him to move in/out of bed/chair safely; pt required minimal " verbal cues for proper hand placements and to scoot to edge of sitting surface to facilitate ease of sit->stand, and to line up to and reach back for sitting surface before sitting.    Pt provided gait training with RW to enable him to safely ambulate household distances; required minimal verbal cues for walker management, proper sequencing, and safety.     Bed Mobility  Bed Mobility: Yes  Bed Mobility 1  Bed Mobility 1: Supine to sitting, Sitting to supine  Level of Assistance 1: Moderate assistance, Minimal verbal cues, Moderate tactile cues  Bed Mobility 2  Bed Mobility  2: Scooting  Level of Assistance 2: Moderate assistance (seated on EOB)  Bed Mobility Comments 2: scooting to HOB while sitting EOB    Ambulation/Gait Training  Ambulation/Gait Training Performed: Yes  Ambulation/Gait Training 1  Surface 1: Level tile  Device 1: Rolling walker  Gait Support Devices: Gait belt  Assistance 1: Minimum assistance, Minimal verbal cues  Quality of Gait 1: Decreased step length, Shuffling gait, Forward flexed posture, Soft knee(s) (increased time in double stance)  Comments/Distance (ft) 1: 3-4 steps forward as well as backward  Transfers  Transfer: Yes  Transfer 1  Transfer From 1: Bed to  Transfer to 1: Stand, Sit  Technique 1: Sit to stand, Stand to sit  Transfer Device 1: Walker, Gait belt  Transfer Level of Assistance 1: Minimum assistance, +2, Minimal verbal cues, Minimal tactile cues  Trials/Comments 1: 1 trial    Outcome Measures:  [facility] Basic Mobility  Turning from your back to your side while in a flat bed without using bedrails: A little  Moving from lying on your back to sitting on the side of a flat bed without using bedrails: A lot  Moving to and from bed to chair (including a wheelchair): A lot  Standing up from a chair using your arms (e.g. wheelchair or bedside chair): A lot  To walk in hospital room: A little  Climbing 3-5 steps with railing: Total  Basic Mobility - Total Score: 13    Education  Documentation  Body Mechanics, taught by Cathleen Gonsales PT at 10/11/2024  1:04 PM.  Learner: Patient  Readiness: Acceptance  Method: Explanation  Response: Verbalizes Understanding, Demonstrated Understanding    Mobility Training, taught by Cathleen Gonsales PT at 10/11/2024  1:04 PM.  Learner: Patient  Readiness: Acceptance  Method: Explanation  Response: Verbalizes Understanding, Demonstrated Understanding    Education Comments  No comments found.        OP EDUCATION:       Encounter Problems       Encounter Problems (Active)       Balance       STG - Maintains static standing balance with ww with mod A x 2 x 3 minutes (Progressing)       Start:  10/09/24    Expected End:  10/23/24       INTERVENTIONS:  1. Practice standing with minimal support.  2. Educate patient about standing tolerance.  3. Educate patient about independence with gait, transfers, and ADL's.  4. Educate patient about use of assistive device.  5. Educate patient about self-directed care.            Mobility       Increase BLE strength to attain functional goals achieved through supine and seated TE.  (Progressing)       Start:  10/09/24    Expected End:  10/23/24            STG - Patient will ambulate >25' with RW and CGA.       Start:  10/11/24    Expected End:  10/23/24                   PT Transfers       STG - Transfer from bed to chair with ww with mod A x 2 (Met)       Start:  10/09/24    Expected End:  10/23/24    Resolved:  10/11/24         STG - Patient to transfer to and from sit to supine with min A (Progressing)       Start:  10/09/24    Expected End:  10/23/24            STG - Patient will transfer sit to and from stand with mod A x 2 with ww (Met)       Start:  10/09/24    Expected End:  10/23/24    Resolved:  10/11/24         STG - Transfer from bed to chair with CGA of 1 and RW.       Start:  10/11/24    Expected End:  10/23/24                STG - Transfer from sit to/from stand from EOB and chair with arms with CGA of 1  and RW.        Start:  10/11/24    Expected End:  10/23/24

## 2024-10-11 NOTE — PROGRESS NOTES
"Yon Lawrence \"Vladimir\" is a 49 y.o. male on day 3 of admission presenting with Large pleural effusion.    Subjective   Right thoracentesis had 550 cc drained on October 10       Objective     Physical Exam  Vitals reviewed.   Constitutional:       Appearance: Normal appearance.   HENT:      Head: Normocephalic.      Right Ear: Tympanic membrane normal.      Nose: Nose normal.   Pulmonary:      Comments: Crackles bilaterally    Abdominal:      General: Abdomen is flat. Bowel sounds are normal.      Palpations: Abdomen is soft.   Musculoskeletal:      Comments: Bilateral LE edema     Skin:     General: Skin is warm.      Capillary Refill: Capillary refill takes less than 2 seconds.   Neurological:      General: No focal deficit present.      Mental Status: He is alert.         Last Recorded Vitals  Blood pressure 105/69, pulse 93, temperature 36.7 °C (98 °F), temperature source Oral, resp. rate 18, height 1.854 m (6' 1\"), weight 80.7 kg (178 lb), SpO2 98%.  Intake/Output last 3 Shifts:  I/O last 3 completed shifts:  In: 580 (7.2 mL/kg) [P.O.:480; IV Piggyback:100]  Out: 410 (5.1 mL/kg) [Urine:410 (0.1 mL/kg/hr)]  Weight: 80.7 kg     Relevant Results  Results for orders placed or performed during the hospital encounter of 10/07/24 (from the past 24 hour(s))   Lactate Dehydrogenase, Fluid   Result Value Ref Range    LD, Fluid 80 Not established. U/L   Body Fluid Cell Count   Result Value Ref Range    Color, Fluid Straw Colorless, Straw, Yellow    Clarity, Fluid Clear Clear    WBC, Fluid 46 See Comment /uL    RBC, Fluid 5 0  /uL /uL   Body Fluid Differential   Result Value Ref Range    Neutrophils %, Manual, Fluid 10 <25 % %    Lymphocytes %, Manual, Fluid 6 <75 % %    Mono/Macrophages %, Manual, Fluid 84 <70 % %    Eosinophils %, Manual, Fluid 0 0 % %    Basophils %, Manual, Fluid 0 0 % %    Immature Granulocytes %, Manual, Fluid 0 0 % %    Blasts %, Manual, Fluid 0 0 % %    Unclassified Cells %, Manual, Fluid 0 0 % %    " Plasma Cells %, Manual, Fluid 0 0 % %    Total Cells Counted, Fluid 100    Protein, Total Fluid   Result Value Ref Range    Protein, Total Fluid 0.5 Not established g/dL   Glucose, Fluid   Result Value Ref Range    Glucose, Fluid 86 Not established mg/dL   Sterile Fluid Culture/Smear    Specimen: Pleural; Fluid   Result Value Ref Range    Sterile Fluid Culture/Smear No growth to date     Gram Stain (1+) Rare Polymorphonuclear leukocytes     Gram Stain No organisms seen    AFB Processed   Result Value Ref Range    Extra Tube Hold for add-ons.    pH, Body Fluid   Result Value Ref Range    pH, Fluid 7.15 See Below   Basic Metabolic Panel   Result Value Ref Range    Glucose 71 (L) 74 - 99 mg/dL    Sodium 133 (L) 136 - 145 mmol/L    Potassium 3.4 (L) 3.5 - 5.3 mmol/L    Chloride 102 98 - 107 mmol/L    Bicarbonate 27 21 - 32 mmol/L    Anion Gap 7 (L) 10 - 20 mmol/L    Urea Nitrogen 12 6 - 23 mg/dL    Creatinine 0.33 (L) 0.50 - 1.30 mg/dL    eGFR >90 >60 mL/min/1.73m*2    Calcium 6.1 (L) 8.6 - 10.3 mg/dL       Imaging Results    Ct chest/abd/pelvis:  IMPRESSION:  1. Increasing, now moderate to large bilateral layering pleural  effusions.  2. Groundglass opacities in the aerated upper lobes likely represent  edema.  3. Moderate air and fluid distention small bowel, new since last exam.  4. Known tumor mass in the distal transverse colon similar to prior,  with unchanged non-specific thickening of the more proximal colon.  5. Persistent marked hepatic steatosis with underlying metastatic  liver lesions.  6. Remainder as above.    Medications:  enoxaparin, 80 mg, subcutaneous, q12h  perflutren lipid microspheres, 0.5-10 mL of dilution, intravenous, Once in imaging  perflutren protein A microsphere, 0.5 mL, intravenous, Once in imaging  potassium chloride CR, 20 mEq, oral, Once  sulfur hexafluoride microsphr, 2 mL, intravenous, Once in imaging       PRN medications: acetaminophen, acetaminophen, HYDROmorphone, HYDROmorphone,  melatonin, ondansetron ODT **OR** ondansetron     Assessment/Plan   Leukocytosis  - pleural fluid negative  -dc iv zosyn per ID     2. Hypotension  -Chronically low, suspect 2/2 decreased oncotic pressure in the setting of low albumin      3. Bilateral pleural effusions  - s/p left thoracentesis transudative 10/0 1.1L  -right thoracentesis today     3. Persistent bilateral LE edema  - suspect 2/2 low albumin   -hold diuresis due to low bps     4. Urine retention in the ED  - bar[place] placed dc voiding trial for now  - Avoid flomax secondary to hypotension     5. Metastatic colon cancer  - Last Pembrolizumab 5 days ago     6. 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 Unable to absorb nutrition well.   -nutrition on board    7. Anemia  -s/p one unit of blood  -hgb stable now    Plan for dc to rehab  Dc barksdale    DVT Prophylaxis:  ANTONI Mclean MD  [facility]

## 2024-10-12 LAB
ANION GAP SERPL CALC-SCNC: 7 MMOL/L (ref 10–20)
BACTERIA FLD CULT: NORMAL
BUN SERPL-MCNC: 10 MG/DL (ref 6–23)
CALCIUM SERPL-MCNC: 6.1 MG/DL (ref 8.6–10.3)
CHLORIDE SERPL-SCNC: 103 MMOL/L (ref 98–107)
CO2 SERPL-SCNC: 28 MMOL/L (ref 21–32)
CREAT SERPL-MCNC: 0.28 MG/DL (ref 0.5–1.3)
EGFRCR SERPLBLD CKD-EPI 2021: >90 ML/MIN/1.73M*2
GLUCOSE SERPL-MCNC: 75 MG/DL (ref 74–99)
GRAM STN SPEC: NORMAL
GRAM STN SPEC: NORMAL
HOLD SPECIMEN: NORMAL
POTASSIUM SERPL-SCNC: 3.5 MMOL/L (ref 3.5–5.3)
SODIUM SERPL-SCNC: 134 MMOL/L (ref 136–145)

## 2024-10-12 PROCEDURE — P9045 ALBUMIN (HUMAN), 5%, 250 ML: HCPCS | Mod: JZ | Performed by: HOSPITALIST

## 2024-10-12 PROCEDURE — 2500000004 HC RX 250 GENERAL PHARMACY W/ HCPCS (ALT 636 FOR OP/ED): Performed by: HOSPITALIST

## 2024-10-12 PROCEDURE — 2500000004 HC RX 250 GENERAL PHARMACY W/ HCPCS (ALT 636 FOR OP/ED): Performed by: INTERNAL MEDICINE

## 2024-10-12 PROCEDURE — 94668 MNPJ CHEST WALL SBSQ: CPT

## 2024-10-12 PROCEDURE — 9420000001 HC RT PATIENT EDUCATION 5 MIN

## 2024-10-12 PROCEDURE — 99233 SBSQ HOSP IP/OBS HIGH 50: CPT | Performed by: INTERNAL MEDICINE

## 2024-10-12 PROCEDURE — 1100000001 HC PRIVATE ROOM DAILY

## 2024-10-12 PROCEDURE — 82374 ASSAY BLOOD CARBON DIOXIDE: CPT | Performed by: PHARMACIST

## 2024-10-12 PROCEDURE — 99232 SBSQ HOSP IP/OBS MODERATE 35: CPT | Performed by: INTERNAL MEDICINE

## 2024-10-12 RX ORDER — ALBUMIN HUMAN 50 G/1000ML
25 SOLUTION INTRAVENOUS ONCE
Status: COMPLETED | OUTPATIENT
Start: 2024-10-12 | End: 2024-10-12

## 2024-10-12 ASSESSMENT — COGNITIVE AND FUNCTIONAL STATUS - GENERAL
MOVING TO AND FROM BED TO CHAIR: A LOT
TURNING FROM BACK TO SIDE WHILE IN FLAT BAD: A LOT
STANDING UP FROM CHAIR USING ARMS: A LOT
CLIMB 3 TO 5 STEPS WITH RAILING: TOTAL
CLIMB 3 TO 5 STEPS WITH RAILING: TOTAL
WALKING IN HOSPITAL ROOM: TOTAL
DRESSING REGULAR UPPER BODY CLOTHING: A LITTLE
DAILY ACTIVITIY SCORE: 19
DAILY ACTIVITIY SCORE: 19
TOILETING: A LITTLE
DRESSING REGULAR LOWER BODY CLOTHING: A LITTLE
MOBILITY SCORE: 11
STANDING UP FROM CHAIR USING ARMS: A LOT
CLIMB 3 TO 5 STEPS WITH RAILING: TOTAL
DRESSING REGULAR UPPER BODY CLOTHING: A LITTLE
DAILY ACTIVITIY SCORE: 19
PERSONAL GROOMING: A LITTLE
MOVING TO AND FROM BED TO CHAIR: A LOT
TOILETING: A LITTLE
DRESSING REGULAR UPPER BODY CLOTHING: A LITTLE
MOBILITY SCORE: 11
STANDING UP FROM CHAIR USING ARMS: A LOT
DRESSING REGULAR LOWER BODY CLOTHING: A LITTLE
PERSONAL GROOMING: A LITTLE
WALKING IN HOSPITAL ROOM: TOTAL
MOVING TO AND FROM BED TO CHAIR: A LOT
WALKING IN HOSPITAL ROOM: TOTAL
MOVING FROM LYING ON BACK TO SITTING ON SIDE OF FLAT BED WITH BEDRAILS: A LITTLE
DRESSING REGULAR LOWER BODY CLOTHING: A LITTLE
HELP NEEDED FOR BATHING: A LITTLE
HELP NEEDED FOR BATHING: A LITTLE
MOVING FROM LYING ON BACK TO SITTING ON SIDE OF FLAT BED WITH BEDRAILS: A LITTLE
PERSONAL GROOMING: A LITTLE
TURNING FROM BACK TO SIDE WHILE IN FLAT BAD: A LOT
MOBILITY SCORE: 11
TURNING FROM BACK TO SIDE WHILE IN FLAT BAD: A LOT
MOVING FROM LYING ON BACK TO SITTING ON SIDE OF FLAT BED WITH BEDRAILS: A LITTLE
TOILETING: A LITTLE
HELP NEEDED FOR BATHING: A LITTLE

## 2024-10-12 ASSESSMENT — PAIN - FUNCTIONAL ASSESSMENT
PAIN_FUNCTIONAL_ASSESSMENT: 0-10

## 2024-10-12 ASSESSMENT — PAIN SCALES - GENERAL
PAINLEVEL_OUTOF10: 0 - NO PAIN

## 2024-10-12 NOTE — PROGRESS NOTES
"Yon Lawrence \"Vladimir\" is a 49 y.o. male on day 4 of admission presenting with Large pleural effusion.    Subjective   pending placement overall doing well       Objective     Physical Exam  Vitals reviewed.   Constitutional:       Appearance: Normal appearance.   HENT:      Head: Normocephalic.      Right Ear: Tympanic membrane normal.      Nose: Nose normal.   Pulmonary:      Comments: Crackles bilaterally    Abdominal:      General: Abdomen is flat. Bowel sounds are normal.      Palpations: Abdomen is soft.   Musculoskeletal:      Comments: Bilateral LE edema     Skin:     General: Skin is warm.      Capillary Refill: Capillary refill takes less than 2 seconds.   Neurological:      General: No focal deficit present.      Mental Status: He is alert.         Last Recorded Vitals  Blood pressure 97/63, pulse 91, temperature 36.2 °C (97.2 °F), temperature source Temporal, resp. rate 18, height 1.854 m (6' 1\"), weight 80.7 kg (178 lb), SpO2 92%.  Intake/Output last 3 Shifts:  I/O last 3 completed shifts:  In: - (0 mL/kg)   Out: 1150 (14.2 mL/kg) [Urine:1150 (0.4 mL/kg/hr)]  Weight: 80.7 kg     Relevant Results  Results for orders placed or performed during the hospital encounter of 10/07/24 (from the past 24 hour(s))   Basic Metabolic Panel   Result Value Ref Range    Glucose 75 74 - 99 mg/dL    Sodium 134 (L) 136 - 145 mmol/L    Potassium 3.5 3.5 - 5.3 mmol/L    Chloride 103 98 - 107 mmol/L    Bicarbonate 28 21 - 32 mmol/L    Anion Gap 7 (L) 10 - 20 mmol/L    Urea Nitrogen 10 6 - 23 mg/dL    Creatinine 0.28 (L) 0.50 - 1.30 mg/dL    eGFR >90 >60 mL/min/1.73m*2    Calcium 6.1 (L) 8.6 - 10.3 mg/dL   Lavender Top   Result Value Ref Range    Extra Tube Hold for add-ons.        Imaging Results    Ct chest/abd/pelvis:  IMPRESSION:  1. Increasing, now moderate to large bilateral layering pleural  effusions.  2. Groundglass opacities in the aerated upper lobes likely represent  edema.  3. Moderate air and fluid distention " small bowel, new since last exam.  4. Known tumor mass in the distal transverse colon similar to prior,  with unchanged non-specific thickening of the more proximal colon.  5. Persistent marked hepatic steatosis with underlying metastatic  liver lesions.  6. Remainder as above.    Medications:  enoxaparin, 80 mg, subcutaneous, q12h  perflutren lipid microspheres, 0.5-10 mL of dilution, intravenous, Once in imaging  perflutren protein A microsphere, 0.5 mL, intravenous, Once in imaging  sulfur hexafluoride microsphr, 2 mL, intravenous, Once in imaging       PRN medications: acetaminophen, acetaminophen, HYDROmorphone, HYDROmorphone, melatonin, ondansetron ODT **OR** ondansetron     Assessment/Plan   Leukocytosis  - pleural fluid negative  -dc iv zosyn per ID     2. Hypotension  -Chronically low, suspect 2/2 decreased oncotic pressure in the setting of low albumin      3. Bilateral pleural effusions  - s/p left thoracentesis transudative 10/0 1.1L  -right thoracentesis today     3. Persistent bilateral LE edema  - suspect 2/2 low albumin   -hold diuresis due to low bps     4. Urine retention in the ED  -failed voiding trial, placed barksdale back  - Avoid flomax secondary to hypotension     5. Metastatic colon cancer  - Last Pembrolizumab 5 days ago     6. 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 Unable to absorb nutrition well.   -nutrition on board    7. Anemia  -s/p one unit of blood  -hgb stable now    Plan for dc to rehab    DVT Prophylaxis:  ANTONI Mclean MD  [facility]

## 2024-10-12 NOTE — CARE PLAN
The patient's goals for the shift include Pt. will have a safe, restful and uneventful evening    The clinical goals for the shift include Patient will remain free from falls.      Problem: Discharge Planning  Goal: Discharge to home or other facility with appropriate resources  Outcome: Progressing     Problem: Chronic Conditions and Co-morbidities  Goal: Patient's chronic conditions and co-morbidity symptoms are monitored and maintained or improved  Outcome: Progressing     Problem: Fall/Injury  Goal: Use assistive devices by end of the shift  Outcome: Progressing  Goal: Pace activities to prevent fatigue by end of the shift  Outcome: Progressing     Problem: Nutrition  Goal: Less than 5 days NPO/clear liquids  Outcome: Progressing  Goal: Oral intake greater 75%  Outcome: Progressing  Goal: Consume prescribed supplement  Outcome: Progressing  Goal: Adequate PO fluid intake  Outcome: Progressing  Goal: Nutrition support goals are met within 48 hrs  Outcome: Progressing  Goal: Nutrition support is meeting 75% of nutrient needs  Outcome: Progressing  Goal: Tube feed tolerance  Outcome: Progressing  Goal: BG  mg/dL  Outcome: Progressing  Goal: Lab values WNL  Outcome: Progressing  Goal: Electrolytes WNL  Outcome: Progressing  Goal: Maintain stable weight  Outcome: Progressing  Goal: Reduce weight from edema/fluid  Outcome: Progressing  Goal: Gradual weight gain  Outcome: Progressing  Goal: Improve ostomy output  Outcome: Progressing

## 2024-10-12 NOTE — PROGRESS NOTES
Spoke with patient's spouse regarding facility choices explained that we still did not have an accepting facility. She indicated that a friend works at [facility] and they are working to get him in additionally she will have other choices for us on Sunday will need follow up.

## 2024-10-12 NOTE — CARE PLAN
The patient's goals for the shift include Pt. will have a safe, restful and uneventful evening    The clinical goals for the shift include Patient will remain free from falls.      Problem: Chronic Conditions and Co-morbidities  Goal: Patient's chronic conditions and co-morbidity symptoms are monitored and maintained or improved  Outcome: Progressing     Problem: Fall/Injury  Goal: Use assistive devices by end of the shift  Outcome: Progressing     Problem: Nutrition  Goal: Oral intake greater 75%  Outcome: Progressing     Problem: Nutrition  Goal: Adequate PO fluid intake  Outcome: Progressing     Problem: Nutrition  Goal: Nutrition support is meeting 75% of nutrient needs  Outcome: Progressing

## 2024-10-12 NOTE — NURSING NOTE
Reported low b/p (see flowsheet) to Dr. Moore via secure chat. Patient is asymptomatic. Awaiting further instruction.

## 2024-10-12 NOTE — PROGRESS NOTES
"Yon Lawrence \"Roger" is a 49 y.o. male on day 4 of admission presenting with Large pleural effusion.  Patient with recently diagnosed metastatic colon cancer, DVT on Lovenox, who was sent from his physician office for low blood pressure and not feeling well. Symptoms also included as sudden onset SOB x 1 day  and LE edema. In the ED work up revealed WBC 15.9, Hb 8.1, Na 127. Admitted to SDU for further management. Of note he found to have b/l moderate to large pleural effusions, for which pulmonary is consulted.   Subjective   No acute overnight events. Remains on RA. Echevarria was removed, but needed to be re-inserted due to urinary retention.    Overall is feeling the same as yesterday. SOB stable. Denies cough, sputum wheezing or any other complaints.    Objective   Scheduled medications  enoxaparin, 80 mg, subcutaneous, q12h  perflutren lipid microspheres, 0.5-10 mL of dilution, intravenous, Once in imaging  perflutren protein A microsphere, 0.5 mL, intravenous, Once in imaging  sulfur hexafluoride microsphr, 2 mL, intravenous, Once in imaging    Continuous medications     PRN medications  PRN medications: acetaminophen, acetaminophen, HYDROmorphone, HYDROmorphone, melatonin, ondansetron ODT **OR** ondansetron   Physical Exam  Constitutional:       General: He is not in acute distress.     Appearance: He is ill-appearing. He is not toxic-appearing.      Comments: Thin.    HENT:      Head: Normocephalic and atraumatic.      Nose:      Comments: On RA.      Mouth/Throat:      Mouth: Mucous membranes are moist.      Comments: Mallampati 3.   Eyes:      General: No scleral icterus.     Extraocular Movements: Extraocular movements intact.      Pupils: Pupils are equal, round, and reactive to light.   Cardiovascular:      Rate and Rhythm: Normal rate and regular rhythm.      Heart sounds: No murmur heard.     No friction rub. No gallop.   Pulmonary:      Effort: Pulmonary effort is normal. No respiratory distress.      " "Breath sounds: Normal breath sounds. No wheezing or rales.   Abdominal:      General: Abdomen is flat. Bowel sounds are normal. There is no distension.      Palpations: Abdomen is soft.      Tenderness: There is no abdominal tenderness.   Musculoskeletal:         General: No swelling or tenderness. Normal range of motion.      Cervical back: Normal range of motion and neck supple. No rigidity or tenderness.      Right lower leg: Edema (1+) present.      Left lower leg: Edema (1+) present.   Lymphadenopathy:      Cervical: No cervical adenopathy.   Skin:     General: Skin is warm and dry.      Coloration: Skin is not jaundiced.   Neurological:      General: No focal deficit present.      Mental Status: He is alert and oriented to person, place, and time.      Cranial Nerves: No cranial nerve deficit.      Motor: No weakness.   Psychiatric:         Mood and Affect: Mood normal.         Behavior: Behavior normal.     Last Recorded Vitals  Blood pressure 101/65, pulse 103, temperature 36.7 °C (98 °F), temperature source Temporal, resp. rate 18, height 1.854 m (6' 1\"), weight 80.7 kg (178 lb), SpO2 95%.  Intake/Output last 3 Shifts:  I/O last 3 completed shifts:  In: - (0 mL/kg)   Out: 1150 (14.2 mL/kg) [Urine:1150 (0.4 mL/kg/hr)]  Weight: 80.7 kg   Relevant Results  Results for orders placed or performed during the hospital encounter of 10/07/24 (from the past 24 hour(s))   Basic Metabolic Panel   Result Value Ref Range    Glucose 75 74 - 99 mg/dL    Sodium 134 (L) 136 - 145 mmol/L    Potassium 3.5 3.5 - 5.3 mmol/L    Chloride 103 98 - 107 mmol/L    Bicarbonate 28 21 - 32 mmol/L    Anion Gap 7 (L) 10 - 20 mmol/L    Urea Nitrogen 10 6 - 23 mg/dL    Creatinine 0.28 (L) 0.50 - 1.30 mg/dL    eGFR >90 >60 mL/min/1.73m*2    Calcium 6.1 (L) 8.6 - 10.3 mg/dL   Lavender Top   Result Value Ref Range    Extra Tube Hold for add-ons.    No results found.   Assessment/Plan   Assessment & Plan  Large pleural effusion    49 with " recently diagnosed metastatic colon cancer, DVT on Lovenox, who was sent from his physician office for low blood pressure and not feeling well. Symptoms also included as sudden onset SOB x 1 day  and LE edema. In the ED work up revealed WBC 15.9, Hb 8.1, Na 127. Admitted to SDU for further management. Of note he found to have b/l moderate to large pleural effusions, for which pulmonary is consulted.      Respiratory failure: acute with hypoxia, due to below. Overall mild an now is resolved.       Supplemental O2 as needed      Home O2 evaluation before DC      BPH with IS, Acapella      Management of the individual causes as below     Pleural effusions: bilateral, likely due to severe protein calorie malnutrition as echo on 10/8, no significant L sided abnormalities. Serum albumin 1.5. However given h/o cancer and overall new from August 2024, will need diagnostic/therapeutic tap       S/p b/l thoracentesis, analysis consistent with transudate       Will FU bacterial, fungal culture and cytology       Would refrain from further thoracentesis as this is likely due to malnutrition       Dietician consult to improve nutritional status.      Bilateral GGO: upper lobe predominant. Given also leukocytosis concerning for infectious etiology. However DD also includes pembrolizumab lung toxicity. Pro-calcitonin WNL      Antibiotics stopped.       FU with ID recommendations      If worsening might need further work up/treatment for pembrolizumab lung toxicity        History of VTE:        Continue Lovenox     Pulmonary will FU while in house.   Sarina Michelle MD

## 2024-10-13 VITALS
WEIGHT: 178 LBS | RESPIRATION RATE: 16 BRPM | HEART RATE: 96 BPM | TEMPERATURE: 97.9 F | BODY MASS INDEX: 23.59 KG/M2 | DIASTOLIC BLOOD PRESSURE: 64 MMHG | OXYGEN SATURATION: 95 % | SYSTOLIC BLOOD PRESSURE: 97 MMHG | HEIGHT: 73 IN

## 2024-10-13 LAB
BACTERIA BLD CULT: NORMAL
BACTERIA FLD CULT: NORMAL
GRAM STN SPEC: NORMAL
GRAM STN SPEC: NORMAL

## 2024-10-13 PROCEDURE — 1100000001 HC PRIVATE ROOM DAILY

## 2024-10-13 PROCEDURE — 2500000004 HC RX 250 GENERAL PHARMACY W/ HCPCS (ALT 636 FOR OP/ED): Performed by: INTERNAL MEDICINE

## 2024-10-13 PROCEDURE — 99232 SBSQ HOSP IP/OBS MODERATE 35: CPT | Performed by: INTERNAL MEDICINE

## 2024-10-13 PROCEDURE — 99233 SBSQ HOSP IP/OBS HIGH 50: CPT | Performed by: INTERNAL MEDICINE

## 2024-10-13 ASSESSMENT — COGNITIVE AND FUNCTIONAL STATUS - GENERAL
CLIMB 3 TO 5 STEPS WITH RAILING: TOTAL
TURNING FROM BACK TO SIDE WHILE IN FLAT BAD: A LOT
DRESSING REGULAR UPPER BODY CLOTHING: A LITTLE
HELP NEEDED FOR BATHING: A LITTLE
TURNING FROM BACK TO SIDE WHILE IN FLAT BAD: A LOT
MOBILITY SCORE: 11
PERSONAL GROOMING: A LITTLE
DRESSING REGULAR LOWER BODY CLOTHING: A LITTLE
MOVING TO AND FROM BED TO CHAIR: A LOT
HELP NEEDED FOR BATHING: A LITTLE
CLIMB 3 TO 5 STEPS WITH RAILING: TOTAL
DRESSING REGULAR UPPER BODY CLOTHING: A LITTLE
MOBILITY SCORE: 11
DAILY ACTIVITIY SCORE: 19
DRESSING REGULAR LOWER BODY CLOTHING: A LITTLE
WALKING IN HOSPITAL ROOM: TOTAL
MOVING FROM LYING ON BACK TO SITTING ON SIDE OF FLAT BED WITH BEDRAILS: A LITTLE
MOVING FROM LYING ON BACK TO SITTING ON SIDE OF FLAT BED WITH BEDRAILS: A LITTLE
TOILETING: A LITTLE
STANDING UP FROM CHAIR USING ARMS: A LOT
WALKING IN HOSPITAL ROOM: TOTAL
STANDING UP FROM CHAIR USING ARMS: A LOT
PERSONAL GROOMING: A LITTLE
MOVING TO AND FROM BED TO CHAIR: A LOT
TOILETING: A LITTLE
DAILY ACTIVITIY SCORE: 19

## 2024-10-13 ASSESSMENT — PAIN - FUNCTIONAL ASSESSMENT
PAIN_FUNCTIONAL_ASSESSMENT: 0-10
PAIN_FUNCTIONAL_ASSESSMENT: 0-10

## 2024-10-13 ASSESSMENT — PAIN SCALES - GENERAL
PAINLEVEL_OUTOF10: 0 - NO PAIN
PAINLEVEL_OUTOF10: 0 - NO PAIN

## 2024-10-13 NOTE — CARE PLAN
The patient's goals for the shift include     The clinical goals for the shift include pt remain comfortabe & hds    Over the shift, the patient is making progress toward the following goals.       Problem: Skin  Goal: Prevent/manage excess moisture  Outcome: Progressing     Problem: Skin  Goal: Prevent/minimize sheer/friction injuries  Outcome: Progressing     Problem: Skin  Goal: Promote/optimize nutrition  Outcome: Progressing

## 2024-10-13 NOTE — PROGRESS NOTES
10/13/24 1249   Discharge Planning   Expected Discharge Disposition SNF     TCC spoke with pt wife on the phone. Sent referrals [facility]. Need accepting SNF.

## 2024-10-13 NOTE — PROGRESS NOTES
"Yon Lawrence \"Roger" is a 49 y.o. male on day 5 of admission presenting with Large pleural effusion.  Patient with recently diagnosed metastatic colon cancer, DVT on Lovenox, who was sent from his physician office for low blood pressure and not feeling well. Symptoms also included as sudden onset SOB x 1 day  and LE edema. In the ED work up revealed WBC 15.9, Hb 8.1, Na 127. Admitted to SDU for further management. Of note he found to have b/l moderate to large pleural effusions, for which pulmonary is consulted.   Subjective   No acute overnight events. Remains on RA.     Overall is feeling the same as yesterday. SOB stable. Denies cough, sputum wheezing or any other complaints.    Objective   Scheduled medications  enoxaparin, 80 mg, subcutaneous, q12h  perflutren lipid microspheres, 0.5-10 mL of dilution, intravenous, Once in imaging  perflutren protein A microsphere, 0.5 mL, intravenous, Once in imaging  sulfur hexafluoride microsphr, 2 mL, intravenous, Once in imaging    Continuous medications     PRN medications  PRN medications: acetaminophen, acetaminophen, HYDROmorphone, HYDROmorphone, melatonin, ondansetron ODT **OR** ondansetron   Physical Exam  Constitutional:       General: He is not in acute distress.     Appearance: He is ill-appearing. He is not toxic-appearing.      Comments: Thin.    HENT:      Head: Normocephalic and atraumatic.      Nose:      Comments: On RA.      Mouth/Throat:      Mouth: Mucous membranes are moist.      Comments: Mallampati 3.   Eyes:      General: No scleral icterus.     Extraocular Movements: Extraocular movements intact.      Pupils: Pupils are equal, round, and reactive to light.   Cardiovascular:      Rate and Rhythm: Normal rate and regular rhythm.      Heart sounds: No murmur heard.     No friction rub. No gallop.   Pulmonary:      Effort: Pulmonary effort is normal. No respiratory distress.      Breath sounds: Normal breath sounds. No wheezing or rales.   Abdominal:    " "  General: Abdomen is flat. Bowel sounds are normal. There is no distension.      Palpations: Abdomen is soft.      Tenderness: There is no abdominal tenderness.   Musculoskeletal:         General: No swelling or tenderness. Normal range of motion.      Cervical back: Normal range of motion and neck supple. No rigidity or tenderness.      Right lower leg: Edema (1+) present.      Left lower leg: Edema (1+) present.   Lymphadenopathy:      Cervical: No cervical adenopathy.   Skin:     General: Skin is warm and dry.      Coloration: Skin is not jaundiced.   Neurological:      General: No focal deficit present.      Mental Status: He is alert and oriented to person, place, and time.      Cranial Nerves: No cranial nerve deficit.      Motor: No weakness.   Psychiatric:         Mood and Affect: Mood normal.         Behavior: Behavior normal.     Last Recorded Vitals  Blood pressure 96/82, pulse 98, temperature 36.6 °C (97.9 °F), temperature source Temporal, resp. rate 16, height 1.854 m (6' 1\"), weight 80.7 kg (178 lb), SpO2 99%.  Intake/Output last 3 Shifts:  I/O last 3 completed shifts:  In: - (0 mL/kg)   Out: 3400 (42.1 mL/kg) [Urine:3400 (1.2 mL/kg/hr)]  Weight: 80.7 kg   Relevant Results  No results found for this or any previous visit (from the past 24 hour(s)).  No results found.   Assessment/Plan   Assessment & Plan  Large pleural effusion    49 with recently diagnosed metastatic colon cancer, DVT on Lovenox, who was sent from his physician office for low blood pressure and not feeling well. Symptoms also included as sudden onset SOB x 1 day  and LE edema. In the ED work up revealed WBC 15.9, Hb 8.1, Na 127. Admitted to SDU for further management. Of note he found to have b/l moderate to large pleural effusions, for which pulmonary is consulted.      Respiratory failure: acute with hypoxia, due to below. Overall mild and now is resolved.       Supplemental O2 as needed      Home O2 evaluation before DC      BPH " with IS, Acapella      Management of the individual causes as below     Pleural effusions: bilateral, likely due to severe protein calorie malnutrition as echo on 10/8, no significant L sided abnormalities. Serum albumin 1.5. However given h/o cancer and overall new from August 2024, will need diagnostic/therapeutic tap       S/p b/l thoracentesis, analysis consistent with transudate       Will FU bacterial, fungal culture and cytology: culture NTD, cytology atypical cells        Would refrain from further thoracentesis as this is likely due to malnutrition       Dietician consult to improve nutritional status.      Bilateral GGO: upper lobe predominant. Given also leukocytosis concerning for infectious etiology. However DD also includes pembrolizumab lung toxicity. Pro-calcitonin WNL      Antibiotics stopped.       FU with ID recommendations      If worsening might need further work up/treatment for pembrolizumab lung toxicity        History of VTE:        Continue Lovenox     Pulmonary will FU while in house.   Sarina Michelle MD

## 2024-10-13 NOTE — CARE PLAN
The patient's goals for the shift include     The clinical goals for the shift include pt will remain comfortable and hds    Over the shift, the patient is making progress toward the following goals.       Problem: Fall/Injury  Goal: Pace activities to prevent fatigue by end of the shift  Outcome: Progressing     Problem: Discharge Planning  Goal: Discharge to home or other facility with appropriate resources  Outcome: Progressing     Problem: Chronic Conditions and Co-morbidities  Goal: Patient's chronic conditions and co-morbidity symptoms are monitored and maintained or improved  Outcome: Progressing

## 2024-10-13 NOTE — PROGRESS NOTES
"Yon Lawrence \"Vladimir\" is a 49 y.o. male on day 5 of admission presenting with Large pleural effusion.    Subjective   Doing well aferbile       Objective     Physical Exam  Vitals reviewed.   Constitutional:       Appearance: Normal appearance.   HENT:      Head: Normocephalic.      Right Ear: Tympanic membrane normal.      Nose: Nose normal.   Pulmonary:      Comments: Crackles bilaterally    Abdominal:      General: Abdomen is flat. Bowel sounds are normal.      Palpations: Abdomen is soft.   Musculoskeletal:      Comments: Bilateral LE edema     Skin:     General: Skin is warm.      Capillary Refill: Capillary refill takes less than 2 seconds.   Neurological:      General: No focal deficit present.      Mental Status: He is alert.         Last Recorded Vitals  Blood pressure 96/61, pulse 96, temperature 36.6 °C (97.9 °F), temperature source Oral, resp. rate 18, height 1.854 m (6' 1\"), weight 80.7 kg (178 lb), SpO2 90%.  Intake/Output last 3 Shifts:  I/O last 3 completed shifts:  In: - (0 mL/kg)   Out: 3400 (42.1 mL/kg) [Urine:3400 (1.2 mL/kg/hr)]  Weight: 80.7 kg     Relevant Results  No results found for this or any previous visit (from the past 24 hour(s)).      Imaging Results    Ct chest/abd/pelvis:  IMPRESSION:  1. Increasing, now moderate to large bilateral layering pleural  effusions.  2. Groundglass opacities in the aerated upper lobes likely represent  edema.  3. Moderate air and fluid distention small bowel, new since last exam.  4. Known tumor mass in the distal transverse colon similar to prior,  with unchanged non-specific thickening of the more proximal colon.  5. Persistent marked hepatic steatosis with underlying metastatic  liver lesions.  6. Remainder as above.    Medications:  enoxaparin, 80 mg, subcutaneous, q12h  perflutren lipid microspheres, 0.5-10 mL of dilution, intravenous, Once in imaging  perflutren protein A microsphere, 0.5 mL, intravenous, Once in imaging  sulfur hexafluoride " microsphr, 2 mL, intravenous, Once in imaging       PRN medications: acetaminophen, acetaminophen, HYDROmorphone, HYDROmorphone, melatonin, ondansetron ODT **OR** ondansetron     Assessment/Plan   Leukocytosis  - pleural fluid negative  -dc iv zosyn per ID     2. Hypotension  -Chronically low, suspect 2/2 decreased oncotic pressure in the setting of low albumin      3. Bilateral pleural effusions  - s/p left thoracentesis transudative 10/0 1.1L  -right thoracentesis today     3. Persistent bilateral LE edema  - suspect 2/2 low albumin   -hold diuresis due to low bps     4. Urine retention in the ED  -failed voiding trial, placed [place] back  - Avoid flomax secondary to hypotension     5. Metastatic colon cancer  - Last Pembrolizumab 5 days ago     6. 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 Unable to absorb nutrition well.   -nutrition on board    7. Anemia  -s/p one unit of blood  -hgb stable now    Plan for dc to rehab pending    DVT Prophylaxis:  ANTONI Mclean MD  [facility]

## 2024-10-14 ENCOUNTER — DOCUMENTATION (OUTPATIENT)
Dept: HEMATOLOGY/ONCOLOGY | Facility: CLINIC | Age: 49
End: 2024-10-14
Payer: COMMERCIAL

## 2024-10-14 ENCOUNTER — DOCUMENTATION (OUTPATIENT)
Dept: CASE MANAGEMENT | Facility: HOSPITAL | Age: 49
End: 2024-10-14
Payer: COMMERCIAL

## 2024-10-14 ENCOUNTER — TELEMEDICINE (OUTPATIENT)
Dept: HEMATOLOGY/ONCOLOGY | Facility: CLINIC | Age: 49
End: 2024-10-14
Payer: COMMERCIAL

## 2024-10-14 DIAGNOSIS — C18.4 ADENOCARCINOMA OF TRANSVERSE COLON (MULTI): ICD-10-CM

## 2024-10-14 PROCEDURE — 2500000004 HC RX 250 GENERAL PHARMACY W/ HCPCS (ALT 636 FOR OP/ED): Performed by: INTERNAL MEDICINE

## 2024-10-14 PROCEDURE — 9420000001 HC RT PATIENT EDUCATION 5 MIN

## 2024-10-14 PROCEDURE — 97530 THERAPEUTIC ACTIVITIES: CPT | Mod: GP,CQ | Performed by: PHYSICAL THERAPY ASSISTANT

## 2024-10-14 PROCEDURE — 99232 SBSQ HOSP IP/OBS MODERATE 35: CPT | Performed by: INTERNAL MEDICINE

## 2024-10-14 PROCEDURE — 99233 SBSQ HOSP IP/OBS HIGH 50: CPT | Performed by: INTERNAL MEDICINE

## 2024-10-14 PROCEDURE — 1100000001 HC PRIVATE ROOM DAILY

## 2024-10-14 PROCEDURE — 94668 MNPJ CHEST WALL SBSQ: CPT

## 2024-10-14 PROCEDURE — 97530 THERAPEUTIC ACTIVITIES: CPT | Mod: GO

## 2024-10-14 ASSESSMENT — COGNITIVE AND FUNCTIONAL STATUS - GENERAL
TURNING FROM BACK TO SIDE WHILE IN FLAT BAD: A LOT
MOBILITY SCORE: 11
DRESSING REGULAR LOWER BODY CLOTHING: A LOT
STANDING UP FROM CHAIR USING ARMS: A LOT
CLIMB 3 TO 5 STEPS WITH RAILING: TOTAL
DRESSING REGULAR UPPER BODY CLOTHING: A LITTLE
HELP NEEDED FOR BATHING: A LOT
WALKING IN HOSPITAL ROOM: TOTAL
MOVING TO AND FROM BED TO CHAIR: A LOT
DRESSING REGULAR LOWER BODY CLOTHING: A LITTLE
TOILETING: TOTAL
MOVING FROM LYING ON BACK TO SITTING ON SIDE OF FLAT BED WITH BEDRAILS: A LITTLE
DAILY ACTIVITIY SCORE: 19
DRESSING REGULAR UPPER BODY CLOTHING: A LITTLE
CLIMB 3 TO 5 STEPS WITH RAILING: TOTAL
TURNING FROM BACK TO SIDE WHILE IN FLAT BAD: A LITTLE
WALKING IN HOSPITAL ROOM: A LOT
HELP NEEDED FOR BATHING: A LITTLE
TOILETING: A LITTLE
MOVING FROM LYING ON BACK TO SITTING ON SIDE OF FLAT BED WITH BEDRAILS: A LITTLE
MOVING TO AND FROM BED TO CHAIR: A LITTLE
STANDING UP FROM CHAIR USING ARMS: A LOT
DAILY ACTIVITIY SCORE: 16
PERSONAL GROOMING: A LITTLE
MOBILITY SCORE: 14

## 2024-10-14 ASSESSMENT — PAIN - FUNCTIONAL ASSESSMENT
PAIN_FUNCTIONAL_ASSESSMENT: 0-10

## 2024-10-14 ASSESSMENT — PAIN SCALES - GENERAL
PAINLEVEL_OUTOF10: 3
PAINLEVEL_OUTOF10: 0 - NO PAIN
PAINLEVEL_OUTOF10: 0 - NO PAIN

## 2024-10-14 NOTE — PROGRESS NOTES
10/14/24 1442   Discharge Planning   Expected Discharge Disposition SNF     TCC soke with pt wife. Wife agreeable to send mass referrals for SNF.

## 2024-10-14 NOTE — PROGRESS NOTES
"Yon Lawrence \"Vladimir\" is a 49 y.o. male on day 6 of admission presenting with Large pleural effusion.    Subjective   Doing well aferbile. Pending placement       Objective     Physical Exam  Vitals reviewed.   Constitutional:       Appearance: Normal appearance.   HENT:      Head: Normocephalic.      Right Ear: Tympanic membrane normal.      Nose: Nose normal.   Pulmonary:      Comments: Crackles bilaterally    Abdominal:      General: Abdomen is flat. Bowel sounds are normal.      Palpations: Abdomen is soft.   Musculoskeletal:      Comments: Bilateral LE edema     Skin:     General: Skin is warm.      Capillary Refill: Capillary refill takes less than 2 seconds.   Neurological:      General: No focal deficit present.      Mental Status: He is alert.         Last Recorded Vitals  Blood pressure 98/74, pulse 77, temperature 36.6 °C (97.9 °F), temperature source Temporal, resp. rate 18, height 1.854 m (6' 1\"), weight 80.7 kg (178 lb), SpO2 96%.  Intake/Output last 3 Shifts:  I/O last 3 completed shifts:  In: 2300 (28.5 mL/kg) [P.O.:2300]  Out: 2450 (30.3 mL/kg) [Urine:2450 (0.8 mL/kg/hr)]  Weight: 80.7 kg     Relevant Results  No results found for this or any previous visit (from the past 24 hour(s)).      Imaging Results    Ct chest/abd/pelvis:  IMPRESSION:  1. Increasing, now moderate to large bilateral layering pleural  effusions.  2. Groundglass opacities in the aerated upper lobes likely represent  edema.  3. Moderate air and fluid distention small bowel, new since last exam.  4. Known tumor mass in the distal transverse colon similar to prior,  with unchanged non-specific thickening of the more proximal colon.  5. Persistent marked hepatic steatosis with underlying metastatic  liver lesions.  6. Remainder as above.    Medications:  enoxaparin, 80 mg, subcutaneous, q12h  perflutren lipid microspheres, 0.5-10 mL of dilution, intravenous, Once in imaging  perflutren protein A microsphere, 0.5 mL, intravenous, " Once in imaging  sulfur hexafluoride microsphr, 2 mL, intravenous, Once in imaging       PRN medications: acetaminophen, acetaminophen, HYDROmorphone, HYDROmorphone, melatonin, ondansetron ODT **OR** ondansetron     Assessment/Plan   Leukocytosis  - pleural fluid negative  -dc iv zosyn per ID     2. Hypotension  -Chronically low, suspect 2/2 decreased oncotic pressure in the setting of low albumin      3. Bilateral pleural effusions  - s/p left thoracentesis transudative 10/0 1.1L  -right thoracentesis today     3. Persistent bilateral LE edema  - suspect 2/2 low albumin   -hold diuresis due to low bps     4. Urine retention in the ED  -failed voiding trial, placed [place] back  - Avoid flomax secondary to hypotension     5. Metastatic colon cancer  - Last Pembrolizumab 5 days ago     6. 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 Unable to absorb nutrition well.   -nutrition on board    7. Anemia  -s/p one unit of blood  -hgb stable now    Plan for dc to rehab pending    DVT Prophylaxis:  ANTONI Mclean MD  [facility]

## 2024-10-14 NOTE — PROGRESS NOTES
Patient is currently inpatient at [facility] and medically ready to be transferred to rehab.  [facility] SW  contacted [facility] SW - Rosio Lawrence who is working on his transfer. Wife wants patient near [place], [place] where they live. They are having difficulty finding placement due to several factors: insurance, bed availability and care needs; inpatient SW is actively searching. Patient will be undergoing treatment every 21 days, and facility is obligated to transport him. [facility] SW spoke to patient who asked about the lift chair and explained that it can't be delivered until patient is home. SW and patient completed and update disability paperwork. Dr. Finn and nurse partner aware of the above. Will continue to assist [facility] and follow up.

## 2024-10-14 NOTE — PROGRESS NOTES
"Yon Lawrence \"Vladimir\" is a 49 y.o. male on day 6 of admission presenting with Large pleural effusion.  Patient with recently diagnosed metastatic colon cancer, DVT on Lovenox, who was sent from his physician office for low blood pressure and not feeling well. Symptoms also included as sudden onset SOB x 1 day  and LE edema. In the ED work up revealed WBC 15.9, Hb 8.1, Na 127. Admitted to SDU for further management. Of note he found to have b/l moderate to large pleural effusions, for which pulmonary is consulted.   Subjective   No acute overnight events. Remains on RA.     Overall is feeling the same as yesterday. SOB stable. Denies cough, sputum wheezing or any other complaints.  Worked with PT/OT, had some CARRION.   Objective   Scheduled medications  enoxaparin, 80 mg, subcutaneous, q12h  perflutren lipid microspheres, 0.5-10 mL of dilution, intravenous, Once in imaging  perflutren protein A microsphere, 0.5 mL, intravenous, Once in imaging  sulfur hexafluoride microsphr, 2 mL, intravenous, Once in imaging    Continuous medications     PRN medications  PRN medications: acetaminophen, acetaminophen, HYDROmorphone, HYDROmorphone, melatonin, ondansetron ODT **OR** ondansetron   Physical Exam  Constitutional:       General: He is not in acute distress.     Appearance: He is ill-appearing. He is not toxic-appearing.      Comments: Thin.    HENT:      Head: Normocephalic and atraumatic.      Nose:      Comments: On RA.      Mouth/Throat:      Mouth: Mucous membranes are moist.      Comments: Mallampati 3.   Eyes:      General: No scleral icterus.     Extraocular Movements: Extraocular movements intact.      Pupils: Pupils are equal, round, and reactive to light.   Cardiovascular:      Rate and Rhythm: Normal rate and regular rhythm.      Heart sounds: No murmur heard.     No friction rub. No gallop.   Pulmonary:      Effort: Pulmonary effort is normal. No respiratory distress.      Breath sounds: Normal breath sounds. No " "wheezing or rales.   Abdominal:      General: Abdomen is flat. Bowel sounds are normal. There is no distension.      Palpations: Abdomen is soft.      Tenderness: There is no abdominal tenderness.   Musculoskeletal:         General: No swelling or tenderness. Normal range of motion.      Cervical back: Normal range of motion and neck supple. No rigidity or tenderness.      Right lower leg: Edema (1+) present.      Left lower leg: Edema (1+) present.   Lymphadenopathy:      Cervical: No cervical adenopathy.   Skin:     General: Skin is warm and dry.      Coloration: Skin is not jaundiced.   Neurological:      General: No focal deficit present.      Mental Status: He is alert and oriented to person, place, and time.      Cranial Nerves: No cranial nerve deficit.      Motor: No weakness.   Psychiatric:         Mood and Affect: Mood normal.         Behavior: Behavior normal.     Last Recorded Vitals  Blood pressure 104/72, pulse 86, temperature 36.2 °C (97.2 °F), temperature source Temporal, resp. rate 18, height 1.854 m (6' 1\"), weight 80.7 kg (178 lb), SpO2 93%.  Intake/Output last 3 Shifts:  I/O last 3 completed shifts:  In: 2300 (28.5 mL/kg) [P.O.:2300]  Out: 2450 (30.3 mL/kg) [Urine:2450 (0.8 mL/kg/hr)]  Weight: 80.7 kg   Relevant Results  No results found for this or any previous visit (from the past 24 hour(s)).  No results found.   Assessment/Plan   Assessment & Plan  Large pleural effusion    49 with recently diagnosed metastatic colon cancer, DVT on Lovenox, who was sent from his physician office for low blood pressure and not feeling well. Symptoms also included as sudden onset SOB x 1 day  and LE edema. In the ED work up revealed WBC 15.9, Hb 8.1, Na 127. Admitted to SDU for further management. Of note he found to have b/l moderate to large pleural effusions, for which pulmonary is consulted.      Respiratory failure: acute with hypoxia, due to below. Overall mild and now is resolved.       Supplemental O2 " as needed      Home O2 evaluation before DC      BPH with IS, Acapella      Management of the individual causes as below     Pleural effusions: bilateral, likely due to severe protein calorie malnutrition as echo on 10/8, no significant L sided abnormalities. Serum albumin 1.5. However given h/o cancer and overall new from August 2024, will need diagnostic/therapeutic tap       S/p b/l thoracentesis, analysis consistent with transudate       Will FU bacterial, fungal culture and cytology: cultures NTD, cytology atypical cells        Would refrain from further thoracentesis as this is likely due to malnutrition       Dietician consult to improve nutritional status.      Bilateral GGO: upper lobe predominant. Given also leukocytosis concerning for infectious etiology. However DD also includes pembrolizumab lung toxicity. Pro-calcitonin WNL      Antibiotics stopped.       FU with ID recommendations      If worsening might need further work up/treatment for pembrolizumab lung toxicity        History of VTE:        Continue Lovenox     Pulmonary will FU while in house.   Sarina Michelle MD

## 2024-10-14 NOTE — PROGRESS NOTES
Call made to patient on behalf of Dr. Finn. Patient is currently admitted to the hospital thus a virtual call cannot be completed due to insurance. Should patient have any additional questions or need to talk to Dr. Finn, she can try to call him later today. Voicemail left for patient to return call to office. Please relay the above. Thank you.

## 2024-10-14 NOTE — PROGRESS NOTES
"Physical Therapy    Physical Therapy Treatment    Patient Name: Yon Lawrence \"Vladimir\"  MRN: [id number]  Department: [place] [address]  Room: [address] [place] [address]  Today's Date: 10/14/2024  Time Calculation  Start Time: 1036  Stop Time: 1054  Time Calculation (min): 18 min         Assessment/Plan   PT Assessment  End of Session Communication: Bedside nurse  Assessment Comment:  (pt req increased rest peridod and cues to complete task, low endurance.)  End of Session Patient Position: Bed, 3 rail up, Alarm on  PT Plan  Inpatient/Swing Bed or Outpatient: Inpatient  PT Plan  Treatment/Interventions: Bed mobility, Transfer training, Gait training, Therapeutic activity  PT Plan: Ongoing PT  PT Frequency: 3 times per week  PT Discharge Recommendations: Moderate intensity level of continued care  Equipment Recommended upon Discharge:  (TBD)  PT Recommended Transfer Status: Assist x2, Assistive device (min assist of 2 with walker to stand abd take a few steps)  PT - OK to Discharge: Yes (per PT POC)      General Visit Information:   PT  Visit  PT Received On: 10/14/24  General  Reason for Referral: Pt admitted with hypotension, BLE edema and large pleral effusion. Recent dx of colon CA with mets.  Prior to Session Communication: Bedside nurse  Patient Position Received: Up in chair, Alarm on  Preferred Learning Style: auditory, kinesthetic, visual, verbal, written  General Comment:  (pt agreeable to tx.)    Subjective   Precautions:       Vital Signs (Past 2hrs)                 Objective   Pain:  Pain Assessment  Pain Assessment: 0-10  0-10 (Numeric) Pain Score: 3  Pain Type: Chronic pain  Cognition:  Cognition  Orientation Level: Oriented X4  Coordination:     Postural Control:  Static Sitting Balance  Static Sitting-Balance Support: Feet supported, No upper extremity supported  Static Sitting-Level of Assistance: Distant supervision  Dynamic Sitting Balance  Dynamic Sitting-Balance Support: Feet supported, No upper extremity supported  Dynamic " Sitting-Level of Assistance: Distant supervision  Static Standing Balance  Static Standing-Balance Support: Bilateral upper extremity supported  Static Standing-Level of Assistance: Minimum assistance  Static Standing-Comment/Number of Minutes:  (pt req min cues for proper fWW placement and to encourage upright posture. able tostand approx 1 min during first trial)  Extremity/Trunk Assessments:    Activity Tolerance:  Activity Tolerance  Endurance: Decreased tolerance for upright activites  Treatments:            Bed Mobility  Bed Mobility: Yes  Bed Mobility 1  Bed Mobility 1: Sitting to supine  Level of Assistance 1: Minimum assistance, Minimal verbal cues, Minimal tactile cues  Bed Mobility Comments 1:  (pt req some assitance to elevate BLE's into bed.)    Ambulation/Gait Training  Ambulation/Gait Training Performed: Yes  Ambulation/Gait Training 1  Surface 1: Level tile  Device 1: Rolling walker  Gait Support Devices: Gait belt  Assistance 1: Minimum assistance, Minimal verbal cues  Quality of Gait 1: Decreased step length, Shuffling gait, Forward flexed posture, Soft knee(s)  Comments/Distance (ft) 1: 3-4 short side steps to bed (pt req mod cues for balance and posture.)  Transfers  Transfer: Yes  Transfer 1  Transfer From 1: Sit to, Stand to  Technique 1: Sit to stand, Stand to sit  Transfer Device 1: Walker, Gait belt  Transfer Level of Assistance 1: Moderate assistance, Minimal verbal cues, Minimal tactile cues  Trials/Comments 1:  (pt req increased timeand assistance to complete transfer)         Outcome Measures:  [facility] Basic Mobility  Turning from your back to your side while in a flat bed without using bedrails: A little  Moving from lying on your back to sitting on the side of a flat bed without using bedrails: A little  Moving to and from bed to chair (including a wheelchair): A little  Standing up from a chair using your arms (e.g. wheelchair or bedside chair): A lot  To walk in hospital room: A  lot  Climbing 3-5 steps with railing: Total  Basic Mobility - Total Score: 14    Education Documentation  Body Mechanics, taught by Matthieu Coker PTA at 10/14/2024 11:49 AM.  Learner: Patient  Readiness: Acceptance  Method: Explanation  Response: Needs Reinforcement    Home Exercise Program, taught by Matthieu Coker PTA at 10/14/2024 11:49 AM.  Learner: Patient  Readiness: Acceptance  Method: Explanation  Response: Needs Reinforcement    Mobility Training, taught by Matthieu Coker PTA at 10/14/2024 11:49 AM.  Learner: Patient  Readiness: Acceptance  Method: Explanation  Response: Needs Reinforcement    Education Comments  No comments found.        OP EDUCATION:       Encounter Problems       Encounter Problems (Active)       Balance       STG - Maintains static standing balance with ww with mod A x 2 x 3 minutes (Progressing)       Start:  10/09/24    Expected End:  10/23/24       INTERVENTIONS:  1. Practice standing with minimal support.  2. Educate patient about standing tolerance.  3. Educate patient about independence with gait, transfers, and ADL's.  4. Educate patient about use of assistive device.  5. Educate patient about self-directed care.            Mobility       Increase BLE strength to attain functional goals achieved through supine and seated TE.  (Progressing)       Start:  10/09/24    Expected End:  10/23/24            STG - Patient will ambulate >25' with RW and CGA.       Start:  10/11/24    Expected End:  10/23/24                   PT Transfers       STG - Transfer from bed to chair with ww with mod A x 2 (Met)       Start:  10/09/24    Expected End:  10/23/24    Resolved:  10/11/24         STG - Patient to transfer to and from sit to supine with min A (Progressing)       Start:  10/09/24    Expected End:  10/23/24            STG - Patient will transfer sit to and from stand with mod A x 2 with ww (Met)       Start:  10/09/24    Expected End:  10/23/24    Resolved:  10/11/24          STG - Transfer from bed to chair with CGA of 1 and RW. (Progressing)       Start:  10/11/24    Expected End:  10/23/24                STG - Transfer from sit to/from stand from EOB and chair with arms with CGA of 1 and RW.  (Progressing)       Start:  10/11/24    Expected End:  10/23/24

## 2024-10-14 NOTE — PROGRESS NOTES
"Occupational Therapy    Occupational Therapy Treatment    Name: Yon Lawrence \"Vladimir\"  MRN: [id number]  Department: [place] [address]  Room: [address] [place] [address]  Date: 10/14/24  Time Calculation  Start Time: 0927  Stop Time: 0956  Time Calculation (min): 29 min    Assessment:  OT Assessment: Pt seen for OT eval. Pt demonstrates with decreased endurance, assist with mobility and ADLSWould benefit from Mod intensity therapy at d/c  Prognosis: Good  Barriers to Discharge: None  Evaluation/Treatment Tolerance: Patient limited by fatigue  Medical Staff Made Aware: Yes  End of Session Communication: PCT/NA/CTA  End of Session Patient Position: Up in chair, Alarm on  Plan:  Treatment Interventions: ADL retraining, Functional transfer training, Endurance training, Equipment evaluation/education  OT Frequency: 3 times per week  OT Discharge Recommendations: Moderate intensity level of continued care  Equipment Recommended upon Discharge: Wheeled walker, Wheelchair  OT Recommended Transfer Status: Moderate assist, Assist of 1  OT - OK to Discharge: Yes    Subjective   Previous Visit Info:  OT Last Visit  OT Received On: 10/14/24  General:  General  Reason for Referral: Pt admitted with hypotension, BLE edema and large pleral effusion. Recent dx of colon CA with mets.  Precautions:       Vital Signs (Past 2hrs)                Pain Assessment:  Pain Assessment  Pain Assessment: 0-10  0-10 (Numeric) Pain Score: 0 - No pain     Objective   Cognition:  Overall Cognitive Status: Within Functional Limits  Orientation Level: Oriented X4  Attention: Within Functional Limits  Memory: Within Funtional Limits  Insight: Within function limits  Impulsive: Within functional limits  Activities of Daily Living: Feeding  Feeding Level of Assistance: Independent  Feeding Where Assessed: Bed level    LE Dressing  LE Dressing: Yes  Sock Level of Assistance: Maximum assistance  LE Dressing Where Assessed:  (Socks donned while in bed, able to adjust socks sitting EOB " with increased SOB)     Bed Mobility/Transfers: Bed Mobility  Bed Mobility: Yes  Bed Mobility 1  Bed Mobility 1: Supine to sitting  Level of Assistance 1: Moderate assistance, Minimal verbal cues, Minimal tactile cues  Bed Mobility Comments 1: Still requires assist to bring trunk to upright position, covtinues to improve daily    Transfers  Transfer: Yes  Transfer 1  Transfer From 1: Bed to  Transfer to 1: Stand  Technique 1: Sit to stand  Transfer Device 1: Walker, Gait belt  Transfer Level of Assistance 1: Moderate assistance, Minimal verbal cues, Minimal tactile cues  Transfers 2  Transfer From 2: Stand to  Transfer to 2: Chair with arms  Technique 2: Stand to sit  Transfer Device 2: Walker, Gait belt  Transfer Level of Assistance 2: Minimum assistance, Minimal verbal cues, Minimal tactile cues      Functional Mobility:  Functional Mobility  Functional Mobility Performed: Yes  Functional Mobility 1  Surface 1: Level tile  Device 1: Rolling walker  Functional Mobility Support Devices: Gait belt  Assistance 1: Minimum assistance  Quality of Functional Mobility 1: Narrow base of support  Comments 1: ablet to ambulate 1-2 feet to chair with Wheelwd walker gait belt  Sitting Balance:  Static Sitting Balance  Static Sitting-Balance Support: Feet supported  Static Sitting-Level of Assistance: Close supervision  Dynamic Sitting Balance  Dynamic Sitting-Balance Support: Feet supported  Dynamic Sitting-Level of Assistance: Close supervision  Dynamic Sitting-Balance: Reaching for objects, Reaching across midline  Standing Balance:  Static Standing Balance  Static Standing-Balance Support: Bilateral upper extremity supported  Static Standing-Level of Assistance: Minimum assistance  Dynamic Standing Balance  Dynamic Standing-Balance Support: Bilateral upper extremity supported  Dynamic Standing-Level of Assistance: Minimum assistance     Therapy/Activity: Therapeutic Activity  Therapeutic Activity Performed: Yes  Therapeutic  Activity 1: Pt continues with SOB anxiety with activity. Completing Pursed lip preathing techniques to slow breathing down and to assist with anxiety.  Outcome Measures:  [facility] Daily Activity  Putting on and taking off regular lower body clothing: A lot  Bathing (including washing, rinsing, drying): A lot  Putting on and taking off regular upper body clothing: A little  Toileting, which includes using toilet, bedpan or urinal: Total  Taking care of personal grooming such as brushing teeth: None  Eating Meals: None  Daily Activity - Total Score: 16        Education Documentation  Handouts, taught by Jana Velasco OT at 10/14/2024 10:15 AM.  Learner: Patient  Readiness: Acceptance  Method: Explanation  Response: Verbalizes Understanding, Needs Reinforcement    Home Exercise Program, taught by Jana Velasco OT at 10/14/2024 10:15 AM.  Learner: Patient  Readiness: Acceptance  Method: Explanation  Response: Verbalizes Understanding, Needs Reinforcement    ADL Training, taught by Jana Velasco OT at 10/14/2024 10:15 AM.  Learner: Patient  Readiness: Acceptance  Method: Explanation  Response: Verbalizes Understanding, Needs Reinforcement    Education Comments  No comments found.      Goals:  Encounter Problems       Encounter Problems (Active)       ADLs       Patient will perform UB and LB bathing 75% with moderate assist level of assistance and adaptive equipment prn . (Progressing)       Start:  10/09/24    Expected End:  10/23/24            Patient with complete upper body dressing with contact guard assist level of assistance donning and doffing all UE clothes with no adaptive equipment while supported sitting (Progressing)       Start:  10/09/24    Expected End:  10/23/24            Patient with complete lower body dressing with moderate assist level of assistance donning and doffing all LE clothes  with reacher, shoe horn, sock-aid, and dressing stick  while supported sitting (Progressing)        Start:  10/09/24    Expected End:  10/23/24            Pt will tolerate 30 min OT tx session w/o subj/obj c/o fatigue/SOB with activity to increase independence  (Progressing)       Start:  10/09/24    Expected End:  10/23/24               EXERCISE/STRENGTHENING       Patient will complete BUE exercises for 3 sets and 10 reps in order to improve strength and activity for ADL performance.  (Progressing)       Start:  10/09/24    Expected End:  10/23/24               TRANSFERS       Patient will perform bed mobility contact guard assist level of assistance and bed rails in order to improve safety and independence with mobility (Progressing)       Start:  10/09/24    Expected End:  10/23/24            Patient will complete functional transfer to stand  with front wheeled walker with moderate assist level of assistance. (Progressing)       Start:  10/09/24    Expected End:  10/23/24

## 2024-10-14 NOTE — PROGRESS NOTES
10/14/24 1500   Discharge Planning   Expected Discharge Disposition SNF     Spoke with wife about FOC. All accepting facilities are out of network and she would need to cover 20% of cost. Per [facility] [place] coverage search engine- patient's plan only covers two facilities in his county- [facility] and the [facility], both of which are unable to accept. Wife made aware of accepting facilities. She stated she would discuss with patient and would call me back with FOC.    Addendum: received return call from wife [facility] [place] is F[facility]. Auth started.

## 2024-10-15 ENCOUNTER — TELEPHONE (OUTPATIENT)
Dept: HEMATOLOGY/ONCOLOGY | Facility: CLINIC | Age: 49
End: 2024-10-15
Payer: COMMERCIAL

## 2024-10-15 ENCOUNTER — TELEPHONE (OUTPATIENT)
Dept: ADMISSION | Facility: HOSPITAL | Age: 49
End: 2024-10-15
Payer: COMMERCIAL

## 2024-10-15 ENCOUNTER — APPOINTMENT (OUTPATIENT)
Dept: HEMATOLOGY/ONCOLOGY | Facility: CLINIC | Age: 49
End: 2024-10-15
Payer: COMMERCIAL

## 2024-10-15 LAB
ANION GAP SERPL CALC-SCNC: 9 MMOL/L (ref 10–20)
BASOPHILS # BLD AUTO: 0.06 X10*3/UL (ref 0–0.1)
BASOPHILS NFR BLD AUTO: 0.6 %
BUN SERPL-MCNC: 9 MG/DL (ref 6–23)
CALCIUM SERPL-MCNC: 6.9 MG/DL (ref 8.6–10.3)
CHLORIDE SERPL-SCNC: 99 MMOL/L (ref 98–107)
CO2 SERPL-SCNC: 30 MMOL/L (ref 21–32)
CREAT SERPL-MCNC: 0.42 MG/DL (ref 0.5–1.3)
EGFRCR SERPLBLD CKD-EPI 2021: >90 ML/MIN/1.73M*2
EOSINOPHIL # BLD AUTO: 0.03 X10*3/UL (ref 0–0.7)
EOSINOPHIL NFR BLD AUTO: 0.3 %
ERYTHROCYTE [DISTWIDTH] IN BLOOD BY AUTOMATED COUNT: 18.3 % (ref 11.5–14.5)
FUNGUS SPEC CULT: NORMAL
FUNGUS SPEC FUNGUS STN: NORMAL
GLUCOSE SERPL-MCNC: 86 MG/DL (ref 74–99)
HCT VFR BLD AUTO: 22.8 % (ref 41–52)
HGB BLD-MCNC: 7.1 G/DL (ref 13.5–17.5)
IMM GRANULOCYTES # BLD AUTO: 0.14 X10*3/UL (ref 0–0.7)
IMM GRANULOCYTES NFR BLD AUTO: 1.4 % (ref 0–0.9)
LABORATORY COMMENT REPORT: NORMAL
LABORATORY COMMENT REPORT: NORMAL
LYMPHOCYTES # BLD AUTO: 3.8 X10*3/UL (ref 1.2–4.8)
LYMPHOCYTES NFR BLD AUTO: 36.9 %
MCH RBC QN AUTO: 28.4 PG (ref 26–34)
MCHC RBC AUTO-ENTMCNC: 31.1 G/DL (ref 32–36)
MCV RBC AUTO: 91 FL (ref 80–100)
MONOCYTES # BLD AUTO: 0.44 X10*3/UL (ref 0.1–1)
MONOCYTES NFR BLD AUTO: 4.3 %
NEUTROPHILS # BLD AUTO: 5.83 X10*3/UL (ref 1.2–7.7)
NEUTROPHILS NFR BLD AUTO: 56.5 %
NRBC BLD-RTO: 0 /100 WBCS (ref 0–0)
PATH REPORT.COMMENTS IMP SPEC: NORMAL
PATH REPORT.FINAL DX SPEC: NORMAL
PATH REPORT.GROSS SPEC: NORMAL
PATH REPORT.RELEVANT HX SPEC: NORMAL
PATH REPORT.TOTAL CANCER: NORMAL
PLATELET # BLD AUTO: 405 X10*3/UL (ref 150–450)
POTASSIUM SERPL-SCNC: 4.1 MMOL/L (ref 3.5–5.3)
RBC # BLD AUTO: 2.5 X10*6/UL (ref 4.5–5.9)
SODIUM SERPL-SCNC: 134 MMOL/L (ref 136–145)
WBC # BLD AUTO: 10.3 X10*3/UL (ref 4.4–11.3)

## 2024-10-15 PROCEDURE — 85025 COMPLETE CBC W/AUTO DIFF WBC: CPT | Performed by: HOSPITALIST

## 2024-10-15 PROCEDURE — 1100000001 HC PRIVATE ROOM DAILY

## 2024-10-15 PROCEDURE — 99232 SBSQ HOSP IP/OBS MODERATE 35: CPT | Performed by: CLINICAL NURSE SPECIALIST

## 2024-10-15 PROCEDURE — 99232 SBSQ HOSP IP/OBS MODERATE 35: CPT | Performed by: HOSPITALIST

## 2024-10-15 PROCEDURE — 2500000001 HC RX 250 WO HCPCS SELF ADMINISTERED DRUGS (ALT 637 FOR MEDICARE OP): Performed by: INTERNAL MEDICINE

## 2024-10-15 PROCEDURE — 80048 BASIC METABOLIC PNL TOTAL CA: CPT | Performed by: HOSPITALIST

## 2024-10-15 PROCEDURE — 2500000004 HC RX 250 GENERAL PHARMACY W/ HCPCS (ALT 636 FOR OP/ED): Performed by: INTERNAL MEDICINE

## 2024-10-15 RX ORDER — MIDODRINE HYDROCHLORIDE 5 MG/1
5 TABLET ORAL EVERY 8 HOURS SCHEDULED
Status: DISCONTINUED | OUTPATIENT
Start: 2024-10-15 | End: 2024-10-18 | Stop reason: HOSPADM

## 2024-10-15 ASSESSMENT — COGNITIVE AND FUNCTIONAL STATUS - GENERAL
MOVING FROM LYING ON BACK TO SITTING ON SIDE OF FLAT BED WITH BEDRAILS: A LITTLE
MOBILITY SCORE: 12
MOVING TO AND FROM BED TO CHAIR: A LOT
STANDING UP FROM CHAIR USING ARMS: A LOT
HELP NEEDED FOR BATHING: A LITTLE
DRESSING REGULAR LOWER BODY CLOTHING: A LITTLE
CLIMB 3 TO 5 STEPS WITH RAILING: TOTAL
TURNING FROM BACK TO SIDE WHILE IN FLAT BAD: A LITTLE
TOILETING: A LITTLE
DAILY ACTIVITIY SCORE: 21
WALKING IN HOSPITAL ROOM: TOTAL

## 2024-10-15 ASSESSMENT — PAIN - FUNCTIONAL ASSESSMENT: PAIN_FUNCTIONAL_ASSESSMENT: 0-10

## 2024-10-15 ASSESSMENT — PAIN SCALES - GENERAL: PAINLEVEL_OUTOF10: 0 - NO PAIN

## 2024-10-15 NOTE — TELEPHONE ENCOUNTER
Pt wants to ensure lab orders are in place for upcoming infusion on Thur.   Plan is for DC from hospital tomorrow. He will have in patient team draw labs today if orders are placed.

## 2024-10-15 NOTE — PROGRESS NOTES
"Physical Therapy                 Therapy Communication Note    Patient Name: Yon Lawrence \"Vladimir\"  MRN: [id number]  Department: [facility] A 5  Room: [address] [place], [place] [address]  Today's Date: 10/15/2024     Discipline: Physical Therapy    Missed Visit Reason: Missed Visit Reason: Patient refused (Pt just dozing off for a nap, and politely declines PT tx at this time.)    Missed Time: Attempt  "

## 2024-10-15 NOTE — CARE PLAN
The patient's goals for the shift include Pt. will have a safe, restful and uneventful evening    The clinical goals for the shift include pt remain HD stable    Problem: Chronic Conditions and Co-morbidities  Goal: Patient's chronic conditions and co-morbidity symptoms are monitored and maintained or improved  Outcome: Progressing     Problem: Fall/Injury  Goal: Use assistive devices by end of the shift  Outcome: Progressing     Problem: Skin  Goal: Participates in plan/prevention/treatment measures  Outcome: Progressing

## 2024-10-15 NOTE — CARE PLAN
"Potential facilties are       [facility] [place] (4 star, has accepted, 50 min from family, cannot  transport 10/17)      [facility] (3 star, has accepted, 47 minutes away from family, cannot transport 10/17)      [facility] (4 star, 40 min from family - reports that \"Awaiting on clairification if we would be responsible for cost of [place] . Family would have to transport due to we have no transportation.\")    [facility] (5 star, 49 minutes from family, reviewing)    [facility]  (5 star, 31 minutes from family, reviewing)    [facility]  (asking again if they have a  bed)    JEZ Montez, LSW          [id number]      10-15-24  [facility] has a  bed available tomorrow  Precert  is still needed  Patient told - he said he would  prefer this facility over the   others above  due to distance. He  will inform wife. [facility]     Facility needs to start  precert -  they  were told    7000 needed. Covid test needed day of  dc.    Other facilities were updated.    JEZ Montez, LSW      "

## 2024-10-15 NOTE — PROGRESS NOTES
"Yon Lawrence \"Vladimir\" is a 49 y.o. male on day 7 of admission presenting with Large pleural effusion.  Patient with recently diagnosed metastatic colon cancer, DVT on Lovenox, who was sent from his physician office for low blood pressure and not feeling well. Symptoms also included as sudden onset SOB x 1 day  and LE edema. In the ED work up revealed WBC 15.9, Hb 8.1, Na 127. Admitted to SDU for further management. Of note he found to have b/l moderate to large pleural effusions, for which pulmonary is consulted.     Subjective   Seen and examined with no acute events overnight.  Patient was resting with his eyes closed.  Stable on room air.  Reports feeling \"all right\".  Denies active signs or symptoms of acute illness/infection.     Objective   Physical Exam     Constitutional:   Cachectic, NAD, cooperative  HENT: Atraumatic, moist mucous membranes  Eyes:  nonicteric  Neck: Supple  Cardiovascular: S1, S2 normal, regular, HR 80s, no murmur appreciated  Pulmonary: Fair air entry with clear lung fields; no conversational dyspnea noted  Abdominal: Soft, nontender, nondistended, + BS  Musculoskeletal: Minimal active movement with generalized weakness and decreased muscle mass  Extremities:   +1 pitting BLE edema, +1 pitting dependent edema bilateral antecubital area  Lymphadenopathy: No nuchal LAP  Skin: Warm and dry with areas of ecchymosis  Neurological: Alert and oriented with clear and appropriate speech; no focal deficits noted  Psychiatric:     Appropriate mood and behavior       Scheduled medications  enoxaparin, 80 mg, subcutaneous, q12h  midodrine, 5 mg, oral, q8h [facility]  perflutren lipid microspheres, 0.5-10 mL of dilution, intravenous, Once in imaging    Continuous medications     PRN medications  PRN medications: acetaminophen, acetaminophen, melatonin, ondansetron ODT **OR** ondansetron     Last Recorded Vitals  Blood pressure 98/63, pulse 90, temperature 37 °C (98.6 °F), temperature source Oral, resp. rate " "16, height 1.854 m (6' 1\"), weight 80.7 kg (178 lb), SpO2 94%.    Intake/Output last 3 Shifts:  I/O last 3 completed shifts:  In: 240 (3 mL/kg) [P.O.:240]  Out: 3900 (48.3 mL/kg) [Urine:3900 (1.3 mL/kg/hr)]  Weight: 80.7 kg     Relevant Results  Results for orders placed or performed during the hospital encounter of 10/07/24 (from the past 24 hour(s))   CBC and Auto Differential   Result Value Ref Range    WBC 10.3 4.4 - 11.3 x10*3/uL    nRBC 0.0 0.0 - 0.0 /100 WBCs    RBC 2.50 (L) 4.50 - 5.90 x10*6/uL    Hemoglobin 7.1 (L) 13.5 - 17.5 g/dL    Hematocrit 22.8 (L) 41.0 - 52.0 %    MCV 91 80 - 100 fL    MCH 28.4 26.0 - 34.0 pg    MCHC 31.1 (L) 32.0 - 36.0 g/dL    RDW 18.3 (H) 11.5 - 14.5 %    Platelets 405 150 - 450 x10*3/uL    Neutrophils % 56.5 40.0 - 80.0 %    Immature Granulocytes %, Automated 1.4 (H) 0.0 - 0.9 %    Lymphocytes % 36.9 13.0 - 44.0 %    Monocytes % 4.3 2.0 - 10.0 %    Eosinophils % 0.3 0.0 - 6.0 %    Basophils % 0.6 0.0 - 2.0 %    Neutrophils Absolute 5.83 1.20 - 7.70 x10*3/uL    Immature Granulocytes Absolute, Automated 0.14 0.00 - 0.70 x10*3/uL    Lymphocytes Absolute 3.80 1.20 - 4.80 x10*3/uL    Monocytes Absolute 0.44 0.10 - 1.00 x10*3/uL    Eosinophils Absolute 0.03 0.00 - 0.70 x10*3/uL    Basophils Absolute 0.06 0.00 - 0.10 x10*3/uL   Basic Metabolic Panel   Result Value Ref Range    Glucose 86 74 - 99 mg/dL    Sodium 134 (L) 136 - 145 mmol/L    Potassium 4.1 3.5 - 5.3 mmol/L    Chloride 99 98 - 107 mmol/L    Bicarbonate 30 21 - 32 mmol/L    Anion Gap 9 (L) 10 - 20 mmol/L    Urea Nitrogen 9 6 - 23 mg/dL    Creatinine 0.42 (L) 0.50 - 1.30 mg/dL    eGFR >90 >60 mL/min/1.73m*2    Calcium 6.9 (L) 8.6 - 10.3 mg/dL     No results found.   Assessment/Plan   Assessment & Plan  Large pleural effusion    49 with recently diagnosed metastatic colon cancer, DVT on Lovenox, who was sent from his physician office for low blood pressure and not feeling well. Symptoms also included as sudden onset SOB x 1 day "  and LE edema. In the ED work up revealed WBC 15.9, Hb 8.1, Na 127. Admitted to SDU for further management. Of note he found to have b/l moderate to large pleural effusions, for which pulmonary is consulted.      Respiratory failure: acute with hypoxia, due to below. Overall mild and now is resolved.       Supplemental O2 as needed      Home O2 evaluation before DC      BPH with IS, Acapella      Management of the individual causes as below     Pleural effusions: bilateral, likely due to severe protein calorie malnutrition as echo on 10/8, no significant L sided abnormalities. Serum albumin 1.5. However given h/o cancer and overall new from August 2024, will need diagnostic/therapeutic tap       S/p b/l thoracentesis, analysis consistent with transudate; left 1.1 L, right 550 mL       Will FU bacterial, fungal culture and cytology: cultures NTD, cytology atypical cells        Would refrain from further thoracentesis as this is likely due to malnutrition       Dietician consult to improve nutritional status.      Bilateral GGO: upper lobe predominant. Given also leukocytosis concerning for infectious etiology. However DD also includes pembrolizumab lung toxicity. Pro-calcitonin WNL      Antibiotics stopped.       FU with ID recommendations      If worsening might need further work up/treatment for pembrolizumab lung toxicity; repeat non-contrast chest CT in 8 weeks for reevaluation of lung parenchyma        History of VTE:        Continue Lovenox    Message with Dr. Molina today on 10/16.  Given that pulmonary is no longer adding to the patient's care pulmonary will sign off.  Please reconsult as needed, thank you.     Pulmonary will FU while in house.   Mallory Smith, FARIHA-CNS

## 2024-10-15 NOTE — TELEPHONE ENCOUNTER
Spoke with patient via video call on 10/14/24 to review recent events and plan of care. He states that he is feeling much better since having pleural effusions drained. He remains very weak and cannot ambulate without assistance. For this reason, rehab has been recommended. He is very concerned about getting his next cycle of pembrolizumab. Explained that this cannot be administered in the hospital but that I would connect with social work this morning to work on making sure he can receive the treatments while at rehab.

## 2024-10-16 ENCOUNTER — APPOINTMENT (OUTPATIENT)
Dept: UROLOGY | Facility: CLINIC | Age: 49
End: 2024-10-16
Payer: COMMERCIAL

## 2024-10-16 LAB
ACID FAST STN SPEC: NORMAL
MYCOBACTERIUM SPEC CULT: NORMAL

## 2024-10-16 PROCEDURE — 1100000001 HC PRIVATE ROOM DAILY

## 2024-10-16 PROCEDURE — 97530 THERAPEUTIC ACTIVITIES: CPT | Mod: GO

## 2024-10-16 PROCEDURE — 97530 THERAPEUTIC ACTIVITIES: CPT | Mod: GP,CQ | Performed by: PHYSICAL THERAPY ASSISTANT

## 2024-10-16 PROCEDURE — 2500000004 HC RX 250 GENERAL PHARMACY W/ HCPCS (ALT 636 FOR OP/ED): Performed by: INTERNAL MEDICINE

## 2024-10-16 PROCEDURE — 2500000001 HC RX 250 WO HCPCS SELF ADMINISTERED DRUGS (ALT 637 FOR MEDICARE OP): Performed by: INTERNAL MEDICINE

## 2024-10-16 PROCEDURE — 97116 GAIT TRAINING THERAPY: CPT | Mod: GP,CQ | Performed by: PHYSICAL THERAPY ASSISTANT

## 2024-10-16 ASSESSMENT — COGNITIVE AND FUNCTIONAL STATUS - GENERAL
MOVING FROM LYING ON BACK TO SITTING ON SIDE OF FLAT BED WITH BEDRAILS: A LITTLE
MOBILITY SCORE: 15
TURNING FROM BACK TO SIDE WHILE IN FLAT BAD: A LITTLE
MOBILITY SCORE: 17
MOVING TO AND FROM BED TO CHAIR: A LITTLE
MOVING TO AND FROM BED TO CHAIR: A LITTLE
PERSONAL GROOMING: A LITTLE
STANDING UP FROM CHAIR USING ARMS: A LOT
CLIMB 3 TO 5 STEPS WITH RAILING: TOTAL
DRESSING REGULAR UPPER BODY CLOTHING: A LITTLE
STANDING UP FROM CHAIR USING ARMS: A LOT
CLIMB 3 TO 5 STEPS WITH RAILING: A LOT
HELP NEEDED FOR BATHING: A LITTLE
WALKING IN HOSPITAL ROOM: A LOT
CLIMB 3 TO 5 STEPS WITH RAILING: A LOT
WALKING IN HOSPITAL ROOM: A LITTLE
HELP NEEDED FOR BATHING: A LOT
DAILY ACTIVITIY SCORE: 22
STANDING UP FROM CHAIR USING ARMS: A LOT
DRESSING REGULAR LOWER BODY CLOTHING: A LOT
DAILY ACTIVITIY SCORE: 19
TOILETING: A LOT
TURNING FROM BACK TO SIDE WHILE IN FLAT BAD: A LITTLE
MOVING FROM LYING ON BACK TO SITTING ON SIDE OF FLAT BED WITH BEDRAILS: A LITTLE
DRESSING REGULAR UPPER BODY CLOTHING: A LITTLE
TOILETING: A LITTLE
HELP NEEDED FOR BATHING: A LITTLE
DRESSING REGULAR LOWER BODY CLOTHING: A LITTLE
WALKING IN HOSPITAL ROOM: A LITTLE
DAILY ACTIVITIY SCORE: 17
MOVING TO AND FROM BED TO CHAIR: A LITTLE
DRESSING REGULAR LOWER BODY CLOTHING: A LITTLE
TURNING FROM BACK TO SIDE WHILE IN FLAT BAD: A LITTLE
MOBILITY SCORE: 15

## 2024-10-16 ASSESSMENT — PAIN SCALES - GENERAL
PAINLEVEL_OUTOF10: 0 - NO PAIN

## 2024-10-16 ASSESSMENT — PAIN - FUNCTIONAL ASSESSMENT
PAIN_FUNCTIONAL_ASSESSMENT: 0-10

## 2024-10-16 NOTE — PROGRESS NOTES
10/16/24 0909   Discharge Planning   Expected Discharge Disposition SNF   Does the patient need discharge transport arranged? Yes   RoundTrip coordination needed? Yes     SW spoke to  Deysi   at  [facility]. She  confirmed  that they  have  what they  need on the  case  but that  they have not started auth yet. She will update  this writer  as  soon as  possible.     Deysi  asked if the pt  had a  chest tube -  per   [facility] the pt  does not.     JEZ Montez, NATALIIA          10-16-24    1140  Updated therapy  notes are needed per  [facility] - therapy  aware, will see pt this  afternoon      JEZ Montez LSW            10-16-24      1258  Wife given an update  telephonically   She reports that she spoke to pt's oncologist  who wants pt  to get his  infusion appt - if not  tomorrow, then  Friday or Monday    SW  spoke to  Nereyda  at the infusion  center - she moved the  pt's  appt to  1pm  Friday. Dr Finn  was informed. Facility  was informed. Patient  was informed  telephonically  - he will tell wife      JEZ Montez LSW          10-16-24 1635  Updated  PT  note sent to  JEZ Stapleton LSW          10-16-24           1650  OT  note  sent to     JEZ Stapleton LSW

## 2024-10-16 NOTE — PROGRESS NOTES
Between 7AM-7PM please message me via Epic Secure Chat.  After 7PM please page Nocturnist on call.    [facility] Hospitalist Progress Note      Yon Lawrence    :  1975(49 y.o.)    MRN:  [id number]  Date: 10/15/24     Assessment and Plan:     Bilateral Pleural effusions  - s/p thoracentesis with 1.1L removed from left side and 550ml from right side; fluid analysis consistent with transudate. Cultures NGTD, cytology with atypical cells  - pulmonology consulted and recommends against further thoracentesis as it is suspected secondary to malnutrition    Hypotension  -Chronically low, suspect 2/2 decreased oncotic pressure in the setting of low albumin   - started on midodrine this admission, bp stable    Urinary retention  - barksdale placed in ED, failed VT. 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 Unable to absorb nutrition well.   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 Hepatology recs: consider liver biopsy if LFTs rise following next pembro dose (poss ICI hepatitis)    Bilateral GGO  - procal low, fluid culture negative  - repeat non-contrast chest CT in 8 weeks for reevaluation of lung parenchyma. If worsening might need further work up/treatment for pembrolizumab lung toxicity    Microcytic anemia  - work up last admission consistent with anemia of chronic disease    DVT Prophylaxis:  subcutaneous Lovenox    Disposition: medically stable for discharge, await placement    Electronically signed by Dre Molina DO on 10/15/24 at 9:57 PM     Subjective:      Interval History:   Vitals and chart notes from overnight reviewed.   No acute issues overnight.   Patient seen and evaluated at bedside.   No complaints today. No chest pain or shortness of breath. No lightheadedness or dizziness. Awaiting auth for SNF    Review of Systems:   Other than patient's chronic conditions and those complaints in the history above, the rest of the 10 systems review were done and were negative.     Current medications:  Scheduled Meds:enoxaparin, 80 mg, subcutaneous, q12h  midodrine, 5 mg, oral, q8h [facility]  perflutren lipid microspheres, 0.5-10 mL of dilution, intravenous, Once in imaging      Continuous Infusions:   PRN Meds:PRN medications: acetaminophen, acetaminophen, melatonin, ondansetron ODT **OR** ondansetron      Objective:     Heart Rate:  [75-93]   Temp:  [36.8 °C (98.2 °F)-37.1 °C (98.8 °F)]   Resp:  [16]   BP: (89-98)/(60-66)   SpO2:  [94 %-95 %]          Physical Exam  Vitals and nursing note reviewed.   HENT:      Mouth/Throat:      Mouth: Mucous membranes are moist.      Pharynx: Oropharynx is clear.   Cardiovascular:      Rate and Rhythm: Normal rate and regular rhythm.   Pulmonary:      Effort: Pulmonary effort is normal.   Abdominal:      Palpations: Abdomen is soft.   Neurological:      Mental Status: He is alert.         Labs:   Lab Results   Component Value Date     (L) 10/15/2024    K 4.1 10/15/2024    CL 99 10/15/2024    CO2 30 10/15/2024    BUN 9 10/15/2024    CREATININE 0.42 (L) 10/15/2024    GLUCOSE 86 10/15/2024    CALCIUM 6.9 (L) 10/15/2024    PROT <3.0 (L) 10/08/2024    BILITOT 0.6 10/08/2024    ALKPHOS 102 10/08/2024    AST 17 10/08/2024    ALT 40 10/08/2024       Lab Results   Component Value Date    WBC 10.3 10/15/2024    HGB 7.1 (L) 10/15/2024    HCT 22.8 (L) 10/15/2024    MCV 91  10/15/2024     10/15/2024

## 2024-10-16 NOTE — PROGRESS NOTES
"Physical Therapy    Physical Therapy Treatment    Patient Name: Yon Lawrence \"Vladimir\"  MRN: [id number]  Department: [place] [address]  Room: [address] [place] [address]  Today's Date: 10/16/2024  Time Calculation  Start Time: 1428  Stop Time: 1454  Time Calculation (min): 26 min         Assessment/Plan   PT Assessment  End of Session Communication: Bedside nurse  Assessment Comment:  (pt demo fair kirstie to activity, difficulty with increased activity)  End of Session Patient Position: Bed, 3 rail up, Alarm on  PT Plan  Inpatient/Swing Bed or Outpatient: Inpatient  PT Plan  Treatment/Interventions: Bed mobility, Transfer training, Gait training, Therapeutic activity  PT Plan: Ongoing PT  PT Frequency: 3 times per week  PT Discharge Recommendations: Moderate intensity level of continued care  Equipment Recommended upon Discharge:  (TBD)  PT Recommended Transfer Status: Assist x2, Assistive device (min assist of 2 with walker to stand abd take a few steps)  PT - OK to Discharge: Yes (per PT POC)      General Visit Information:   PT  Visit  PT Received On: 10/16/24  General  Reason for Referral: Pt admitted with hypotension, BLE edema and large pleral effusion. Recent dx of colon CA with mets.  Prior to Session Communication: Bedside nurse  Patient Position Received: Bed, 3 rail up, Alarm on  Preferred Learning Style: auditory, kinesthetic, visual, verbal, written  General Comment:  (pt agreeable to tx.)    Subjective   Precautions:       Vital Signs (Past 2hrs)        Date/Time Vitals Session Patient Position Pulse Resp SpO2 BP MAP (mmHg)    10/16/24 1428 --  --  105  --  --  98/71  --                         Objective   Pain:  Pain Assessment  Pain Assessment: 0-10  0-10 (Numeric) Pain Score: 0 - No pain  Cognition:  Cognition  Orientation Level: Oriented X4  Coordination:     Postural Control:     Extremity/Trunk Assessments:    Activity Tolerance:     Treatments:            Bed Mobility  Bed Mobility: Yes  Bed Mobility 1  Bed Mobility 1: Supine to " sitting, Sitting to supine  Level of Assistance 1: Minimum assistance, Minimal verbal cues, Minimal tactile cues  Bed Mobility Comments 1:  (pt req increased cues for proper hand placement.)    Ambulation/Gait Training  Ambulation/Gait Training Performed: Yes  Ambulation/Gait Training 1  Surface 1: Level tile  Device 1: Rolling walker  Gait Support Devices: Gait belt  Assistance 1: Minimum assistance, Minimal verbal cues  Quality of Gait 1: Decreased step length, Shuffling gait, Forward flexed posture, Soft knee(s)  Comments/Distance (ft) 1: 15ft (pt demo slwo short step to pattern, low endurance, pt became dizzi and wiht increased HR,RNB notified. pt was brought to supine position in bed.)  Transfers  Transfer: Yes  Transfer 1  Transfer From 1: Sit to, Stand to  Technique 1: Sit to stand, Stand to sit  Transfer Device 1: Walker, Gait belt  Transfer Level of Assistance 1: Moderate assistance, Minimal verbal cues, Minimal tactile cues  Trials/Comments 1: 3 (pt req increased assistance to complete task)         Outcome Measures:  [facility] Basic Mobility  Turning from your back to your side while in a flat bed without using bedrails: A little  Moving from lying on your back to sitting on the side of a flat bed without using bedrails: A little  Moving to and from bed to chair (including a wheelchair): A little  Standing up from a chair using your arms (e.g. wheelchair or bedside chair): A lot  To walk in hospital room: A little  Climbing 3-5 steps with railing: Total  Basic Mobility - Total Score: 15    Education Documentation  Body Mechanics, taught by Matthieu Coker PTA at 10/16/2024  4:18 PM.  Learner: Patient  Readiness: Acceptance  Method: Explanation  Response: Needs Reinforcement    Home Exercise Program, taught by Matthieu Coker PTA at 10/16/2024  4:18 PM.  Learner: Patient  Readiness: Acceptance  Method: Explanation  Response: Needs Reinforcement    Mobility Training, taught by Matthieu Coker  PTA at 10/16/2024  4:18 PM.  Learner: Patient  Readiness: Acceptance  Method: Explanation  Response: Needs Reinforcement    Education Comments  No comments found.        OP EDUCATION:       Encounter Problems       Encounter Problems (Active)       Balance       STG - Maintains static standing balance with ww with mod A x 2 x 3 minutes (Progressing)       Start:  10/09/24    Expected End:  10/23/24       INTERVENTIONS:  1. Practice standing with minimal support.  2. Educate patient about standing tolerance.  3. Educate patient about independence with gait, transfers, and ADL's.  4. Educate patient about use of assistive device.  5. Educate patient about self-directed care.            Mobility       Increase BLE strength to attain functional goals achieved through supine and seated TE.  (Progressing)       Start:  10/09/24    Expected End:  10/23/24            STG - Patient will ambulate >25' with RW and CGA.       Start:  10/11/24    Expected End:  10/23/24                   PT Transfers       STG - Transfer from bed to chair with ww with mod A x 2 (Met)       Start:  10/09/24    Expected End:  10/23/24    Resolved:  10/11/24         STG - Patient to transfer to and from sit to supine with min A (Progressing)       Start:  10/09/24    Expected End:  10/23/24            STG - Patient will transfer sit to and from stand with mod A x 2 with ww (Met)       Start:  10/09/24    Expected End:  10/23/24    Resolved:  10/11/24         STG - Transfer from bed to chair with CGA of 1 and RW. (Progressing)       Start:  10/11/24    Expected End:  10/23/24                STG - Transfer from sit to/from stand from EOB and chair with arms with CGA of 1 and RW.  (Progressing)       Start:  10/11/24    Expected End:  10/23/24

## 2024-10-16 NOTE — PROGRESS NOTES
"Occupational Therapy    Occupational Therapy Treatment    Name: Yon Lawrence \"Vladimir\"  MRN: [id number]  Department: [place] [address]  Room: [address] [place] [address]  Date: 10/16/24  Time Calculation  Start Time: 1450  Stop Time: 1505  Time Calculation (min): 15 min    Assessment:  OT Assessment: Pt seen for OT  tx. Pt contiinues with decreased endurance, improved from eval. Pt continues to require assist with LE ADLS and mobility. Cont to rec MOD intensity therapy at d/c  Prognosis: Good  Barriers to Discharge: None  Evaluation/Treatment Tolerance: Patient limited by fatigue  Medical Staff Made Aware: Yes  End of Session Communication: Bedside nurse  End of Session Patient Position: Up in chair, Alarm off, caregiver present  Plan:  Treatment Interventions: ADL retraining, Functional transfer training, Endurance training  OT Frequency: 3 times per week  OT Discharge Recommendations: Moderate intensity level of continued care  Equipment Recommended upon Discharge: Wheeled walker  OT Recommended Transfer Status: Moderate assist  OT - OK to Discharge: Yes    Subjective   Previous Visit Info:     General:  General  Reason for Referral: Pt admitted with hypotension, BLE edema and large pleral effusion. Recent dx of colon CA with mets.  Prior to Session Communication: Bedside nurse  Patient Position Received: Bed, 3 rail up, Alarm on  Preferred Learning Style: auditory, kinesthetic, verbal, visual, written  General Comment: Pt agreeable to OT tx  Precautions:  Medical Precautions: Fall precautions    Vital Signs (Past 2hrs)                Pain Assessment:  Pain Assessment  Pain Assessment: 0-10  0-10 (Numeric) Pain Score: 0 - No pain     Objective   Cognition:  Overall Cognitive Status: Within Functional Limits  Orientation Level: Oriented X4  Attention: Within Functional Limits  Memory: Within Funtional Limits  Insight: Within function limits  Impulsive: Within functional limits  Activities of Daily Living:    LE Dressing  Sock Level of " Assistance: Maximum assistance    Bed Mobility/Transfers: Bed Mobility  Bed Mobility: Yes  Bed Mobility 1  Bed Mobility 1: Supine to sitting  Level of Assistance 1: Minimum assistance (assit to bring trunk to upright position)    Transfers  Transfer: Yes  Transfer 1  Technique 1: Sit to stand, Stand to sit  Transfer Device 1: Walker, Gait belt  Transfer Level of Assistance 1: Moderate assistance, Minimal verbal cues  Trials/Comments 1: Pt attempted to do transfer on his own without additional assist      Functional Mobility:  Functional Mobility 1  Surface 1: Level tile  Device 1: Rolling walker  Functional Mobility Support Devices: Gait belt  Assistance 1: Minimum assistance  Quality of Functional Mobility 1: Narrow base of support  Comments 1: pt ambulated 12 feet in room, reports fatigue and SOB with activity  Sitting Balance:  Static Sitting Balance  Static Sitting-Balance Support: Feet supported  Static Sitting-Level of Assistance: Close supervision  Dynamic Sitting Balance  Dynamic Sitting-Balance Support: Feet supported  Dynamic Sitting-Level of Assistance: Close supervision  Dynamic Sitting-Balance: Reaching for objects  Standing Balance:  Static Standing Balance  Static Standing-Balance Support: Bilateral upper extremity supported  Static Standing-Level of Assistance: Minimum assistance  Dynamic Standing Balance  Dynamic Standing-Balance Support: Bilateral upper extremity supported  Dynamic Standing-Level of Assistance: Minimum assistance    Therapy/Activity: Therapeutic Activity  Therapeutic Activity Performed: Yes  Therapeutic Activity 1: Pt continues to use pursed lip breathing with activity. Reports difficulty with UE ADLS. pt unable to complete LE ADLS     Outcome Measures:  [facility] Daily Activity  Putting on and taking off regular lower body clothing: A lot  Bathing (including washing, rinsing, drying): A lot  Putting on and taking off regular upper body clothing: A little  Toileting, which includes  using toilet, bedpan or urinal: A lot  Taking care of personal grooming such as brushing teeth: None  Eating Meals: None  Daily Activity - Total Score: 17        Education Documentation  Handouts, taught by Jana Velasco OT at 10/16/2024  4:37 PM.  Learner: Significant Other, Patient  Readiness: Acceptance  Method: Explanation, Demonstration  Response: Verbalizes Understanding, Needs Reinforcement    Home Exercise Program, taught by Jana Velasco OT at 10/16/2024  4:37 PM.  Learner: Significant Other, Patient  Readiness: Acceptance  Method: Explanation, Demonstration  Response: Verbalizes Understanding, Needs Reinforcement    ADL Training, taught by Jana Velasco OT at 10/16/2024  4:37 PM.  Learner: Significant Other, Patient  Readiness: Acceptance  Method: Explanation, Demonstration  Response: Verbalizes Understanding, Needs Reinforcement    Education Comments  No comments found.      Goals:  Encounter Problems       Encounter Problems (Active)       ADLs       Patient will perform UB and LB bathing 75% with moderate assist level of assistance and adaptive equipment prn . (Progressing)       Start:  10/09/24    Expected End:  10/23/24            Patient with complete upper body dressing with contact guard assist level of assistance donning and doffing all UE clothes with no adaptive equipment while supported sitting (Progressing)       Start:  10/09/24    Expected End:  10/23/24            Patient with complete lower body dressing with moderate assist level of assistance donning and doffing all LE clothes  with reacher, shoe horn, sock-aid, and dressing stick  while supported sitting (Progressing)       Start:  10/09/24    Expected End:  10/23/24            Pt will tolerate 30 min OT tx session w/o subj/obj c/o fatigue/SOB with activity to increase independence  (Progressing)       Start:  10/09/24    Expected End:  10/23/24               EXERCISE/STRENGTHENING       Patient will complete BUE  exercises for 3 sets and 10 reps in order to improve strength and activity for ADL performance.  (Progressing)       Start:  10/09/24    Expected End:  10/23/24               TRANSFERS       Patient will perform bed mobility contact guard assist level of assistance and bed rails in order to improve safety and independence with mobility (Progressing)       Start:  10/09/24    Expected End:  10/23/24            Patient will complete functional transfer to stand  with front wheeled walker with moderate assist level of assistance. (Progressing)       Start:  10/09/24    Expected End:  10/23/24

## 2024-10-16 NOTE — CARE PLAN
The patient's goals for the shift include Pt. will have a safe, restful and uneventful evening    The clinical goals for the shift include Pt will remain HDS    Problem: Discharge Planning  Goal: Discharge to home or other facility with appropriate resources  Outcome: Progressing     Problem: Chronic Conditions and Co-morbidities  Goal: Patient's chronic conditions and co-morbidity symptoms are monitored and maintained or improved  Outcome: Progressing     Problem: Fall/Injury  Goal: Use assistive devices by end of the shift  Outcome: Progressing  Goal: Pace activities to prevent fatigue by end of the shift  Outcome: Progressing     Problem: Nutrition  Goal: Less than 5 days NPO/clear liquids  Outcome: Progressing  Goal: Oral intake greater 75%  Outcome: Progressing  Goal: Consume prescribed supplement  Outcome: Progressing  Goal: Adequate PO fluid intake  Outcome: Progressing  Goal: Nutrition support goals are met within 48 hrs  Outcome: Progressing  Goal: Nutrition support is meeting 75% of nutrient needs  Outcome: Progressing  Goal: Tube feed tolerance  Outcome: Progressing  Goal: BG  mg/dL  Outcome: Progressing  Goal: Lab values WNL  Outcome: Progressing  Goal: Electrolytes WNL  Outcome: Progressing  Goal: Maintain stable weight  Outcome: Progressing  Goal: Reduce weight from edema/fluid  Outcome: Progressing  Goal: Gradual weight gain  Outcome: Progressing  Goal: Improve ostomy output  Outcome: Progressing     Problem: Skin  Goal: Participates in plan/prevention/treatment measures  Outcome: Progressing  Goal: Prevent/manage excess moisture  Outcome: Progressing  Goal: Prevent/minimize sheer/friction injuries  Outcome: Progressing  Goal: Promote/optimize nutrition  Outcome: Progressing  Goal: Promote skin healing  Outcome: Progressing

## 2024-10-17 ENCOUNTER — APPOINTMENT (OUTPATIENT)
Dept: HEMATOLOGY/ONCOLOGY | Facility: CLINIC | Age: 49
End: 2024-10-17
Payer: COMMERCIAL

## 2024-10-17 LAB
ABO GROUP (TYPE) IN BLOOD: NORMAL
ANTIBODY SCREEN: NORMAL
ERYTHROCYTE [DISTWIDTH] IN BLOOD BY AUTOMATED COUNT: 19.4 % (ref 11.5–14.5)
HCT VFR BLD AUTO: 19.7 % (ref 41–52)
HGB BLD-MCNC: 6.4 G/DL (ref 13.5–17.5)
MCH RBC QN AUTO: 30.2 PG (ref 26–34)
MCHC RBC AUTO-ENTMCNC: 32.5 G/DL (ref 32–36)
MCV RBC AUTO: 93 FL (ref 80–100)
NRBC BLD-RTO: 0 /100 WBCS (ref 0–0)
PLATELET # BLD AUTO: 390 X10*3/UL (ref 150–450)
RBC # BLD AUTO: 2.12 X10*6/UL (ref 4.5–5.9)
RH FACTOR (ANTIGEN D): NORMAL
SARS-COV-2 RNA RESP QL NAA+PROBE: NOT DETECTED
WBC # BLD AUTO: 10.4 X10*3/UL (ref 4.4–11.3)

## 2024-10-17 PROCEDURE — 85027 COMPLETE CBC AUTOMATED: CPT | Performed by: INTERNAL MEDICINE

## 2024-10-17 PROCEDURE — 2500000001 HC RX 250 WO HCPCS SELF ADMINISTERED DRUGS (ALT 637 FOR MEDICARE OP): Performed by: INTERNAL MEDICINE

## 2024-10-17 PROCEDURE — 86923 COMPATIBILITY TEST ELECTRIC: CPT

## 2024-10-17 PROCEDURE — 2500000004 HC RX 250 GENERAL PHARMACY W/ HCPCS (ALT 636 FOR OP/ED): Performed by: INTERNAL MEDICINE

## 2024-10-17 PROCEDURE — 86901 BLOOD TYPING SEROLOGIC RH(D): CPT | Performed by: INTERNAL MEDICINE

## 2024-10-17 PROCEDURE — P9040 RBC LEUKOREDUCED IRRADIATED: HCPCS

## 2024-10-17 PROCEDURE — 99239 HOSP IP/OBS DSCHRG MGMT >30: CPT | Performed by: INTERNAL MEDICINE

## 2024-10-17 PROCEDURE — 87635 SARS-COV-2 COVID-19 AMP PRB: CPT | Performed by: INTERNAL MEDICINE

## 2024-10-17 PROCEDURE — 36430 TRANSFUSION BLD/BLD COMPNT: CPT

## 2024-10-17 PROCEDURE — 1100000001 HC PRIVATE ROOM DAILY

## 2024-10-17 RX ORDER — MIDODRINE HYDROCHLORIDE 5 MG/1
5 TABLET ORAL EVERY 8 HOURS SCHEDULED
Qty: 90 TABLET | Refills: 0 | Status: SHIPPED | OUTPATIENT
Start: 2024-10-17 | End: 2024-11-16

## 2024-10-17 RX ORDER — FAMOTIDINE 10 MG/ML
20 INJECTION INTRAVENOUS ONCE AS NEEDED
Status: CANCELLED | OUTPATIENT
Start: 2024-10-17

## 2024-10-17 RX ORDER — DIPHENHYDRAMINE HYDROCHLORIDE 50 MG/ML
50 INJECTION INTRAMUSCULAR; INTRAVENOUS AS NEEDED
Status: CANCELLED | OUTPATIENT
Start: 2024-10-17

## 2024-10-17 RX ORDER — PROCHLORPERAZINE MALEATE 5 MG
10 TABLET ORAL EVERY 6 HOURS PRN
Status: CANCELLED | OUTPATIENT
Start: 2024-10-17

## 2024-10-17 RX ORDER — ALBUTEROL SULFATE 0.83 MG/ML
3 SOLUTION RESPIRATORY (INHALATION) AS NEEDED
Status: CANCELLED | OUTPATIENT
Start: 2024-10-17

## 2024-10-17 RX ORDER — PROCHLORPERAZINE EDISYLATE 5 MG/ML
10 INJECTION INTRAMUSCULAR; INTRAVENOUS EVERY 6 HOURS PRN
Status: CANCELLED | OUTPATIENT
Start: 2024-10-17

## 2024-10-17 RX ORDER — EPINEPHRINE 0.3 MG/.3ML
0.3 INJECTION SUBCUTANEOUS EVERY 5 MIN PRN
Status: CANCELLED | OUTPATIENT
Start: 2024-10-17

## 2024-10-17 ASSESSMENT — COGNITIVE AND FUNCTIONAL STATUS - GENERAL
TURNING FROM BACK TO SIDE WHILE IN FLAT BAD: A LITTLE
MOVING FROM LYING ON BACK TO SITTING ON SIDE OF FLAT BED WITH BEDRAILS: A LITTLE
MOVING TO AND FROM BED TO CHAIR: A LITTLE
PERSONAL GROOMING: A LITTLE
HELP NEEDED FOR BATHING: A LITTLE
WALKING IN HOSPITAL ROOM: A LOT
TOILETING: A LITTLE
CLIMB 3 TO 5 STEPS WITH RAILING: A LOT
DAILY ACTIVITIY SCORE: 19
MOBILITY SCORE: 15
DRESSING REGULAR UPPER BODY CLOTHING: A LITTLE
DRESSING REGULAR LOWER BODY CLOTHING: A LITTLE
STANDING UP FROM CHAIR USING ARMS: A LOT

## 2024-10-17 ASSESSMENT — PAIN SCALES - GENERAL
PAINLEVEL_OUTOF10: 0 - NO PAIN
PAINLEVEL_OUTOF10: 0 - NO PAIN

## 2024-10-17 ASSESSMENT — PAIN - FUNCTIONAL ASSESSMENT
PAIN_FUNCTIONAL_ASSESSMENT: 0-10
PAIN_FUNCTIONAL_ASSESSMENT: 0-10

## 2024-10-17 NOTE — PROGRESS NOTES
Between 7AM-7PM please message me via Epic Secure Chat.  After 7PM please page Nocturnist on call.    [facility] Hospitalist Progress Note      Yon Lawrence    :  1975(49 y.o.)    MRN:  [id number]  Date: 10/16/24     Assessment and Plan:     Bilateral Pleural effusions  - s/p thoracentesis with 1.1L removed from left side and 550ml from right side; fluid analysis consistent with transudate. Cultures NGTD, cytology with atypical cells  - pulmonology consulted and recommends against further thoracentesis as it is suspected secondary to malnutrition    Hypotension  -Chronically low, suspect 2/2 decreased oncotic pressure in the setting of low albumin   - started on midodrine this admission, bp stable    Urinary retention  - barksdale placed in ED, failed VT. 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 Unable to absorb nutrition well.   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 Hepatology recs: consider liver biopsy if LFTs rise following next pembro dose (poss ICI hepatitis)    Bilateral GGO  - procal low, fluid culture negative  - repeat non-contrast chest CT in 8 weeks for reevaluation of lung parenchyma. If worsening might need further work up/treatment for pembrolizumab lung toxicity    Microcytic anemia  - work up last admission consistent with anemia of chronic disease    DVT Prophylaxis:  subcutaneous Lovenox    Disposition: medically stable for discharge, await placement    Electronically signed by Dre Molina DO on 10/16/24 at 9:34 PM     Subjective:      Interval History:   Vitals and chart notes from overnight reviewed.   No acute issues overnight.   Patient seen and evaluated at bedside.   Again no complaints today. Awaiting auth for [facility]    Review of Systems:   Other than patient's chronic conditions and those complaints in the history above, the rest of the 10 systems review were done and were negative.     Current medications:  Scheduled Meds:enoxaparin, 80 mg, subcutaneous, q12h  midodrine, 5 mg, oral, q8h CATRACHITA  perflutren lipid microspheres, 0.5-10 mL of dilution, intravenous, Once in imaging      Continuous Infusions:   PRN Meds:PRN medications: acetaminophen, acetaminophen, melatonin, ondansetron ODT **OR** ondansetron      Objective:     Heart Rate:  []   Temp:  [36.6 °C (97.9 °F)-37 °C (98.6 °F)]   Resp:  [16-18]   BP: (91-98)/(58-71)   SpO2:  [94 %-100 %]          Physical Exam  Vitals and nursing note reviewed.   HENT:      Mouth/Throat:      Mouth: Mucous membranes are moist.      Pharynx: Oropharynx is clear.   Cardiovascular:      Rate and Rhythm: Normal rate and regular rhythm.   Pulmonary:      Effort: Pulmonary effort is normal.   Abdominal:      Palpations: Abdomen is soft.   Neurological:      Mental Status: He is alert.         Labs:   Lab Results   Component Value Date     (L) 10/15/2024    K 4.1 10/15/2024    CL 99 10/15/2024    CO2 30 10/15/2024    BUN 9 10/15/2024    CREATININE 0.42 (L) 10/15/2024    GLUCOSE 86 10/15/2024    CALCIUM 6.9 (L) 10/15/2024    PROT <3.0 (L) 10/08/2024    BILITOT 0.6 10/08/2024    ALKPHOS 102 10/08/2024    AST 17 10/08/2024    ALT 40 10/08/2024       Lab Results   Component Value Date    WBC 10.3 10/15/2024    HGB 7.1 (L) 10/15/2024    HCT 22.8 (L) 10/15/2024    MCV 91 10/15/2024     10/15/2024

## 2024-10-17 NOTE — DISCHARGE SUMMARY
Discharge Diagnosis  Large pleural effusion    Issues Requiring Follow-Up  PCP follow up in 1-2 weeks    Discharge Meds     Medication List      START taking these medications     midodrine 5 mg tablet; Commonly known as: Proamatine; Take 1 tablet (5   mg) by mouth every 8 hours.     CONTINUE taking these medications     enoxaparin 80 mg/0.8 mL syringe; Commonly known as: Lovenox; Inject 0.8   mL (80 mg) under the skin every 12 hours.     STOP taking these medications     potassium chloride CR 20 mEq ER tablet; Commonly known as: Klor-Con M20       Test Results Pending At Discharge  Pending Labs       Order Current Status    AFB Culture/Smear Preliminary result    Fungal Culture/Smear Preliminary result    Fungal Culture/Smear Preliminary result            Hospital Course     Bilateral Pleural effusions  - s/p thoracentesis with 1.1L removed from left side and 550ml from right side; fluid analysis consistent with transudate. Cultures NGTD, cytology with atypical cells  - pulmonology consulted and recommends against further thoracentesis as it is suspected secondary to malnutrition     Hypotension  -Chronically low, suspect 2/2 decreased oncotic pressure in the setting of low albumin   - started on midodrine this admission, bp stable     Urinary retention  - barksdale placed in ED, failed VT. 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 Unable to absorb nutrition well.   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 Hepatology recs: consider liver biopsy if LFTs rise following next pembro dose (poss ICI hepatitis)     Bilateral GGO  - procal low, fluid culture negative  - repeat non-contrast chest CT in 8 weeks for reevaluation of lung parenchyma. If worsening might need further work up/treatment for pembrolizumab lung toxicity     Microcytic anemia  - work up last admission consistent with anemia of chronic disease       Patient is doing better today.  he will be discharged to SNF today.      Discharge time spent more than 30 minutes.    Pertinent Physical Exam At Time of Discharge  Physical Exam  HENT:      Mouth/Throat:      Mouth: Mucous membranes are moist.      Pharynx: Oropharynx is clear.   Cardiovascular:      Rate and Rhythm: Normal rate and regular rhythm.   Pulmonary:      Effort: Pulmonary effort is normal.   Abdominal:      Palpations: Abdomen is soft.   Neurological:      Mental Status: He is alert.   Outpatient Follow-Up  Future Appointments   Date Time Provider Department Center   10/18/2024  1:00 PM [place] [facility] [place]   10/30/2024  2:45 PM PASHA Trevino [id number] [facility]   1/21/2025  1:00 PM Jenni Phelps MD [id number] [facility]         Alda Goins MD

## 2024-10-17 NOTE — PROGRESS NOTES
10/17/24 1110   Discharge Planning   Expected Discharge Disposition SNF   Does the patient need discharge transport arranged? Yes   RoundTrip coordination needed? Yes     Auth obtained for [facility]  Covid test pending per  facility   request  PASSR  completed    JEZ Montez LSW        10-17-24    1223  Covid was  neg    SW updated pt  that [employer] authorized and trip was  requested    Patient  agreed to  get text message updates about  rides for him and his  wife.     Pickup is  3 pm    JEZ Montez LSW            10-17-24 1254  Per nursing  the pt's pickup time is now    between 130pm-2pm     JEZ Montez LSW        10-17-24 1330  SW  was informed that the pt's  ride was put into  will call as it requires  975  up front  as [employer]  would  only  pay part of  the  mileage. SW  did  reach out  to  G[facility]  to  see if there were  any options.     At the same time, the  dc order  was  cancelled due to  needing a  stat  CBC    JEZ Montez LSW            10-17-24             1554  Wife  agreed to  pay  975.51  for  ride    Stat  CBC  not collected yet    SW  called [facility] and let  Deysi  know  that transport is at  8am    10-18-24 thru [facility]  (Per  Graham,  [facility] cannot  come in the  evening   10/17)    TCC asked to cancel ride tomorrow morning  with [facility]  if there  are any  issues  with the  CBC    Pt has  a 1pm infusion appt    to get to  in [place]      N2N  is  [phone]      Wife informed - she will tell pt    JEZ Montez LSW      1945      10-17-24  Wife  called this  writer  stressing urgency  of getting this blood work taken  care  of    Team  informed  JEZ Montez LSW

## 2024-10-17 NOTE — CARE PLAN
The patient's goals for the shift include Pt. will have a safe, restful and uneventful evening    The clinical goals for the shift include VS, pain control, monitor I&O, remain safe and free of falls

## 2024-10-18 ENCOUNTER — INFUSION (OUTPATIENT)
Dept: HEMATOLOGY/ONCOLOGY | Facility: CLINIC | Age: 49
End: 2024-10-18

## 2024-10-18 VITALS
HEIGHT: 73 IN | DIASTOLIC BLOOD PRESSURE: 63 MMHG | OXYGEN SATURATION: 98 % | RESPIRATION RATE: 18 BRPM | HEART RATE: 85 BPM | BODY MASS INDEX: 23.59 KG/M2 | SYSTOLIC BLOOD PRESSURE: 90 MMHG | TEMPERATURE: 98.1 F | WEIGHT: 178 LBS

## 2024-10-18 VITALS
RESPIRATION RATE: 18 BRPM | OXYGEN SATURATION: 97 % | BODY MASS INDEX: 20.39 KG/M2 | HEART RATE: 90 BPM | DIASTOLIC BLOOD PRESSURE: 61 MMHG | WEIGHT: 154.54 LBS | TEMPERATURE: 97.3 F | SYSTOLIC BLOOD PRESSURE: 88 MMHG

## 2024-10-18 DIAGNOSIS — C18.4 ADENOCARCINOMA OF TRANSVERSE COLON (MULTI): ICD-10-CM

## 2024-10-18 LAB
ALBUMIN SERPL BCP-MCNC: 2 G/DL (ref 3.4–5)
ALP SERPL-CCNC: 130 U/L (ref 33–120)
ALT SERPL W P-5'-P-CCNC: 37 U/L (ref 10–52)
ANION GAP SERPL CALC-SCNC: 8 MMOL/L (ref 10–20)
AST SERPL W P-5'-P-CCNC: 19 U/L (ref 9–39)
BILIRUB SERPL-MCNC: 0.5 MG/DL (ref 0–1.2)
BLOOD EXPIRATION DATE: NORMAL
BUN SERPL-MCNC: 13 MG/DL (ref 6–23)
CALCIUM SERPL-MCNC: 6.9 MG/DL (ref 8.6–10.3)
CEA SERPL-MCNC: 3.5 UG/L
CHLORIDE SERPL-SCNC: 101 MMOL/L (ref 98–107)
CO2 SERPL-SCNC: 30 MMOL/L (ref 21–32)
CREAT SERPL-MCNC: 0.59 MG/DL (ref 0.5–1.3)
DISPENSE STATUS: NORMAL
EGFRCR SERPLBLD CKD-EPI 2021: >90 ML/MIN/1.73M*2
ERYTHROCYTE [DISTWIDTH] IN BLOOD BY AUTOMATED COUNT: 18.8 % (ref 11.5–14.5)
FUNGUS SPEC CULT: NORMAL
FUNGUS SPEC FUNGUS STN: NORMAL
GLUCOSE SERPL-MCNC: 104 MG/DL (ref 74–99)
HCT VFR BLD AUTO: 23.4 % (ref 41–52)
HGB BLD-MCNC: 7.7 G/DL (ref 13.5–17.5)
MCH RBC QN AUTO: 30 PG (ref 26–34)
MCHC RBC AUTO-ENTMCNC: 32.9 G/DL (ref 32–36)
MCV RBC AUTO: 91 FL (ref 80–100)
NRBC BLD-RTO: 0 /100 WBCS (ref 0–0)
PLATELET # BLD AUTO: 397 X10*3/UL (ref 150–450)
POTASSIUM SERPL-SCNC: 4.2 MMOL/L (ref 3.5–5.3)
PRODUCT BLOOD TYPE: 5100
PRODUCT CODE: NORMAL
PROT SERPL-MCNC: 3.9 G/DL (ref 6.4–8.2)
RBC # BLD AUTO: 2.57 X10*6/UL (ref 4.5–5.9)
SODIUM SERPL-SCNC: 135 MMOL/L (ref 136–145)
UNIT ABO: NORMAL
UNIT NUMBER: NORMAL
UNIT RH: NORMAL
UNIT VOLUME: 350
WBC # BLD AUTO: 9.6 X10*3/UL (ref 4.4–11.3)
XM INTEP: NORMAL

## 2024-10-18 PROCEDURE — 2500000004 HC RX 250 GENERAL PHARMACY W/ HCPCS (ALT 636 FOR OP/ED): Performed by: INTERNAL MEDICINE

## 2024-10-18 PROCEDURE — 2500000001 HC RX 250 WO HCPCS SELF ADMINISTERED DRUGS (ALT 637 FOR MEDICARE OP): Performed by: INTERNAL MEDICINE

## 2024-10-18 PROCEDURE — 99239 HOSP IP/OBS DSCHRG MGMT >30: CPT | Performed by: INTERNAL MEDICINE

## 2024-10-18 PROCEDURE — 36591 DRAW BLOOD OFF VENOUS DEVICE: CPT

## 2024-10-18 PROCEDURE — 85027 COMPLETE CBC AUTOMATED: CPT | Performed by: INTERNAL MEDICINE

## 2024-10-18 PROCEDURE — 96413 CHEMO IV INFUSION 1 HR: CPT

## 2024-10-18 PROCEDURE — 82378 CARCINOEMBRYONIC ANTIGEN: CPT | Mod: SAMLAB

## 2024-10-18 PROCEDURE — 80053 COMPREHEN METABOLIC PANEL: CPT

## 2024-10-18 RX ORDER — HEPARIN 100 UNIT/ML
500 SYRINGE INTRAVENOUS AS NEEDED
OUTPATIENT
Start: 2024-10-18

## 2024-10-18 RX ORDER — ACETAMINOPHEN 325 MG/1
650 TABLET ORAL EVERY 4 HOURS PRN
COMMUNITY

## 2024-10-18 RX ORDER — HEPARIN 100 UNIT/ML
5 SYRINGE INTRAVENOUS AS NEEDED
Status: DISCONTINUED | OUTPATIENT
Start: 2024-10-18 | End: 2024-10-18 | Stop reason: HOSPADM

## 2024-10-18 RX ORDER — HEPARIN SODIUM,PORCINE/PF 10 UNIT/ML
50 SYRINGE (ML) INTRAVENOUS AS NEEDED
OUTPATIENT
Start: 2024-10-18

## 2024-10-18 RX ORDER — HEPARIN 100 UNIT/ML
500 SYRINGE INTRAVENOUS AS NEEDED
Status: DISCONTINUED | OUTPATIENT
Start: 2024-10-18 | End: 2024-10-18 | Stop reason: HOSPADM

## 2024-10-18 ASSESSMENT — PAIN SCALES - GENERAL: PAINLEVEL_OUTOF10: 0-NO PAIN

## 2024-10-18 NOTE — PROGRESS NOTES
10/18/24 0910   Discharge Planning   Does the patient need discharge transport arranged? Yes   RoundTrip coordination needed? Yes   Has discharge transport been arranged? Yes   What day is the transport expected? 10/18/24   What time is the transport expected? 4097

## 2024-10-18 NOTE — DISCHARGE SUMMARY
Discharge Diagnosis  Large pleural effusion    Issues Requiring Follow-Up  PCP follow up in 1-2 weeks    Discharge Meds     Medication List      START taking these medications     midodrine 5 mg tablet; Commonly known as: Proamatine; Take 1 tablet (5   mg) by mouth every 8 hours.     CONTINUE taking these medications     enoxaparin 80 mg/0.8 mL syringe; Commonly known as: Lovenox; Inject 0.8   mL (80 mg) under the skin every 12 hours.     STOP taking these medications     potassium chloride CR 20 mEq ER tablet; Commonly known as: Klor-Con M20       Test Results Pending At Discharge  Pending Labs       Order Current Status    AFB Culture/Smear Preliminary result    Fungal Culture/Smear Preliminary result    Fungal Culture/Smear Preliminary result            Hospital Course     Bilateral Pleural effusions  - s/p thoracentesis with 1.1L removed from left side and 550ml from right side; fluid analysis consistent with transudate. Cultures NGTD, cytology with atypical cells  - pulmonology consulted and recommends against further thoracentesis as it is suspected secondary to malnutrition     Hypotension  -Chronically low, suspect 2/2 decreased oncotic pressure in the setting of low albumin   - started on midodrine this admission, bp stable     Urinary retention  - barksdale placed in ED, failed VT. 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 Unable to absorb nutrition well.   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 Hepatology recs: consider liver biopsy if LFTs rise following next pembro dose (poss ICI hepatitis)     Bilateral GGO  - procal low, fluid culture negative  - repeat non-contrast chest CT in 8 weeks for reevaluation of lung parenchyma. If worsening might need further work up/treatment for pembrolizumab lung toxicity     Microcytic anemia  - work up last admission consistent with anemia of chronic disease  -Hemoglobin yesterday was 6.6, received 1 unit of blood transfusion yesterday  - hemoglobin this morning 7.7       Patient is doing better today.  he will be discharged to SNF today.      Discharge time spent more than 30 minutes.    Pertinent Physical Exam At Time of Discharge  Physical Exam  HENT:      Mouth/Throat:      Mouth: Mucous membranes are moist.      Pharynx: Oropharynx is clear.   Cardiovascular:      Rate and Rhythm: Normal rate and regular rhythm.   Pulmonary:      Effort: Pulmonary effort is normal.   Abdominal:      Palpations: Abdomen is soft.   Neurological:      Mental Status: He is alert.   Outpatient Follow-Up  Future Appointments   Date Time Provider Department Center   10/18/2024  1:00 PM INF 03 [facility] [place]   10/30/2024  2:45 PM Danielle Kraus DEBBI [id number] [facility]   1/21/2025  1:00 PM Jenni Phelps MD [id number] [facility]         Alda Goins MD

## 2024-10-18 NOTE — CARE PLAN
Patient was supposed to  leave  at  8am this morning. Ride  was   reportedly   cancelled and rescheduled  for  1030    (Per  Graham at [facility], he tried to  send a  crew  late last night but when the crew got to the  hospital   they were told discharge  was cancelled -  so the  8am  ride  was  cancelled. LETICIA  explained that  when he had  said 8am  yesterday  all plans  were adjusted for  8am this  morning.)    LETICIA  called pt and told him - he told his  wife    LETICIA  called [facility] and  told Deysi -  she said that the  ride is  still on  for  1pm to  get to infusion  appt  from [place]    LETICIA  spoke to infusion center  and let them know that  the pt's  dc  was  still happening this morning    Nanci Saldana, MSW, LSW

## 2024-10-20 ENCOUNTER — NURSING HOME VISIT (OUTPATIENT)
Dept: POST ACUTE CARE | Facility: EXTERNAL LOCATION | Age: 49
End: 2024-10-20
Payer: COMMERCIAL

## 2024-10-20 DIAGNOSIS — C18.4 ADENOCARCINOMA OF TRANSVERSE COLON (MULTI): Primary | ICD-10-CM

## 2024-10-20 DIAGNOSIS — R19.7 DIARRHEA, UNSPECIFIED TYPE: ICD-10-CM

## 2024-10-20 PROCEDURE — 99304 1ST NF CARE SF/LOW MDM 25: CPT | Performed by: INTERNAL MEDICINE

## 2024-10-20 NOTE — PROGRESS NOTES
Pt was seen in the NH.  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

## 2024-10-20 NOTE — LETTER
Patient: Vladimir Lawrence  : 1975    Encounter Date: 10/20/2024    Pt was seen in the NH.  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

## 2024-10-21 ENCOUNTER — NURSE TRIAGE (OUTPATIENT)
Dept: ADMISSION | Facility: HOSPITAL | Age: 49
End: 2024-10-21
Payer: COMMERCIAL

## 2024-10-21 ENCOUNTER — LAB REQUISITION (OUTPATIENT)
Dept: LAB | Facility: HOSPITAL | Age: 49
End: 2024-10-21
Payer: COMMERCIAL

## 2024-10-21 DIAGNOSIS — A04.72 ENTEROCOLITIS DUE TO CLOSTRIDIUM DIFFICILE, NOT SPECIFIED AS RECURRENT: ICD-10-CM

## 2024-10-21 DIAGNOSIS — C18.4 ADENOCARCINOMA OF TRANSVERSE COLON (MULTI): ICD-10-CM

## 2024-10-21 DIAGNOSIS — C18.4 ADENOCARCINOMA OF TRANSVERSE COLON (MULTI): Primary | ICD-10-CM

## 2024-10-21 LAB
C DIF TOX TCDA+TCDB STL QL NAA+PROBE: NOT DETECTED
FUNGUS SPEC CULT: NORMAL
FUNGUS SPEC CULT: NORMAL
FUNGUS SPEC FUNGUS STN: NORMAL
FUNGUS SPEC FUNGUS STN: NORMAL

## 2024-10-21 PROCEDURE — 87493 C DIFF AMPLIFIED PROBE: CPT | Mod: OUT | Performed by: INTERNAL MEDICINE

## 2024-10-21 RX ORDER — LOPERAMIDE HYDROCHLORIDE 2 MG/1
CAPSULE ORAL
Qty: 30 CAPSULE | Refills: 2 | Status: SHIPPED | OUTPATIENT
Start: 2024-10-21 | End: 2024-10-23 | Stop reason: SDUPTHER

## 2024-10-21 NOTE — TELEPHONE ENCOUNTER
Patient called to notify team he had immunotherapy on Friday: over the weekend he had diarrhea, liquid stools - 4x on Saturday, 5x Sunday.  No blood in stool. No fevers.  Eating and drinking normally.  No medications were given for diarrhea.  Metamucil was ordered but has not been used yet.  Denies foul odor, C-diff stool pending.  Urinating well via catheter.  Denies pain. (See nursing note from 10/20/24 in Epic)    Patient currently at [facility] in [place].     Also patient does not have a FUV or next infusion appointment scheduled.       Additional Information   Commented on: If yes to belly pain or tenderness, on a scale of 0 to 10 (0 being no pain and 10 being the worst possible) how would  you rate your pain?     Denies pain   Commented on: How many bowel movements have you had in the last 24 hours?     5x yesterday   Commented on: How long have your problems been going on?     Started 2 days ago    Protocols used: Diarrhea

## 2024-10-21 NOTE — TELEPHONE ENCOUNTER
RN left detailed voicemail for patient regarding upcoming appointments- advised scheduling will reach out to patient for FUV and infusion.  Also advised patient that is recommended for patient to take Imodium.  - advised it was sent to [facility] on file. Advised to call back if he has additional questions/concerns.

## 2024-10-23 LAB
ACID FAST STN SPEC: NORMAL
MYCOBACTERIUM SPEC CULT: NORMAL

## 2024-10-23 RX ORDER — LOPERAMIDE HYDROCHLORIDE 2 MG/1
CAPSULE ORAL
Qty: 60 CAPSULE | Refills: 2 | Status: SHIPPED | OUTPATIENT
Start: 2024-10-23

## 2024-10-25 ENCOUNTER — DOCUMENTATION (OUTPATIENT)
Dept: CASE MANAGEMENT | Facility: HOSPITAL | Age: 49
End: 2024-10-25
Payer: COMMERCIAL

## 2024-10-25 NOTE — PROGRESS NOTES
LETICIA received fax number for HR from wife to send [facility] paperwork-forms sent.   HR Anna Hidalgo- [phone]  Phone- [phone].

## 2024-10-27 ENCOUNTER — NURSING HOME VISIT (OUTPATIENT)
Dept: POST ACUTE CARE | Facility: EXTERNAL LOCATION | Age: 49
End: 2024-10-27
Payer: COMMERCIAL

## 2024-10-27 DIAGNOSIS — I82.4Y9 DEEP VEIN THROMBOSIS (DVT) OF PROXIMAL LOWER EXTREMITY, UNSPECIFIED CHRONICITY, UNSPECIFIED LATERALITY (MULTI): Primary | ICD-10-CM

## 2024-10-27 PROCEDURE — 99308 SBSQ NF CARE LOW MDM 20: CPT | Performed by: INTERNAL MEDICINE

## 2024-10-27 NOTE — LETTER
Patient: Vladimir Lawrence  : 1975    Encounter Date: 10/27/2024    Pt was seen in the NH.  Pt is in usual state , no complaint  General appearance: Comfortable, no distress  ROS: No SOB  Medications reviewed  Head: Normal  Neck: Soft  Heart: Regular  Lungs: Clear  Abdomen: soft    Plan:   1)clinically doing fine  2) To continue lovenox 80 mg subcutaneous bid    Problem List Items Addressed This Visit       Deep vein thrombosis (DVT) of proximal lower extremity (Multi) - Primary          Electronically Signed By: Madhav Roldan MD   10/27/24  6:32 PM

## 2024-10-28 ENCOUNTER — NURSE TRIAGE (OUTPATIENT)
Dept: ADMISSION | Facility: HOSPITAL | Age: 49
End: 2024-10-28
Payer: COMMERCIAL

## 2024-10-28 LAB
FUNGUS SPEC CULT: NORMAL
FUNGUS SPEC CULT: NORMAL
FUNGUS SPEC FUNGUS STN: NORMAL
FUNGUS SPEC FUNGUS STN: NORMAL

## 2024-10-29 ENCOUNTER — DOCUMENTATION (OUTPATIENT)
Dept: CASE MANAGEMENT | Facility: HOSPITAL | Age: 49
End: 2024-10-29
Payer: COMMERCIAL

## 2024-10-30 ENCOUNTER — APPOINTMENT (OUTPATIENT)
Dept: GENETICS | Facility: CLINIC | Age: 49
End: 2024-10-30
Payer: COMMERCIAL

## 2024-10-30 DIAGNOSIS — Z80.9 FAMILY HX-MALIGNANCY: ICD-10-CM

## 2024-10-30 DIAGNOSIS — C18.4 ADENOCARCINOMA OF TRANSVERSE COLON (MULTI): ICD-10-CM

## 2024-10-30 DIAGNOSIS — Z13.71 ENCOUNTER FOR NONPROCREATIVE GENETIC COUNSELING AND TESTING: Primary | ICD-10-CM

## 2024-10-30 DIAGNOSIS — K63.89 MASS OF COLON: ICD-10-CM

## 2024-10-30 DIAGNOSIS — Z71.83 ENCOUNTER FOR NONPROCREATIVE GENETIC COUNSELING AND TESTING: Primary | ICD-10-CM

## 2024-10-30 LAB
ACID FAST STN SPEC: NORMAL
MYCOBACTERIUM SPEC CULT: NORMAL

## 2024-10-30 PROCEDURE — 96040 PR MEDICAL GENETICS COUNSELING EACH 30 MINUTES: CPT | Performed by: GENETIC COUNSELOR, MS

## 2024-11-04 DIAGNOSIS — C18.4 ADENOCARCINOMA OF TRANSVERSE COLON (MULTI): Primary | ICD-10-CM

## 2024-11-05 ENCOUNTER — HOSPITAL ENCOUNTER (INPATIENT)
Facility: HOSPITAL | Age: 49
End: 2024-11-05
Attending: EMERGENCY MEDICINE | Admitting: STUDENT IN AN ORGANIZED HEALTH CARE EDUCATION/TRAINING PROGRAM
Payer: COMMERCIAL

## 2024-11-05 ENCOUNTER — TELEPHONE (OUTPATIENT)
Dept: HEMATOLOGY/ONCOLOGY | Facility: CLINIC | Age: 49
End: 2024-11-05
Payer: COMMERCIAL

## 2024-11-05 ENCOUNTER — APPOINTMENT (OUTPATIENT)
Dept: RADIOLOGY | Facility: HOSPITAL | Age: 49
End: 2024-11-05
Payer: COMMERCIAL

## 2024-11-05 ENCOUNTER — APPOINTMENT (OUTPATIENT)
Dept: CARDIOLOGY | Facility: HOSPITAL | Age: 49
End: 2024-11-05
Payer: COMMERCIAL

## 2024-11-05 DIAGNOSIS — I95.89 CHRONIC HYPOTENSION: ICD-10-CM

## 2024-11-05 DIAGNOSIS — E86.0 DEHYDRATION: Primary | ICD-10-CM

## 2024-11-05 DIAGNOSIS — C18.9 COLON ADENOCARCINOMA (MULTI): ICD-10-CM

## 2024-11-05 DIAGNOSIS — E83.51 HYPOCALCEMIA: ICD-10-CM

## 2024-11-05 DIAGNOSIS — E83.42 HYPOMAGNESEMIA: ICD-10-CM

## 2024-11-05 DIAGNOSIS — E87.6 HYPOKALEMIA: ICD-10-CM

## 2024-11-05 PROBLEM — D72.829 LEUKOCYTOSIS: Status: ACTIVE | Noted: 2024-11-05

## 2024-11-05 PROBLEM — E87.8 ELECTROLYTE IMBALANCE: Status: ACTIVE | Noted: 2024-11-05

## 2024-11-05 LAB
ALBUMIN SERPL BCP-MCNC: <1.5 G/DL (ref 3.4–5)
ALP SERPL-CCNC: 142 U/L (ref 33–120)
ALT SERPL W P-5'-P-CCNC: 21 U/L (ref 10–52)
ANION GAP SERPL CALC-SCNC: 11 MMOL/L (ref 10–20)
APPEARANCE UR: ABNORMAL
AST SERPL W P-5'-P-CCNC: 12 U/L (ref 9–39)
BACTERIA #/AREA URNS AUTO: ABNORMAL /HPF
BASOPHILS # BLD AUTO: 0.02 X10*3/UL (ref 0–0.1)
BASOPHILS NFR BLD AUTO: 0.1 %
BILIRUB SERPL-MCNC: 0.5 MG/DL (ref 0–1.2)
BILIRUB UR STRIP.AUTO-MCNC: NEGATIVE MG/DL
BUN SERPL-MCNC: 13 MG/DL (ref 6–23)
CA-I BLD-SCNC: 0.91 MMOL/L (ref 1.1–1.33)
CALCIUM SERPL-MCNC: 5.4 MG/DL (ref 8.6–10.3)
CHLORIDE SERPL-SCNC: 103 MMOL/L (ref 98–107)
CO2 SERPL-SCNC: 23 MMOL/L (ref 21–32)
COLOR UR: ABNORMAL
CREAT SERPL-MCNC: 0.24 MG/DL (ref 0.5–1.3)
EGFRCR SERPLBLD CKD-EPI 2021: >90 ML/MIN/1.73M*2
EOSINOPHIL # BLD AUTO: 0 X10*3/UL (ref 0–0.7)
EOSINOPHIL NFR BLD AUTO: 0 %
ERYTHROCYTE [DISTWIDTH] IN BLOOD BY AUTOMATED COUNT: 16.1 % (ref 11.5–14.5)
GLUCOSE SERPL-MCNC: 80 MG/DL (ref 74–99)
GLUCOSE UR STRIP.AUTO-MCNC: NORMAL MG/DL
HCT VFR BLD AUTO: 27.2 % (ref 41–52)
HGB BLD-MCNC: 9.4 G/DL (ref 13.5–17.5)
IMM GRANULOCYTES # BLD AUTO: 0.1 X10*3/UL (ref 0–0.7)
IMM GRANULOCYTES NFR BLD AUTO: 0.6 % (ref 0–0.9)
KETONES UR STRIP.AUTO-MCNC: ABNORMAL MG/DL
LEUKOCYTE ESTERASE UR QL STRIP.AUTO: ABNORMAL
LYMPHOCYTES # BLD AUTO: 4.14 X10*3/UL (ref 1.2–4.8)
LYMPHOCYTES NFR BLD AUTO: 24.1 %
MAGNESIUM SERPL-MCNC: 1.21 MG/DL (ref 1.6–2.4)
MAGNESIUM SERPL-MCNC: 1.71 MG/DL (ref 1.6–2.4)
MCH RBC QN AUTO: 29.6 PG (ref 26–34)
MCHC RBC AUTO-ENTMCNC: 34.6 G/DL (ref 32–36)
MCV RBC AUTO: 86 FL (ref 80–100)
MONOCYTES # BLD AUTO: 0.32 X10*3/UL (ref 0.1–1)
MONOCYTES NFR BLD AUTO: 1.9 %
MUCOUS THREADS #/AREA URNS AUTO: ABNORMAL /LPF
NEUTROPHILS # BLD AUTO: 12.6 X10*3/UL (ref 1.2–7.7)
NEUTROPHILS NFR BLD AUTO: 73.3 %
NITRITE UR QL STRIP.AUTO: NEGATIVE
NRBC BLD-RTO: 0 /100 WBCS (ref 0–0)
PH UR STRIP.AUTO: 6 [PH]
PLATELET # BLD AUTO: 417 X10*3/UL (ref 150–450)
POTASSIUM SERPL-SCNC: 3 MMOL/L (ref 3.5–5.3)
POTASSIUM SERPL-SCNC: 3.5 MMOL/L (ref 3.5–5.3)
PROT SERPL-MCNC: 3.2 G/DL (ref 6.4–8.2)
PROT UR STRIP.AUTO-MCNC: ABNORMAL MG/DL
RBC # BLD AUTO: 3.18 X10*6/UL (ref 4.5–5.9)
RBC # UR STRIP.AUTO: ABNORMAL /UL
RBC #/AREA URNS AUTO: >20 /HPF
SODIUM SERPL-SCNC: 134 MMOL/L (ref 136–145)
SP GR UR STRIP.AUTO: 1.03
SQUAMOUS #/AREA URNS AUTO: ABNORMAL /HPF
UROBILINOGEN UR STRIP.AUTO-MCNC: NORMAL MG/DL
WBC # BLD AUTO: 17.2 X10*3/UL (ref 4.4–11.3)
WBC #/AREA URNS AUTO: >50 /HPF
WBC CLUMPS #/AREA URNS AUTO: ABNORMAL /HPF

## 2024-11-05 PROCEDURE — G0378 HOSPITAL OBSERVATION PER HR: HCPCS

## 2024-11-05 PROCEDURE — 96361 HYDRATE IV INFUSION ADD-ON: CPT

## 2024-11-05 PROCEDURE — 81001 URINALYSIS AUTO W/SCOPE: CPT

## 2024-11-05 PROCEDURE — 2500000001 HC RX 250 WO HCPCS SELF ADMINISTERED DRUGS (ALT 637 FOR MEDICARE OP)

## 2024-11-05 PROCEDURE — 99223 1ST HOSP IP/OBS HIGH 75: CPT

## 2024-11-05 PROCEDURE — 99291 CRITICAL CARE FIRST HOUR: CPT | Performed by: PHYSICIAN ASSISTANT

## 2024-11-05 PROCEDURE — 85025 COMPLETE CBC W/AUTO DIFF WBC: CPT | Performed by: PHYSICIAN ASSISTANT

## 2024-11-05 PROCEDURE — 2500000004 HC RX 250 GENERAL PHARMACY W/ HCPCS (ALT 636 FOR OP/ED)

## 2024-11-05 PROCEDURE — 96365 THER/PROPH/DIAG IV INF INIT: CPT | Mod: 59

## 2024-11-05 PROCEDURE — 2500000001 HC RX 250 WO HCPCS SELF ADMINISTERED DRUGS (ALT 637 FOR MEDICARE OP): Performed by: PHYSICIAN ASSISTANT

## 2024-11-05 PROCEDURE — 93010 ELECTROCARDIOGRAM REPORT: CPT | Performed by: STUDENT IN AN ORGANIZED HEALTH CARE EDUCATION/TRAINING PROGRAM

## 2024-11-05 PROCEDURE — 2500000004 HC RX 250 GENERAL PHARMACY W/ HCPCS (ALT 636 FOR OP/ED): Mod: JZ | Performed by: PHYSICIAN ASSISTANT

## 2024-11-05 PROCEDURE — 82330 ASSAY OF CALCIUM: CPT | Performed by: PHYSICIAN ASSISTANT

## 2024-11-05 PROCEDURE — 93005 ELECTROCARDIOGRAM TRACING: CPT

## 2024-11-05 PROCEDURE — 83735 ASSAY OF MAGNESIUM: CPT

## 2024-11-05 PROCEDURE — 96372 THER/PROPH/DIAG INJ SC/IM: CPT

## 2024-11-05 PROCEDURE — 96368 THER/DIAG CONCURRENT INF: CPT

## 2024-11-05 PROCEDURE — 83735 ASSAY OF MAGNESIUM: CPT | Performed by: PHYSICIAN ASSISTANT

## 2024-11-05 PROCEDURE — 96366 THER/PROPH/DIAG IV INF ADDON: CPT

## 2024-11-05 PROCEDURE — 84132 ASSAY OF SERUM POTASSIUM: CPT

## 2024-11-05 PROCEDURE — 80053 COMPREHEN METABOLIC PANEL: CPT | Performed by: PHYSICIAN ASSISTANT

## 2024-11-05 PROCEDURE — 71045 X-RAY EXAM CHEST 1 VIEW: CPT | Performed by: RADIOLOGY

## 2024-11-05 PROCEDURE — 71045 X-RAY EXAM CHEST 1 VIEW: CPT

## 2024-11-05 RX ORDER — POTASSIUM CHLORIDE 14.9 MG/ML
20 INJECTION INTRAVENOUS ONCE
Status: DISCONTINUED | OUTPATIENT
Start: 2024-11-05 | End: 2024-11-05

## 2024-11-05 RX ORDER — CALCIUM GLUCONATE 20 MG/ML
2 INJECTION, SOLUTION INTRAVENOUS ONCE
Status: COMPLETED | OUTPATIENT
Start: 2024-11-05 | End: 2024-11-05

## 2024-11-05 RX ORDER — MIDODRINE HYDROCHLORIDE 5 MG/1
5 TABLET ORAL EVERY 8 HOURS SCHEDULED
Status: DISCONTINUED | OUTPATIENT
Start: 2024-11-05 | End: 2024-11-07

## 2024-11-05 RX ORDER — POTASSIUM CHLORIDE 14.9 MG/ML
20 INJECTION INTRAVENOUS
Status: COMPLETED | OUTPATIENT
Start: 2024-11-05 | End: 2024-11-05

## 2024-11-05 RX ORDER — DIPHENOXYLATE HYDROCHLORIDE AND ATROPINE SULFATE 2.5; .025 MG/1; MG/1
1 TABLET ORAL 3 TIMES DAILY PRN
Status: ON HOLD | COMMUNITY

## 2024-11-05 RX ORDER — ENOXAPARIN SODIUM 100 MG/ML
70 INJECTION SUBCUTANEOUS EVERY 12 HOURS
Status: DISPENSED | OUTPATIENT
Start: 2024-11-05

## 2024-11-05 RX ORDER — MAGNESIUM SULFATE HEPTAHYDRATE 40 MG/ML
2 INJECTION, SOLUTION INTRAVENOUS ONCE
Status: COMPLETED | OUTPATIENT
Start: 2024-11-05 | End: 2024-11-05

## 2024-11-05 RX ORDER — ONDANSETRON 4 MG/1
4 TABLET, ORALLY DISINTEGRATING ORAL EVERY 6 HOURS PRN
Status: ON HOLD | COMMUNITY

## 2024-11-05 RX ORDER — ACETAMINOPHEN 325 MG/1
650 TABLET ORAL EVERY 4 HOURS PRN
Status: DISPENSED | OUTPATIENT
Start: 2024-11-05

## 2024-11-05 RX ORDER — MIDODRINE HYDROCHLORIDE 5 MG/1
5 TABLET ORAL ONCE
Status: COMPLETED | OUTPATIENT
Start: 2024-11-05 | End: 2024-11-05

## 2024-11-05 RX ORDER — ONDANSETRON 4 MG/1
4 TABLET, ORALLY DISINTEGRATING ORAL EVERY 6 HOURS PRN
Status: DISCONTINUED | OUTPATIENT
Start: 2024-11-05 | End: 2024-11-09

## 2024-11-05 RX ORDER — POTASSIUM CHLORIDE 20 MEQ/1
40 TABLET, EXTENDED RELEASE ORAL ONCE
Status: DISCONTINUED | OUTPATIENT
Start: 2024-11-05 | End: 2024-11-09

## 2024-11-05 SDOH — ECONOMIC STABILITY: TRANSPORTATION INSECURITY: IN THE PAST 12 MONTHS, HAS LACK OF TRANSPORTATION KEPT YOU FROM MEDICAL APPOINTMENTS OR FROM GETTING MEDICATIONS?: NO

## 2024-11-05 SDOH — HEALTH STABILITY: MENTAL HEALTH: HOW OFTEN DO YOU HAVE SIX OR MORE DRINKS ON ONE OCCASION?: NEVER

## 2024-11-05 SDOH — ECONOMIC STABILITY: INCOME INSECURITY: IN THE PAST 12 MONTHS HAS THE ELECTRIC, GAS, OIL, OR WATER COMPANY THREATENED TO SHUT OFF SERVICES IN YOUR HOME?: NO

## 2024-11-05 SDOH — ECONOMIC STABILITY: HOUSING INSECURITY: AT ANY TIME IN THE PAST 12 MONTHS, WERE YOU HOMELESS OR LIVING IN A SHELTER (INCLUDING NOW)?: NO

## 2024-11-05 SDOH — SOCIAL STABILITY: SOCIAL INSECURITY: WITHIN THE LAST YEAR, HAVE YOU BEEN HUMILIATED OR EMOTIONALLY ABUSED IN OTHER WAYS BY YOUR PARTNER OR EX-PARTNER?: NO

## 2024-11-05 SDOH — HEALTH STABILITY: PHYSICAL HEALTH: ON AVERAGE, HOW MANY DAYS PER WEEK DO YOU ENGAGE IN MODERATE TO STRENUOUS EXERCISE (LIKE A BRISK WALK)?: 0 DAYS

## 2024-11-05 SDOH — SOCIAL STABILITY: SOCIAL INSECURITY: ARE YOU MARRIED, WIDOWED, DIVORCED, SEPARATED, NEVER MARRIED, OR LIVING WITH A PARTNER?: MARRIED

## 2024-11-05 SDOH — ECONOMIC STABILITY: HOUSING INSECURITY: IN THE LAST 12 MONTHS, WAS THERE A TIME WHEN YOU WERE NOT ABLE TO PAY THE MORTGAGE OR RENT ON TIME?: NO

## 2024-11-05 SDOH — HEALTH STABILITY: PHYSICAL HEALTH: ON AVERAGE, HOW MANY MINUTES DO YOU ENGAGE IN EXERCISE AT THIS LEVEL?: 0 MIN

## 2024-11-05 SDOH — SOCIAL STABILITY: SOCIAL INSECURITY: ABUSE: ADULT

## 2024-11-05 SDOH — ECONOMIC STABILITY: FOOD INSECURITY: WITHIN THE PAST 12 MONTHS, YOU WORRIED THAT YOUR FOOD WOULD RUN OUT BEFORE YOU GOT THE MONEY TO BUY MORE.: NEVER TRUE

## 2024-11-05 SDOH — SOCIAL STABILITY: SOCIAL INSECURITY: WITHIN THE LAST YEAR, HAVE YOU BEEN AFRAID OF YOUR PARTNER OR EX-PARTNER?: NO

## 2024-11-05 SDOH — HEALTH STABILITY: MENTAL HEALTH: HOW MANY DRINKS CONTAINING ALCOHOL DO YOU HAVE ON A TYPICAL DAY WHEN YOU ARE DRINKING?: PATIENT DOES NOT DRINK

## 2024-11-05 SDOH — HEALTH STABILITY: MENTAL HEALTH: HOW OFTEN DO YOU HAVE A DRINK CONTAINING ALCOHOL?: NEVER

## 2024-11-05 SDOH — SOCIAL STABILITY: SOCIAL INSECURITY: ARE THERE ANY APPARENT SIGNS OF INJURIES/BEHAVIORS THAT COULD BE RELATED TO ABUSE/NEGLECT?: NO

## 2024-11-05 SDOH — ECONOMIC STABILITY: FOOD INSECURITY: WITHIN THE PAST 12 MONTHS, THE FOOD YOU BOUGHT JUST DIDN'T LAST AND YOU DIDN'T HAVE MONEY TO GET MORE.: NEVER TRUE

## 2024-11-05 SDOH — ECONOMIC STABILITY: HOUSING INSECURITY: IN THE PAST 12 MONTHS, HOW MANY TIMES HAVE YOU MOVED WHERE YOU WERE LIVING?: 0

## 2024-11-05 SDOH — SOCIAL STABILITY: SOCIAL INSECURITY: ARE YOU OR HAVE YOU BEEN THREATENED OR ABUSED PHYSICALLY, EMOTIONALLY, OR SEXUALLY BY ANYONE?: NO

## 2024-11-05 SDOH — SOCIAL STABILITY: SOCIAL INSECURITY: HAVE YOU HAD THOUGHTS OF HARMING ANYONE ELSE?: NO

## 2024-11-05 SDOH — SOCIAL STABILITY: SOCIAL NETWORK: IN A TYPICAL WEEK, HOW MANY TIMES DO YOU TALK ON THE PHONE WITH FAMILY, FRIENDS, OR NEIGHBORS?: ONCE A WEEK

## 2024-11-05 SDOH — SOCIAL STABILITY: SOCIAL INSECURITY: DO YOU FEEL UNSAFE GOING BACK TO THE PLACE WHERE YOU ARE LIVING?: NO

## 2024-11-05 SDOH — SOCIAL STABILITY: SOCIAL INSECURITY: HAS ANYONE EVER THREATENED TO HURT YOUR FAMILY OR YOUR PETS?: NO

## 2024-11-05 SDOH — SOCIAL STABILITY: SOCIAL INSECURITY: DOES ANYONE TRY TO KEEP YOU FROM HAVING/CONTACTING OTHER FRIENDS OR DOING THINGS OUTSIDE YOUR HOME?: NO

## 2024-11-05 SDOH — SOCIAL STABILITY: SOCIAL NETWORK: HOW OFTEN DO YOU ATTEND MEETINGS OF THE CLUBS OR ORGANIZATIONS YOU BELONG TO?: NEVER

## 2024-11-05 SDOH — SOCIAL STABILITY: SOCIAL NETWORK: HOW OFTEN DO YOU GET TOGETHER WITH FRIENDS OR RELATIVES?: ONCE A WEEK

## 2024-11-05 SDOH — ECONOMIC STABILITY: FOOD INSECURITY: HOW HARD IS IT FOR YOU TO PAY FOR THE VERY BASICS LIKE FOOD, HOUSING, MEDICAL CARE, AND HEATING?: NOT HARD AT ALL

## 2024-11-05 SDOH — SOCIAL STABILITY: SOCIAL NETWORK: HOW OFTEN DO YOU ATTEND CHURCH OR RELIGIOUS SERVICES?: MORE THAN 4 TIMES PER YEAR

## 2024-11-05 SDOH — SOCIAL STABILITY: SOCIAL INSECURITY: DO YOU FEEL ANYONE HAS EXPLOITED OR TAKEN ADVANTAGE OF YOU FINANCIALLY OR OF YOUR PERSONAL PROPERTY?: NO

## 2024-11-05 ASSESSMENT — COGNITIVE AND FUNCTIONAL STATUS - GENERAL
MOVING TO AND FROM BED TO CHAIR: A LOT
PATIENT BASELINE BEDBOUND: NO
TOILETING: TOTAL
PERSONAL GROOMING: A LITTLE
DAILY ACTIVITIY SCORE: 13
TURNING FROM BACK TO SIDE WHILE IN FLAT BAD: A LOT
PERSONAL GROOMING: A LITTLE
CLIMB 3 TO 5 STEPS WITH RAILING: TOTAL
MOVING TO AND FROM BED TO CHAIR: A LOT
EATING MEALS: A LITTLE
MOVING FROM LYING ON BACK TO SITTING ON SIDE OF FLAT BED WITH BEDRAILS: A LITTLE
DAILY ACTIVITIY SCORE: 13
DRESSING REGULAR LOWER BODY CLOTHING: A LOT
TOILETING: TOTAL
HELP NEEDED FOR BATHING: A LOT
DRESSING REGULAR UPPER BODY CLOTHING: A LOT
STANDING UP FROM CHAIR USING ARMS: A LOT
WALKING IN HOSPITAL ROOM: TOTAL
MOBILITY SCORE: 12
WALKING IN HOSPITAL ROOM: TOTAL
MOBILITY SCORE: 11
DRESSING REGULAR UPPER BODY CLOTHING: A LOT
CLIMB 3 TO 5 STEPS WITH RAILING: TOTAL
TURNING FROM BACK TO SIDE WHILE IN FLAT BAD: A LOT
STANDING UP FROM CHAIR USING ARMS: A LOT
DRESSING REGULAR LOWER BODY CLOTHING: A LOT
HELP NEEDED FOR BATHING: A LOT
EATING MEALS: A LITTLE

## 2024-11-05 ASSESSMENT — ENCOUNTER SYMPTOMS
ALLERGIC/IMMUNOLOGIC NEGATIVE: 1
EYES NEGATIVE: 1
EYE PAIN: 0
ENDOCRINE NEGATIVE: 1
VOMITING: 1
NAUSEA: 1
HEMATOLOGIC/LYMPHATIC NEGATIVE: 1
SHORTNESS OF BREATH: 0
PALPITATIONS: 0
SEIZURES: 0
ARTHRALGIAS: 0
ABDOMINAL PAIN: 0
COLOR CHANGE: 0
FEVER: 0
CHILLS: 0
CARDIOVASCULAR NEGATIVE: 1
SORE THROAT: 0
FATIGUE: 1
PSYCHIATRIC NEGATIVE: 1
HEMATURIA: 0
COUGH: 0
DYSURIA: 0
RESPIRATORY NEGATIVE: 1
DIARRHEA: 1
MUSCULOSKELETAL NEGATIVE: 1
NEUROLOGICAL NEGATIVE: 1
BACK PAIN: 0

## 2024-11-05 ASSESSMENT — PATIENT HEALTH QUESTIONNAIRE - PHQ9
SUM OF ALL RESPONSES TO PHQ9 QUESTIONS 1 & 2: 0
1. LITTLE INTEREST OR PLEASURE IN DOING THINGS: NOT AT ALL
2. FEELING DOWN, DEPRESSED OR HOPELESS: NOT AT ALL

## 2024-11-05 ASSESSMENT — PAIN SCALES - GENERAL
PAINLEVEL_OUTOF10: 0 - NO PAIN

## 2024-11-05 ASSESSMENT — LIFESTYLE VARIABLES
HOW OFTEN DO YOU HAVE A DRINK CONTAINING ALCOHOL: NEVER
HOW OFTEN DO YOU HAVE 6 OR MORE DRINKS ON ONE OCCASION: NEVER
SKIP TO QUESTIONS 9-10: 1
AUDIT-C TOTAL SCORE: 0
SKIP TO QUESTIONS 9-10: 1
AUDIT-C TOTAL SCORE: 0
HOW MANY STANDARD DRINKS CONTAINING ALCOHOL DO YOU HAVE ON A TYPICAL DAY: PATIENT DOES NOT DRINK
AUDIT-C TOTAL SCORE: 0

## 2024-11-05 ASSESSMENT — ACTIVITIES OF DAILY LIVING (ADL)
HEARING - LEFT EAR: FUNCTIONAL
WALKS IN HOME: DEPENDENT
BATHING: DEPENDENT
PATIENT'S MEMORY ADEQUATE TO SAFELY COMPLETE DAILY ACTIVITIES?: YES
LACK_OF_TRANSPORTATION: NO
GROOMING: DEPENDENT
TOILETING: DEPENDENT
HEARING - RIGHT EAR: FUNCTIONAL
ADEQUATE_TO_COMPLETE_ADL: YES
ASSISTIVE_DEVICE: EYEGLASSES;WALKER
DRESSING YOURSELF: DEPENDENT
JUDGMENT_ADEQUATE_SAFELY_COMPLETE_DAILY_ACTIVITIES: YES
FEEDING YOURSELF: INDEPENDENT

## 2024-11-05 ASSESSMENT — PAIN - FUNCTIONAL ASSESSMENT
PAIN_FUNCTIONAL_ASSESSMENT: 0-10
PAIN_FUNCTIONAL_ASSESSMENT: 0-10

## 2024-11-05 NOTE — ED PROVIDER NOTES
Patient is a 49-year-old male with a history of invasive colon adenocarcinoma with metastasis to his liver, currently receiving immunotherapy at [facility] who presents today for concern of dehydration.  Patient has immunotherapy infusion planned for this Friday.  He receives immunotherapy infusions every 3 weeks.  He states that he has had nausea, vomiting and diarrhea which are typically chronic but worse than normal.  He patient states that he has chronic problems with his low blood pressure, is on midodrine.  The SNF was concerned that he was dehydrated and needed IV fluids.  They were unable to obtain IV access and sent him to the emergency room for further evaluation.  Patient denies any pain.  No chest pain, shortness of breath or abdominal pain.  He states that his nausea, vomiting and diarrhea are chronic.           Review of Systems   Constitutional:  Negative for chills and fever.   HENT:  Negative for ear pain and sore throat.    Eyes:  Negative for pain and visual disturbance.   Respiratory:  Negative for cough and shortness of breath.    Cardiovascular:  Negative for chest pain and palpitations.   Gastrointestinal:  Positive for diarrhea, nausea and vomiting. Negative for abdominal pain.   Genitourinary:  Negative for dysuria and hematuria.   Musculoskeletal:  Negative for arthralgias and back pain.   Skin:  Negative for color change and rash.   Neurological:  Negative for seizures and syncope.   All other systems reviewed and are negative.       Physical Exam  Vitals and nursing note reviewed.   Constitutional:       General: He is not in acute distress.     Appearance: Normal appearance. He is well-developed.      Comments: Cachectic   HENT:      Head: Normocephalic and atraumatic.      Mouth/Throat:      Mouth: Mucous membranes are dry.      Pharynx: No oropharyngeal exudate or posterior oropharyngeal erythema.   Eyes:      Extraocular Movements: Extraocular movements intact.       Conjunctiva/sclera: Conjunctivae normal.   Cardiovascular:      Rate and Rhythm: Normal rate and regular rhythm.      Heart sounds: No murmur heard.  Pulmonary:      Effort: Pulmonary effort is normal. No respiratory distress.      Breath sounds: Normal breath sounds.   Abdominal:      General: There is no distension.      Palpations: Abdomen is soft. There is no mass.      Tenderness: There is no abdominal tenderness. There is no guarding or rebound.      Hernia: No hernia is present.   Musculoskeletal:         General: No swelling.      Cervical back: Normal range of motion and neck supple. No rigidity.   Skin:     General: Skin is warm and dry.      Capillary Refill: Capillary refill takes less than 2 seconds.   Neurological:      General: No focal deficit present.      Mental Status: He is alert and oriented to person, place, and time.   Psychiatric:         Mood and Affect: Mood normal.          Labs Reviewed   CBC WITH AUTO DIFFERENTIAL - Abnormal       Result Value    WBC 17.2 (*)     nRBC 0.0      RBC 3.18 (*)     Hemoglobin 9.4 (*)     Hematocrit 27.2 (*)     MCV 86      MCH 29.6      MCHC 34.6      RDW 16.1 (*)     Platelets 417      Neutrophils % 73.3      Immature Granulocytes %, Automated 0.6      Lymphocytes % 24.1      Monocytes % 1.9      Eosinophils % 0.0      Basophils % 0.1      Neutrophils Absolute 12.60 (*)     Immature Granulocytes Absolute, Automated 0.10      Lymphocytes Absolute 4.14      Monocytes Absolute 0.32      Eosinophils Absolute 0.00      Basophils Absolute 0.02     COMPREHENSIVE METABOLIC PANEL - Abnormal    Glucose 80      Sodium 134 (*)     Potassium 3.0 (*)     Chloride 103      Bicarbonate 23      Anion Gap 11      Urea Nitrogen 13      Creatinine 0.24 (*)     eGFR >90      Calcium 5.4 (*)     Albumin <1.5 (*)     Alkaline Phosphatase 142 (*)     Total Protein 3.2 (*)     AST 12      Bilirubin, Total 0.5      ALT 21     CALCIUM, IONIZED - Abnormal    POCT Calcium, Ionized 0.91  (*)    MAGNESIUM - Abnormal    Magnesium 1.21 (*)         No orders to display        ECG 12 lead    Performed by: Avelina Matos PA-C  Authorized by: Hernandez Power DO    ECG interpreted by ED Physician in the absence of a cardiologist: yes    Comments:      EKG shows sinus rhythm with sinus arrhythmia.  Rate of 93 bpm.  No ST elevation.  OR interval is 144 ms and QRS duration is 90 ms.  Prolonged QT EKG interpretation per myself, Avelina Matos  Critical Care    Performed by: Avelina Matos PA-C  Authorized by: Miles Carlos DO    Critical care provider statement:     Critical care time (minutes):  50    Critical care time was exclusive of:  Separately billable procedures and treating other patients    Critical care was necessary to treat or prevent imminent or life-threatening deterioration of the following conditions:  Dehydration (electrolyte abnormalities)    Critical care was time spent personally by me on the following activities:  Ordering and performing treatments and interventions, ordering and review of laboratory studies, re-evaluation of patient's condition, review of old charts, discussions with consultants and examination of patient       Medical Decision Making  Patient is a 49 year old male with a hx of metastatic colon adenocarcinoma, chronic n/v/d, presented today for dehydration. Has a hx of hypocalcemia in the past that has been corrected. Serum calcium is 5.4 today, ionized calcium is 0.91. Magnesium is 1.21, potassium is 3.0. He does have some EKG changes of QT prolongation. Discussed with cardiologist, Dr. Morgan who recommended admission for IV electrolyte replenishment. He also is on midodrine for hypotension every 8 hrs, most recent dose ordered for 1600.  Discussed patient with his oncologist, Dr. Finn who felt that patient was appropriate to stay at [facility] given his electrolyte abnormalities and no indication for transfer from an oncologic standpoint.  At  this time no indication for CT scan of the abdomen/pelvis but she reports if symptoms persist she would like the patient reimaged.  Patient was able to tolerate ginger ale and is currently eating Jell-O.  He denies any abdominal pain and his abdomen is soft and nontender.    DDX includes but not limited to: dehydration, electrolyte abnormality, cardiac arrhythmia    Amount and/or Complexity of Data Reviewed  Labs: ordered. Decision-making details documented in ED Course.  ECG/medicine tests: ordered and independent interpretation performed. Decision-making details documented in ED Course.         Diagnoses as of 11/05/24 1602   Dehydration   Hypokalemia   Hypocalcemia   Hypomagnesemia   Chronic hypotension   Colon adenocarcinoma (Multi)                    Avelina Matos PA-C  11/05/24 1602

## 2024-11-05 NOTE — TELEPHONE ENCOUNTER
"Spoke with patient's wife, Irene, for an update on patient's status. The plan per rehab is to bring him to the emergency room at [place] [facility]. His blood pressure is very low and he has not been eating. Over the past few weeks he has been having difficulty keeping food down and also having diarrhea. Last Tuesday he came home with PT and OT to see what he could do at home. He started going downhill again over the past 5 days. They are planning to bring him to the emergency room. Wife states he is not in any pain, just feels \"blah\" and having nausea and diarrhea. He was having diarrhea 2-3 times a day and medication to help the diarrhea was working. He was doing great as of last week, but now has been too weak. Agree with plan for emergency room. She will keep the team updated.  "

## 2024-11-05 NOTE — TELEPHONE ENCOUNTER
I received a call from [facility] that they had not received the faxed lab orders for Vladimir that need to be drawn tomorrow.  The nurse stated they probably would not be needing them as they are going to send him to the ER.  Nurse stated that he has been unable to keep any fluids down and blood pressure has been in the 70's.  They had been giving him IV fluids peripherally, but their IV infiltrated.  They are unable to use his port.

## 2024-11-06 LAB
ACID FAST STN SPEC: NORMAL
ANION GAP SERPL CALC-SCNC: 8 MMOL/L (ref 10–20)
ATRIAL RATE: 91 BPM
ATRIAL RATE: 93 BPM
BUN SERPL-MCNC: 12 MG/DL (ref 6–23)
C DIF TOX TCDA+TCDB STL QL NAA+PROBE: NOT DETECTED
CALCIUM SERPL-MCNC: 5.7 MG/DL (ref 8.6–10.3)
CHLORIDE SERPL-SCNC: 106 MMOL/L (ref 98–107)
CO2 SERPL-SCNC: 23 MMOL/L (ref 21–32)
CREAT SERPL-MCNC: 0.22 MG/DL (ref 0.5–1.3)
EGFRCR SERPLBLD CKD-EPI 2021: >90 ML/MIN/1.73M*2
ERYTHROCYTE [DISTWIDTH] IN BLOOD BY AUTOMATED COUNT: 16.3 % (ref 11.5–14.5)
GLUCOSE SERPL-MCNC: 68 MG/DL (ref 74–99)
HCT VFR BLD AUTO: 25.4 % (ref 41–52)
HGB BLD-MCNC: 8.8 G/DL (ref 13.5–17.5)
MCH RBC QN AUTO: 29.8 PG (ref 26–34)
MCHC RBC AUTO-ENTMCNC: 34.6 G/DL (ref 32–36)
MCV RBC AUTO: 86 FL (ref 80–100)
MYCOBACTERIUM SPEC CULT: NORMAL
NRBC BLD-RTO: 0 /100 WBCS (ref 0–0)
P AXIS: 46 DEGREES
P OFFSET: 193 MS
P ONSET: 148 MS
PLATELET # BLD AUTO: 361 X10*3/UL (ref 150–450)
POTASSIUM SERPL-SCNC: 3.5 MMOL/L (ref 3.5–5.3)
PR INTERVAL: 140 MS
PR INTERVAL: 144 MS
Q ONSET: 217 MS
Q ONSET: 218 MS
QRS COUNT: 15 BEATS
QRS COUNT: 16 BEATS
QRS DURATION: 90 MS
QRS DURATION: 90 MS
QT INTERVAL: 384 MS
QT INTERVAL: 396 MS
QTC CALCULATION(BAZETT): 472 MS
QTC CALCULATION(BAZETT): 492 MS
QTC FREDERICIA: 441 MS
QTC FREDERICIA: 458 MS
R AXIS: 20 DEGREES
R AXIS: 69 DEGREES
RBC # BLD AUTO: 2.95 X10*6/UL (ref 4.5–5.9)
SODIUM SERPL-SCNC: 133 MMOL/L (ref 136–145)
T AXIS: -2 DEGREES
T AXIS: 46 DEGREES
T OFFSET: 410 MS
T OFFSET: 415 MS
VENTRICULAR RATE: 91 BPM
VENTRICULAR RATE: 93 BPM
WBC # BLD AUTO: 17.6 X10*3/UL (ref 4.4–11.3)

## 2024-11-06 PROCEDURE — 2500000004 HC RX 250 GENERAL PHARMACY W/ HCPCS (ALT 636 FOR OP/ED)

## 2024-11-06 PROCEDURE — 96372 THER/PROPH/DIAG INJ SC/IM: CPT

## 2024-11-06 PROCEDURE — 2500000004 HC RX 250 GENERAL PHARMACY W/ HCPCS (ALT 636 FOR OP/ED): Performed by: STUDENT IN AN ORGANIZED HEALTH CARE EDUCATION/TRAINING PROGRAM

## 2024-11-06 PROCEDURE — 87506 IADNA-DNA/RNA PROBE TQ 6-11: CPT | Mod: SAMLAB | Performed by: STUDENT IN AN ORGANIZED HEALTH CARE EDUCATION/TRAINING PROGRAM

## 2024-11-06 PROCEDURE — G0378 HOSPITAL OBSERVATION PER HR: HCPCS

## 2024-11-06 PROCEDURE — 97165 OT EVAL LOW COMPLEX 30 MIN: CPT | Mod: GO

## 2024-11-06 PROCEDURE — 2500000002 HC RX 250 W HCPCS SELF ADMINISTERED DRUGS (ALT 637 FOR MEDICARE OP, ALT 636 FOR OP/ED): Performed by: NURSE PRACTITIONER

## 2024-11-06 PROCEDURE — 87493 C DIFF AMPLIFIED PROBE: CPT | Performed by: NURSE PRACTITIONER

## 2024-11-06 PROCEDURE — 99232 SBSQ HOSP IP/OBS MODERATE 35: CPT | Performed by: NURSE PRACTITIONER

## 2024-11-06 PROCEDURE — 97161 PT EVAL LOW COMPLEX 20 MIN: CPT | Mod: GP | Performed by: PHYSICAL THERAPIST

## 2024-11-06 PROCEDURE — 84145 PROCALCITONIN (PCT): CPT | Mod: SAMLAB | Performed by: STUDENT IN AN ORGANIZED HEALTH CARE EDUCATION/TRAINING PROGRAM

## 2024-11-06 PROCEDURE — 80048 BASIC METABOLIC PNL TOTAL CA: CPT

## 2024-11-06 PROCEDURE — 2500000001 HC RX 250 WO HCPCS SELF ADMINISTERED DRUGS (ALT 637 FOR MEDICARE OP)

## 2024-11-06 PROCEDURE — 85027 COMPLETE CBC AUTOMATED: CPT

## 2024-11-06 RX ORDER — SODIUM CHLORIDE, SODIUM LACTATE, POTASSIUM CHLORIDE, CALCIUM CHLORIDE 600; 310; 30; 20 MG/100ML; MG/100ML; MG/100ML; MG/100ML
100 INJECTION, SOLUTION INTRAVENOUS CONTINUOUS
Status: DISCONTINUED | OUTPATIENT
Start: 2024-11-06 | End: 2024-11-07

## 2024-11-06 RX ORDER — DIPHENOXYLATE HYDROCHLORIDE AND ATROPINE SULFATE 2.5; .025 MG/1; MG/1
1 TABLET ORAL 4 TIMES DAILY PRN
Status: DISPENSED | OUTPATIENT
Start: 2024-11-06

## 2024-11-06 RX ORDER — CALCIUM CARBONATE 200(500)MG
500 TABLET,CHEWABLE ORAL 3 TIMES DAILY
Status: DISPENSED | OUTPATIENT
Start: 2024-11-06

## 2024-11-06 RX ORDER — CALCIUM CARBONATE 200(500)MG
500 TABLET,CHEWABLE ORAL DAILY
Status: DISCONTINUED | OUTPATIENT
Start: 2024-11-06 | End: 2024-11-06

## 2024-11-06 ASSESSMENT — PAIN - FUNCTIONAL ASSESSMENT
PAIN_FUNCTIONAL_ASSESSMENT: 0-10

## 2024-11-06 ASSESSMENT — COGNITIVE AND FUNCTIONAL STATUS - GENERAL
DAILY ACTIVITIY SCORE: 13
TOILETING: TOTAL
STANDING UP FROM CHAIR USING ARMS: A LOT
DRESSING REGULAR LOWER BODY CLOTHING: A LOT
WALKING IN HOSPITAL ROOM: A LITTLE
CLIMB 3 TO 5 STEPS WITH RAILING: TOTAL
WALKING IN HOSPITAL ROOM: TOTAL
DRESSING REGULAR UPPER BODY CLOTHING: A LOT
STANDING UP FROM CHAIR USING ARMS: A LITTLE
EATING MEALS: A LITTLE
MOVING TO AND FROM BED TO CHAIR: A LITTLE
HELP NEEDED FOR BATHING: A LOT
DRESSING REGULAR LOWER BODY CLOTHING: A LOT
PERSONAL GROOMING: A LITTLE
TOILETING: A LOT
EATING MEALS: A LITTLE
HELP NEEDED FOR BATHING: A LOT
MOVING FROM LYING ON BACK TO SITTING ON SIDE OF FLAT BED WITH BEDRAILS: A LITTLE
DAILY ACTIVITIY SCORE: 13
MOVING TO AND FROM BED TO CHAIR: A LOT
TURNING FROM BACK TO SIDE WHILE IN FLAT BAD: A LOT
PERSONAL GROOMING: TOTAL
DRESSING REGULAR UPPER BODY CLOTHING: A LITTLE
MOBILITY SCORE: 12
CLIMB 3 TO 5 STEPS WITH RAILING: TOTAL
MOBILITY SCORE: 15
TURNING FROM BACK TO SIDE WHILE IN FLAT BAD: A LOT

## 2024-11-06 ASSESSMENT — ACTIVITIES OF DAILY LIVING (ADL)
BATHING_ASSISTANCE: MAXIMAL
LACK_OF_TRANSPORTATION: NO

## 2024-11-06 ASSESSMENT — PAIN SCALES - GENERAL
PAINLEVEL_OUTOF10: 0 - NO PAIN
PAINLEVEL_OUTOF10: 0 - NO PAIN

## 2024-11-06 NOTE — PROGRESS NOTES
"Yon Lawrence \"Roger" is a 49 y.o. male on day 0 of admission presenting with Hypokalemia.      Subjective   Patient lying in bed in no acute distress.  Requesting something for his diarrhea.  States having 2-3 loose stools today.       Objective     Last Recorded Vitals  BP 96/64 (BP Location: Right arm, Patient Position: Lying)   Pulse 83   Temp 36.2 °C (97.2 °F) (Temporal)   Resp 16   Wt 69.9 kg (154 lb)   SpO2 98%   Intake/Output last 3 Shifts:    Intake/Output Summary (Last 24 hours) at 11/6/2024 1300  Last data filed at 11/6/2024 0628  Gross per 24 hour   Intake 672 ml   Output 315 ml   Net 357 ml       Admission Weight  Weight: 69.9 kg (154 lb) (11/05/24 1137)    Daily Weight  11/05/24 : 69.9 kg (154 lb)    Image Results  ECG 12 Lead  Sinus rhythm with marked sinus arrhythmia  T wave abnormality, consider anterior ischemia  Prolonged QT  Abnormal ECG    See ED provider note for full interpretation and clinical correlation  Confirmed by Randolph Wilkerson ([id number]) on 11/6/2024 10:28:06 AM  ECG 12 Lead  Sinus rhythm with marked sinus arrhythmia  Low voltage QRS  Nonspecific T wave abnormality  Abnormal ECG  When compared with ECG of 05-NOV-2024 11:40, (unconfirmed)  No significant change was found  Confirmed by Dinesh Rivera ([id number]) on 11/6/2024 9:11:35 AM      Physical Exam  Constitutional:       Appearance: He is ill-appearing.   HENT:      Head: Normocephalic and atraumatic.        Mouth: Mucous membranes are moist.   Eyes:      Extraocular Movements: Extraocular movements intact.      Conjunctiva/sclera: Conjunctivae normal.      Pupils: Pupils are equal, round, and reactive to light.   Cardiovascular:      Rate and Rhythm: Normal rate and regular rhythm.      Pulses: Normal pulses.   Pulmonary:      Effort: Pulmonary effort is normal.      Breath sounds: Normal breath sounds.   Abdominal:      Palpations: Abdomen is soft.      Tenderness: There is abdominal tenderness.   Musculoskeletal:         General: " Normal range of motion.      Cervical back: Normal range of motion.   Skin:     General: Skin is warm and dry.      Capillary Refill: Capillary refill takes less than 2 seconds.   Neurological:      General: No focal deficit present.      Mental Status: He is alert.   Psychiatric:         Mood and Affect: Mood normal.         Behavior: Behavior normal.         Thought Content: Thought content normal.         Judgment: Judgment normal.   Relevant Results     Results for orders placed or performed during the hospital encounter of 11/05/24 (from the past 24 hours)   Calcium, Ionized   Result Value Ref Range    POCT Calcium, Ionized 0.91 (L) 1.1 - 1.33 mmol/L   ECG 12 Lead   Result Value Ref Range    Ventricular Rate 93 BPM    Atrial Rate 93 BPM    TX Interval 144 ms    QRS Duration 90 ms    QT Interval 396 ms    QTC Calculation(Bazett) 492 ms    R Axis 69 degrees    T Axis 46 degrees    QRS Count 16 beats    Q Onset 217 ms    T Offset 415 ms    QTC Fredericia 458 ms   ECG 12 Lead   Result Value Ref Range    Ventricular Rate 91 BPM    Atrial Rate 91 BPM    TX Interval 140 ms    QRS Duration 90 ms    QT Interval 384 ms    QTC Calculation(Bazett) 472 ms    P Axis 46 degrees    R Axis 20 degrees    T Axis -2 degrees    QRS Count 15 beats    Q Onset 218 ms    P Onset 148 ms    P Offset 193 ms    T Offset 410 ms    QTC Fredericia 441 ms   Urinalysis with Reflex Microscopic   Result Value Ref Range    Color, Urine Light-Orange (N) Light-Yellow, Yellow, Dark-Yellow    Appearance, Urine Ex.Turbid (N) Clear    Specific Gravity, Urine 1.032 1.005 - 1.035    pH, Urine 6.0 5.0, 5.5, 6.0, 6.5, 7.0, 7.5, 8.0    Protein, Urine 100 (2+) (A) NEGATIVE, 10 (TRACE), 20 (TRACE) mg/dL    Glucose, Urine Normal Normal mg/dL    Blood, Urine OVER (3+) (A) NEGATIVE    Ketones, Urine 10 (1+) (A) NEGATIVE mg/dL    Bilirubin, Urine NEGATIVE NEGATIVE    Urobilinogen, Urine Normal Normal mg/dL    Nitrite, Urine NEGATIVE NEGATIVE    Leukocyte Esterase,  Urine 25 Belinda/µL (A) NEGATIVE   Microscopic Only, Urine   Result Value Ref Range    WBC, Urine >50 (A) 1-5, NONE /HPF    WBC Clumps, Urine OCCASIONAL Reference range not established. /HPF    RBC, Urine >20 (A) NONE, 1-2, 3-5 /HPF    Squamous Epithelial Cells, Urine 1-9 (SPARSE) Reference range not established. /HPF    Bacteria, Urine 3+ (A) NONE SEEN /HPF    Mucus, Urine 3+ Reference range not established. /LPF   Potassium   Result Value Ref Range    Potassium 3.5 3.5 - 5.3 mmol/L   Magnesium   Result Value Ref Range    Magnesium 1.71 1.60 - 2.40 mg/dL   CBC   Result Value Ref Range    WBC 17.6 (H) 4.4 - 11.3 x10*3/uL    nRBC 0.0 0.0 - 0.0 /100 WBCs    RBC 2.95 (L) 4.50 - 5.90 x10*6/uL    Hemoglobin 8.8 (L) 13.5 - 17.5 g/dL    Hematocrit 25.4 (L) 41.0 - 52.0 %    MCV 86 80 - 100 fL    MCH 29.8 26.0 - 34.0 pg    MCHC 34.6 32.0 - 36.0 g/dL    RDW 16.3 (H) 11.5 - 14.5 %    Platelets 361 150 - 450 x10*3/uL   Basic metabolic panel   Result Value Ref Range    Glucose 68 (L) 74 - 99 mg/dL    Sodium 133 (L) 136 - 145 mmol/L    Potassium 3.5 3.5 - 5.3 mmol/L    Chloride 106 98 - 107 mmol/L    Bicarbonate 23 21 - 32 mmol/L    Anion Gap 8 (L) 10 - 20 mmol/L    Urea Nitrogen 12 6 - 23 mg/dL    Creatinine 0.22 (L) 0.50 - 1.30 mg/dL    eGFR >90 >60 mL/min/1.73m*2    Calcium 5.7 (L) 8.6 - 10.3 mg/dL     *Note: Due to a large number of results and/or encounters for the requested time period, some results have not been displayed. A complete set of results can be found in Results Review.       Assessment/Plan        This patient has a central line   Reason for the central line remaining today? Parenteral medication    This patient has a urinary catheter   Reason for the urinary catheter remaining today? urinary retention/bladder outlet obstruction, acute or chronic          Assessment & Plan  Hypokalemia    Anemia    Hypomagnesemia    Hypocalcemia    Leukocytosis    Electrolyte imbalance    Electrolyte imbalance and  hypocalcemia  -Calcium 5.4 on admission now 5.7 continue oral supplementation  -Sodium 134 on admission today 133 potassium 3.0 today 3.5 continue to follow  -Magnesium admission 1.21 corrected to 1.71    Leukocytosis  -UA negative  -Consider blood cultures tomorrow if goes up    Diarrhea  -Check stool for C. Difficile  -If C. difficile negative will give Lomotil    Weakness debility  -PT OT consult patient already at [facility] for [facility]hab    Invasive colon adenocarcinoma with mets to his liver  -Currently receiving immunotherapy    Hypotension  -Continue midodrine      Malnutrition Diagnosis Status: New  Malnutrition Diagnosis: Severe malnutrition related to starvation  As Evidenced by: >5% weight loss in 1 month, prolonger poor PO intake <75% for >1 month.  I agree with the dietitian's malnutrition diagnosis.  DVT prophylaxis Lovenox and SCDs     Disposition return to PCP when medically stable    Gayle Mcgovern, APRN-CNP

## 2024-11-06 NOTE — CONSULTS
"Nutrition Initial Assessment:   Nutrition Assessment    Reason for Assessment: Provider consult order    Patient is a 49 y.o. male presenting with hx of invasive colon adenocarcinoma with metastasis to his liver. Currently receiving immunotherapy. Concerns of dehydration. Pt reports chronic N/V/D but it has become worse than usual over the past few days. Consulted by provider for nutrition recommendation. Pt with significant wt loss of 12.5% over the past 6 weeks. Rec; supplementation of ensure clear 3x daily with meals. Providing an additional 240 kcals and 10g of protein each.       Nutrition History:  Energy Intake: Poor < 50 %  Food and Nutrient History: Pt with decreased appetite, reports poor appetite.  Food Allergies/Intolerances:  None  GI Symptoms: Diarrhea, Nausea, and Vomiting  Oral Problems: None       Anthropometrics:  Height: 185.4 cm (6' 1\")   Weight: 69.9 kg (154 lb)   BMI (Calculated): 20.32    Weight Change %:  Weight History / % Weight Change: Significant wt loss of 12.5% in 6 weeks.  Significant Weight Loss: Yes  Interpretation of Weight Loss: >5% in 1 month    Nutrition Focused Physical Exam Findings:  Subcutaneous Fat Loss:   Orbital Fat Pads: Mild-Moderate (slight dark circles and slight hollowing)  Buccal Fat Pads: Mild-Moderate (flat cheeks, minimal bounce)  Muscle Wasting:  Temporalis: Mild-Moderate (slight depression)  Pectoralis (Clavicular Region): Mild-Moderate (some protrusion of clavicle)  Edema:  Edema: none  Physical Findings:  Hair: Negative  Eyes: Negative  Mouth: Negative  Skin: Negative (Skin noting wound to buttocks.)    Nutrition Significant Labs:  CBC Trend:   Results from last 7 days   Lab Units 11/06/24  0444 11/05/24  1223   WBC AUTO x10*3/uL 17.6* 17.2*   RBC AUTO x10*6/uL 2.95* 3.18*   HEMOGLOBIN g/dL 8.8* 9.4*   HEMATOCRIT % 25.4* 27.2*   MCV fL 86 86   PLATELETS AUTO x10*3/uL 361 417    , BMP Trend:   Results from last 7 days   Lab Units 11/06/24  0444 11/05/24  2209 " 11/05/24  1223   GLUCOSE mg/dL 68*  --  80   CALCIUM mg/dL 5.7*  --  5.4*   SODIUM mmol/L 133*  --  134*   POTASSIUM mmol/L 3.5   < > 3.0*   CO2 mmol/L 23  --  23   CHLORIDE mmol/L 106  --  103   BUN mg/dL 12  --  13   CREATININE mg/dL 0.22*  --  0.24*    < > = values in this interval not displayed.     Nutrition Specific Medications:  enoxaparin, 70 mg, subcutaneous, q12h  midodrine, 5 mg, oral, q8h CATRACHITA  potassium chloride CR, 40 mEq, oral, Once         I/O:   Last BM Date: 11/06/24; Stool Appearance: Loose, Watery (11/06/24 0459)    Dietary Orders (From admission, onward)       Start     Ordered    11/05/24 2232  May Participate in Room Service  ( ROOM SERVICE MAY PARTICIPATE)  Once        Question:  .  Answer:  Yes    11/05/24 2231 11/05/24 1729  Adult diet Regular  Diet effective now        Question:  Diet type  Answer:  Regular    11/05/24 1729                     Estimated Needs:   Total Energy Estimated Needs (kCal): 2400 kCal  Method for Estimating Needs: 30kcals/kg IBW  Total Protein Estimated Needs (g): 80 g  Method for Estimating Needs: 1-1.2g/kg IBW (80-96)  Total Fluid Estimated Needs (mL): 2400 mL  Method for Estimating Needs: 1ml/kcal        Nutrition Diagnosis   Malnutrition Diagnosis  Patient has Malnutrition Diagnosis: Yes  Diagnosis Status: New  Malnutrition Diagnosis: Severe malnutrition related to starvation  As Evidenced by: >5% weight loss in 1 month, prolonger poor PO intake <75% for >1 month.        Nutrition Interventions/Recommendations         Nutrition Prescription:  Individualized Nutrition Prescription Provided for : Individual nutrition prescription of 2400 kcals and 80g of protein to be provided with diet order.        Nutrition Interventions:   Interventions: Medical food supplement  Medical Food Supplement: Commercial beverage  Goal: ensure clear 3x daily providing an additional 240 kcals and 10g of protein each.    Nutrition Monitoring and Evaluation   Food/Nutrient Related  History Monitoring  Monitoring and Evaluation Plan: Energy intake  Energy Intake: Estimated energy intake  Criteria: Continue with PO intakes, goal of >33% of all meals.  Additional Plans: Ensure clear 3x daily providing an additional 240 kcals and 10g of protein each.    Body Composition/Growth/Weight History  Monitoring and Evaluation Plan: Weight  Weight: Weight change  Criteria: Continue to monitor wt status and wt changes. Pt with significant wt loss of 12.5% in 6 weeks noted.    Biochemical Data, Medical Tests and Procedures  Monitoring and Evaluation Plan: Electrolyte/renal panel    Time Spent (min): 60 minutes

## 2024-11-06 NOTE — PROGRESS NOTES
Spiritual Care Visit    Clinical Encounter Type  Visited With: Patient  Routine Visit: Introduction  Continue Visiting: Yes  Referral To:     Rastafari Encounters  Rastafari Needs: Sacred text, Prayer, Spiritual care brochure              Patient Spiritual Care Encounters  Child Adaptation to Hospital: Never demonstrated  Suffering Severity: None  Fear Level: None  Feelings of Loneliness: Good  Feelings of Hopelessness: Excellent  Coping: Consistently demonstrated  Social Interaction: Participates in daily activites              PC-7 Assessment (Level of Unmet Needs)  Existential Struggle: Further assess  Spiritual/Rastafari Struggle: Further assess  Legacy: Further assess  Relationships: Further assess  Fear of Death/Dying: Further assess  Values/Medical Decision Making: Further assess  Ritual/Other: Further assess  PC-7 Score: 0    SDAT (Spiritual Distress Assessment Tool)  Need for Life Balance: Some evidence of unmet spiritual need  Need for Connection: Some evidence of unmet spiritual need  Need for Values Acknowledgement: Some evidence of unmet spiritual need  Need to Maintain Control: Some evidence of unmet spiritual need  Need to Maintain Identity: Some evidence of unmet spiritual need  SDAT Score: 5  SDAT Average Score: 1    Taxonomy  Intended Effects: Aligning care plan with patient's values, Build relationship of care and support, Convey a calming presence, Demonstrate caring and concern, Lessen someone's feelings of isolation, Helping someone feel comforted  Methods: Assist with finding purpose, Assist with spiritual/Scientologist practices, Collaborate with care team member  Interventions: Active listening, Ask guided questions

## 2024-11-06 NOTE — CARE PLAN
The patient's goals for the shift include      The clinical goals for the shift include decreased loose stools this shift.  Problem: Fall/Injury  Goal: Not fall by end of shift  11/6/2024 1739 by Francheska Livingston RN  Outcome: Progressing  11/6/2024 1440 by Francheska Livingston RN  Outcome: Progressing  Goal: Be free from injury by end of the shift  11/6/2024 1739 by Francheska Livingston RN  Outcome: Progressing  11/6/2024 1440 by Francheska Livingston RN  Outcome: Progressing  Goal: Verbalize understanding of personal risk factors for fall in the hospital  11/6/2024 1739 by Francheska Livingston RN  Outcome: Progressing  11/6/2024 1440 by Francheska Livingston RN  Outcome: Progressing  Goal: Verbalize understanding of risk factor reduction measures to prevent injury from fall in the home  11/6/2024 1739 by Francheska Livingston RN  Outcome: Progressing  11/6/2024 1440 by Francheska Livingston RN  Outcome: Progressing  Goal: Use assistive devices by end of the shift  11/6/2024 1739 by Francheska Livingston RN  Outcome: Progressing  11/6/2024 1440 by Francheska Livingston RN  Outcome: Progressing  Goal: Pace activities to prevent fatigue by end of the shift  11/6/2024 1739 by Francheska Livingston RN  Outcome: Progressing  11/6/2024 1440 by Francheska Livingston RN  Outcome: Progressing

## 2024-11-06 NOTE — CARE PLAN
I was contacted by nursing as patient's family had concerns about the patient's continued diarrhea and leukocytosis.  Will obtain expanded GI stool panel to evaluate for other infections other than C. difficile.  With the patient continued to have persistent watery diarrhea and not having significant p.o. intake, we will place the patient back on fluids.  Procalcitonin has been ordered.  Increase Tums to 500 mg 3 times daily with continue hypocalcemia.  The patient continues to have issues moving forward with diarrhea and leukocytosis, CT abdomen pelvis should be considered if other infection has not been identified.

## 2024-11-06 NOTE — PROGRESS NOTES
"Physical Therapy    Physical Therapy Evaluation    Patient Name: Yon Lawrence \"Vladimir\"  MRN: [id number]  Today's Date: 11/6/2024   Time Calculation  Start Time: 1023  Stop Time: 1041  Time Calculation (min): 18 min    Assessment/Plan   Skilled PT intervention is indicated due to patient presents with deficits in bed mobility, transfers, gait, stair negotiation, strength, activity tolerance, and safety awareness.   Patient globally weak and deconditioned.  Recommend continued physical therapy intervention at a low moderate intensity to improve strength, balance, mobility and reduce fall risk.  Continue ambulation with FWW.    PT Assessment  PT Assessment Results: Decreased strength, Decreased endurance, Impaired balance, Decreased mobility, Pain  Rehab Prognosis: Good  Barriers to Discharge: needing assist for mobility  Evaluation/Treatment Tolerance: Patient limited by fatigue, Patient limited by pain  Barriers to Participation: Comorbidities  End of Session Communication: Bedside nurse (communication on white board)  End of Session Patient Position: Bed, 3 rail up, Alarm off, not on at start of session  IP OR SWING BED PT PLAN  Inpatient or Swing Bed: Inpatient  PT Plan  Treatment/Interventions: Bed mobility, Transfer training, Gait training, Balance training, Strengthening, Endurance training, Therapeutic exercise, Therapeutic activity, Home exercise program  PT Plan: Ongoing PT  PT Frequency: 3 times per week  PT Discharge Recommendations: Moderate intensity level of continued care  Equipment Recommended upon Discharge: Wheeled walker  PT Recommended Transfer Status: Assist x1  PT - OK to Discharge: Yes (once medically appropriate and safe DC plan in place)    Subjective     Current Problem:  Patient Active Problem List   Diagnosis    LUQ abdominal pain    Adenocarcinoma of transverse colon (Multi)    Anemia    BPH without obstruction/lower urinary tract symptoms    Infarction of liver (HHS-HCC)    Deep vein " thrombosis (DVT) of proximal lower extremity (Multi)    Large pleural effusion    Hypokalemia    Hypomagnesemia    Hypocalcemia    Leukocytosis    Electrolyte imbalance       General Visit Information:  General  Reason for Referral: impaired mobility;49 y.o. male with past medical history of colon cancer, anemia, hypokalemia, hypomagnesemia, BPH, and DVT of proximal lower extremity presented from [facility]  to ER today with complaints of dehydration.  Referred By: RASHIDA Negro  Past Medical History Relevant to Rehab: history of invasive colon adenocarcinoma with metastasis to his liver, currently receiving immunotherapy at [facility] facility  Family/Caregiver Present: No  Co-Treatment: OT  Co-Treatment Reason: to maximize patient safety with mobility  Prior to Session Communication: Bedside nurse (PT attempted earlier this am but patient needing cleaned up from diarrhea first)  Patient Position Received: Bed, 3 rail up, Alarm off, not on at start of session  General Comment: patient awake in bed on arrival and agreeable to therapy;  states he is most comfortable staying in the bed and sitting upright in bed vs recliner;    Home Living:  Home Living  Home Living Comments: patient reports he is currently at [facility] for a skilled stay following hospitalization in October.  Plan is for patient to return to [facility] for skilled therapy    Prior Level of Function:  Prior Function Per Pt/Caregiver Report  Prior Function Comments: [facility]ile at [facility] patient has assist for bathing and some dressing.  States he has no control of his bowels and has a barksdale catheter; he is total assist for cleaning up after his BMs; states he has been ambulating with a FWW but the past 3 days has been having severe burning in his feet/legs which limits his mobility;  states they have been following him with a chair at the [facility] when he walks as he may need to sit down  abruptly    Precautions:  Precautions  Medical Precautions: Fall precautions    Vital Signs:     Objective     Pain:  Pain Assessment  Pain Assessment: 0-10  0-10 (Numeric) Pain Score:  (no c/o pain but did c/o burning in his feet with standing)    Cognition:  Cognition  Overall Cognitive Status: Within Functional Limits    General Assessments:  General Observation  General Observation: cooperative and pleasant   Activity Tolerance  Endurance: Decreased tolerance for upright activites     Strength  Strength Comments: LEs grossly >/=3/5           Static Sitting Balance  Static Sitting-Balance Support: Feet supported, Bilateral upper extremity supported  Static Sitting-Level of Assistance: Independent  Dynamic Standing Balance  Dynamic Standing-Balance Support: Bilateral upper extremity supported  Dynamic Standing-Level of Assistance: Contact guard  Dynamic Standing-Balance:  (stepping, turning)  Dynamic Standing-Comments: FWW, gait belt    Functional Assessments:     Bed Mobility  Bed Mobility: Yes  Bed Mobility 1  Bed Mobility 1: Supine to sitting  Level of Assistance 1: Close supervision  Bed Mobility Comments 1: HOB elevated 80 deg, using rails  Bed Mobility 2  Bed Mobility  2: Sitting to supine  Level of Assistance 2: Moderate assistance (for LES)  Bed Mobility Comments 2: HOB elevated, using rails  Transfers  Transfer: Yes  Transfer 1  Technique 1: Sit to stand, Stand to sit  Transfer Device 1: Gait belt (FWW)  Transfer Level of Assistance 1: Contact guard  Ambulation/Gait Training  Ambulation/Gait Training Performed: Yes  Ambulation/Gait Training 1  Surface 1: Level tile  Device 1: Rolling walker  Gait Support Devices: Gait belt  Assistance 1: Contact guard  Quality of Gait 1: Decreased step length, Diminished heel strike, Antalgic  Comments/Distance (ft) 1: 4' forward and back to bed, c/o significant burning in his B LE/feet and needing to return to bed due to fatigue and burning          Outcome  Measures:  [facility] Basic Mobility  Turning from your back to your side while in a flat bed without using bedrails: A little  Moving from lying on your back to sitting on the side of a flat bed without using bedrails: A lot  Moving to and from bed to chair (including a wheelchair): A little  Standing up from a chair using your arms (e.g. wheelchair or bedside chair): A little  To walk in hospital room: A little  Climbing 3-5 steps with railing: Total  Basic Mobility - Total Score: 15                            Goals:  Encounter Problems       Encounter Problems (Active)       PT Problem       PT Goal 1       Start:  11/06/24    Expected End:  11/19/24       Yon Lawrence will be independent with bed mobility for supine to and from sitting EOB without use of rail           PT Goal 2       Start:  11/06/24    Expected End:  11/19/24       Yon Lawrence will transfer sit to and from stand using least restrictive assistive device SBA            PT Goal 3       Start:  11/06/24    Expected End:  11/19/24       Yon Lawrence will demonstrate good safety awareness with transfers and mobility and with use of assistive device (proper hand placement).            PT Goal 4       Start:  11/06/24    Expected End:  11/19/24       Yon Lawrence will ambulate 100 ft with assistive device , level surface, good balance, steady SBA                Education Documentation  Mobility Training, taught by Esme Hassan, PT at 11/6/2024 11:51 AM.  Learner: Patient  Readiness: Acceptance  Method: Explanation  Response: Verbalizes Understanding, Needs Reinforcement, Demonstrated Understanding  Comment: safety with mobility and transfers and use of device    Education Comments  No comments found.

## 2024-11-06 NOTE — PROGRESS NOTES
"Occupational Therapy    Evaluation    Patient Name: Yon Lawrence \"Vladimir\"  MRN: [id number]  Today's Date: 11/6/2024  Time Calculation  Start Time: 1024  Stop Time: 1041  Time Calculation (min): 17 min  310/310-A    Assessment  IP OT Assessment  OT Assessment: pt has been recently staying at [facility] for skilled therapy services due to a decline in functional ADL and mobility.  pt would benefit from continued OT services at moderate intensity at discharge in order to ensure safe transition home.  Prognosis: Good  Barriers to Discharge: None  Evaluation/Treatment Tolerance: Patient limited by fatigue (limited by dizziness)  Medical Staff Made Aware: Yes  End of Session Communication: Bedside nurse  End of Session Patient Position: Bed, 3 rail up, Alarm off, not on at start of session    Plan:  Treatment Interventions: ADL retraining, Functional transfer training, Endurance training, Patient/family training, Neuromuscular reeducation, Compensatory technique education  OT Frequency: 3 times per week  OT Discharge Recommendations: Moderate intensity level of continued care  OT Recommended Transfer Status: Assist of 1  OT - OK to Discharge: Yes (when medically stable)    Subjective     Current Problem:  1. Dehydration        2. Hypokalemia        3. Hypocalcemia        4. Hypomagnesemia        5. Chronic hypotension        6. Colon adenocarcinoma (Multi)            General:  General  Reason for Referral: 49 y.o. male with past medical history of colon cancer, anemia, hypokalemia, hypomagnesemia, BPH, and DVT of proximal lower extremity presented from [facility]  to ER today with complaints of dehydration.  Referred By: FARIHA Negro-CNP  Past Medical History Relevant to Rehab: history of invasive colon adenocarcinoma with metastasis to his liver, currently receiving immunotherapy at [facility]  Family/Caregiver Present: No  Co-Treatment: PT  Co-Treatment Reason: to maximize patient safety with " mobility  Prior to Session Communication: Bedside nurse  Patient Position Received: Bed, 3 rail up, Alarm off, not on at start of session  General Comment: patient awake in bed on arrival and agreeable to therapy;  states he is most comfortable staying in the bed and sitting upright in bed vs recliner;    Precautions:  Medical Precautions: Fall precautions    Vital Signs:  Vital Signs Comment: no concerns    Pain:  Pain Assessment  Pain Assessment: 0-10  0-10 (Numeric) Pain Score:  (c/o no pain at rest.  burning in feet when standing)    Objective     Cognition:  Overall Cognitive Status: Within Functional Limits             Home Living:  Home Living Comments:  (patient reports he is currently at [facility] for a skilled stay following hospitalization in October.  Plan is for patient to return to [facility] for skilled therapy    patient reports he is currently at)     Prior Function:  Prior Function Comments: pt typically is indep in all mobility and ADL.  [facility] at [facility] patient has assist for bathing and some dressing.  States he has no control of his bowels and has a barksdale catheter; he is total assist for cleaning up after his BMs; states he has been ambulating with a FWW but the past 3 days has been having severe burning in his feet/legs and dizziness which limits his mobility;  states they have been following him with a chair at the SNF when he walks as he may need to sit down abruptly    IADL History:       ADL:  Eating Assistance: Independent  Grooming Assistance: Stand by  Bathing Assistance: Maximal  UE Dressing Assistance: Minimal  LE Dressing Assistance: Maximal  Toileting Assistance with Device: Total    Activity Tolerance:  Endurance: Decreased tolerance for upright activites    Bed Mobility/Transfers:   Bed Mobility  Bed Mobility: Yes  Bed Mobility 1  Bed Mobility 1: Supine to sitting  Level of Assistance 1: Close supervision  Bed Mobility Comments 1: HOB elevated  Bed Mobility 2  Bed  Mobility  2: Sitting to supine  Level of Assistance 2: Moderate assistance  Bed Mobility Comments 2: HOB elevated  Transfers  Transfer: Yes  Transfer 1  Technique 1: Sit to stand, Stand to sit  Transfer Device 1: Walker, Gait belt  Transfer Level of Assistance 1: Contact guard    Ambulation/Gait Training:  Functional Mobility  Functional Mobility Performed: Yes  Functional Mobility 1  Surface 1: Level tile  Device 1: Rolling walker  Functional Mobility Support Devices: Gait belt  Assistance 1: Contact guard  Comments 1: short distance.  limited by c/o feeling light headed        Vision: Vision - Basic Assessment  Current Vision: Wears glasses all the time      Strength: BUE fair         Outcome Measures: [facility] Daily Activity  Putting on and taking off regular lower body clothing: A lot  Bathing (including washing, rinsing, drying): A lot  Putting on and taking off regular upper body clothing: A little  Toileting, which includes using toilet, bedpan or urinal: A lot  Taking care of personal grooming such as brushing teeth: Total  Eating Meals: A little  Daily Activity - Total Score: 13                       EDUCATION:     Education Documentation  Body Mechanics, taught by Francheska Caruos OT at 11/6/2024  2:26 PM.  Learner: Patient  Readiness: Acceptance  Method: Explanation, Demonstration  Response: Verbalizes Understanding, Demonstrated Understanding    Precautions, taught by Francheska Caruso OT at 11/6/2024  2:26 PM.  Learner: Patient  Readiness: Acceptance  Method: Explanation, Demonstration  Response: Verbalizes Understanding, Demonstrated Understanding    Handouts, taught by Francheska Caruso OT at 11/6/2024  2:26 PM.  Learner: Patient  Readiness: Acceptance  Method: Explanation, Demonstration  Response: Verbalizes Understanding, Demonstrated Understanding    Home Exercise Program, taught by Francheska Caruso OT at 11/6/2024  2:26 PM.  Learner: Patient  Readiness: Acceptance  Method: Explanation, Demonstration  Response:  Verbalizes Understanding, Demonstrated Understanding    ADL Training, taught by Francheska Caruso OT at 11/6/2024  2:26 PM.  Learner: Patient  Readiness: Acceptance  Method: Explanation, Demonstration  Response: Verbalizes Understanding, Demonstrated Understanding    Education Comments  No comments found.        Goals:   Encounter Problems       Encounter Problems (Active)       ADLs       Patient will perform UB bathing  with stand by assist level of assistance. (Progressing)       Start:  11/06/24    Expected End:  11/20/24            Patient with complete lower body dressing with minimal assist  level of assistance.  (Progressing)       Start:  11/06/24    Expected End:  11/20/24            Patient will complete toileting including hygiene clothing management/hygiene with minimal assist  level of assistance. (Progressing)       Start:  11/06/24    Expected End:  11/20/24               BALANCE       Patient will tolerate standing for 3 minutes to stand by assist level of assistance with front wheeled walker in order to improve functional activity tolerance for ADL tasks. (Progressing)       Start:  11/06/24    Expected End:  11/20/24

## 2024-11-06 NOTE — PROGRESS NOTES
11/06/24 1319   Discharge Planning   Living Arrangements Spouse/significant other;Children;Other (Comment)  (Currently at [facility] for therapy)   Support Systems Spouse/significant other;Children;Family members   Assistance Needed walker, assist x1   Type of Residence Skilled nursing facility   Do you have animals or pets at home? No   Who is requesting discharge planning? Provider   Home or Post Acute Services Post acute facilities (Rehab/SNF/etc)   Type of Post Acute Facility Services Rehab   Expected Discharge Disposition SNF   Does the patient need discharge transport arranged? Yes   RoundTrip coordination needed? No   Has discharge transport been arranged? No   Financial Resource Strain   How hard is it for you to pay for the very basics like food, housing, medical care, and heating? Not very   Housing Stability   In the last 12 months, was there a time when you were not able to pay the mortgage or rent on time? N   In the past 12 months, how many times have you moved where you were living? 0   At any time in the past 12 months, were you homeless or living in a shelter (including now)? N   Transportation Needs   In the past 12 months, has lack of transportation kept you from medical appointments or from getting medications? no   In the past 12 months, has lack of transportation kept you from meetings, work, or from getting things needed for daily living? No   Patient Choice   Provider Choice list and CMS website (https://medicare.gov/care-compare#search) for post-acute Quality and Resource Measure Data were provided and reviewed with: Patient   Patient / Family choosing to utilize agency / facility established prior to hospitalization Yes     Care Transitions: Patient reviewed in care round meeting this AM and is not medically ready for discharge today. Met with patient at bedside. Role if TCC explained. Demographics and contacts verified. He resides with spouse and 15 year ols son in a 2 story home. He has been  staying at [facility] for Rehab since around 10/18/24. States he was independent with ADL's until around August 2024 and he had a decline. Requires 1 assist for personal care and uses a walker for ambulation. He receives immunotherapy at the infusion center in [place] every 3 weeks. His oncologist is Dr. Huma Galeano. [facility] 12 per nursing. Therapy eval completed, [facility] 13/15. Patient states discharge plan is to return to [facility] to resume therapy. [facility] Care team to follow. Billie Murillo RN/TCC

## 2024-11-07 ENCOUNTER — APPOINTMENT (OUTPATIENT)
Dept: RADIOLOGY | Facility: HOSPITAL | Age: 49
End: 2024-11-07
Payer: COMMERCIAL

## 2024-11-07 ENCOUNTER — APPOINTMENT (OUTPATIENT)
Dept: UROLOGY | Facility: CLINIC | Age: 49
End: 2024-11-07
Payer: COMMERCIAL

## 2024-11-07 PROBLEM — E86.0 DEHYDRATION: Status: ACTIVE | Noted: 2024-11-07

## 2024-11-07 LAB
ANION GAP SERPL CALC-SCNC: 8 MMOL/L (ref 10–20)
BUN SERPL-MCNC: 12 MG/DL (ref 6–23)
CALCIUM SERPL-MCNC: 5.4 MG/DL (ref 8.6–10.3)
CHLORIDE SERPL-SCNC: 106 MMOL/L (ref 98–107)
CO2 SERPL-SCNC: 22 MMOL/L (ref 21–32)
CREAT SERPL-MCNC: 0.29 MG/DL (ref 0.5–1.3)
EGFRCR SERPLBLD CKD-EPI 2021: >90 ML/MIN/1.73M*2
ERYTHROCYTE [DISTWIDTH] IN BLOOD BY AUTOMATED COUNT: 16.2 % (ref 11.5–14.5)
GLUCOSE SERPL-MCNC: 85 MG/DL (ref 74–99)
HCT VFR BLD AUTO: 27.4 % (ref 41–52)
HGB BLD-MCNC: 9.1 G/DL (ref 13.5–17.5)
MCH RBC QN AUTO: 29.2 PG (ref 26–34)
MCHC RBC AUTO-ENTMCNC: 33.2 G/DL (ref 32–36)
MCV RBC AUTO: 88 FL (ref 80–100)
NRBC BLD-RTO: 0 /100 WBCS (ref 0–0)
PLATELET # BLD AUTO: 354 X10*3/UL (ref 150–450)
POTASSIUM SERPL-SCNC: 3.8 MMOL/L (ref 3.5–5.3)
PROCALCITONIN SERPL-MCNC: 0.07 NG/ML
RBC # BLD AUTO: 3.12 X10*6/UL (ref 4.5–5.9)
SODIUM SERPL-SCNC: 132 MMOL/L (ref 136–145)
WBC # BLD AUTO: 15.7 X10*3/UL (ref 4.4–11.3)

## 2024-11-07 PROCEDURE — 2500000002 HC RX 250 W HCPCS SELF ADMINISTERED DRUGS (ALT 637 FOR MEDICARE OP, ALT 636 FOR OP/ED): Performed by: NURSE PRACTITIONER

## 2024-11-07 PROCEDURE — 99232 SBSQ HOSP IP/OBS MODERATE 35: CPT | Performed by: NURSE PRACTITIONER

## 2024-11-07 PROCEDURE — 1200000002 HC GENERAL ROOM WITH TELEMETRY DAILY

## 2024-11-07 PROCEDURE — 85027 COMPLETE CBC AUTOMATED: CPT | Performed by: NURSE PRACTITIONER

## 2024-11-07 PROCEDURE — 2500000004 HC RX 250 GENERAL PHARMACY W/ HCPCS (ALT 636 FOR OP/ED): Performed by: STUDENT IN AN ORGANIZED HEALTH CARE EDUCATION/TRAINING PROGRAM

## 2024-11-07 PROCEDURE — 74177 CT ABD & PELVIS W/CONTRAST: CPT

## 2024-11-07 PROCEDURE — 2500000004 HC RX 250 GENERAL PHARMACY W/ HCPCS (ALT 636 FOR OP/ED)

## 2024-11-07 PROCEDURE — 82310 ASSAY OF CALCIUM: CPT | Performed by: NURSE PRACTITIONER

## 2024-11-07 PROCEDURE — 80048 BASIC METABOLIC PNL TOTAL CA: CPT | Performed by: NURSE PRACTITIONER

## 2024-11-07 PROCEDURE — 2500000001 HC RX 250 WO HCPCS SELF ADMINISTERED DRUGS (ALT 637 FOR MEDICARE OP): Performed by: STUDENT IN AN ORGANIZED HEALTH CARE EDUCATION/TRAINING PROGRAM

## 2024-11-07 PROCEDURE — 74177 CT ABD & PELVIS W/CONTRAST: CPT | Performed by: RADIOLOGY

## 2024-11-07 PROCEDURE — 2500000001 HC RX 250 WO HCPCS SELF ADMINISTERED DRUGS (ALT 637 FOR MEDICARE OP): Performed by: NURSE PRACTITIONER

## 2024-11-07 PROCEDURE — 2550000001 HC RX 255 CONTRASTS: Performed by: STUDENT IN AN ORGANIZED HEALTH CARE EDUCATION/TRAINING PROGRAM

## 2024-11-07 PROCEDURE — 2500000004 HC RX 250 GENERAL PHARMACY W/ HCPCS (ALT 636 FOR OP/ED): Performed by: NURSE PRACTITIONER

## 2024-11-07 PROCEDURE — 99222 1ST HOSP IP/OBS MODERATE 55: CPT | Performed by: SURGERY

## 2024-11-07 PROCEDURE — 2500000001 HC RX 250 WO HCPCS SELF ADMINISTERED DRUGS (ALT 637 FOR MEDICARE OP)

## 2024-11-07 RX ORDER — PROCHLORPERAZINE 25 MG/1
25 SUPPOSITORY RECTAL EVERY 12 HOURS PRN
Status: DISPENSED | OUTPATIENT
Start: 2024-11-07

## 2024-11-07 RX ORDER — PROCHLORPERAZINE MALEATE 5 MG
10 TABLET ORAL EVERY 6 HOURS PRN
Status: DISPENSED | OUTPATIENT
Start: 2024-11-07

## 2024-11-07 RX ORDER — PROCHLORPERAZINE EDISYLATE 5 MG/ML
10 INJECTION INTRAMUSCULAR; INTRAVENOUS EVERY 6 HOURS PRN
Status: DISPENSED | OUTPATIENT
Start: 2024-11-07

## 2024-11-07 RX ORDER — SODIUM CHLORIDE, SODIUM LACTATE, POTASSIUM CHLORIDE, CALCIUM CHLORIDE 600; 310; 30; 20 MG/100ML; MG/100ML; MG/100ML; MG/100ML
100 INJECTION, SOLUTION INTRAVENOUS CONTINUOUS
Status: DISCONTINUED | OUTPATIENT
Start: 2024-11-07 | End: 2024-11-07

## 2024-11-07 RX ORDER — MORPHINE SULFATE 2 MG/ML
1 INJECTION, SOLUTION INTRAMUSCULAR; INTRAVENOUS EVERY 4 HOURS PRN
Status: DISPENSED | OUTPATIENT
Start: 2024-11-07

## 2024-11-07 RX ORDER — MIDODRINE HYDROCHLORIDE 5 MG/1
10 TABLET ORAL EVERY 8 HOURS SCHEDULED
Status: DISPENSED | OUTPATIENT
Start: 2024-11-07

## 2024-11-07 RX ORDER — SODIUM CHLORIDE, SODIUM LACTATE, POTASSIUM CHLORIDE, CALCIUM CHLORIDE 600; 310; 30; 20 MG/100ML; MG/100ML; MG/100ML; MG/100ML
100 INJECTION, SOLUTION INTRAVENOUS CONTINUOUS
Status: DISCONTINUED | OUTPATIENT
Start: 2024-11-07 | End: 2024-11-09

## 2024-11-07 ASSESSMENT — COGNITIVE AND FUNCTIONAL STATUS - GENERAL
DAILY ACTIVITIY SCORE: 24
MOBILITY SCORE: 24

## 2024-11-07 ASSESSMENT — PAIN - FUNCTIONAL ASSESSMENT
PAIN_FUNCTIONAL_ASSESSMENT: 0-10

## 2024-11-07 ASSESSMENT — PAIN DESCRIPTION - LOCATION: LOCATION: ABDOMEN

## 2024-11-07 ASSESSMENT — PAIN SCALES - GENERAL
PAINLEVEL_OUTOF10: 5 - MODERATE PAIN
PAINLEVEL_OUTOF10: 2
PAINLEVEL_OUTOF10: 6

## 2024-11-07 NOTE — PROGRESS NOTES
"Music Therapy Note    Yon JAVIER \"Vladimir\" Howard was referred by Cole Ruiz RN.     Therapy Session  Referral Type: New referral this admission  Visit Type: New visit  Session Start Time: 1319  Session End Time: 1320  Intervention Delivery: In-person  Conflict of Service: None  Family Present for Session: None     Pre-assessment  Unable to Assess Reason: Outcomes not assessed  Mood/Affect: Calm         Treatment/Interventions  Music Therapy Interventions: Assessment    Post-assessment  Unable to Assess Reason: Did not provide expressive therapy intervention  Mood/Affect: Calm  Total Session Time (min): 1 minutes    Narrative  Assessment Detail: Pt found resting in bed, awake/alert, upon arrival of music therapist. Pt expressed not doing well today and not liking music. Pt declined music therapy services.  Follow-up: No plan to follow-up.    Education Documentation  No documentation found.        Expressive Therapies Note  "

## 2024-11-07 NOTE — PROGRESS NOTES
"Occupational Therapy                 Therapy Communication Note    Patient Name: Yon Lawrence \"Vladimir\"  MRN: [id number]  Department: [facility]  Room: 310/310-A  Today's Date: 11/7/2024     Discipline: Occupational Therapy    Missed Visit Reason: Missed Visit Reason: Patient placed on medical hold (pt resting heart rate 150.  will check on pt later if medically appropriate and schedule allows.)    12:35 pt remains a medical hold for today.  Defer OT session       "

## 2024-11-07 NOTE — CARE PLAN
The patient's goals for the shift include rest    The clinical goals for the shift include less diarrhea    Over the shift, the patient did not make progress toward the following goals. Barriers to progression include . Recommendations to address these barriers include .

## 2024-11-07 NOTE — CONSULTS
Reason For Consult  Ileus versus developing small bowel obstruction    History Of Present Illness  Vladimir Lawrence is a 49 y.o. male presenting with abdominal pain and nausea.  The patient has a history of renal cancer and has been undergoing treatment.  He has had chronic diarrhea for weeks and has had a diminished appetite.  He developed abdominal pain today which prompted his visit to the emergency room.  I have reviewed his CT scan which showed some dilatation of his small bowel which was very mild.  He states he has undergone multiple test for the diarrhea with no diagnosis.     Past Medical History  He has a past medical history of Cancer (Multi).    Surgical History  He has a past surgical history that includes Other surgical history (06/07/2022); Colonoscopy (05/06/2024); and Tunneled venous port placement (05/31/2024).     Social History  He reports that he has never smoked. He has never used smokeless tobacco. He reports that he does not drink alcohol and does not use drugs.    Family History  Family History   Problem Relation Name Age of Onset    Diabetes type II Mother      Hypertension Father      Heart disease Father          CABG    Macular degeneration Father      Ulcers Father      Breast cancer Mother's Sister  50 - 59    Cancer Father's Sister          rare cancer unknown type    Cancer Paternal Grandmother      Stroke Paternal Grandfather      Other (MCAD) Daughter      No Known Problems Son          Allergies  Patient has no known allergies.    Review of Systems  Constitutional:  no fever, sweats, and chills  Cardiovascular: No chest pain or palpitations  Respiratory: No cough or shortness of breath  Gastrointestinal: Diarrhea without blood.  Genitourinary: no dysuria or urinary frequency  Musculoskeletal: no weakness or swelling  Integumentary: no rashes  Neurological: no confusion  Endocrine: no heat or cold intolerance  Heme/Lymph: no easy bruising or bleeding       Physical  "Exam  Constitutional: No acute distress, conversant, he does however appear weakened and ill.  Neurologic: flat affect  Psych: Appropriate affect  Ears, Nose, Mouth and Throat: mucus membranes moist  Pulmonary: No labored breathing  Cardiovascular: Regular rate and rhythm  Abdomen: Soft, non-distended, abdomen is diffusely tender to palpation.  He states it does not hurt worse when he coughs.  Musculoskeletal: Moves all extremities, he has dependent edema of the dorsum of his hands  Skin: No jaundice       Last Recorded Vitals  Blood pressure 83/57, pulse 93, temperature 36 °C (96.8 °F), temperature source Temporal, resp. rate 16, height 1.854 m (6' 1\"), weight 69.9 kg (154 lb), SpO2 97%.    Relevant Results  Multiple abnormalities identified.  His albumin is less than 1.5.  He has multiple electrolyte abnormalities.     Assessment/Plan     Apparently a consult has been placed to palliative care.  I think it would be prudent to get his plans prior to proceeding with anything interventional like an NG tube, which he currently does not need for surgical intervention or even TPN.  I explained to him he can have ice chips and popsicles and we will repeat his x-rays in the morning.  If he begins to vomit he will simply require an NG tube.  I discussed the case with the hospitalist  Kelly Wang MD    "

## 2024-11-07 NOTE — PROGRESS NOTES
Care Transitions: Patient reviewed in care round meeting this AM. ADOD 24-48 hours. Discharge plan is return to [facility] for therapy when medically ready. Chart note updates sent to [facility] via [facility] and verified with Deysi @ [facility] that she will submit for precert for patient to return. Patient therapy was placed on hold today and will resume when medically appropriate. Care team to follow. Billie Murillo RN/TCC

## 2024-11-07 NOTE — PROGRESS NOTES
"Yon Lawrence \"Roger" is a 49 y.o. male on day 0 of admission presenting with Hypokalemia.      Subjective   Patient with increased abdominal pain and discomfort.  Patient did vomit this morning.  He did have loose stools during the night.  Last dose of Keytruda was October 18.  Patient is nauseated does not feel he will be able to tolerate oral contrast for CAT scan.       Objective     Last Recorded Vitals  BP 89/61 (BP Location: Left arm, Patient Position: Lying)   Pulse 98   Temp 36 °C (96.8 °F) (Temporal)   Resp 16   Wt 69.9 kg (154 lb)   SpO2 98%   Intake/Output last 3 Shifts:    Intake/Output Summary (Last 24 hours) at 11/7/2024 1228  Last data filed at 11/7/2024 1130  Gross per 24 hour   Intake 1747.5 ml   Output 450 ml   Net 1297.5 ml       Admission Weight  Weight: 69.9 kg (154 lb) (11/05/24 1137)    Daily Weight  11/05/24 : 69.9 kg (154 lb)    Image Results  ECG 12 Lead  Sinus rhythm with marked sinus arrhythmia  T wave abnormality, consider anterior ischemia  Prolonged QT  Abnormal ECG    See ED provider note for full interpretation and clinical correlation  Confirmed by Rnadolph Wilkerson ([id number]) on 11/6/2024 10:28:06 AM  ECG 12 Lead  Sinus rhythm with marked sinus arrhythmia  Low voltage QRS  Nonspecific T wave abnormality  Abnormal ECG  When compared with ECG of 05-NOV-2024 11:40, (unconfirmed)  No significant change was found  Confirmed by Dinesh Rivera ([id number]) on 11/6/2024 9:11:35 AM      Physical Exam  Constitutional:       Appearance: He is ill-appearing.   HENT:      Head: Normocephalic and atraumatic.        Mouth: Mucous membranes are moist.   Eyes:      Extraocular Movements: Extraocular movements intact.      Conjunctiva/sclera: Conjunctivae normal.      Pupils: Pupils are equal, round, and reactive to light.   Cardiovascular:      Rate and Rhythm: Normal rate and regular rhythm.      Pulses: Normal pulses.   Pulmonary:      Effort: Pulmonary effort is normal.      Breath sounds: Normal " "breath sounds.   Abdominal:      Palpations: Abdomen is soft.      Tenderness: There is abdominal tenderness.   Musculoskeletal:         General: Normal range of motion.      Cervical back: Normal range of motion.   Skin:     General: Skin is warm and dry.      Capillary Refill: Capillary refill takes less than 2 seconds.   Neurological:      General: No focal deficit present.      Mental Status: He is alert.   Psychiatric:         Mood and Affect: Mood normal.         Behavior: Behavior normal.         Thought Content: Thought content normal.         Judgment: Judgment normal.   Relevant Results     Results for orders placed or performed during the hospital encounter of 11/05/24 (from the past 24 hours)   C. difficile, PCR    Specimen: Stool   Result Value Ref Range    C. difficile, PCR Not Detected Not Detected   Procalcitonin   Result Value Ref Range    Procalcitonin 0.07 <=0.07 ng/mL   Basic Metabolic Panel   Result Value Ref Range    Glucose 85 74 - 99 mg/dL    Sodium 132 (L) 136 - 145 mmol/L    Potassium 3.8 3.5 - 5.3 mmol/L    Chloride 106 98 - 107 mmol/L    Bicarbonate 22 21 - 32 mmol/L    Anion Gap 8 (L) 10 - 20 mmol/L    Urea Nitrogen 12 6 - 23 mg/dL    Creatinine 0.29 (L) 0.50 - 1.30 mg/dL    eGFR >90 >60 mL/min/1.73m*2    Calcium 5.4 (LL) 8.6 - 10.3 mg/dL   CBC   Result Value Ref Range    WBC 15.7 (H) 4.4 - 11.3 x10*3/uL    nRBC 0.0 0.0 - 0.0 /100 WBCs    RBC 3.12 (L) 4.50 - 5.90 x10*6/uL    Hemoglobin 9.1 (L) 13.5 - 17.5 g/dL    Hematocrit 27.4 (L) 41.0 - 52.0 %    MCV 88 80 - 100 fL    MCH 29.2 26.0 - 34.0 pg    MCHC 33.2 32.0 - 36.0 g/dL    RDW 16.2 (H) 11.5 - 14.5 %    Platelets 354 150 - 450 x10*3/uL         Assessment/Plan    Yon Lawrence \"Vladimir\" is a 49 y.o. male with past medical history of colon cancer, anemia, hypokalemia, hypomagnesemia, BPH, and DVT of proximal lower extremity presented from [facility]  to ER today with complaints of dehydration.  Temp 36 6, heart rate 85, respiration " 18, blood pressure 90/72 and 100% on room air.  Sodium 134, potassium 3.0, creatinine 0.24, calcium 5.4,  albumin less than 1.5, magnesium 1.21, WBC 17.2. Patient was discussed with his oncologist, Dr. Finn who felt that patient was appropriate to stay at [facility] given his electrolyte abnormalities and no indication for transfer from an oncologic standpoint. At this time no indication for CT scan of the abdomen/pelvis but she reports if symptoms persist she would like the patient reimaged.     Assessment & Plan  Hypokalemia    Anemia    Hypomagnesemia    Hypocalcemia    Leukocytosis    Electrolyte imbalance    Dehydration    Electrolyte imbalance and hypocalcemia  -Calcium 5.4 on admission now 5.4  oral supplementation increased to 3 times daily  -Sodium 134 on admission today 132 potassium 3.0 today 3.8 continue to follow  -Magnesium admission 1.21 corrected to 1.71     Leukocytosis  -UA negative chest x-ray negative for infiltrates  -WBC trending down 15.7 today continue to follow  -Procalcitonin 0.07     Diarrhea  -C. difficile negative stool cultures pending  -Patient uses Lomotil at home for his chronic diarrhea.     Weakness debility  -PT OT consult patient already at [facility] for rehab     Invasive colon adenocarcinoma with mets to his liver  -Currently receiving immunotherapy  -Will repeat imaging today.  Patient not able to tolerate oral contrast  - CT revealed mildly prominent fluid-filled small bowel may represent ileus or developing obstruction.  Patient n.p.o. except for ice chips.  Will repeat abdominal series in the morning.  Dr. Wang consulted.     Hypotension  -Increase midodrine and follow blood pressures        Malnutrition Diagnosis Status: New  Malnutrition Diagnosis: Severe malnutrition related to starvation  As Evidenced by: >5% weight loss in 1 month, prolonger poor PO intake <75% for >1 month.  I agree with the dietitian's malnutrition diagnosis.  DVT prophylaxis Lovenox and SCDs       Disposition return to PCP when medically stable              Gayle Mcgovern, APRN-CNP

## 2024-11-07 NOTE — PROGRESS NOTES
"Physical Therapy                 Therapy Communication Note    Patient Name: Yon Lawrence \"Vladimir\"  MRN: [id number]  Department: [facility]  Room: 310/310-A  Today's Date: 11/7/2024     Discipline: Physical Therapy    Missed Visit Reason: Missed Visit Reason: Patient placed on medical hold (held tx today per nurse.  Pt. with high HR, diarrhea and nauseated.)    Missed Time: Attempt      "

## 2024-11-08 ENCOUNTER — APPOINTMENT (OUTPATIENT)
Dept: RADIOLOGY | Facility: HOSPITAL | Age: 49
End: 2024-11-08
Payer: COMMERCIAL

## 2024-11-08 ENCOUNTER — APPOINTMENT (OUTPATIENT)
Dept: HEMATOLOGY/ONCOLOGY | Facility: HOSPITAL | Age: 49
End: 2024-11-08
Payer: COMMERCIAL

## 2024-11-08 ENCOUNTER — APPOINTMENT (OUTPATIENT)
Dept: HEMATOLOGY/ONCOLOGY | Facility: CLINIC | Age: 49
End: 2024-11-08
Payer: COMMERCIAL

## 2024-11-08 LAB
ANION GAP SERPL CALC-SCNC: 8 MMOL/L (ref 10–20)
BUN SERPL-MCNC: 14 MG/DL (ref 6–23)
C COLI+JEJ+UPSA DNA STL QL NAA+PROBE: NOT DETECTED
CA-I BLD-SCNC: 0.94 MMOL/L (ref 1.1–1.33)
CALCIUM SERPL-MCNC: 5 MG/DL (ref 8.6–10.3)
CHLORIDE SERPL-SCNC: 106 MMOL/L (ref 98–107)
CO2 SERPL-SCNC: 21 MMOL/L (ref 21–32)
CREAT SERPL-MCNC: 0.33 MG/DL (ref 0.5–1.3)
EC STX1 GENE STL QL NAA+PROBE: NOT DETECTED
EC STX2 GENE STL QL NAA+PROBE: NOT DETECTED
EGFRCR SERPLBLD CKD-EPI 2021: >90 ML/MIN/1.73M*2
ERYTHROCYTE [DISTWIDTH] IN BLOOD BY AUTOMATED COUNT: 15.9 % (ref 11.5–14.5)
GLUCOSE SERPL-MCNC: 89 MG/DL (ref 74–99)
HCT VFR BLD AUTO: 25.5 % (ref 41–52)
HGB BLD-MCNC: 8.9 G/DL (ref 13.5–17.5)
MAGNESIUM SERPL-MCNC: 1.19 MG/DL (ref 1.6–2.4)
MCH RBC QN AUTO: 29.7 PG (ref 26–34)
MCHC RBC AUTO-ENTMCNC: 34.9 G/DL (ref 32–36)
MCV RBC AUTO: 85 FL (ref 80–100)
NOROVIRUS GI + GII RNA STL NAA+PROBE: NOT DETECTED
NRBC BLD-RTO: 0.1 /100 WBCS (ref 0–0)
PHOSPHATE SERPL-MCNC: 1.5 MG/DL (ref 2.5–4.9)
PLATELET # BLD AUTO: 298 X10*3/UL (ref 150–450)
POTASSIUM SERPL-SCNC: 3.4 MMOL/L (ref 3.5–5.3)
RBC # BLD AUTO: 3 X10*6/UL (ref 4.5–5.9)
RV RNA STL NAA+PROBE: NOT DETECTED
SALMONELLA DNA STL QL NAA+PROBE: NOT DETECTED
SHIGELLA DNA SPEC QL NAA+PROBE: NOT DETECTED
SODIUM SERPL-SCNC: 132 MMOL/L (ref 136–145)
V CHOLERAE DNA STL QL NAA+PROBE: NOT DETECTED
WBC # BLD AUTO: 17 X10*3/UL (ref 4.4–11.3)
Y ENTEROCOL DNA STL QL NAA+PROBE: NOT DETECTED

## 2024-11-08 PROCEDURE — 82330 ASSAY OF CALCIUM: CPT | Performed by: NURSE PRACTITIONER

## 2024-11-08 PROCEDURE — 3E0G76Z INTRODUCTION OF NUTRITIONAL SUBSTANCE INTO UPPER GI, VIA NATURAL OR ARTIFICIAL OPENING: ICD-10-PCS | Performed by: NURSE PRACTITIONER

## 2024-11-08 PROCEDURE — 80048 BASIC METABOLIC PNL TOTAL CA: CPT | Performed by: NURSE PRACTITIONER

## 2024-11-08 PROCEDURE — 2500000002 HC RX 250 W HCPCS SELF ADMINISTERED DRUGS (ALT 637 FOR MEDICARE OP, ALT 636 FOR OP/ED): Performed by: NURSE PRACTITIONER

## 2024-11-08 PROCEDURE — 2500000004 HC RX 250 GENERAL PHARMACY W/ HCPCS (ALT 636 FOR OP/ED): Performed by: NURSE PRACTITIONER

## 2024-11-08 PROCEDURE — 99232 SBSQ HOSP IP/OBS MODERATE 35: CPT | Performed by: NURSE PRACTITIONER

## 2024-11-08 PROCEDURE — 83735 ASSAY OF MAGNESIUM: CPT | Performed by: STUDENT IN AN ORGANIZED HEALTH CARE EDUCATION/TRAINING PROGRAM

## 2024-11-08 PROCEDURE — 2500000004 HC RX 250 GENERAL PHARMACY W/ HCPCS (ALT 636 FOR OP/ED)

## 2024-11-08 PROCEDURE — 74022 RADEX COMPL AQT ABD SERIES: CPT

## 2024-11-08 PROCEDURE — 1200000002 HC GENERAL ROOM WITH TELEMETRY DAILY

## 2024-11-08 PROCEDURE — 2500000001 HC RX 250 WO HCPCS SELF ADMINISTERED DRUGS (ALT 637 FOR MEDICARE OP): Performed by: NURSE PRACTITIONER

## 2024-11-08 PROCEDURE — 74022 RADEX COMPL AQT ABD SERIES: CPT | Performed by: RADIOLOGY

## 2024-11-08 PROCEDURE — 84478 ASSAY OF TRIGLYCERIDES: CPT | Mod: SAMLAB | Performed by: NURSE PRACTITIONER

## 2024-11-08 PROCEDURE — 84145 PROCALCITONIN (PCT): CPT | Mod: SAMLAB | Performed by: NURSE PRACTITIONER

## 2024-11-08 PROCEDURE — 84100 ASSAY OF PHOSPHORUS: CPT | Performed by: STUDENT IN AN ORGANIZED HEALTH CARE EDUCATION/TRAINING PROGRAM

## 2024-11-08 PROCEDURE — 2500000004 HC RX 250 GENERAL PHARMACY W/ HCPCS (ALT 636 FOR OP/ED): Mod: JZ | Performed by: STUDENT IN AN ORGANIZED HEALTH CARE EDUCATION/TRAINING PROGRAM

## 2024-11-08 PROCEDURE — 2500000005 HC RX 250 GENERAL PHARMACY W/O HCPCS: Performed by: NURSE PRACTITIONER

## 2024-11-08 PROCEDURE — 85027 COMPLETE CBC AUTOMATED: CPT | Performed by: NURSE PRACTITIONER

## 2024-11-08 RX ORDER — CALCIUM GLUCONATE 20 MG/ML
2 INJECTION, SOLUTION INTRAVENOUS ONCE
Status: COMPLETED | OUTPATIENT
Start: 2024-11-08 | End: 2024-11-08

## 2024-11-08 RX ORDER — WATER
1000 LIQUID (ML) MISCELLANEOUS
Status: DISCONTINUED | OUTPATIENT
Start: 2024-11-08 | End: 2024-11-08

## 2024-11-08 RX ORDER — LEVOFLOXACIN 5 MG/ML
500 INJECTION, SOLUTION INTRAVENOUS EVERY 24 HOURS
Status: DISPENSED | OUTPATIENT
Start: 2024-11-08

## 2024-11-08 RX ORDER — MAGNESIUM SULFATE HEPTAHYDRATE 40 MG/ML
2 INJECTION, SOLUTION INTRAVENOUS ONCE
Status: COMPLETED | OUTPATIENT
Start: 2024-11-08 | End: 2024-11-08

## 2024-11-08 RX ORDER — WATER
100 LIQUID (ML) MISCELLANEOUS
Status: DISCONTINUED | OUTPATIENT
Start: 2024-11-08 | End: 2024-11-09

## 2024-11-08 ASSESSMENT — COGNITIVE AND FUNCTIONAL STATUS - GENERAL
PERSONAL GROOMING: A LITTLE
STANDING UP FROM CHAIR USING ARMS: A LOT
WALKING IN HOSPITAL ROOM: TOTAL
MOBILITY SCORE: 13
MOBILITY SCORE: 11
MOVING TO AND FROM BED TO CHAIR: A LOT
PERSONAL GROOMING: A LITTLE
TOILETING: TOTAL
HELP NEEDED FOR BATHING: A LOT
MOVING FROM LYING ON BACK TO SITTING ON SIDE OF FLAT BED WITH BEDRAILS: A LITTLE
CLIMB 3 TO 5 STEPS WITH RAILING: TOTAL
DAILY ACTIVITIY SCORE: 13
TURNING FROM BACK TO SIDE WHILE IN FLAT BAD: A LITTLE
HELP NEEDED FOR BATHING: TOTAL
DRESSING REGULAR UPPER BODY CLOTHING: TOTAL
STANDING UP FROM CHAIR USING ARMS: A LOT
WALKING IN HOSPITAL ROOM: TOTAL
TOILETING: TOTAL
EATING MEALS: A LITTLE
MOVING TO AND FROM BED TO CHAIR: A LOT
TURNING FROM BACK TO SIDE WHILE IN FLAT BAD: A LOT
DAILY ACTIVITIY SCORE: 11
DRESSING REGULAR LOWER BODY CLOTHING: A LOT
CLIMB 3 TO 5 STEPS WITH RAILING: TOTAL
DRESSING REGULAR LOWER BODY CLOTHING: A LOT
EATING MEALS: A LITTLE
DRESSING REGULAR UPPER BODY CLOTHING: A LOT

## 2024-11-08 ASSESSMENT — PAIN - FUNCTIONAL ASSESSMENT
PAIN_FUNCTIONAL_ASSESSMENT: 0-10

## 2024-11-08 ASSESSMENT — PAIN SCALES - GENERAL
PAINLEVEL_OUTOF10: 0 - NO PAIN
PAINLEVEL_OUTOF10: 0 - NO PAIN
PAINLEVEL_OUTOF10: 8
PAINLEVEL_OUTOF10: 6
PAINLEVEL_OUTOF10: 3

## 2024-11-08 ASSESSMENT — PAIN DESCRIPTION - DESCRIPTORS: DESCRIPTORS: DISCOMFORT;CRAMPING

## 2024-11-08 NOTE — PROGRESS NOTES
"Occupational Therapy                 Therapy Communication Note    Patient Name: Yon Lawrence \"Vladimir\"  MRN: [id number]  Department: [facility]  Room: 310/310-A  Today's Date: 11/8/2024     Discipline: Occupational Therapy    Missed Visit Reason: Missed Visit Reason: Other (Comment) (per bedside nurse- pt is in significant pain and to defer OT session for today.)      "

## 2024-11-08 NOTE — CONSULTS
"Nutrition Follow Up Assessment:   Nutrition Assessment    Reason for Assessment: Provider consult order    Consulted by CNP for TPN recommendations.     Rec; Starting with 1L. Clinimix  Goal of 2L 5/20 providing 1,760 kcals. (5% amino acids, 20% dextrose)     Lipids 3x/week daily average providing 214 kcals.     Daily lipids + goal of 2L providing a 1,974 kcals, 100g Protein provided total.    Nutrition History:  Energy Intake: Poor < 50 %  Food Allergies/Intolerances:  None  GI Symptoms: Diarrhea, Nausea, Vomiting, and Abdominal pain  Oral Problems: None       Anthropometrics:  Height: 185.4 cm (6' 1\")   Weight: 69.9 kg (154 lb)   BMI (Calculated): 20.32       Weight Change %:  Weight History / % Weight Change: 12.5% in 6 weeks.  Significant Weight Loss: Yes  Interpretation of Weight Loss: >5% in 1 month    Nutrition Focused Physical Exam Findings:  Subcutaneous Fat Loss:   Orbital Fat Pads: Mild-Moderate (slight dark circles and slight hollowing)  Buccal Fat Pads: Mild-Moderate (flat cheeks, minimal bounce)  Muscle Wasting:  Temporalis: Mild-Moderate (slight depression)  Pectoralis (Clavicular Region): Mild-Moderate (some protrusion of clavicle)  Edema:  Edema: none  Physical Findings:  Hair: Negative  Eyes: Negative  Mouth: Negative  Skin: Negative    Nutrition Significant Labs:  CBC Trend:   Results from last 7 days   Lab Units 11/08/24  0550 11/07/24  0519 11/06/24  0444 11/05/24  1223   WBC AUTO x10*3/uL 17.0* 15.7* 17.6* 17.2*   RBC AUTO x10*6/uL 3.00* 3.12* 2.95* 3.18*   HEMOGLOBIN g/dL 8.9* 9.1* 8.8* 9.4*   HEMATOCRIT % 25.5* 27.4* 25.4* 27.2*   MCV fL 85 88 86 86   PLATELETS AUTO x10*3/uL 298 354 361 417    , BMP Trend:   Results from last 7 days   Lab Units 11/08/24  0550 11/07/24  0519 11/06/24 0444 11/05/24  2209 11/05/24  1223   GLUCOSE mg/dL 89 85 68*  --  80   CALCIUM mg/dL 5.0* 5.4* 5.7*  --  5.4*   SODIUM mmol/L 132* 132* 133*  --  134*   POTASSIUM mmol/L 3.4* 3.8 3.5   < > 3.0*   CO2 mmol/L 21 22 " 23  --  23   CHLORIDE mmol/L 106 106 106  --  103   BUN mg/dL 14 12 12  --  13   CREATININE mg/dL 0.33* 0.29* 0.22*  --  0.24*    < > = values in this interval not displayed.       Nutrition Specific Medications:  calcium carbonate, 500 mg, oral, TID  enoxaparin, 70 mg, subcutaneous, q12h  iohexol, 500 mL, oral, Once in imaging  midodrine, 10 mg, oral, q8h CATRACHITA  potassium chloride CR, 40 mEq, oral, Once         I/O:   Last BM Date: 11/08/24; Stool Appearance: Liquid (11/08/24 0700)    Dietary Orders (From admission, onward)       Start     Ordered    11/07/24 1619  NPO Diet Except: Sips with meds, Ice chips; Effective now  Diet effective now        Question Answer Comment   Except: Sips with meds    Except: Ice chips        11/07/24 1618    11/05/24 2232  May Participate in Room Service  ( ROOM SERVICE MAY PARTICIPATE)  Once        Question:  .  Answer:  Yes    11/05/24 2231                     Estimated Needs:   Total Energy Estimated Needs (kCal): 2400 kCal  Method for Estimating Needs: 30kcals/kg IBW  Total Protein Estimated Needs (g): 80 g  Method for Estimating Needs: 1-1.2g/kg IBW  Total Fluid Estimated Needs (mL): 2400 mL  Method for Estimating Needs: 1ml/kcal        Nutrition Diagnosis   Malnutrition Diagnosis  Patient has Malnutrition Diagnosis: Yes  Diagnosis Status: Ongoing  Malnutrition Diagnosis: Severe malnutrition related to starvation  As Evidenced by: >5% weight loss in 1 month, prolonger poor PO intake <75% for >1 month.      Nutrition Interventions/Recommendations         Nutrition Prescription:  Individualized Nutrition Prescription Provided for : Individual nutrition prescription of 2400 kcals and 80g of protein to be provided with diet order.        Nutrition Interventions:   Interventions: Parenteral nutrition/ IV fluids  Parenteral Nutrition/IV Fluids: Modify volume of parenteral nutrition  Formula: TPN standard - AA 5%, dextrose 20%  Weight Frequency: Daily  Labs: Triglycerides:  now and  each week      Nutrition Monitoring and Evaluation   Food/Nutrient Related History Monitoring  Monitoring and Evaluation Plan: Enteral and parenteral nutrition intake  Enteral and Parenteral Nutrition Intake: Parenteral nutrition formula/solution  Criteria: Clinimix 2L goal, providing a total of 1,974 kcals and 100g of protein.    Body Composition/Growth/Weight History  Monitoring and Evaluation Plan: Weight  Weight: Weight change  Criteria: Continue to monitor wt status and wt changes.    Biochemical Data, Medical Tests and Procedures  Monitoring and Evaluation Plan: Electrolyte/renal panel, Glucose/endocrine profile    Time Spent (min): 45 minutes

## 2024-11-08 NOTE — PROGRESS NOTES
"Physical Therapy                 Therapy Communication Note    Patient Name: Yon Lawrence \"Vladimir\"  MRN: [id number]  Department: [facility]  Room: 310/310-A  Today's Date: 11/8/2024     Discipline: Physical Therapy    Missed Visit Reason: Missed Visit Reason: Patient placed on medical hold (per bedside nurse- pt is in significant pain and to defer PT session for today.)    Missed Time: Attempt    Comment:  "

## 2024-11-08 NOTE — ASSESSMENT & PLAN NOTE
Electrolyte imbalance and hypocalcemia  -Calcium 5.4 on admission now 5.0  oral supplementation increased to 3 times daily replaced IV.  Ionized calcium low as well.  -Sodium 134 on admission today 132 potassium 3.0 today 3.-Replaced continue to follow  -Magnesium admission 1.21 corrected to 1.19-replaced IV     Leukocytosis  -UA negative chest x-ray negative for infiltrates  -WBC trending up today.        Diarrhea  -C. difficile negative stool cultures pending  -Patient uses Lomotil at home for his chronic diarrhea.     Weakness debility  -PT OT consult patient already at [facility] for rehab     Invasive colon adenocarcinoma with mets to his liver  -Currently receiving immunotherapy  - CT revealed mildly prominent fluid-filled small bowel may represent ileus or developing obstruction.  Patient n.p.o. except for ice chips.  Will repeat abdominal series in the morning.  Dr. Wang consulted.  -Palliative care consulted    Colitis  -Will treat with Levaquin   -CBC with diff in AM and Procal.     Hypotension  -Increase midodrine and follow blood pressures        Malnutrition Diagnosis Status: New  Malnutrition Diagnosis: Severe malnutrition related to starvation  As Evidenced by: >5% weight loss in 1 month, prolonger poor PO intake <75% for >1 month.  I agree with the dietitian's malnutrition diagnosis.  DVT prophylaxis Lovenox and SCDs  -Patient started on TPN today  -Dietitian consulted and following  -Lipid panel added      Disposition return to [facility] when medically stable

## 2024-11-08 NOTE — PROGRESS NOTES
"Yon Lawrence \"Vladimir\" is a 49 y.o. male on day 1 of admission presenting with Hypokalemia.      Subjective   Patient resting in bed.  Ill-appearing, frail cachectic.  Patient still having incontinence of stool.  Abdominal pain slightly improved today.  No vomiting.       Objective     Last Recorded Vitals  BP 84/63 (BP Location: Right arm, Patient Position: Sitting)   Pulse (!) 113   Temp 36.5 °C (97.7 °F) (Oral)   Resp 15   Wt 69.9 kg (154 lb)   SpO2 95%   Intake/Output last 3 Shifts:    Intake/Output Summary (Last 24 hours) at 11/8/2024 1413  Last data filed at 11/8/2024 0600  Gross per 24 hour   Intake 891.66 ml   Output 375 ml   Net 516.66 ml       Admission Weight  Weight: 69.9 kg (154 lb) (11/05/24 1137)    Daily Weight  11/05/24 : 69.9 kg (154 lb)    Image Results  XR abdomen 2 views w chest 1 view  Narrative: Interpreted By:  Timmy Ahmadi,   STUDY:  XR ABDOMEN 2 VIEWS WITH CHEST 1 VIEW;  11/8/2024 7:42 am      INDICATION:  Signs/Symptoms:ileus.          COMPARISON:  None.      ACCESSION NUMBER(S):  [id number]      ORDERING CLINICIAN:  EMY CHOI      TECHNIQUE:  Abdomen supine and upright views      Chest PA view      FINDINGS:          ABDOMEN: There is moderate-to-marked distention numerous small bowel  segments, with some air-fluid levels on the upright projection. While  there is not appear to be significant colonic distension, there does  appear to be mucosal edema of the proximal descending colon  suggesting colitis, and there may be fluid-filled distended colonic  loops. Small bowel distention may be due to secondary ileus, although  obstruction is possible.      No evidence of pneumoperitoneum.      Osseous structures demonstrate no acute bony abnormalities.      CHEST:  Cardiomediastinal silhouette in normal in size and configuration.      Lungs are clear.      No acute osseous changes.      Impression: 1.  Probable small bowel ileus and colitis, although obstruction is  possible.  2.  No " evidence of acute cardiopulmonary process.          MACRO:  None      Signed by: Timmy Ahmadi 11/8/2024 9:47 AM  Dictation workstation:   [id number]      Physical Exam  Constitutional:       Appearance: He is ill-appearing.   HENT:      Head: Normocephalic and atraumatic.        Mouth: Mucous membranes are moist.   Eyes:      Extraocular Movements: Extraocular movements intact.      Conjunctiva/sclera: Conjunctivae normal.      Pupils: Pupils are equal, round, and reactive to light.   Cardiovascular:      Rate and Rhythm: Normal rate and regular rhythm.      Pulses: Normal pulses.   Pulmonary:      Effort: Pulmonary effort is normal.      Breath sounds: Normal breath sounds.   Abdominal:      Palpations: Abdomen is soft.      Tenderness: There is abdominal tenderness.   Musculoskeletal:         General: Normal range of motion.      Cervical back: Normal range of motion.   Skin:     General: Skin is warm and dry.      Capillary Refill: Capillary refill takes less than 2 seconds.   Neurological:      General: No focal deficit present.      Mental Status: He is alert.   Psychiatric:         Mood and Affect: Mood normal.         Behavior: Behavior normal.         Thought Content: Thought content normal.         Judgment: Judgment normal.   Relevant Results  Results for orders placed or performed during the hospital encounter of 11/05/24 (from the past 24 hours)   Basic Metabolic Panel   Result Value Ref Range    Glucose 89 74 - 99 mg/dL    Sodium 132 (L) 136 - 145 mmol/L    Potassium 3.4 (L) 3.5 - 5.3 mmol/L    Chloride 106 98 - 107 mmol/L    Bicarbonate 21 21 - 32 mmol/L    Anion Gap 8 (L) 10 - 20 mmol/L    Urea Nitrogen 14 6 - 23 mg/dL    Creatinine 0.33 (L) 0.50 - 1.30 mg/dL    eGFR >90 >60 mL/min/1.73m*2    Calcium 5.0 (LL) 8.6 - 10.3 mg/dL   CBC   Result Value Ref Range    WBC 17.0 (H) 4.4 - 11.3 x10*3/uL    nRBC 0.1 (H) 0.0 - 0.0 /100 WBCs    RBC 3.00 (L) 4.50 - 5.90 x10*6/uL    Hemoglobin 8.9 (L) 13.5 - 17.5  g/dL    Hematocrit 25.5 (L) 41.0 - 52.0 %    MCV 85 80 - 100 fL    MCH 29.7 26.0 - 34.0 pg    MCHC 34.9 32.0 - 36.0 g/dL    RDW 15.9 (H) 11.5 - 14.5 %    Platelets 298 150 - 450 x10*3/uL   Magnesium   Result Value Ref Range    Magnesium 1.19 (L) 1.60 - 2.40 mg/dL   Phosphorus   Result Value Ref Range    Phosphorus 1.5 (L) 2.5 - 4.9 mg/dL   Calcium, ionized   Result Value Ref Range    POCT Calcium, Ionized 0.94 (L) 1.1 - 1.33 mmol/L         Assessment/Plan        This patient has a central line   Reason for the central line remaining today? Parenteral medication    This patient has a urinary catheter   Reason for the urinary catheter remaining today? critically ill patient who need accurate urinary output measurements          Assessment & Plan  Hypokalemia    Anemia    Hypomagnesemia    Hypocalcemia    Leukocytosis    Electrolyte imbalance    Dehydration  Electrolyte imbalance and hypocalcemia  -Calcium 5.4 on admission now 5.0  oral supplementation increased to 3 times daily replaced IV.  Ionized calcium low as well.  -Sodium 134 on admission today 132 potassium 3.0 today 3.-Replaced continue to follow  -Magnesium admission 1.21 corrected to 1.19-replaced IV     Leukocytosis  -UA negative chest x-ray negative for infiltrates  -WBC trending up today.        Diarrhea  -C. difficile negative stool cultures pending  -Patient uses Lomotil at home for his chronic diarrhea.     Weakness debility  -PT OT consult patient already at [facility] for rehab     Invasive colon adenocarcinoma with mets to his liver  -Currently receiving immunotherapy  - CT revealed mildly prominent fluid-filled small bowel may represent ileus or developing obstruction.  Patient n.p.o. except for ice chips.  Will repeat abdominal series in the morning.  Dr. Wang consulted.  -Palliative care consulted    Colitis  -Will treat with Levaquin   -CBC with diff in AM and Procal.     Hypotension  -Increase midodrine and follow blood pressures         Malnutrition Diagnosis Status: New  Malnutrition Diagnosis: Severe malnutrition related to starvation  As Evidenced by: >5% weight loss in 1 month, prolonger poor PO intake <75% for >1 month.  I agree with the dietitian's malnutrition diagnosis.  DVT prophylaxis Lovenox and SCDs  -Patient started on TPN today  -Dietitian consulted and following  -Lipid panel added      Disposition return to [facility] when medically stable                Gayle Mcgovern, APRN-CNP

## 2024-11-08 NOTE — CARE PLAN
Problem: Fall/Injury  Goal: Not fall by end of shift  Outcome: Progressing  Goal: Be free from injury by end of the shift  Outcome: Progressing  Goal: Verbalize understanding of personal risk factors for fall in the hospital  Outcome: Progressing  Goal: Verbalize understanding of risk factor reduction measures to prevent injury from fall in the home  Outcome: Progressing  Goal: Use assistive devices by end of the shift  Outcome: Progressing  Goal: Pace activities to prevent fatigue by end of the shift  Outcome: Progressing     Problem: Nutrition  Goal: Oral intake greater than 50%  Outcome: Progressing  Goal: Consume prescribed supplement  Outcome: Progressing  Goal: Adequate PO fluid intake  Outcome: Progressing  Goal: Lab values WNL  Outcome: Progressing  Goal: Electrolytes WNL  Outcome: Progressing  Goal: Promote healing  Outcome: Progressing  Goal: Maintain stable weight  Outcome: Progressing  Goal: Gradual weight gain  Outcome: Progressing     Problem: Pain  Goal: Takes deep breaths with improved pain control throughout the shift  Outcome: Progressing  Goal: Turns in bed with improved pain control throughout the shift  Outcome: Progressing  Goal: Walks with improved pain control throughout the shift  Outcome: Progressing  Goal: Performs ADL's with improved pain control throughout shift  Outcome: Progressing  Goal: Participates in PT with improved pain control throughout the shift  Outcome: Progressing  Goal: Free from opioid side effects throughout the shift  Outcome: Progressing  Goal: Free from acute confusion related to pain meds throughout the shift  Outcome: Progressing     Problem: Skin  Goal: Participates in plan/prevention/treatment measures  Outcome: Progressing  Goal: Prevent/manage excess moisture  Outcome: Progressing  Goal: Prevent/minimize sheer/friction injuries  Outcome: Progressing  Flowsheets (Taken 11/8/2024 0259)  Prevent/minimize sheer/friction injuries: Use pull sheet  Goal:  Promote/optimize nutrition  Outcome: Progressing  Goal: Promote skin healing  Outcome: Progressing     Problem: Discharge Planning  Goal: Discharge to home or other facility with appropriate resources  Outcome: Progressing     Problem: Chronic Conditions and Co-morbidities  Goal: Patient's chronic conditions and co-morbidity symptoms are monitored and maintained or improved  Outcome: Progressing   The patient's goals for the shift include      The clinical goals for the shift include Patient will have controlled pain and nausea this shift.

## 2024-11-08 NOTE — PROGRESS NOTES
Per medical team, patient continues to be very unwell; Patient may be developing a small bowel obstruction and will likely require another 72 hours in the hospital. /DSC to continue to send updated notes to [facility] via [facility] as notes available. Plan for patient TBD: Patient has been hoping to return to [facility] when medically ready to resume a skilled stay; Patient awaits a palliative consult. Care Transitions to follow and assist. NATALIIA Alex

## 2024-11-09 ENCOUNTER — APPOINTMENT (OUTPATIENT)
Dept: RADIOLOGY | Facility: HOSPITAL | Age: 49
End: 2024-11-09
Payer: COMMERCIAL

## 2024-11-09 LAB
ALBUMIN SERPL BCP-MCNC: <1.5 G/DL (ref 3.4–5)
ALP SERPL-CCNC: 102 U/L (ref 33–120)
ALT SERPL W P-5'-P-CCNC: 26 U/L (ref 10–52)
ANION GAP SERPL CALC-SCNC: 10 MMOL/L (ref 10–20)
ANION GAP SERPL CALC-SCNC: 7 MMOL/L (ref 10–20)
AST SERPL W P-5'-P-CCNC: 14 U/L (ref 9–39)
BASOPHILS # BLD AUTO: 0.01 X10*3/UL (ref 0–0.1)
BASOPHILS NFR BLD AUTO: 0.1 %
BILIRUB SERPL-MCNC: 0.6 MG/DL (ref 0–1.2)
BUN SERPL-MCNC: 14 MG/DL (ref 6–23)
BUN SERPL-MCNC: 14 MG/DL (ref 6–23)
CA-I BLD-SCNC: 0.85 MMOL/L (ref 1.1–1.33)
CALCIUM SERPL-MCNC: 5 MG/DL (ref 8.6–10.3)
CALCIUM SERPL-MCNC: 5.7 MG/DL (ref 8.6–10.3)
CHLORIDE SERPL-SCNC: 103 MMOL/L (ref 98–107)
CHLORIDE SERPL-SCNC: 105 MMOL/L (ref 98–107)
CHOLEST SERPL-MCNC: 51 MG/DL (ref 0–199)
CHOLESTEROL/HDL RATIO: NORMAL
CO2 SERPL-SCNC: 21 MMOL/L (ref 21–32)
CO2 SERPL-SCNC: 21 MMOL/L (ref 21–32)
CREAT SERPL-MCNC: 0.3 MG/DL (ref 0.5–1.3)
CREAT SERPL-MCNC: 0.36 MG/DL (ref 0.5–1.3)
EGFRCR SERPLBLD CKD-EPI 2021: >90 ML/MIN/1.73M*2
EGFRCR SERPLBLD CKD-EPI 2021: >90 ML/MIN/1.73M*2
EOSINOPHIL # BLD AUTO: 0 X10*3/UL (ref 0–0.7)
EOSINOPHIL NFR BLD AUTO: 0 %
ERYTHROCYTE [DISTWIDTH] IN BLOOD BY AUTOMATED COUNT: 15.9 % (ref 11.5–14.5)
GLUCOSE SERPL-MCNC: 162 MG/DL (ref 74–99)
GLUCOSE SERPL-MCNC: 190 MG/DL (ref 74–99)
HCT VFR BLD AUTO: 21.8 % (ref 41–52)
HDLC SERPL-MCNC: <2.5 MG/DL
HGB BLD-MCNC: 7.9 G/DL (ref 13.5–17.5)
IMM GRANULOCYTES # BLD AUTO: 0.05 X10*3/UL (ref 0–0.7)
IMM GRANULOCYTES NFR BLD AUTO: 0.4 % (ref 0–0.9)
LDLC SERPL CALC-MCNC: NORMAL MG/DL
LYMPHOCYTES # BLD AUTO: 2.67 X10*3/UL (ref 1.2–4.8)
LYMPHOCYTES NFR BLD AUTO: 19.5 %
MAGNESIUM SERPL-MCNC: 1.45 MG/DL (ref 1.6–2.4)
MCH RBC QN AUTO: 31.1 PG (ref 26–34)
MCHC RBC AUTO-ENTMCNC: 36.2 G/DL (ref 32–36)
MCV RBC AUTO: 86 FL (ref 80–100)
MONOCYTES # BLD AUTO: 0.25 X10*3/UL (ref 0.1–1)
MONOCYTES NFR BLD AUTO: 1.8 %
NEUTROPHILS # BLD AUTO: 10.73 X10*3/UL (ref 1.2–7.7)
NEUTROPHILS NFR BLD AUTO: 78.2 %
NON HDL CHOLESTEROL: NORMAL
NRBC BLD-RTO: 0 /100 WBCS (ref 0–0)
PLATELET # BLD AUTO: 207 X10*3/UL (ref 150–450)
POTASSIUM SERPL-SCNC: 3.1 MMOL/L (ref 3.5–5.3)
POTASSIUM SERPL-SCNC: 3.3 MMOL/L (ref 3.5–5.3)
PROCALCITONIN SERPL-MCNC: 0.54 NG/ML
PROT SERPL-MCNC: 3 G/DL (ref 6.4–8.2)
RBC # BLD AUTO: 2.54 X10*6/UL (ref 4.5–5.9)
SODIUM SERPL-SCNC: 130 MMOL/L (ref 136–145)
SODIUM SERPL-SCNC: 131 MMOL/L (ref 136–145)
TRIGL SERPL-MCNC: 98 MG/DL (ref 0–149)
VLDL: 20 MG/DL (ref 0–40)
WBC # BLD AUTO: 13.7 X10*3/UL (ref 4.4–11.3)

## 2024-11-09 PROCEDURE — 2500000004 HC RX 250 GENERAL PHARMACY W/ HCPCS (ALT 636 FOR OP/ED)

## 2024-11-09 PROCEDURE — 2500000004 HC RX 250 GENERAL PHARMACY W/ HCPCS (ALT 636 FOR OP/ED): Performed by: NURSE PRACTITIONER

## 2024-11-09 PROCEDURE — 74022 RADEX COMPL AQT ABD SERIES: CPT

## 2024-11-09 PROCEDURE — 9420000001 HC RT PATIENT EDUCATION 5 MIN

## 2024-11-09 PROCEDURE — 80048 BASIC METABOLIC PNL TOTAL CA: CPT | Performed by: NURSE PRACTITIONER

## 2024-11-09 PROCEDURE — 2500000001 HC RX 250 WO HCPCS SELF ADMINISTERED DRUGS (ALT 637 FOR MEDICARE OP): Performed by: NURSE PRACTITIONER

## 2024-11-09 PROCEDURE — 85025 COMPLETE CBC W/AUTO DIFF WBC: CPT | Performed by: NURSE PRACTITIONER

## 2024-11-09 PROCEDURE — 94667 MNPJ CHEST WALL 1ST: CPT

## 2024-11-09 PROCEDURE — 94664 DEMO&/EVAL PT USE INHALER: CPT

## 2024-11-09 PROCEDURE — 1200000002 HC GENERAL ROOM WITH TELEMETRY DAILY

## 2024-11-09 PROCEDURE — 99231 SBSQ HOSP IP/OBS SF/LOW 25: CPT | Performed by: NURSE PRACTITIONER

## 2024-11-09 PROCEDURE — 99232 SBSQ HOSP IP/OBS MODERATE 35: CPT | Performed by: SURGERY

## 2024-11-09 PROCEDURE — 83735 ASSAY OF MAGNESIUM: CPT | Performed by: NURSE PRACTITIONER

## 2024-11-09 PROCEDURE — 84075 ASSAY ALKALINE PHOSPHATASE: CPT | Performed by: NURSE PRACTITIONER

## 2024-11-09 PROCEDURE — 2500000005 HC RX 250 GENERAL PHARMACY W/O HCPCS: Performed by: NURSE PRACTITIONER

## 2024-11-09 PROCEDURE — 82330 ASSAY OF CALCIUM: CPT | Performed by: STUDENT IN AN ORGANIZED HEALTH CARE EDUCATION/TRAINING PROGRAM

## 2024-11-09 PROCEDURE — 74022 RADEX COMPL AQT ABD SERIES: CPT | Performed by: RADIOLOGY

## 2024-11-09 PROCEDURE — 2500000001 HC RX 250 WO HCPCS SELF ADMINISTERED DRUGS (ALT 637 FOR MEDICARE OP): Performed by: STUDENT IN AN ORGANIZED HEALTH CARE EDUCATION/TRAINING PROGRAM

## 2024-11-09 PROCEDURE — 2500000004 HC RX 250 GENERAL PHARMACY W/ HCPCS (ALT 636 FOR OP/ED): Mod: JZ | Performed by: STUDENT IN AN ORGANIZED HEALTH CARE EDUCATION/TRAINING PROGRAM

## 2024-11-09 RX ORDER — MAGNESIUM SULFATE HEPTAHYDRATE 40 MG/ML
2 INJECTION, SOLUTION INTRAVENOUS ONCE
Status: COMPLETED | OUTPATIENT
Start: 2024-11-09 | End: 2024-11-09

## 2024-11-09 RX ORDER — POTASSIUM CHLORIDE 14.9 MG/ML
20 INJECTION INTRAVENOUS
Status: COMPLETED | OUTPATIENT
Start: 2024-11-09 | End: 2024-11-09

## 2024-11-09 RX ORDER — FUROSEMIDE 10 MG/ML
20 INJECTION INTRAMUSCULAR; INTRAVENOUS ONCE
Status: COMPLETED | OUTPATIENT
Start: 2024-11-09 | End: 2024-11-09

## 2024-11-09 RX ORDER — CALCIUM GLUCONATE 20 MG/ML
2 INJECTION, SOLUTION INTRAVENOUS ONCE
Status: DISCONTINUED | OUTPATIENT
Start: 2024-11-09 | End: 2024-11-09

## 2024-11-09 RX ORDER — POTASSIUM CHLORIDE 14.9 MG/ML
20 INJECTION INTRAVENOUS
Status: DISPENSED | OUTPATIENT
Start: 2024-11-09 | End: 2024-11-09

## 2024-11-09 RX ORDER — MIDODRINE HYDROCHLORIDE 5 MG/1
10 TABLET ORAL ONCE
Status: DISPENSED | OUTPATIENT
Start: 2024-11-09

## 2024-11-09 RX ORDER — WATER
100 LIQUID (ML) MISCELLANEOUS
Status: SHIPPED | OUTPATIENT
Start: 2024-11-09

## 2024-11-09 ASSESSMENT — COGNITIVE AND FUNCTIONAL STATUS - GENERAL
TURNING FROM BACK TO SIDE WHILE IN FLAT BAD: TOTAL
EATING MEALS: TOTAL
CLIMB 3 TO 5 STEPS WITH RAILING: TOTAL
PERSONAL GROOMING: TOTAL
DRESSING REGULAR LOWER BODY CLOTHING: TOTAL
MOVING FROM LYING ON BACK TO SITTING ON SIDE OF FLAT BED WITH BEDRAILS: A LOT
WALKING IN HOSPITAL ROOM: TOTAL
DRESSING REGULAR UPPER BODY CLOTHING: TOTAL
MOVING TO AND FROM BED TO CHAIR: TOTAL
MOVING TO AND FROM BED TO CHAIR: TOTAL
TOILETING: TOTAL
EATING MEALS: TOTAL
DRESSING REGULAR LOWER BODY CLOTHING: TOTAL
HELP NEEDED FOR BATHING: TOTAL
DAILY ACTIVITIY SCORE: 6
DAILY ACTIVITIY SCORE: 6
STANDING UP FROM CHAIR USING ARMS: TOTAL
MOBILITY SCORE: 7
MOVING FROM LYING ON BACK TO SITTING ON SIDE OF FLAT BED WITH BEDRAILS: A LOT
STANDING UP FROM CHAIR USING ARMS: TOTAL
DRESSING REGULAR UPPER BODY CLOTHING: TOTAL
TURNING FROM BACK TO SIDE WHILE IN FLAT BAD: TOTAL
WALKING IN HOSPITAL ROOM: TOTAL
TOILETING: TOTAL
PERSONAL GROOMING: TOTAL
MOBILITY SCORE: 7
HELP NEEDED FOR BATHING: TOTAL
CLIMB 3 TO 5 STEPS WITH RAILING: TOTAL

## 2024-11-09 ASSESSMENT — PAIN SCALES - GENERAL
PAINLEVEL_OUTOF10: 1
PAINLEVEL_OUTOF10: 4
PAINLEVEL_OUTOF10: 5 - MODERATE PAIN
PAINLEVEL_OUTOF10: 2
PAINLEVEL_OUTOF10: 0 - NO PAIN

## 2024-11-09 ASSESSMENT — PAIN - FUNCTIONAL ASSESSMENT
PAIN_FUNCTIONAL_ASSESSMENT: 0-10

## 2024-11-09 ASSESSMENT — PAIN DESCRIPTION - LOCATION: LOCATION: ABDOMEN

## 2024-11-09 ASSESSMENT — PAIN DESCRIPTION - ORIENTATION: ORIENTATION: ANTERIOR;LOWER;MID;LEFT

## 2024-11-09 NOTE — PROGRESS NOTES
"Physical Therapy                 Therapy Communication Note    Patient Name: Yon Lawrence \"Vladimir\"  MRN: [id number]  Department: [facility]  Room: [address] [place], [place] [address]A  Today's Date: 11/9/2024     Discipline: Physical Therapy    Missed Visit Reason: Missed Visit Reason: Patient placed on medical hold    Missed Time: Attempt    Comment:  Nursing reports patient was up a lot during night in pain, just went to sleep after receiving pain med, requested to let patient rest.  "

## 2024-11-09 NOTE — CARE PLAN
The patient's goals for the shift include      The clinical goals for the shift include electrolyte replacement    Pt tolerated replacement. Weight shift done independently.

## 2024-11-09 NOTE — PROGRESS NOTES
Pt reviewed in care rounds this morning. Pt not medically ready for discharge. Anticipate several days continued admission. Plan is for Pt to discharge to SNF rtn to [facility] for therapies. Referrals sent and [facility] accepting and following case. SW to follow.

## 2024-11-09 NOTE — PROGRESS NOTES
"Yon Lawrence \"Vladimir\" is a 49 y.o. male on day 2 of admission presenting with Hypokalemia.    Subjective   Patient seen with multiple family members including his wife.  He actually looks better than he did a couple of days ago.  He denies any abdominal pain.  He denies any nausea.  He continues with bowel movements although they are not as frequent.  He would like to try more in the way of food.       Objective     Unchanged    Last Recorded Vitals  Blood pressure 90/67, pulse 97, temperature 36.1 °C (97 °F), temperature source Temporal, resp. rate 18, height 1.854 m (6' 1\"), weight 69.9 kg (154 lb), SpO2 100%.  Intake/Output last 3 Shifts:  I/O last 3 completed shifts:  In: 3456.8 (49.5 mL/kg) [P.O.:400; I.V.:2655 (38 mL/kg)]  Out: 625 (8.9 mL/kg) [Urine:625 (0.2 mL/kg/hr)]  Weight: 69.9 kg       Assessment/Plan   Assessment & Plan  Hypokalemia    Anemia    Hypomagnesemia    Hypocalcemia    Leukocytosis    Electrolyte imbalance    Dehydration    He continues on TPN.  His electrolytes are being replaced.  His wife states that in his previous hospitalization sometimes they had to give him some albumin to help support him.  I reviewed his abdominal x-rays today.  The read states that it was concerning for a bowel obstruction.  I discussed with them I could see the splenic flexure and he continues to move his bowels so at most it is a partial obstruction.  I explained I do think this is more likely an ileus related to his hypoproteinemic state as well as his multiple electrolyte abnormalities.  He needs nutrition.  He wants chicken noodle soup but they cannot give him that unless he is on a regular diet.  Regular diet is ordered and he will be allowed to pick and choose from the menu.  He states he can usually tolerate noodles with marinara sauce.  The hospitalist will write for supplements.  We discussed if he we did think he had a bowel obstruction we would put an NG tube down but I really hate to do that unless he " is vomiting.         Kelly Wang MD

## 2024-11-09 NOTE — PROGRESS NOTES
Hospital Medicine Progress Note     Subjective   Patient examined at bedside.  Patient alert and oriented x 3.  Patient is a looks very pale.  Very weak in bed reports some pain to his upper epigastric area.  Denies any associated nausea or vomiting    Objective     General Appearance: AAO x 3, not in acute distress  Skin: skin color, texture, turgor normal; no suspicious rashes or lesions  Eyes : PERRL, EOM's intact, conjunctiva pink  ENT: no oral thrush, no pharyngeal erythema or exudates  Neck: no JVD, no lymphadenopathy  Respiratory: lungs CTA, no rhonchi, wheezing, or crackles  Heart: RRR without murmur, gallop, or rubs, no ectopy  Abdomen: Nondistended, positive bowel sounds, soft,  nontender  Extremities: no edema, ROM x 4 extremities  Peripheral pulses: normal and present x 4 extremities  Neuro: alert, coherent and conversant, no focal motor deficits    Medications  Scheduled medications  calcium carbonate, 500 mg, oral, TID  enoxaparin, 70 mg, subcutaneous, q12h  fat emulsion-plant based, 250 mL, intravenous, Once per day on Monday Wednesday Friday  iohexol, 500 mL, oral, Once in imaging  levoFLOXacin, 500 mg, intravenous, q24h  magnesium sulfate, 2 g, intravenous, Once  midodrine, 10 mg, oral, q8h CATRACHITA  potassium chloride, 20 mEq, intravenous, q2h    Continuous medications  Adult Clinimix Parenteral Nutrition Continuous, 20 mL/hr, Last Rate: 20 mL/hr (11/09/24 1037)    PRN medications  PRN medications: acetaminophen, diphenoxylate-atropine, morphine, prochlorperazine **OR** prochlorperazine **OR** prochlorperazine    Last Recorded Vitals  BP 92/62 (BP Location: Right arm, Patient Position: Lying)   Pulse 104   Temp 36.5 °C (97.7 °F) (Temporal)   Resp 18   Wt 69.9 kg (154 lb)   SpO2 100%     Admission Weight  Weight: 69.9 kg (154 lb) (11/05/24 1137)    Daily Weight  11/05/24 : 69.9 kg (154 lb)    Lab Results  Results for orders placed or performed during the hospital encounter of 11/05/24 (from the past  24 hours)   Procalcitonin   Result Value Ref Range    Procalcitonin 0.54 (H) <=0.07 ng/mL   Lipid Panel   Result Value Ref Range    Cholesterol 51 0 - 199 mg/dL    HDL-Cholesterol <2.5 mg/dL    Cholesterol/HDL Ratio      LDL Calculated      VLDL 20 0 - 40 mg/dL    Triglycerides 98 0 - 149 mg/dL    Non HDL Cholesterol     Basic Metabolic Panel   Result Value Ref Range    Glucose 190 (H) 74 - 99 mg/dL    Sodium 130 (L) 136 - 145 mmol/L    Potassium 3.3 (L) 3.5 - 5.3 mmol/L    Chloride 105 98 - 107 mmol/L    Bicarbonate 21 21 - 32 mmol/L    Anion Gap 7 (L) 10 - 20 mmol/L    Urea Nitrogen 14 6 - 23 mg/dL    Creatinine 0.30 (L) 0.50 - 1.30 mg/dL    eGFR >90 >60 mL/min/1.73m*2    Calcium 5.0 (LL) 8.6 - 10.3 mg/dL   CBC and Auto Differential   Result Value Ref Range    WBC 13.7 (H) 4.4 - 11.3 x10*3/uL    nRBC 0.0 0.0 - 0.0 /100 WBCs    RBC 2.54 (L) 4.50 - 5.90 x10*6/uL    Hemoglobin 7.9 (L) 13.5 - 17.5 g/dL    Hematocrit 21.8 (L) 41.0 - 52.0 %    MCV 86 80 - 100 fL    MCH 31.1 26.0 - 34.0 pg    MCHC 36.2 (H) 32.0 - 36.0 g/dL    RDW 15.9 (H) 11.5 - 14.5 %    Platelets 207 150 - 450 x10*3/uL    Neutrophils % 78.2 40.0 - 80.0 %    Immature Granulocytes %, Automated 0.4 0.0 - 0.9 %    Lymphocytes % 19.5 13.0 - 44.0 %    Monocytes % 1.8 2.0 - 10.0 %    Eosinophils % 0.0 0.0 - 6.0 %    Basophils % 0.1 0.0 - 2.0 %    Neutrophils Absolute 10.73 (H) 1.20 - 7.70 x10*3/uL    Immature Granulocytes Absolute, Automated 0.05 0.00 - 0.70 x10*3/uL    Lymphocytes Absolute 2.67 1.20 - 4.80 x10*3/uL    Monocytes Absolute 0.25 0.10 - 1.00 x10*3/uL    Eosinophils Absolute 0.00 0.00 - 0.70 x10*3/uL    Basophils Absolute 0.01 0.00 - 0.10 x10*3/uL   Magnesium   Result Value Ref Range    Magnesium 1.45 (L) 1.60 - 2.40 mg/dL   Calcium, ionized   Result Value Ref Range    POCT Calcium, Ionized 0.85 (L) 1.1 - 1.33 mmol/L     Image Results  XR abdomen 2 views w chest 1 view  Narrative: Interpreted By:  Timmy Ahmadi,   STUDY:  XR ABDOMEN 2 VIEWS WITH  CHEST 1 VIEW;  11/8/2024 7:42 am      INDICATION:  Signs/Symptoms:ileus.          COMPARISON:  None.      ACCESSION NUMBER(S):  [id number]      ORDERING CLINICIAN:  EMY CHOI      TECHNIQUE:  Abdomen supine and upright views      Chest PA view      FINDINGS:          ABDOMEN: There is moderate-to-marked distention numerous small bowel  segments, with some air-fluid levels on the upright projection. While  there is not appear to be significant colonic distension, there does  appear to be mucosal edema of the proximal descending colon  suggesting colitis, and there may be fluid-filled distended colonic  loops. Small bowel distention may be due to secondary ileus, although  obstruction is possible.      No evidence of pneumoperitoneum.      Osseous structures demonstrate no acute bony abnormalities.      CHEST:  Cardiomediastinal silhouette in normal in size and configuration.      Lungs are clear.      No acute osseous changes.      Impression: 1.  Probable small bowel ileus and colitis, although obstruction is  possible.  2.  No evidence of acute cardiopulmonary process.          MACRO:  None      Signed by: Timmy Ahmadi 11/8/2024 9:47 AM  Dictation workstation:   [id number]    Assessment       Principal Problem:    Hypokalemia  Active Problems:    Anemia    Hypomagnesemia    Hypocalcemia    Leukocytosis    Electrolyte imbalance    Dehydration    PLAN:     Hypocalcemnia  -calcium 5.0 today  -ionized ca 0.85  -appears patient has had 5 g of ca earlier this morning, will recheck Ca level now    Hypomagnesiumia  -Mag 1.45  -ordered 2g IV  -recheck in Am     Hypokalemia  -k 3.3  -ordered 40meq IV    CAP  -cxr not too impressve, but Procal is 0.54  -will continue levaquin that patient was already receiving (uti)  -IS   -patient appearing overloaded, dc iv fluids and give lasix 40mg IV OT dose    Acute cystitis  -ua showed leuks  -urine culture pending     Diarrhea  -C. difficile negative  - stool cultures  pending     Physical deconditioning  -is at [facility] currently for rehab  -Pt done seen him      Ileus vs SBO in the setting of Invasive colon adenocarcinoma with mets to his liver  -Currently receiving immunotherapy  - CT revealed mildly prominent fluid-filled small bowel may represent ileus or developing obstruction.   - Patient NPO except for ice chips.    -consulted surgery, appreciate recs, awaiting KUB for today  -consulted palliative care, appreciate recs      Hypotension  - midodrine 10mg q8, takes it at home    Protein calorie malnutrition  -patient was started on TPN yesterday, he is appearing overloaded I decreased the rate to 20ml/hr (reviewed with dr mendoza)    Pulmonary nodules  -incidental finding   -recs for CT chest give hx of Ca    Dispo: anticipate several days depending on clinical course   Case reviewed with dr kelly Ferguson, DNP, APRN, AGNP-BC  Hospitalist Nurse Practitioner

## 2024-11-09 NOTE — PROGRESS NOTES
Clinical note    Spoke with dr banuelos, he will try to do telehealth either tonight or tomorrow.     Gricelda Ferguson, APRN-CNP

## 2024-11-10 ENCOUNTER — APPOINTMENT (OUTPATIENT)
Dept: CARDIOLOGY | Facility: HOSPITAL | Age: 49
End: 2024-11-10
Payer: COMMERCIAL

## 2024-11-10 VITALS
TEMPERATURE: 98.6 F | OXYGEN SATURATION: 99 % | HEIGHT: 73 IN | RESPIRATION RATE: 18 BRPM | BODY MASS INDEX: 20.41 KG/M2 | SYSTOLIC BLOOD PRESSURE: 103 MMHG | HEART RATE: 92 BPM | DIASTOLIC BLOOD PRESSURE: 71 MMHG | WEIGHT: 154 LBS

## 2024-11-10 LAB
ANION GAP SERPL CALC-SCNC: <7 MMOL/L (ref 10–20)
ATRIAL RATE: 97 BPM
BUN SERPL-MCNC: 13 MG/DL (ref 6–23)
CALCIUM SERPL-MCNC: 5.9 MG/DL (ref 8.6–10.3)
CHLORIDE SERPL-SCNC: 105 MMOL/L (ref 98–107)
CO2 SERPL-SCNC: 22 MMOL/L (ref 21–32)
CREAT SERPL-MCNC: 0.25 MG/DL (ref 0.5–1.3)
EGFRCR SERPLBLD CKD-EPI 2021: >90 ML/MIN/1.73M*2
ERYTHROCYTE [DISTWIDTH] IN BLOOD BY AUTOMATED COUNT: 15.4 % (ref 11.5–14.5)
GLUCOSE SERPL-MCNC: 110 MG/DL (ref 74–99)
HCT VFR BLD AUTO: 23 % (ref 41–52)
HGB BLD-MCNC: 7.7 G/DL (ref 13.5–17.5)
MAGNESIUM SERPL-MCNC: 1.54 MG/DL (ref 1.6–2.4)
MCH RBC QN AUTO: 30 PG (ref 26–34)
MCHC RBC AUTO-ENTMCNC: 33.5 G/DL (ref 32–36)
MCV RBC AUTO: 90 FL (ref 80–100)
NRBC BLD-RTO: 0 /100 WBCS (ref 0–0)
P AXIS: 59 DEGREES
P OFFSET: 195 MS
P ONSET: 145 MS
PHOSPHATE SERPL-MCNC: 1.1 MG/DL (ref 2.5–4.9)
PLATELET # BLD AUTO: 156 X10*3/UL (ref 150–450)
POTASSIUM SERPL-SCNC: 3.6 MMOL/L (ref 3.5–5.3)
PR INTERVAL: 146 MS
Q ONSET: 218 MS
QRS COUNT: 16 BEATS
QRS DURATION: 96 MS
QT INTERVAL: 378 MS
QTC CALCULATION(BAZETT): 480 MS
QTC FREDERICIA: 443 MS
R AXIS: 58 DEGREES
RBC # BLD AUTO: 2.57 X10*6/UL (ref 4.5–5.9)
SODIUM SERPL-SCNC: 129 MMOL/L (ref 136–145)
T AXIS: 21 DEGREES
T OFFSET: 407 MS
VENTRICULAR RATE: 97 BPM
WBC # BLD AUTO: 10.7 X10*3/UL (ref 4.4–11.3)

## 2024-11-10 PROCEDURE — 2500000004 HC RX 250 GENERAL PHARMACY W/ HCPCS (ALT 636 FOR OP/ED): Performed by: STUDENT IN AN ORGANIZED HEALTH CARE EDUCATION/TRAINING PROGRAM

## 2024-11-10 PROCEDURE — 2500000005 HC RX 250 GENERAL PHARMACY W/O HCPCS: Performed by: STUDENT IN AN ORGANIZED HEALTH CARE EDUCATION/TRAINING PROGRAM

## 2024-11-10 PROCEDURE — 93010 ELECTROCARDIOGRAM REPORT: CPT | Performed by: STUDENT IN AN ORGANIZED HEALTH CARE EDUCATION/TRAINING PROGRAM

## 2024-11-10 PROCEDURE — 2500000004 HC RX 250 GENERAL PHARMACY W/ HCPCS (ALT 636 FOR OP/ED)

## 2024-11-10 PROCEDURE — 2500000001 HC RX 250 WO HCPCS SELF ADMINISTERED DRUGS (ALT 637 FOR MEDICARE OP): Performed by: STUDENT IN AN ORGANIZED HEALTH CARE EDUCATION/TRAINING PROGRAM

## 2024-11-10 PROCEDURE — 99233 SBSQ HOSP IP/OBS HIGH 50: CPT | Performed by: STUDENT IN AN ORGANIZED HEALTH CARE EDUCATION/TRAINING PROGRAM

## 2024-11-10 PROCEDURE — 85027 COMPLETE CBC AUTOMATED: CPT | Performed by: NURSE PRACTITIONER

## 2024-11-10 PROCEDURE — 1100000001 HC PRIVATE ROOM DAILY

## 2024-11-10 PROCEDURE — 2500000004 HC RX 250 GENERAL PHARMACY W/ HCPCS (ALT 636 FOR OP/ED): Performed by: NURSE PRACTITIONER

## 2024-11-10 PROCEDURE — 93005 ELECTROCARDIOGRAM TRACING: CPT

## 2024-11-10 PROCEDURE — 94668 MNPJ CHEST WALL SBSQ: CPT

## 2024-11-10 PROCEDURE — 83735 ASSAY OF MAGNESIUM: CPT | Performed by: NURSE PRACTITIONER

## 2024-11-10 PROCEDURE — 80048 BASIC METABOLIC PNL TOTAL CA: CPT | Performed by: NURSE PRACTITIONER

## 2024-11-10 PROCEDURE — 84100 ASSAY OF PHOSPHORUS: CPT | Performed by: NURSE PRACTITIONER

## 2024-11-10 PROCEDURE — 2500000001 HC RX 250 WO HCPCS SELF ADMINISTERED DRUGS (ALT 637 FOR MEDICARE OP): Performed by: NURSE PRACTITIONER

## 2024-11-10 PROCEDURE — 2500000005 HC RX 250 GENERAL PHARMACY W/O HCPCS: Performed by: NURSE PRACTITIONER

## 2024-11-10 RX ORDER — MAGNESIUM SULFATE HEPTAHYDRATE 40 MG/ML
2 INJECTION, SOLUTION INTRAVENOUS ONCE
Status: COMPLETED | OUTPATIENT
Start: 2024-11-10 | End: 2024-11-10

## 2024-11-10 RX ORDER — CALCIUM GLUCONATE 20 MG/ML
2 INJECTION, SOLUTION INTRAVENOUS ONCE
Status: COMPLETED | OUTPATIENT
Start: 2024-11-10 | End: 2024-11-10

## 2024-11-10 ASSESSMENT — COGNITIVE AND FUNCTIONAL STATUS - GENERAL
EATING MEALS: TOTAL
STANDING UP FROM CHAIR USING ARMS: TOTAL
DRESSING REGULAR UPPER BODY CLOTHING: TOTAL
DRESSING REGULAR LOWER BODY CLOTHING: TOTAL
EATING MEALS: TOTAL
WALKING IN HOSPITAL ROOM: TOTAL
MOVING TO AND FROM BED TO CHAIR: TOTAL
TOILETING: TOTAL
DAILY ACTIVITIY SCORE: 6
HELP NEEDED FOR BATHING: TOTAL
HELP NEEDED FOR BATHING: TOTAL
TOILETING: TOTAL
MOBILITY SCORE: 7
CLIMB 3 TO 5 STEPS WITH RAILING: TOTAL
STANDING UP FROM CHAIR USING ARMS: TOTAL
MOVING FROM LYING ON BACK TO SITTING ON SIDE OF FLAT BED WITH BEDRAILS: A LOT
PERSONAL GROOMING: TOTAL
MOVING TO AND FROM BED TO CHAIR: TOTAL
MOVING FROM LYING ON BACK TO SITTING ON SIDE OF FLAT BED WITH BEDRAILS: A LOT
DRESSING REGULAR LOWER BODY CLOTHING: TOTAL
PERSONAL GROOMING: TOTAL
CLIMB 3 TO 5 STEPS WITH RAILING: TOTAL
TURNING FROM BACK TO SIDE WHILE IN FLAT BAD: TOTAL
DAILY ACTIVITIY SCORE: 6
WALKING IN HOSPITAL ROOM: TOTAL
MOBILITY SCORE: 7
TURNING FROM BACK TO SIDE WHILE IN FLAT BAD: TOTAL
DRESSING REGULAR UPPER BODY CLOTHING: TOTAL

## 2024-11-10 ASSESSMENT — PAIN SCALES - GENERAL
PAINLEVEL_OUTOF10: 6
PAINLEVEL_OUTOF10: 2
PAINLEVEL_OUTOF10: 3
PAINLEVEL_OUTOF10: 6

## 2024-11-10 ASSESSMENT — PAIN - FUNCTIONAL ASSESSMENT
PAIN_FUNCTIONAL_ASSESSMENT: 0-10

## 2024-11-10 ASSESSMENT — PAIN DESCRIPTION - ORIENTATION
ORIENTATION: LEFT
ORIENTATION: RIGHT;LEFT;LOWER;ANTERIOR

## 2024-11-10 ASSESSMENT — PAIN DESCRIPTION - LOCATION
LOCATION: ABDOMEN
LOCATION: ABDOMEN

## 2024-11-10 NOTE — ASSESSMENT & PLAN NOTE
Hypokalemia  Hypomagnesemia  Hypocalcemia  -Give 2 g of IV calcium gluconate, 2 g IV magnesium sulfate, IV potassium phosphate 30 mmol.  Potassium levels are normal at this time.  Continue following electrolyte abnormalities and replace as needed.       Leukocytosis  -UA negative chest x-ray negative for infiltrates  -Patient has been started on Levaquin to treat colitis.  White blood cell count is downtrending and has now normalized.       Diarrhea  -C. difficile negative.  Expanded panel was negative.  -Patient uses Lomotil at home for his chronic diarrhea.     Weakness debility  -PT OT consult patient already at [facility] for rehab     Invasive colon adenocarcinoma with mets to his liver  -Currently receiving immunotherapy  - CT revealed mildly prominent fluid-filled small bowel may represent ileus or developing obstruction.  Patient had originally been placed n.p.o. with general surgery consultation.  He has since been advanced to a regular diet.  -Palliative care has been consulted and following the patient.    Colitis  Continue Levaquin and treat for 7 days total.       Hypotension  -Likely related to the patient's-low protein state.  -Continue midodrine 10 mg every 8 hours.        Malnutrition Diagnosis Status: New  Malnutrition Diagnosis: Severe malnutrition related to starvation  As Evidenced by: >5% weight loss in 1 month, prolonger poor PO intake <75% for >1 month.  I agree with the dietitian's malnutrition diagnosis.  DVT prophylaxis Lovenox and SCDs  -Patient started on TPN today  -Dietitian consulted and following  -Lipid panel added      Disposition continue replacing electrolytes.  Continue Levaquin for now.  Follow-up with palliative care in regards to possibly moving forward with hospice.  Prognosis moving forward is poor considering his metastatic colon cancer, poor nutritional state, and poor response to current therapy.  Return to [facility] when medically stable

## 2024-11-10 NOTE — CARE PLAN
The patient's goals for the shift include      The clinical goals for the shift include electrolyte replacement. goals  of care discussion

## 2024-11-10 NOTE — PROGRESS NOTES
"Physical Therapy                 Therapy Communication Note    Patient Name: Yon Lawrence \"Vladimir\"  MRN: [id number]  Department[facility]  [facility]  Today's Date: 11/10/2024     Discipline: Physical Therapy    Missed Visit Reason: Missed Visit Reason: Patient placed on medical hold    Missed Time: Attempt    Comment:  Nursing requested to hold PT on this date due to patient being in too much pain.  "

## 2024-11-10 NOTE — PROGRESS NOTES
"Yon Lawrence \"Vladimir\" is a 49 y.o. male on day 3 of admission presenting with Hypokalemia.    Subjective   This is the first day that I am seeing the patient.  He has been managed in the hospital for several electrolyte abnormalities related to diarrhea.  General surgery has also been following due to concerns for ileus versus partial small bowel obstruction.  Patient states that he is feeling a little better today.  He was able to eat applesauce.  Denies nausea and vomiting or diarrhea.  Does continue to have some lower abdominal pain.    I discussed the patient's current status in regards to his metastatic colon cancer.  He does know that he has a terminal condition.  He expressed to me that he is concerned that he continues to get worse despite being on immunotherapy.  He also did express to me that he has come to terms with the possibility that he could die soon.  He has an appointment scheduled with our palliative care physician, Dr. Ritchie Marti.  He wants to do talk to him before he makes a final decision going forward with hospice care.       Objective     General:     Chronically sick appearing.  Wasting of face  HENT:      Head: Normocephalic and atraumatic.      Mouth: Mucous membranes are moist.   Eyes:      Pupils are equal, round, and reactive to light.   Cardiovascular:      Normal rate and regular rhythm. Normal S1, S2.  No murmurs, clicks, gallops.   Pulmonary:      Clear to auscultation bilaterally.  No wheezing, rhonchi, or crackles heard.   Abdominal:       Bowel sounds are normal.      Abdomen is soft, mild tenderness to palpation of lower quadrants without rebound tenderness, nondistended  Musculoskeletal:         Extremities: No edema present. No deformities with no abnormal range of motion     Neck supple.   Neurological:      Mental Status: He is alert and oriented X3     CN II-XII intact.  No focal neurologic deficits appreciated.     Last Recorded Vitals  Blood pressure 100/68, pulse 92, " "temperature 36 °C (96.8 °F), temperature source Temporal, resp. rate 18, height 1.854 m (6' 1\"), weight 69.9 kg (154 lb), SpO2 98%.  Intake/Output last 3 Shifts:  I/O last 3 completed shifts:  In: 3976.8 (56.9 mL/kg) [P.O.:970; I.V.:2605 (37.3 mL/kg)]  Out: 1300 (18.6 mL/kg) [Urine:1300 (0.5 mL/kg/hr)]  Weight: 69.9 kg     Relevant Results               This patient has a central line   Reason for the central line remaining today? Parenteral nutrition and line is implantable port    This patient has a urinary catheter   Reason for the urinary catheter remaining today?  Chronic Echevarria catheter that was in place on initial admission.  This was replaced in the emergency department.               Assessment/Plan   Assessment & Plan  Hypokalemia    Anemia    Hypomagnesemia    Hypocalcemia    Leukocytosis    Electrolyte imbalance    Dehydration  Hypokalemia  Hypomagnesemia  Hypocalcemia  -Give 2 g of IV calcium gluconate, 2 g IV magnesium sulfate, IV potassium phosphate 30 mmol.  Potassium levels are normal at this time.  Continue following electrolyte abnormalities and replace as needed.       Leukocytosis  -UA negative chest x-ray negative for infiltrates  -Patient has been started on Levaquin to treat colitis.  White blood cell count is downtrending and has now normalized.       Diarrhea  -C. difficile negative.  Expanded panel was negative.  -Patient uses Lomotil at home for his chronic diarrhea.     Weakness debility  -PT OT consult patient already at [facility] for rehab     Invasive colon adenocarcinoma with mets to his liver  -Currently receiving immunotherapy  - CT revealed mildly prominent fluid-filled small bowel may represent ileus or developing obstruction.  Patient had originally been placed n.p.o. with general surgery consultation.  He has since been advanced to a regular diet.  -Palliative care has been consulted and following the patient.    Colitis  Continue Levaquin and treat for 7 days total.     "   Hypotension  -Likely related to the patient's-low protein state.  -Continue midodrine 10 mg every 8 hours.        Malnutrition Diagnosis Status: New  Malnutrition Diagnosis: Severe malnutrition related to starvation  As Evidenced by: >5% weight loss in 1 month, prolonger poor PO intake <75% for >1 month.  I agree with the dietitian's malnutrition diagnosis.  DVT prophylaxis Lovenox and SCDs  -Patient started on TPN today  -Dietitian consulted and following  -Lipid panel added      Disposition continue replacing electrolytes.  Continue Levaquin for now.  Follow-up with palliative care in regards to possibly moving forward with hospice.  Prognosis moving forward is poor considering his metastatic colon cancer, poor nutritional state, and poor response to current therapy.  Return to [facility] when medically stable         I spent 50 minutes in the professional and overall care of this patient.      José Miguel Mendez, DO

## 2024-11-10 NOTE — NURSING NOTE
Pt rang for pain medication.  Pt states pain in abd 6/10- medication given.  Pt then states he feels SOB and is having chest pain.  VS 87/49 MAP 62 Oxygen 100% RA R 18 HR 93.  Pt states chest pain is on L side and non radiating and it does get slightly worse with deep breath. Rates 4/10 on pain scale. Respirations unlabored. RT called for EKG.  Dr. Hassan notified.      0200: Pt sleeping comfortably in bed.   0205: Pt rang to have side table moved closer to bedside.  No problems voiced at this time.

## 2024-11-11 LAB
ANION GAP SERPL CALC-SCNC: <7 MMOL/L (ref 10–20)
ATRIAL RATE: 97 BPM
BASOPHILS # BLD AUTO: 0.01 X10*3/UL (ref 0–0.1)
BASOPHILS NFR BLD AUTO: 0.1 %
BUN SERPL-MCNC: 10 MG/DL (ref 6–23)
CALCIUM SERPL-MCNC: 5.7 MG/DL (ref 8.6–10.3)
CHLORIDE SERPL-SCNC: 104 MMOL/L (ref 98–107)
CO2 SERPL-SCNC: 23 MMOL/L (ref 21–32)
CREAT SERPL-MCNC: 0.22 MG/DL (ref 0.5–1.3)
EGFRCR SERPLBLD CKD-EPI 2021: >90 ML/MIN/1.73M*2
EOSINOPHIL # BLD AUTO: 0.01 X10*3/UL (ref 0–0.7)
EOSINOPHIL NFR BLD AUTO: 0.1 %
ERYTHROCYTE [DISTWIDTH] IN BLOOD BY AUTOMATED COUNT: 15.4 % (ref 11.5–14.5)
GLUCOSE SERPL-MCNC: 99 MG/DL (ref 74–99)
HCT VFR BLD AUTO: 21.2 % (ref 41–52)
HGB BLD-MCNC: 7.2 G/DL (ref 13.5–17.5)
IMM GRANULOCYTES # BLD AUTO: 0.04 X10*3/UL (ref 0–0.7)
IMM GRANULOCYTES NFR BLD AUTO: 0.5 % (ref 0–0.9)
LYMPHOCYTES # BLD AUTO: 2.29 X10*3/UL (ref 1.2–4.8)
LYMPHOCYTES NFR BLD AUTO: 27.4 %
MAGNESIUM SERPL-MCNC: 1.53 MG/DL (ref 1.6–2.4)
MCH RBC QN AUTO: 29.9 PG (ref 26–34)
MCHC RBC AUTO-ENTMCNC: 34 G/DL (ref 32–36)
MCV RBC AUTO: 88 FL (ref 80–100)
MONOCYTES # BLD AUTO: 0.18 X10*3/UL (ref 0.1–1)
MONOCYTES NFR BLD AUTO: 2.2 %
NEUTROPHILS # BLD AUTO: 5.84 X10*3/UL (ref 1.2–7.7)
NEUTROPHILS NFR BLD AUTO: 69.7 %
NRBC BLD-RTO: 0 /100 WBCS (ref 0–0)
P AXIS: 59 DEGREES
P OFFSET: 195 MS
P ONSET: 145 MS
PHOSPHATE SERPL-MCNC: 2.4 MG/DL (ref 2.5–4.9)
PLATELET # BLD AUTO: 112 X10*3/UL (ref 150–450)
POTASSIUM SERPL-SCNC: 4.1 MMOL/L (ref 3.5–5.3)
PR INTERVAL: 146 MS
Q ONSET: 218 MS
QRS COUNT: 16 BEATS
QRS DURATION: 96 MS
QT INTERVAL: 378 MS
QTC CALCULATION(BAZETT): 480 MS
QTC FREDERICIA: 443 MS
R AXIS: 58 DEGREES
RBC # BLD AUTO: 2.41 X10*6/UL (ref 4.5–5.9)
SODIUM SERPL-SCNC: 128 MMOL/L (ref 136–145)
T AXIS: 21 DEGREES
T OFFSET: 407 MS
VENTRICULAR RATE: 97 BPM
WBC # BLD AUTO: 8.4 X10*3/UL (ref 4.4–11.3)

## 2024-11-11 PROCEDURE — 84100 ASSAY OF PHOSPHORUS: CPT | Performed by: STUDENT IN AN ORGANIZED HEALTH CARE EDUCATION/TRAINING PROGRAM

## 2024-11-11 PROCEDURE — 80048 BASIC METABOLIC PNL TOTAL CA: CPT | Performed by: NURSE PRACTITIONER

## 2024-11-11 PROCEDURE — 85025 COMPLETE CBC W/AUTO DIFF WBC: CPT | Performed by: STUDENT IN AN ORGANIZED HEALTH CARE EDUCATION/TRAINING PROGRAM

## 2024-11-11 PROCEDURE — 2500000004 HC RX 250 GENERAL PHARMACY W/ HCPCS (ALT 636 FOR OP/ED): Performed by: NURSE PRACTITIONER

## 2024-11-11 PROCEDURE — 99223 1ST HOSP IP/OBS HIGH 75: CPT | Performed by: INTERNAL MEDICINE

## 2024-11-11 PROCEDURE — 83735 ASSAY OF MAGNESIUM: CPT | Performed by: STUDENT IN AN ORGANIZED HEALTH CARE EDUCATION/TRAINING PROGRAM

## 2024-11-11 PROCEDURE — 1100000001 HC PRIVATE ROOM DAILY

## 2024-11-11 PROCEDURE — 99232 SBSQ HOSP IP/OBS MODERATE 35: CPT | Performed by: NURSE PRACTITIONER

## 2024-11-11 RX ORDER — LORAZEPAM 2 MG/ML
0.5 INJECTION INTRAMUSCULAR EVERY 4 HOURS PRN
Status: DISCONTINUED | OUTPATIENT
Start: 2024-11-11 | End: 2024-11-12 | Stop reason: HOSPADM

## 2024-11-11 RX ORDER — HYOSCYAMINE SULFATE 0.12 MG/1
0.12 TABLET, ORALLY DISINTEGRATING ORAL EVERY 4 HOURS PRN
Status: DISCONTINUED | OUTPATIENT
Start: 2024-11-11 | End: 2024-11-12 | Stop reason: HOSPADM

## 2024-11-11 RX ORDER — HYOSCYAMINE SULFATE 0.5 MG/ML
0.5 INJECTION, SOLUTION SUBCUTANEOUS EVERY 4 HOURS PRN
Status: DISCONTINUED | OUTPATIENT
Start: 2024-11-11 | End: 2024-11-11 | Stop reason: ALTCHOICE

## 2024-11-11 ASSESSMENT — PAIN SCALES - GENERAL
PAINLEVEL_OUTOF10: 2
PAINLEVEL_OUTOF10: 7
PAINLEVEL_OUTOF10: 4

## 2024-11-11 ASSESSMENT — PAIN DESCRIPTION - LOCATION: LOCATION: ABDOMEN

## 2024-11-11 ASSESSMENT — PAIN - FUNCTIONAL ASSESSMENT
PAIN_FUNCTIONAL_ASSESSMENT: 0-10
PAIN_FUNCTIONAL_ASSESSMENT: 0-10

## 2024-11-11 NOTE — ASSESSMENT & PLAN NOTE
Hypokalemia  Hypomagnesemia  Hypocalcemia  -  Potassium levels are normal at this time.  Sodium is 128, calcium 5.7 phosphorus 2.4, magnesium 1.53.  Continue following electrolyte abnormalities and replace as needed.       Leukocytosis  -UA negative chest x-ray negative for infiltrates  -Patient has been started on Levaquin to treat colitis.  White blood cell count is downtrending and has now normalized.       Diarrhea  -C. difficile negative.  Expanded panel was negative.  -Patient uses Lomotil at home for his chronic diarrhea.     Weakness debility  -PT OT consult patient already at [facility] for rehab     Invasive colon adenocarcinoma with mets to his liver  -Currently receiving immunotherapy  - CT revealed mildly prominent fluid-filled small bowel may represent ileus or developing obstruction.  Patient had originally been placed n.p.o. with general surgery consultation.  He has since been advanced to a regular diet.  -Palliative care has been consulted and following the patient.    Colitis  Continue Levaquin and treat for 7 days total.       Hypotension  -Likely related to the patient's-low protein state.  -Continue midodrine 10 mg every 8 hours.        Malnutrition Diagnosis Status: New  Malnutrition Diagnosis: Severe malnutrition related to starvation  As Evidenced by: >5% weight loss in 1 month, prolonger poor PO intake <75% for >1 month.  I agree with the dietitian's malnutrition diagnosis.  DVT prophylaxis Lovenox and SCDs  -Patient started on TPN   -Dietitian consulted and following  -Lipid panel added      Disposition continue replacing electrolytes.  Continue Levaquin for now.  Prognosis moving forward is poor considering his metastatic colon cancer, poor nutritional state, and poor response to current therapy.  Awaiting plan from palliative care.  Most likely will be discharged home with hospice care in the next 24 to 48 hours.

## 2024-11-11 NOTE — NURSING NOTE
Patient did not want to do IS at this time, or at 10pm. Family request to not bother patient to wake up and do incentive and let him rest. Per Dr. Brenner discontinue incentive Q4 at this time.     Alejandra Muñoz, RRT  7:23 PM

## 2024-11-11 NOTE — CARE PLAN
The patient's goals for the shift include      The clinical goals for the shift include rest    Over the shift, the patient did not make progress toward the following goals. Barriers to progression include . Recommendations to address these barriers include .

## 2024-11-11 NOTE — PROGRESS NOTES
"Occupational Therapy                 Therapy Communication Note    Patient Name: Yon Lawrence \"Vladimir\"  MRN: [id number]  Department: [facility]  Room: 310/310-A  Today's Date: 11/11/2024     Discipline: Occupational Therapy    Missed Visit Reason: Missed Visit Reason: Other (Comment) (pt will be discharging home with hospice services.  Will DC OT services at this time. Please notify department if discharge plan were to change.)    "

## 2024-11-11 NOTE — PROGRESS NOTES
Pt reviewed during Care Rounds today and he is nearing discharge.  Per Charge Nurse/Domenica, pt and family have opted for comfort care at home following a [facility] discussion with Dr. Hudson this morning.  SW met with pt and wife to discuss the above. Pt/wife educated to comfort care and various hospice agencies who service [facility]. Agency of choice is [facility] as long as they are in network with pt's commercial insurance, [employer].  Pt/family aware that hospice support staff will visit 2-3 times/week at home and the rest of the care will fall to family.  They are only requesting a bedside table at this time and would like to hold off on discharge until tomorrow/Tuesday or Wednesday.  A referral was made to [facility] who can accept. Pt/wife updated.  Care Transitions will continue to follow.       JESSICA Bruno

## 2024-11-11 NOTE — PROGRESS NOTES
"Yon Lawrence \"Vladimir\" is a 49 y.o. male on day 4 of admission presenting with Hypokalemia.      Subjective   Patient laying in bed awake oriented x 3 very frail and cachectic appearing.  We had a very long discussion regarding patient's goals.  He reports he did not realize his condition was terminal until one of our hospitalist let him know.  He tells me he and his wife have had a conversation and he wishes to go hospice.  He feels it is unfair to his family and to himself to go on the way he is.  He reports he was up to a wheelchair and could barely hold himself up.  He feels he has no good quality of life.  He will be meeting with inpatient palliative care doctor today.       Objective     Last Recorded Vitals  /70 (BP Location: Left arm, Patient Position: Lying)   Pulse 90   Temp 36.3 °C (97.3 °F) (Temporal)   Resp 18   Wt 69.9 kg (154 lb)   SpO2 100%   Intake/Output last 3 Shifts:    Intake/Output Summary (Last 24 hours) at 11/11/2024 1431  Last data filed at 11/11/2024 1222  Gross per 24 hour   Intake 2030.09 ml   Output 525 ml   Net 1505.09 ml       Admission Weight  Weight: 69.9 kg (154 lb) (11/05/24 1137)    Daily Weight  11/05/24 : 69.9 kg (154 lb)    Image Results  ECG 12 Lead  Sinus rhythm with Premature atrial complexes  Nonspecific T wave abnormality  Prolonged QT  Abnormal ECG  When compared with ECG of 05-NOV-2024 18:12,  Premature atrial complexes are now Present  Confirmed by Dinesh Rivera ([id number]) on 11/11/2024 12:46:08 PM      Physical Exam  General:     Chronically sick appearing.  Wasting of face  HENT:      Head: Normocephalic and atraumatic.      Mouth: Mucous membranes are moist.   Eyes:      Pupils are equal, round, and reactive to light.   Cardiovascular:      Normal rate and regular rhythm. Normal S1, S2.  No murmurs, clicks, gallops.   Pulmonary:      Clear to auscultation bilaterally.  No wheezing, rhonchi, or crackles heard.   Abdominal:       Bowel sounds are normal.      " Abdomen is soft, mild tenderness to palpation of lower quadrants without rebound tenderness, nondistended  Musculoskeletal:         Extremities: No edema present. No deformities with no abnormal range of motion     Neck supple.   Neurological:      Mental Status: He is alert and oriented X3     CN II-XII intact.  No focal neurologic deficits appreciated.  Relevant Results       Results for orders placed or performed during the hospital encounter of 11/05/24 (from the past 24 hours)   Basic Metabolic Panel   Result Value Ref Range    Glucose 99 74 - 99 mg/dL    Sodium 128 (L) 136 - 145 mmol/L    Potassium 4.1 3.5 - 5.3 mmol/L    Chloride 104 98 - 107 mmol/L    Bicarbonate 23 21 - 32 mmol/L    Anion Gap <7 (L) 10 - 20 mmol/L    Urea Nitrogen 10 6 - 23 mg/dL    Creatinine 0.22 (L) 0.50 - 1.30 mg/dL    eGFR >90 >60 mL/min/1.73m*2    Calcium 5.7 (L) 8.6 - 10.3 mg/dL   CBC and Auto Differential   Result Value Ref Range    WBC 8.4 4.4 - 11.3 x10*3/uL    nRBC 0.0 0.0 - 0.0 /100 WBCs    RBC 2.41 (L) 4.50 - 5.90 x10*6/uL    Hemoglobin 7.2 (L) 13.5 - 17.5 g/dL    Hematocrit 21.2 (L) 41.0 - 52.0 %    MCV 88 80 - 100 fL    MCH 29.9 26.0 - 34.0 pg    MCHC 34.0 32.0 - 36.0 g/dL    RDW 15.4 (H) 11.5 - 14.5 %    Platelets 112 (L) 150 - 450 x10*3/uL    Neutrophils % 69.7 40.0 - 80.0 %    Immature Granulocytes %, Automated 0.5 0.0 - 0.9 %    Lymphocytes % 27.4 13.0 - 44.0 %    Monocytes % 2.2 2.0 - 10.0 %    Eosinophils % 0.1 0.0 - 6.0 %    Basophils % 0.1 0.0 - 2.0 %    Neutrophils Absolute 5.84 1.20 - 7.70 x10*3/uL    Immature Granulocytes Absolute, Automated 0.04 0.00 - 0.70 x10*3/uL    Lymphocytes Absolute 2.29 1.20 - 4.80 x10*3/uL    Monocytes Absolute 0.18 0.10 - 1.00 x10*3/uL    Eosinophils Absolute 0.01 0.00 - 0.70 x10*3/uL    Basophils Absolute 0.01 0.00 - 0.10 x10*3/uL   Magnesium   Result Value Ref Range    Magnesium 1.53 (L) 1.60 - 2.40 mg/dL   Phosphorus   Result Value Ref Range    Phosphorus 2.4 (L) 2.5 - 4.9 mg/dL      *Note: Due to a large number of results and/or encounters for the requested time period, some results have not been displayed. A complete set of results can be found in Results Review.         Assessment/Plan        This patient has a central line   Reason for the central line remaining today? Parenteral nutrition    This patient has a urinary catheter   Reason for the urinary catheter remaining today? end-of-life care          Assessment & Plan  Hypokalemia    Anemia    Hypomagnesemia    Hypocalcemia    Leukocytosis    Electrolyte imbalance    Dehydration  Hypokalemia  Hypomagnesemia  Hypocalcemia  -  Potassium levels are normal at this time.  Sodium is 128, calcium 5.7 phosphorus 2.4, magnesium 1.53.  Continue following electrolyte abnormalities and replace as needed.       Leukocytosis  -UA negative chest x-ray negative for infiltrates  -Patient has been started on Levaquin to treat colitis.  White blood cell count is downtrending and has now normalized.       Diarrhea  -C. difficile negative.  Expanded panel was negative.  -Patient uses Lomotil at home for his chronic diarrhea.     Weakness debility  -PT OT consult patient already at [facility] for rehab     Invasive colon adenocarcinoma with mets to his liver  -Currently receiving immunotherapy  - CT revealed mildly prominent fluid-filled small bowel may represent ileus or developing obstruction.  Patient had originally been placed n.p.o. with general surgery consultation.  He has since been advanced to a regular diet.  -Palliative care has been consulted and following the patient.    Colitis  Continue Levaquin and treat for 7 days total.       Hypotension  -Likely related to the patient's-low protein state.  -Continue midodrine 10 mg every 8 hours.        Malnutrition Diagnosis Status: New  Malnutrition Diagnosis: Severe malnutrition related to starvation  As Evidenced by: >5% weight loss in 1 month, prolonger poor PO intake <75% for >1 month.  I agree with the  dietitian's malnutrition diagnosis.  DVT prophylaxis Lovenox and SCDs  -Patient started on TPN   -Dietitian consulted and following  -Lipid panel added      Disposition continue replacing electrolytes.  Continue Levaquin for now.  Prognosis moving forward is poor considering his metastatic colon cancer, poor nutritional state, and poor response to current therapy.  Awaiting plan from palliative care.  Most likely will be discharged home with hospice care in the next 24 to 48 hours.                Gayle Mcgovern, APRN-CNP

## 2024-11-11 NOTE — PROGRESS NOTES
"Physical Therapy                 Therapy Communication Note    Patient Name: Yon Lawrence \"Vladimir\"  MRN: [id number]  Department: [facility]  Room: 310/310-A  Today's Date: 11/11/2024     Discipline: Physical Therapy    Missed Visit Reason: Missed Visit Reason: Other (Comment) (pt will be discharging home with hospice services. Will DC PT services at this time. Please notify department if discharge plan were to change.)      "

## 2024-11-11 NOTE — CONSULTS
Reason For Consult  Advanced Care Planning     History Of Present Illness   This is an unfortunate 49-year-old male with a past medical history of BPH, left peroneal DVT, severe protein calorie malnutrition, intestinal fistula, lower extremities edema, and metastatic colon adenocarcinoma to the lungs and the liver diagnosed end of April 2024 where it was associated at that time with fistulization with an adjacent loop of jejunum s/p pembrolizumab therapy that was complicated by diarrhea for which she had to be placed on oral prednisone for 1 week followed by a taper and temporarily holding the treatment that was resumed back on September 2024.  Disease course has been complicated by anemia, hypoalbuminemia, severe protein calorie malnutrition, and electrolyte disturbances.  Patient was then hospitalized from 10/7/2024 till 10/17/2024 for bilateral pleural effusions s/p bilateral thoracentesis of which pleural fluid came back transudative.  Patient on the other hand had developed edema in his feet and his legs.  After that, patient was discharged to skilled nursing facility for rehab.  He however was having an overall gradual decline in his nutritional and functional status despite optimization of all medical therapy.  He presented back to the emergency room on 11/5/2024 for complaints of weakness and dehydration.  Blood workup showed hypocalcemia, hypokalemia, significant hypoalbuminemia, hypomagnesemia, and leukocytosis.  Imaging showed colitis.  He was admitted to the medical service.  Electrolyte correction was initiated.  General surgery was consulted.  TPN was initiated.  But yet despite optimizing all medical therapy, patient's overall clinical condition remained poor.  Oral intake remains very poor and he was requiring significant assistance with his ADLs.  Given the guarded prognosis, palliative care consulted for advanced care planning.    Upon encounter, patient is awake but looks very ill and tired.   Spouse at bedside.  Patient currently denies having any pain.  Appetite is poor.  No nausea.  He reports feeling very weak.  Increased daytime sleepiness.  No anxiety no depression.  Patient lives at home with his spouse.     Review of Systems  10 systems were reviewed and were negative except for those noted in the history of present illness.     Past Medical History  He has a past medical history of Cancer (Multi).  Pertinent medical history also documented in my above narrative    Surgical History  He has a past surgical history that includes Other surgical history (06/07/2022); Colonoscopy (05/06/2024); and Tunneled venous port placement (05/31/2024).  Pertinent surgical history also documented in my above narrative     Social History  He reports that he has never smoked. He has never used smokeless tobacco. He reports that he does not drink alcohol and does not use drugs.    Family History  Family History   Problem Relation Name Age of Onset    Diabetes type II Mother      Hypertension Father      Heart disease Father          CABG    Macular degeneration Father      Ulcers Father      Breast cancer Mother's Sister  50 - 59    Cancer Father's Sister          rare cancer unknown type    Cancer Paternal Grandmother      Stroke Paternal Grandfather      Other (MCAD) Daughter      No Known Problems Son          Allergies  Patient has no known allergies.    Home Medications  Medications Prior to Admission   Medication Sig Dispense Refill Last Dose/Taking    acetaminophen (Tylenol) 325 mg tablet Take 2 tablets (650 mg) by mouth every 4 hours if needed for mild pain (1 - 3).   Past Week    diphenoxylate-atropine (Lomotil) 2.5-0.025 mg tablet Take 1 tablet by mouth 3 times a day as needed for diarrhea.   11/5/2024 Morning    enoxaparin (Lovenox) 80 mg/0.8 mL syringe Inject 0.8 mL (80 mg) under the skin every 12 hours. 60 each 3 11/5/2024 Morning    midodrine (Proamatine) 5 mg tablet Take 1 tablet (5 mg) by mouth every  8 hours. 90 tablet 0 11/5/2024 Morning    ondansetron ODT (Zofran-ODT) 4 mg disintegrating tablet Take 1 tablet (4 mg) by mouth every 6 hours if needed for nausea or vomiting.   Past Week    loperamide (Imodium) 2 mg capsule Take 2 capsules with first episode of loose, watery stool, then 1 capsule with each episode of loose stool thereafter. Do not exceed 8 capsules in 24 hours. (Patient not taking: Reported on 11/5/2024) 60 capsule 2 Not Taking        Current Hospital Medications    Current Facility-Administered Medications:     acetaminophen (Tylenol) tablet 650 mg, 650 mg, oral, q4h PRN, RASHIDA Negro    Adult Clinimix Parenteral Nutrition Continuous, 20 mL/hr, intravenous, Daily PN, RASHIDA Richardson, Last Rate: 20 mL/hr at 11/10/24 2100, New Bag at 11/10/24 2100    calcium carbonate (Tums) chewable tablet 500 mg, 500 mg, oral, TID, José Miguel Mendez, , 500 mg at 11/10/24 0822    diphenoxylate-atropine (Lomotil) 2.5-0.025 mg per tablet 1 tablet, 1 tablet, oral, 4x daily PRN, RASHIDA Mcgovern, 1 tablet at 11/08/24 0559    enoxaparin (Lovenox) syringe 70 mg, 70 mg, subcutaneous, q12h, RASHIDA Negro, 70 mg at 11/10/24 2100    fat emulsion-plant based (Intralipid) 20 % infusion 50 g, 250 mL, intravenous, Once per day on Monday Wednesday Friday, RASHIDA Mcgovern, Stopped at 11/09/24 1036    iohexol (OMNIPaque) 12 mg iodine/mL oral contrast 500 mL, 500 mL, oral, Once in imaging, RASHIDA Mcgovern    levoFLOXacin (Levaquin) 500 mg in dextrose 5%  mL, 500 mg, intravenous, q24h, RASHIDA Mcgovern, Stopped at 11/10/24 1556    midodrine (Proamatine) tablet 10 mg, 10 mg, oral, q8h CATRACHITA, Gayle Mcgovern, APRN-CNP, 10 mg at 11/09/24 2116    midodrine (Proamatine) tablet 10 mg, 10 mg, oral, Once, Gricelda Ferguson, FARIHA-CNP    morphine injection 1 mg, 1 mg, intravenous, q4h PRN, Gayle Mcgovern, FARIHA-CNP, 1 mg at  "11/11/24 0423    oral hydration solution 100 mL, 100 mL, oral, q1h while awake, Gricelda CRUZ Marty, APRN-CNP, 100 mL at 11/11/24 1009    prochlorperazine (Compazine) tablet 10 mg, 10 mg, oral, q6h PRN, 10 mg at 11/10/24 0822 **OR** prochlorperazine (Compazine) injection 10 mg, 10 mg, intravenous, q6h PRN, 10 mg at 11/07/24 2307 **OR** prochlorperazine (Compazine) suppository 25 mg, 25 mg, rectal, q12h PRN, Gayle Mcgovern, APRN-CNP       Physical Exam  Physical Exam  Constitutional:       General: He is not in acute distress.     Appearance: He is ill-appearing.      Comments: Awake alert and oriented x3.  Very frail looking and cachectic   HENT:      Mouth/Throat:      Pharynx: Oropharynx is clear.   Eyes:      Pupils: Pupils are equal, round, and reactive to light.   Cardiovascular:      Rate and Rhythm: Normal rate and regular rhythm.      Heart sounds: Normal heart sounds.   Pulmonary:      Effort: No respiratory distress.      Breath sounds: Normal breath sounds. No wheezing or rhonchi.   Abdominal:      General: Abdomen is flat. Bowel sounds are normal. There is no distension.      Palpations: Abdomen is soft.      Tenderness: There is no abdominal tenderness.   Musculoskeletal:      Comments: +2 pitting edema in the feet and lower legs.  Significant muscle wasting.   Skin:     General: Skin is warm and dry.      Coloration: Skin is pale.   Neurological:      General: No focal deficit present.   Psychiatric:         Mood and Affect: Mood normal.         Behavior: Behavior normal.         Thought Content: Thought content normal.         Judgment: Judgment normal.           Last Recorded Vitals  Blood pressure 114/70, pulse 90, temperature 36.3 °C (97.3 °F), temperature source Temporal, resp. rate 18, height 1.854 m (6' 1\"), weight 69.9 kg (154 lb), SpO2 100%.    Relevant Results  Results for orders placed or performed during the hospital encounter of 11/05/24 (from the past 24 hours)   Basic Metabolic Panel "   Result Value Ref Range    Glucose 99 74 - 99 mg/dL    Sodium 128 (L) 136 - 145 mmol/L    Potassium 4.1 3.5 - 5.3 mmol/L    Chloride 104 98 - 107 mmol/L    Bicarbonate 23 21 - 32 mmol/L    Anion Gap <7 (L) 10 - 20 mmol/L    Urea Nitrogen 10 6 - 23 mg/dL    Creatinine 0.22 (L) 0.50 - 1.30 mg/dL    eGFR >90 >60 mL/min/1.73m*2    Calcium 5.7 (L) 8.6 - 10.3 mg/dL   CBC and Auto Differential   Result Value Ref Range    WBC 8.4 4.4 - 11.3 x10*3/uL    nRBC 0.0 0.0 - 0.0 /100 WBCs    RBC 2.41 (L) 4.50 - 5.90 x10*6/uL    Hemoglobin 7.2 (L) 13.5 - 17.5 g/dL    Hematocrit 21.2 (L) 41.0 - 52.0 %    MCV 88 80 - 100 fL    MCH 29.9 26.0 - 34.0 pg    MCHC 34.0 32.0 - 36.0 g/dL    RDW 15.4 (H) 11.5 - 14.5 %    Platelets 112 (L) 150 - 450 x10*3/uL    Neutrophils % 69.7 40.0 - 80.0 %    Immature Granulocytes %, Automated 0.5 0.0 - 0.9 %    Lymphocytes % 27.4 13.0 - 44.0 %    Monocytes % 2.2 2.0 - 10.0 %    Eosinophils % 0.1 0.0 - 6.0 %    Basophils % 0.1 0.0 - 2.0 %    Neutrophils Absolute 5.84 1.20 - 7.70 x10*3/uL    Immature Granulocytes Absolute, Automated 0.04 0.00 - 0.70 x10*3/uL    Lymphocytes Absolute 2.29 1.20 - 4.80 x10*3/uL    Monocytes Absolute 0.18 0.10 - 1.00 x10*3/uL    Eosinophils Absolute 0.01 0.00 - 0.70 x10*3/uL    Basophils Absolute 0.01 0.00 - 0.10 x10*3/uL   Magnesium   Result Value Ref Range    Magnesium 1.53 (L) 1.60 - 2.40 mg/dL   Phosphorus   Result Value Ref Range    Phosphorus 2.4 (L) 2.5 - 4.9 mg/dL        Imaging  ECG 12 Lead    Result Date: 11/10/2024  Sinus rhythm with Premature atrial complexes Nonspecific T wave abnormality Prolonged QT Abnormal ECG When compared with ECG of 05-NOV-2024 18:12, Premature atrial complexes are now Present         Assessment/Plan     This is an unfortunate 49-year-old male with a past medical history of BPH, left peroneal DVT, severe protein calorie malnutrition, intestinal fistula, lower extremities edema, and metastatic colon adenocarcinoma to the lungs and the liver  diagnosed end of April 2024 where it was associated at that time with fistulization with an adjacent loop of jejunum s/p pembrolizumab therapy that was complicated by diarrhea for which she had to be placed on oral prednisone for 1 week followed by a taper and temporarily holding the treatment that was resumed back on September 2024.  Disease course has been complicated by anemia, hypoalbuminemia, severe protein calorie malnutrition, and electrolyte disturbances.  Patient was then hospitalized from 10/7/2024 till 10/17/2024 for bilateral pleural effusions s/p bilateral thoracentesis of which pleural fluid came back transudative.  Patient on the other hand had developed edema in his feet and his legs.  After that, patient was discharged to skilled nursing facility for rehab.  He however was having an overall gradual decline in his nutritional and functional status despite optimization of all medical therapy.  He presented back to the emergency room on 11/5/2024 for complaints of weakness and dehydration.  Blood workup showed hypocalcemia, hypokalemia, significant hypoalbuminemia, hypomagnesemia, and leukocytosis.  Imaging showed colitis.  He was admitted to the medical service.  Electrolyte correction was initiated.  General surgery was consulted.  TPN was initiated.  But yet despite optimizing all medical therapy, patient's overall clinical condition remained poor.  Oral intake remains very poor and he was requiring significant assistance with his ADLs.  Given the guarded prognosis, palliative care consulted for advanced care planning.    I had a very lengthy discussion with the patient in the presence of his spouse.  I started the meeting by introducing the practice of palliative care and the services it provides.  I then reviewed at length with patient/family the clinical diagnosis/medical problems that the patient presented with and the treatments provided so far. We discussed the implications of the current  circumstances and option plans going forward.  We then reviewed at length the nature of the patient's primary disease--metastatic colon cancer, its progression, and potential complications in the long run.  I then tried to my best answering all questions patient/family had and attending to their concerns via reflective listening.  Support and empathy was provided throughout the encounter.  I reviewed in details the current supportive care we have provided here at the hospital.  I also told patient/spouse that I had tried to reach out to his oncologist but turned out that she is currently off service.  Spouse told me that she did not send a message to his oncologist herself over the weekend and waiting to hear back from her.  I then after that discussed with the patient and the spouse the options we currently have.  I told them that I myself cannot decide on whether he still qualifies for any further cancer directed therapy or not and whether he is responding to the treatment or not.  I will defer such decisions to his oncologist.  My clinical input is based on my assessment of his current clinical condition.  Patient presented him with the following options;  1.  Continue with the current optimization of medical therapy and consider transferring the patient to tertiary care center primarily a cancer center at [facility] in [place] where there he could be evaluated also by oncology and see if he qualifies for any other treatment options.  2.  Decide not to pursue any further escalation of care and focus on comfort.  I introduced the philosophy of hospice care and the services it provides.  I explained how hospice attends to patient's quality of life and dignity while the disease takes its natural course.  I emphasized how hospice focuses on decreasing the burden of the disease and providing comfort not only for the patient but also for the family and caregivers.    Patient and spouse demonstrated good  understanding of all the information I provided.  They said that they are leaning more to comfort measures and patient himself does realize that he is not getting any better at all especially after the events that happened to him the past several months.  His goal is to go back home and spend quality time with his children.  I demonstrated understanding.  I told him that we are here to support his decision.    I spent a total of 75 minutes on the date of the service which included: preparing to see the patient,  obtaining and/or reviewing separately obtained history, face-to-face patient care, performing a medically appropriate examination, counseling and educating the patient/family/caregiver, ordering medications/tests, independently interpreting results (not separately reported), communicating results to the patient/family caregiver, and completing clinical documentation.         (This note was generated with voice recognition software and may contain errors including spelling, grammar, syntax and misrecognition of what was dictated, that are not fully corrected)     Tristan Marti MD

## 2024-11-11 NOTE — CONSULTS
"Nutrition Follow Up Assessment:   Nutrition Assessment         11/11 Pt continues with TPN, Clinimix 2L 5/20 providing 1,760 kcals . (5% amino acids, 20% dextrose) along with lipids 3x/week with a daily average providing an additional 214 kcals. Daily lipids + goal of 2L clinimix providing 1,974 kcals and 100g of protein, along with easy to chew PO diet. Pt denies N/V/D a this time. Reported getting down a few bites of applesauce. Reports some abdominal pain. Pt continues to receive ensure clear 3x daily with meals. Not drinking much of them. Pt meeting with palliative care MD this week.     Nutrition History:  Energy Intake: Poor < 50 %  Food and Nutrient History: Small PO intake on top of TPN.  Food Allergies/Intolerances:  None  GI Symptoms: Abdominal pain  Oral Problems: None       Anthropometrics:  Height: 185.4 cm (6' 1\")   Weight: 69.9 kg (154 lb)   BMI (Calculated): 20.32    Nutrition Significant Labs:  CBC Trend:   Results from last 7 days   Lab Units 11/11/24  0439 11/10/24  0426 11/09/24  0502 11/08/24  0550   WBC AUTO x10*3/uL 8.4 10.7 13.7* 17.0*   RBC AUTO x10*6/uL 2.41* 2.57* 2.54* 3.00*   HEMOGLOBIN g/dL 7.2* 7.7* 7.9* 8.9*   HEMATOCRIT % 21.2* 23.0* 21.8* 25.5*   MCV fL 88 90 86 85   PLATELETS AUTO x10*3/uL 112* 156 207 298    , BMP Trend:   Results from last 7 days   Lab Units 11/11/24  0439 11/10/24  0426 11/09/24  1210 11/09/24  0502   GLUCOSE mg/dL 99 110* 162* 190*   CALCIUM mg/dL 5.7* 5.9* 5.7* 5.0*   SODIUM mmol/L 128* 129* 131* 130*   POTASSIUM mmol/L 4.1 3.6 3.1* 3.3*   CO2 mmol/L 23 22 21 21   CHLORIDE mmol/L 104 105 103 105   BUN mg/dL 10 13 14 14   CREATININE mg/dL 0.22* 0.25* 0.36* 0.30*     Nutrition Specific Medications:  calcium carbonate, 500 mg, oral, TID  enoxaparin, 70 mg, subcutaneous, q12h  fat emulsion-plant based, 250 mL, intravenous, Once per day on Monday Wednesday Friday  iohexol, 500 mL, oral, Once in imaging  levoFLOXacin, 500 mg, intravenous, q24h  midodrine, 10 mg, " oral, q8h CATRACHITA  midodrine, 10 mg, oral, Once  oral hydration, 100 mL, oral, q1h while awake         I/O:   Last BM Date: 11/08/24; Stool Appearance: Loose, Watery (11/09/24 0100)    Dietary Orders (From admission, onward)       Start     Ordered    11/09/24 1544  Adult diet Regular; Easy to chew  Diet effective now        Question Answer Comment   Diet type Regular    Texture Easy to chew        11/09/24 1544    11/09/24 1533  Oral nutritional supplements  Until discontinued        Question Answer Comment   Deliver with All meals    Select supplement: Ensure Clear        11/09/24 1532    11/05/24 2232  May Participate in Room Service  ( ROOM SERVICE MAY PARTICIPATE)  Once        Question:  .  Answer:  Yes    11/05/24 2231                     Estimated Needs:   Total Energy Estimated Needs (kCal): 2400 kCal  Method for Estimating Needs: 30kcals/kg IBW  Total Protein Estimated Needs (g): 80 g  Method for Estimating Needs: 1-1.2g/kg IBW  Total Fluid Estimated Needs (mL): 2400 mL  Method for Estimating Needs: 1ml/kcal        Nutrition Diagnosis   Malnutrition Diagnosis  Patient has Malnutrition Diagnosis: Yes  Diagnosis Status: Ongoing  Malnutrition Diagnosis: Severe malnutrition related to starvation  As Evidenced by: >5% weight loss in 1 month, prolonger poor PO intake <75% for >1 month.      Nutrition Interventions/Recommendations         Nutrition Prescription:  Individualized Nutrition Prescription Provided for : Individual nutrition prescription of 2400 kcals and 80g of protein to be provided with diet order.        Nutrition Interventions:   Interventions: Parenteral nutrition/ IV fluids  Parenteral Nutrition/IV Fluids: Modify volume of parenteral nutrition  Formula: TPN standard - AA 5%, dextrose 20%  Weight Frequency: Daily  Labs: Triglycerides:  now and each week  Medical Food Supplement: Commercial beverage  Goal: ensure clear 3x daily providing an additional 240 kcals and 8g of protein each.    Nutrition  Monitoring and Evaluation   Food/Nutrient Related History Monitoring  Monitoring and Evaluation Plan: Enteral and parenteral nutrition intake  Energy Intake: Estimated energy intake  Criteria: Continue with PO intakes on top of TPN, goal of >33% of all meals.  Enteral and Parenteral Nutrition Intake: Parenteral nutrition formula/solution  Criteria: Clinimix 2L goal, providing a total of 1,974 kcals and 100g of protein.  Additional Plans: Ensure clear 3x daily providing an additional 240 kcals and 8g of protein each.    Body Composition/Growth/Weight History  Monitoring and Evaluation Plan: Weight  Weight: Weight change  Criteria: Continue to monitor wt status and wt changes.      Time Spent (min): 45 minutes

## 2024-11-11 NOTE — CARE PLAN
The patient's goals for the shift include      The clinical goals for the shift include rest     Problem: Nutrition  Goal: Oral intake greater than 50%  Outcome: Not Progressing  Goal: Consume prescribed supplement  Outcome: Not Progressing  Goal: Adequate PO fluid intake  Outcome: Not Progressing  Goal: Lab values WNL  Outcome: Not Progressing  Goal: Electrolytes WNL  Outcome: Not Progressing  Goal: Promote healing  Outcome: Not Progressing  Goal: Maintain stable weight  Outcome: Not Progressing  Goal: Gradual weight gain  Outcome: Not Progressing     Problem: Pain  Goal: Takes deep breaths with improved pain control throughout the shift  Outcome: Not Progressing  Goal: Turns in bed with improved pain control throughout the shift  Outcome: Not Progressing  Goal: Walks with improved pain control throughout the shift  Outcome: Not Progressing  Goal: Performs ADL's with improved pain control throughout shift  Outcome: Not Progressing  Goal: Participates in PT with improved pain control throughout the shift  Outcome: Not Progressing  Goal: Free from opioid side effects throughout the shift  Outcome: Not Progressing  Goal: Free from acute confusion related to pain meds throughout the shift  Outcome: Not Progressing     Problem: Skin  Goal: Promote/optimize nutrition  Outcome: Not Progressing     Problem: Chronic Conditions and Co-morbidities  Goal: Patient's chronic conditions and co-morbidity symptoms are monitored and maintained or improved  Outcome: Not Progressing     Problem: Nutritional Status  Goal: Patient will be in control of what, how much, and when to eat  Outcome: Not Progressing  Goal: Mucous membranes will remain intact, moist, and clean of excessive secretions  Outcome: Not Progressing  Goal: Associated symptoms of anorexia will be managed  Outcome: Not Progressing  Goal: Family/Caregiver will verbalize understanding of anorexia in the dying patient and demonstrate comfort with the plan of care  Outcome:  Not Progressing

## 2024-11-12 ENCOUNTER — TELEPHONE (OUTPATIENT)
Dept: HEMATOLOGY/ONCOLOGY | Facility: HOSPITAL | Age: 49
End: 2024-11-12
Payer: COMMERCIAL

## 2024-11-12 VITALS
RESPIRATION RATE: 16 BRPM | DIASTOLIC BLOOD PRESSURE: 75 MMHG | TEMPERATURE: 97.9 F | OXYGEN SATURATION: 99 % | BODY MASS INDEX: 20.41 KG/M2 | HEART RATE: 108 BPM | HEIGHT: 73 IN | WEIGHT: 154 LBS | SYSTOLIC BLOOD PRESSURE: 97 MMHG

## 2024-11-12 DIAGNOSIS — C18.4 ADENOCARCINOMA OF TRANSVERSE COLON (MULTI): Primary | ICD-10-CM

## 2024-11-12 LAB
ANION GAP SERPL CALC-SCNC: <7 MMOL/L (ref 10–20)
BUN SERPL-MCNC: 10 MG/DL (ref 6–23)
CALCIUM SERPL-MCNC: 5.5 MG/DL (ref 8.6–10.3)
CHLORIDE SERPL-SCNC: 105 MMOL/L (ref 98–107)
CO2 SERPL-SCNC: 24 MMOL/L (ref 21–32)
CREAT SERPL-MCNC: 0.21 MG/DL (ref 0.5–1.3)
EGFRCR SERPLBLD CKD-EPI 2021: >90 ML/MIN/1.73M*2
GLUCOSE SERPL-MCNC: 72 MG/DL (ref 74–99)
MAGNESIUM SERPL-MCNC: 1.48 MG/DL (ref 1.6–2.4)
PHOSPHATE SERPL-MCNC: 2.2 MG/DL (ref 2.5–4.9)
POTASSIUM SERPL-SCNC: 4.3 MMOL/L (ref 3.5–5.3)
SODIUM SERPL-SCNC: 131 MMOL/L (ref 136–145)

## 2024-11-12 PROCEDURE — 36415 COLL VENOUS BLD VENIPUNCTURE: CPT | Performed by: NURSE PRACTITIONER

## 2024-11-12 PROCEDURE — 84100 ASSAY OF PHOSPHORUS: CPT | Performed by: NURSE PRACTITIONER

## 2024-11-12 PROCEDURE — 99232 SBSQ HOSP IP/OBS MODERATE 35: CPT | Performed by: INTERNAL MEDICINE

## 2024-11-12 PROCEDURE — 99239 HOSP IP/OBS DSCHRG MGMT >30: CPT | Performed by: NURSE PRACTITIONER

## 2024-11-12 PROCEDURE — 80048 BASIC METABOLIC PNL TOTAL CA: CPT | Performed by: NURSE PRACTITIONER

## 2024-11-12 PROCEDURE — 83735 ASSAY OF MAGNESIUM: CPT | Performed by: NURSE PRACTITIONER

## 2024-11-12 PROCEDURE — 2500000004 HC RX 250 GENERAL PHARMACY W/ HCPCS (ALT 636 FOR OP/ED): Performed by: NURSE PRACTITIONER

## 2024-11-12 RX ORDER — PREDNISONE 20 MG/1
60 TABLET ORAL DAILY
Qty: 90 TABLET | Refills: 1 | Status: SHIPPED | OUTPATIENT
Start: 2024-11-12

## 2024-11-12 ASSESSMENT — COGNITIVE AND FUNCTIONAL STATUS - GENERAL
TOILETING: A LOT
EATING MEALS: A LOT
DRESSING REGULAR UPPER BODY CLOTHING: A LOT
CLIMB 3 TO 5 STEPS WITH RAILING: A LOT
HELP NEEDED FOR BATHING: A LOT
DRESSING REGULAR LOWER BODY CLOTHING: A LOT
TURNING FROM BACK TO SIDE WHILE IN FLAT BAD: A LOT
DAILY ACTIVITIY SCORE: 12
WALKING IN HOSPITAL ROOM: A LOT
MOVING TO AND FROM BED TO CHAIR: A LOT
MOVING FROM LYING ON BACK TO SITTING ON SIDE OF FLAT BED WITH BEDRAILS: A LOT
PERSONAL GROOMING: A LOT
MOBILITY SCORE: 12
STANDING UP FROM CHAIR USING ARMS: A LOT

## 2024-11-12 ASSESSMENT — PAIN SCALES - GENERAL
PAINLEVEL_OUTOF10: 4
PAINLEVEL_OUTOF10: 7
PAINLEVEL_OUTOF10: 4
PAINLEVEL_OUTOF10: 4

## 2024-11-12 ASSESSMENT — PAIN DESCRIPTION - ORIENTATION: ORIENTATION: RIGHT;LEFT

## 2024-11-12 ASSESSMENT — PAIN - FUNCTIONAL ASSESSMENT
PAIN_FUNCTIONAL_ASSESSMENT: 0-10
PAIN_FUNCTIONAL_ASSESSMENT: 0-10
PAIN_FUNCTIONAL_ASSESSMENT: CPOT (CRITICAL CARE PAIN OBSERVATION TOOL)

## 2024-11-12 ASSESSMENT — PAIN DESCRIPTION - LOCATION
LOCATION: ABDOMEN
LOCATION: ABDOMEN

## 2024-11-12 NOTE — TELEPHONE ENCOUNTER
Patient's mother calling requesting patient's records be faxed to Dr. Wicho Torres at [place], [place] Fax # [phone]  Transferred mother to Medical Records for further assistance on how to get records transferred and release information

## 2024-11-12 NOTE — PROGRESS NOTES
"Yon Lawrence \"Vladimir\" is a 49 y.o. male on day 5 of admission presenting with Hypokalemia.    Subjective   No acute events overnight.  Patient still reports feeling very weak and tired.  Poor appetite.  No nausea.  No pain.    Objective   Last Recorded Vitals  Blood pressure 97/75, pulse 108, temperature 36.6 °C (97.9 °F), temperature source Temporal, resp. rate 16, height 1.854 m (6' 1\"), weight 69.9 kg (154 lb), SpO2 99%.  Intake/Output last 3 Shifts:  I/O last 3 completed shifts:  In: 801 (11.5 mL/kg) [P.O.:500]  Out: 700 (10 mL/kg) [Urine:700 (0.3 mL/kg/hr)]  Weight: 69.9 kg     Physical Exam      Current Facility-Administered Medications:     acetaminophen (Tylenol) tablet 650 mg, 650 mg, oral, q4h PRN, FARIHA Negro-CNP    Adult Clinimix Parenteral Nutrition Continuous, 20 mL/hr, intravenous, Daily PN, FARIHA Richardson-CNP, Last Rate: 20 mL/hr at 11/10/24 2100, New Bag at 11/10/24 2100    calcium carbonate (Tums) chewable tablet 500 mg, 500 mg, oral, TID, José Miguel Mendez DO, 500 mg at 11/10/24 0822    diphenoxylate-atropine (Lomotil) 2.5-0.025 mg per tablet 1 tablet, 1 tablet, oral, 4x daily PRN, FARIHA Mcgovern-CNP, 1 tablet at 11/08/24 0559    enoxaparin (Lovenox) syringe 70 mg, 70 mg, subcutaneous, q12h, FARIHA Negro-CNP, 70 mg at 11/10/24 2100    fat emulsion-plant based (Intralipid) 20 % infusion 50 g, 250 mL, intravenous, Once per day on Monday Wednesday Friday, RASHIDA Mcgovern, Stopped at 11/09/24 1036    hyoscyamine (Anaspaz) disintegrating tablet 0.125 mg, 0.125 mg, oral, q4h PRN, RASHIDA Mcgovern    iohexol (OMNIPaque) 12 mg iodine/mL oral contrast 500 mL, 500 mL, oral, Once in imaging, RASHIDA Mcgovern    levoFLOXacin (Levaquin) 500 mg in dextrose 5%  mL, 500 mg, intravenous, q24h, RASHIDA Mcgovern, Stopped at 11/10/24 1556    LORazepam (Ativan) injection 0.5 mg, 0.5 mg, intravenous, q4h PRN, " Gayle Mcgovern, APRN-CNP    midodrine (Proamatine) tablet 10 mg, 10 mg, oral, q8h CATRACHITA, Gayle Mcgovern, APRN-CNP, 10 mg at 11/09/24 2116    midodrine (Proamatine) tablet 10 mg, 10 mg, oral, Once, Gricelda Ferguson APRN-CNP    morphine injection 1 mg, 1 mg, intravenous, q4h PRN, Gayle Mcgovern, APRN-CNP, 1 mg at 11/12/24 0235    oral hydration solution 100 mL, 100 mL, oral, q1h while awake, Gricelda Ferguson APRN-CNP, 100 mL at 11/11/24 1222    prochlorperazine (Compazine) tablet 10 mg, 10 mg, oral, q6h PRN, 10 mg at 11/10/24 0822 **OR** prochlorperazine (Compazine) injection 10 mg, 10 mg, intravenous, q6h PRN, 10 mg at 11/07/24 2307 **OR** prochlorperazine (Compazine) suppository 25 mg, 25 mg, rectal, q12h PRN, Gayle Mcgovern, APRN-CNP     Relevant Results  Results for orders placed or performed during the hospital encounter of 11/05/24 (from the past 24 hours)   Basic Metabolic Panel   Result Value Ref Range    Glucose 72 (L) 74 - 99 mg/dL    Sodium 131 (L) 136 - 145 mmol/L    Potassium 4.3 3.5 - 5.3 mmol/L    Chloride 105 98 - 107 mmol/L    Bicarbonate 24 21 - 32 mmol/L    Anion Gap <7 (L) 10 - 20 mmol/L    Urea Nitrogen 10 6 - 23 mg/dL    Creatinine 0.21 (L) 0.50 - 1.30 mg/dL    eGFR >90 >60 mL/min/1.73m*2    Calcium 5.5 (LL) 8.6 - 10.3 mg/dL   Phosphorus   Result Value Ref Range    Phosphorus 2.2 (L) 2.5 - 4.9 mg/dL   Magnesium   Result Value Ref Range    Magnesium 1.48 (L) 1.60 - 2.40 mg/dL      No results found.     Assessment/Plan   Principal Problem:    Hypokalemia  Active Problems:    Anemia    Hypomagnesemia    Hypocalcemia    Leukocytosis    Electrolyte imbalance    Dehydration    This is an unfortunate 49-year-old male with a past medical history of BPH, left peroneal DVT, severe protein calorie malnutrition, intestinal fistula, lower extremities edema, and metastatic colon adenocarcinoma to the lungs and the liver diagnosed end of April 2024 where it was associated at that  time with fistulization with an adjacent loop of jejunum s/p pembrolizumab therapy that was complicated by diarrhea for which she had to be placed on oral prednisone for 1 week followed by a taper and temporarily holding the treatment that was resumed back on September 2024. Disease course has been complicated by anemia, hypoalbuminemia, severe protein calorie malnutrition, and electrolyte disturbances. Patient was then hospitalized from 10/7/2024 till 10/17/2024 for bilateral pleural effusions s/p bilateral thoracentesis of which pleural fluid came back transudative. Patient on the other hand had developed edema in his feet and his legs. After that, patient was discharged to skilled nursing facility for rehab. He however was having an overall gradual decline in his nutritional and functional status despite optimization of all medical therapy. He presented back to the emergency room on 11/5/2024 for complaints of weakness and dehydration. Blood workup showed hypocalcemia, hypokalemia, significant hypoalbuminemia, hypomagnesemia, and leukocytosis. Imaging showed colitis. He was admitted to the medical service. Electrolyte correction was initiated. General surgery was consulted. TPN was initiated. But yet despite optimizing all medical therapy, patient's overall clinical condition remained poor. Oral intake remains very poor and he was requiring significant assistance with his ADLs. Given the guarded prognosis, palliative care consulted for advanced care planning.     Please refer to my note from yesterday regarding the lengthy goals of care discussion meeting I had with the patient.  Patient had decided with his wife not to pursue any further escalation of care and focus on comfort and he elected to choose hospice.  He would like to go back home.  I further explained to him about care he would be receiving now under hospice and the support and what medications will be using to keep him comfortable.  He is in  agreement with that.      Disclosure: I informed patient/family that I am a medical director for one of the hospices in the community.  This role is outside my current work as a palliative care physician at the hospital.  I would be more than happy to continue taking care of the patient under the hospice I am in charge of but told patient/family that they have the freedom of choice to chose any of the other hospices in the area and of which they see would best meet their expectations.  I will have our  provide the list of the hospices.  I also told patient/family that I would support any decision they would take and this will not at all interfere or jeopardize the quality of care I provide while patient is at the hospital.             (This note was generated with voice recognition software and may contain errors including spelling, grammar, syntax and misrecognition of what was dictated, that are not fully corrected)     Tristan Marti MD

## 2024-11-12 NOTE — DISCHARGE SUMMARY
"Discharge Diagnosis  Hypokalemia    Issues Requiring Follow-Up  Metastatic cancer    Discharge Meds     Medication List      CONTINUE taking these medications     acetaminophen 325 mg tablet; Commonly known as: Tylenol   diphenoxylate-atropine 2.5-0.025 mg tablet; Commonly known as: Lomotil   midodrine 5 mg tablet; Commonly known as: Proamatine; Take 1 tablet (5   mg) by mouth every 8 hours.   ondansetron ODT 4 mg disintegrating tablet; Commonly known as:   Zofran-ODT     STOP taking these medications     enoxaparin 80 mg/0.8 mL syringe; Commonly known as: Lovenox     ASK your doctor about these medications     loperamide 2 mg capsule; Commonly known as: Imodium; Take 2 capsules   with first episode of loose, watery stool, then 1 capsule with each   episode of loose stool thereafter. Do not exceed 8 capsules in 24 hours.       Test Results Pending At Discharge  Pending Labs       No current pending labs.            Hospital Course   Yon Lawrence \"Vladimir\" is a 49 y.o. male with past medical history of colon cancer, anemia, hypokalemia, hypomagnesemia, BPH, and DVT of proximal lower extremity presented from [facility]  to ER today with complaints of dehydration.  Patient reports poor oral intake that started couple days ago and progressively is getting worse.  Patient is on immunotherapy.  Last immunotherapy was on October 18. ED course: Temp 36 6, heart rate 85, respiration 18, blood pressure 90/72 and 100% on room air.  Sodium 134, potassium 3.0, creatinine 0.24, calcium 5.4,  albumin less than 1.5, magnesium 1.21, WBC 17.2. Patient was discussed with his oncologist, Dr. Finn who felt that patient was appropriate to stay at [facility] given his electrolyte abnormalities and no indication for transfer from an oncologic standpoint. At this time no indication for CT scan of the abdomen/pelvis but she reports if symptoms persist she would like the patient reimaged.  Patient then admitted for further evaluation " and treatment.    Hospital course:  Electrolyte abnormalities.  Replaced IV and p.o. as needed.     Diarrhea  -C. difficile negative.  Expanded panel was negative.  -Patient uses Lomotil at home for his chronic diarrhea.        Invasive colon adenocarcinoma with mets to his liver  - immunotherapy last dose October 18 and able to receive further treatments due to recurring hospitalizations.  Palliative care was consulted and decision was made to return home on hospice care.    Abdominal pain/colitis  CT revealed mildly prominent fluid-filled small bowel may represent ileus or developing obstruction.  Patient had originally been placed n.p.o. with general surgery consultation.  He has since been advanced to a regular diet.  He was given IV Levaquin for 7 days total.        Hypotension  -Likely related to the patient's-low protein state.  -Continue midodrine 10 mg every 8 hours.        Malnutrition Diagnosis Status: New  Malnutrition Diagnosis: Severe malnutrition related to starvation  As Evidenced by: >5% weight loss in 1 month, prolonger poor PO intake <75% for >1 month.  I agree with the dietitian's malnutrition diagnosis.  DVT prophylaxis Lovenox and SCDs  Patient was started on TPN but then refused and requested to only have meal trays.    Patient will return home today on hospice care.        Pertinent Physical Exam At Time of Discharge  Physical Exam  General:     Chronically sick appearing.  Wasting of face  HENT:      Head: Normocephalic and atraumatic.      Mouth: Mucous membranes are moist.   Eyes:      Pupils are equal, round, and reactive to light.   Cardiovascular:      Normal rate and regular rhythm. Normal S1, S2.  No murmurs, clicks, gallops.   Pulmonary:      Clear to auscultation bilaterally.  No wheezing, rhonchi, or crackles heard.   Abdominal:       Bowel sounds are normal.      Abdomen is soft, mild tenderness to palpation of lower quadrants without rebound tenderness,  nondistended  Musculoskeletal:         Extremities: No edema present. No deformities with no abnormal range of motion     Neck supple.   Neurological:      Mental Status: He is alert and oriented X3     CN II-XII intact.  No focal neurologic deficits appreciated  Outpatient Follow-Up  Future Appointments   Date Time Provider Department Center   11/29/2024  8:40 AM Billie Finn MD [id number] [facility]   12/2/2024  1:30 [facility]   12/9/2024  2:00 PM Danielle Kraus DEBBI [id number] None   1/21/2025  1:00 PM Jenni Phelps MD [id number] [facility]         Gayle Mcgovern, APRN-CNP

## 2024-11-12 NOTE — TELEPHONE ENCOUNTER
[facility] for Dr. Finn made aware.  Appt is going to be rescheduled based on dr availability.

## 2024-11-12 NOTE — NURSING NOTE
Discharge Note: 11/12/2024 1252 Discharged via stretcher by [facility] to SNF, paperwork packet sent with transporter, personal belongings taken by transporter, no distress noted, no complaints voiced. Edinson KIM

## 2024-11-12 NOTE — TELEPHONE ENCOUNTER
Patient's mom calls to ask for you to call her after you study Vladimir's records.  Caller is not in his contacts.  I informed caller she would need to be in his contacts to get a call back.    His parents want him to live (she asked that I put this in the message).      Message sent

## 2024-11-12 NOTE — PROGRESS NOTES
Received notification of patient's discharge to hospice. I reached out to inpatient team, who stated that patient requested discharge to hospice due to ongoing weakness and abdominal symptoms. I reviewed the CT, which showed multiple fluid-filled dilated loops of bowel. No clear evidence of tumor or obstruction. I called the patient to discuss his symptoms, and he states he is very weak and cannot get out of bed. I offered trial of steroid for assessment of improvement in symptoms and virtual visit with me this week. He is in agreement with this plan.

## 2024-11-12 NOTE — PROGRESS NOTES
Pt reviewed during Care Rounds today and he is ready for discharge. SW met with pt and wife to review the plan.  They confirm their desires to discharge home with services from [facility].  Final updates/orders attached in [facility] for [facility] and transportation arranged for 1215pm. All parties updated. No further needs identified.       JESSICA Bruno

## 2024-11-13 ENCOUNTER — TUMOR BOARD CONFERENCE (OUTPATIENT)
Dept: HEMATOLOGY/ONCOLOGY | Facility: HOSPITAL | Age: 49
End: 2024-11-13
Payer: COMMERCIAL

## 2024-11-13 LAB
ACID FAST STN SPEC: NORMAL
MYCOBACTERIUM SPEC CULT: NORMAL

## 2024-11-13 NOTE — TUMOR BOARD NOTE
Anesthetic History   No history of anesthetic complications            Review of Systems / Medical History  Patient summary reviewed, nursing notes reviewed and pertinent labs reviewed    Pulmonary        Sleep apnea    Asthma        Neuro/Psych   Within defined limits           Cardiovascular  Within defined limits                     GI/Hepatic/Renal  Within defined limits              Endo/Other  Within defined limits           Other Findings              Physical Exam    Airway  Mallampati: II    Neck ROM: normal range of motion   Mouth opening: Normal     Cardiovascular  Regular rate and rhythm,  S1 and S2 normal,  no murmur, click, rub, or gallop             Dental  No notable dental hx       Pulmonary  Breath sounds clear to auscultation               Abdominal  GI exam deferred       Other Findings            Anesthetic Plan    ASA: 2  Anesthesia type: general          Induction: Inhalational  Anesthetic plan and risks discussed with: Parent / [address] MULTIDISCIPLINARY GI TUMOR BOARD CONFERENCE NOTE  Yon Lawrence was presented at GI Tumor Board Conference  Conference date: 11/13/2024  Presenting Provider(s): Dr. Adry Carrera  Present at Conference: Medical Oncology, Radiation Oncology, Surgical Oncology, Radiology, and Pathology Representatives  Conference Review Type: Radiology Review     Impression:  Yon Lawrence is a 49 y.o. male patient who presents with splenic flexure primary with metastatic colon cancer to liver. Patient had severe toxicity to immunotherapy and is symptomatic with diarrhea and weakness, currently in hospice.      CT A/P 11/07/24  IMPRESSION:  1. Mildly prominent fluid-filled small bowel, may represent ileus  and/or developing obstruction.  2. Diffuse hypodensity throughout the liver, consistent with fatty  infiltration.  3. Multiple foci of relative hyperdensity within the liver, may  represent masses and/or focal areas of fatty sparing. Further  evaluation with nonemergent MRI of the liver can be obtained, if  clinically warranted.    Tumor Board Stage: cT x cNx cM1c  Mismatch Repair Status:     National Guidelines discussed: Yes  Recommendations: Advise patient to return to control immunotherapy induced toxicities.          Disclaimer  SCC tumor board recommendations represent the consensus opinion of physicians present at a weekly patient care conference. The treating SCC physician is not always present, and many of the physicians formulating the recommendation have not personally seen or examined the patient under discussion. It is understood that the treating SCC physician considers the expertise of the Tumor Board Recommendation in formulating his/her plan for the patient. However, in many situations, based on individualized patient considerations, a different plan is determined by the treating physician to be the optimal medical management.    Scribe Attestation  By signing my name below, Amando BURTON Scribe attest  that this documentation has been prepared under the direction and in the presence of GASTROINTESTINAL TUMOR BOARD.

## 2024-11-13 NOTE — TELEPHONE ENCOUNTER
Contacted patient to schedule him for VV with Dr. Carrera.   No answer, left detailed message informing him that I have him scheduled for tomorrow, 11/14, @ 12:00.  Left callback number if this date/time does not work for him.

## 2024-11-14 ENCOUNTER — TELEMEDICINE (OUTPATIENT)
Dept: HEMATOLOGY/ONCOLOGY | Facility: CLINIC | Age: 49
End: 2024-11-14
Payer: COMMERCIAL

## 2024-11-14 DIAGNOSIS — C18.4 ADENOCARCINOMA OF TRANSVERSE COLON (MULTI): ICD-10-CM

## 2024-11-14 PROCEDURE — 99213 OFFICE O/P EST LOW 20 MIN: CPT | Performed by: STUDENT IN AN ORGANIZED HEALTH CARE EDUCATION/TRAINING PROGRAM

## 2024-11-14 NOTE — PROGRESS NOTES
MEDICAL ONCOLOGY INITIAL OUTPATIENT CONSULTATION NOTE  [facility], Gastrointestinal Oncology    Patient Name:  Yon Lawrence  MRN:  [id number]  :  1975    Referring Provider: Billie Finn MD  Care Team: Patient Care Team:  Pedro Bazzi MD as PCP - General (Family Medicine)  Pedro Bazzi MD as PCP - MMO ACO PCP  Billie Finn MD as Consulting Physician (Hematology and Oncology)  Adry Carrera MD PhD as Consulting Physician (Hematology and Oncology)  Billie Finn MD as Referring Physician (Hematology and Oncology)  Date of Service: 2024     IDENTIFYING DATA:   Cancer Staging   Adenocarcinoma of transverse colon (Multi)  Staging form: Colon and Rectum, AJCC 8th Edition  - Clinical: Stage IVC (cTX, cNX, cM1c) - Signed by Billie Finn MD on 2024    Yon Lawrence is a 49 y.o.-year-old with metastatic distal transverse colon adenocarcinoma, dMMR, MLH1 promoter hypermethylation detected    INTERVAL HISTORY:  Patient presents for follow up accompanied by his wife. At the time of follow up on 24, he reported significant increase in his weakness and shortness of breath with exertion. Recommended at that time that he present to the ED for workup due to shortness of breath, borderline hypotension, weakness and hypocalcemia. He declined to present initially but then went in on 24 due to worsening shortness of breath. CT angio chest was negative for PE but CT abdomen/pelvis showed profound and diffuse decrease in hepatic parenchymal density with concern for filling defects in the branches of the hepatic artery. He was admitted and transferred to [facility], where he was managed with albumin, lasix and PRBC transfusion.    He received C4 pembrolizumab on 24, but presented to the ED again 24 with weakness and orange urine. He was released but has been feeling poorly. He present today for follow up, hypotensive and unwell appearing. He is  cachectic and admits he has had little oral intake over the past few days and is not urinating. He is having difficulty caring for himself at home. He was much more lethargic over the past 2 days. Vitals notable for hypotension and patient is dizzy. EMS was called and patient brought to [facility].    11/14/24: Received pembrolizumab 10/18/24, and subsequently developed diarrhea, hypotension. Admitted to local hospital and discharged home on hospice.     ONCOLOGY HISTORY:  4/30/24: CT abdomen/pelvis showed a large necrotic mass occupying the left upper quadrant of the abdomen, likely arising from the distal transverse colon, as well as multiple mesenteric nodules consistent with metastatic lesions and multiple liver mass lesions consistent with metastasis  5/6/24: colonoscopy with malignant distal transverse colon mass measuring 10cm, invasive adenocarcinoma, MMR IHC pending  5/7/24: CT chest with no intrathoracic metastasis  5/16/24: CT abdomen/pelvis with stable to mild interval enlargement of the distal transverse colonic mass measuring 10.3 x 9.3 x 8.8 cm with invasion of and fistulization with an adjacent loop of jejunum with passage of oral contrast from the jejunum into the colon at the level of the mass. Mass noted to abut the anterior abdominal wall without evidence of direct invasion, mesenteric stranding and nodularity concerning for peritoneal carcinomatosis, increase in size and number of diffuse liver metastases from 4/30/24    REGIMEN #1: pembrolizumab  6/3/24: C1D1 pembrolizumab  6/24/24: C2D1 pembrolizumab  7/12/24: C3D1 pembrolizumab   -- Received C3 pembrolizumab on 7/12/24 and when he presented for follow up for consideration of C4 on 8/6/24, he reported up to 6 episodes/day of diarrhea and was started on prednisone 1mg/kg/day x1 week followed by taper with improvement in the diarrhea. Subsequent CT 8/22/24 showed decreased burden of disease in the colon, decreased tumor burden in the liver and  persistent fistulization between the primary tumor and small bowel. Plan as of 8/26/24 follow up was to schedule the patient with gastroenterology and hold pembrolizumab.  8/2024-9/26/24: treatment on hold for diarrhea  9/26/24: C4D1 pembrolizumab    PAST MEDICAL HISTORY:  Past Medical History:   Diagnosis Date    Cancer (Multi)     colon cancer       PAST SURGICAL HISTORY:  Past Surgical History:   Procedure Laterality Date    COLONOSCOPY  05/06/2024    DR VALDEZ    OTHER SURGICAL HISTORY  06/07/2022    Meniscectomy    TUNNELED VENOUS PORT PLACEMENT  05/31/2024    PLACEMENT OF TUNNELED CENTR VENOUS CATHETER W/SQ PORT/ DR VALDEZ       ALLERGIES:  No Known Allergies    MEDICATIONS:  Current Outpatient Medications   Medication Instructions    acetaminophen (TYLENOL) 650 mg, Every 4 hours PRN    diphenoxylate-atropine (Lomotil) 2.5-0.025 mg tablet 1 tablet, 3 times daily PRN    loperamide (Imodium) 2 mg capsule Take 2 capsules with first episode of loose, watery stool, then 1 capsule with each episode of loose stool thereafter. Do not exceed 8 capsules in 24 hours.    midodrine (PROAMATINE) 5 mg, oral, Every 8 hours scheduled    ondansetron ODT (ZOFRAN-ODT) 4 mg, Every 6 hours PRN    predniSONE (DELTASONE) 60 mg, oral, Daily       SOCIAL HISTORY:  Social History     Tobacco Use    Smoking status: Never    Smokeless tobacco: Never   Substance Use Topics    Alcohol use: Never     Social History     Social History Narrative    Lives with wife and 2 children. Works as a  at [employer]. Coaches basketball over the summer.      FAMILY HISTORY:  Family History   Problem Relation Name Age of Onset    Diabetes type II Mother      Hypertension Father      Heart disease Father          CABG    Macular degeneration Father      Ulcers Father      Breast cancer Mother's Sister  50 - 59    Cancer Father's Sister          rare cancer unknown type    Cancer Paternal Grandmother      Stroke  Paternal Grandfather      Other (MCAD) Daughter      No Known Problems Son          REVIEW OF SYSTEMS:  10-pt ROS reviewed and negative except as mentioned above.    PERFORMANCE STATUS:  Karnofsky Performance Score/ECO, Able to carry on normal activity; minor signs or symptoms of disease (ECOG equivalent 0)    PHYSICAL EXAMINATION:  There were no vitals taken for this visit.   Wt Readings from Last 5 Encounters:   24 69.9 kg (154 lb)   10/18/24 70.1 kg (154 lb 8.7 oz)   10/08/24 80.7 kg (178 lb)   10/04/24 79.8 kg (176 lb)   24 79.8 kg (176 lb)     PE - deferred, telephone visit.     PATHOLOGY:     Surgical pathology 24:  FINAL DIAGNOSIS   A. COLON - TRANSVERSE, MASS BIOPSY:   Invasive adenocarcinoma     MMR studies pending; separate report will follow     : The images have been reviewed at the GI consensus conference on May 15, 2024 and diagnosis of adenocarcinoma is confirmed.  Dr. MONROE Asif has been notified via secure chat on 5/15/2024 at 11:51 am     B. RECTUM POLYP, POLYPECTOMY:   Hyperplastic polyp   Electronically signed by Servando Lawton MD on 5/15/2024 at 1155        By the signature on this report, the individual or group listed as making the Final Interpretation/Diagnosis certifies that they have reviewed this case.    Addendum            MISMATCH REPAIR PROTEIN EXPRESSION                 Protein:  Result                MLH-1:   Expression Absent                                            PMS-2:  Expression Absent                                      MSH-2:  Expression Present                                   MSH-6:  Expression Present                                       Neoplasm with combined loss of MLH-1/PMS-2 mismatch repair protein expression.     Assessment for BRAF mutation or methylation of the MLH1 promoter status has been ordered.     The loss of MLH-2/PMS-2 protein expression has been identified (normal internal controls stain appropriately).  Loss  of expression of the MLH-1 gene and hence protein expression, is often associated with the concurrent loss of protein expression in PMS-2.     Reference Range: A result is reported as Expression Present if any tumor nuclei are stained positive.     MLH1 Promoter Methylation Result       Specimen:  FFPE [id number]  Estimated Tumor Content: 40%        RESULT: Positive for MLH1 Promoter Methylation       NGS:   ** Copied from [address] [place] [address] on 18-Jun-2024 12:01PM by Billie Finn **    Test Name: [employer] (XT.V4)  Laboratory Name: [facility]  Specimen Source: Colon, transverse  Collection Date: 06-May-2024  Cancer Type: Invasive adenocarcinoma  Report Id: [id number]    Result Interpretation:  * Lab Notes: Tumor Percentage: 30%  * Germline Notes: No potential germline variants were found in the limited set of genes on which we report.    Molecular Findings:  * Gene: BRCA2, Variant: Frameshift - LOF, Potentially Actionable, p.I605fs (Allele Frequency - 7.9%)  * Gene: [place], Variant: Frameshift - LOF, Potentially Actionable, p.F357fs (Allele Frequency - 7.6%)  * Gene: PIK3CA, Variant: Missense variant (exon 1) - GOF, Potentially Actionable, p.R88Q (Allele Frequency - 6.5%)  * Gene: PIK3CA, Variant: Missense variant (exon 20) - GOF, Potentially Actionable, p.W1510K (Allele Frequency - 6.4%)  * Gene: CTNNB1, Variant: Missense variant - GOF, Biologically Relevant, p.T41A (Allele Frequency - 28.5%)  * Gene: MLH1, Variant: Frameshift - LOF, Biologically Relevant, p.R9fs (Allele Frequency - 26.4%)  * Gene: MSH3, Variant: Frameshift - LOF, Biologically Relevant, p.K383fs (Allele Frequency - 16.2%)  * Gene: HNF1A, Variant: Frameshift - LOF, Biologically Relevant, p.P291fs (Allele Frequency - 10.3%)  * Gene: PIK3R2, Variant: Missense variant - GOF, Biologically Relevant, p.G373R (Allele Frequency - 8.8%)  * Gene: PTEN, Variant: Frameshift - LOF, Biologically Relevant, p.K267fs (Allele Frequency - 8.6%)  * Gene: PTEN, Variant:  Frameshift - LOF, Biologically Relevant, p.C250fs (Allele Frequency - 6%)  * Gene: FBXW7, Variant: Stop gain - LOF, Biologically Relevant, p.R278*, Nonsense (Allele Frequency - 8.5%)  * Gene: PTCH1, Variant: Frameshift - LOF, Biologically Relevant, p.U5990hd (Allele Frequency - 8.3%)  * Gene: PTCH1, Variant: Stop gain - LOF, Biologically Relevant, p.Q728*, Nonsense (Allele Frequency - 6.6%)  * Gene: SWO8XP6, Variant: Stop gain - LOF, Biologically Relevant, p.R216*, Nonsense (Allele Frequency - 8.1%)  * Gene: CHD2, Variant: Frameshift - LOF, Biologically Relevant, p.V175fs (Allele Frequency - 7.9%)  * Gene: PIK3R1, Variant: Frameshift - LOF, Biologically Relevant, p.S460fs (Allele Frequency - 7.7%)  * Gene: PMS2, Variant: Frameshift - LOF, Biologically Relevant, p.D414fs (Allele Frequency - 7.6%)  * Gene: NOTCH1, Variant: Frameshift - GOF, Biologically Relevant, p.L6870uu (Allele Frequency - 7.6%)  * Gene: APC, Variant: Frameshift - LOF, Biologically Relevant, p.A759fs (Allele Frequency - 7.5%)  * Gene: TP53, Variant: Frameshift - LOF, Biologically Relevant, p.P152fs (Allele Frequency - 7.4%)  * Gene: ARID1A, Variant: Frameshift - LOF, Biologically Relevant, p.G1426zq (Allele Frequency - 7.3%)  * Gene: ZFHX3, Variant: Frameshift - LOF, Biologically Relevant, p.D121fs (Allele Frequency - 6.8%)  * Gene: INPP4B, Variant: Frameshift - LOF, Biologically Relevant, p.N299fs (Allele Frequency - 6.5%)  * Gene: NBN, Variant: Frameshift - LOF, Biologically Relevant, p.R466fs (Allele Frequency - 6.5%)  * Gene: ACVR1B, Variant: Stop gain - LOF, Biologically Relevant, p.R485*, Nonsense (Allele Frequency - 6.5%)  * Gene: MAP2K4, Variant: Frameshift - LOF, Biologically Relevant, p.N233fs (Allele Frequency - 6.2%)  * Gene: NF1, Variant: Stop gain - LOF, Biologically Relevant, p.*, Nonsense (Allele Frequency - 6.2%)  * Biomarker: Microsatellite Instability, Status: high  * Biomarker: Tumor Mutation [place] (51.6 Muts/Mb,  Percentile: 99)  * Disease associated genes/variants with no reportable findings:     * BRAF    * KRAS    * NRAS  * 80 variants of unknown significance    ** End of copied information **    IMAGING DATA:   CT abdomen/pelvis 9/17/24:  IMPRESSION:  Global abnormal appearing liver is compatible with  ischemia/infarction and obscures the patient's known underlying  metastatic lesions. 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      Patient's known tumor of the distal transverse colon has a associated  mesenteric desmoplastic reaction and there are adjacent enlarged  mesenteric lymph nodes consistent with locally advanced and locally  metastatic disease.      Pleural effusions, ascites and diffuse subcutaneous edema      US liver with doppler 9/18/24:  IMPRESSION:  1. Hepatomegaly and echogenic hepatic parenchyma suggestive of fatty  infiltration. Several hepatic lesions are visualized and described  above, better characterized on recent CTs.  2. Poor visualization of the right hepatic artery and right hepatic  vein, potentially related to technique or patient condition, rather  than vascular abnormality. Otherwise normal patent hepatic  vasculature.  3. Right pleural effusion and small volume ascites.    CT enterography 9/19/24:  IMPRESSION:  1. Similar-appearing 5.0 x 4.8 cm mass at the splenic flexure, with  associated enlarged adjacent mesenteric lymph nodes. Full  characterization of the fistula tracts with the adjacent small bowel  is limited on this exam given lack of enteric contrast. No new fluid  collection.  2. There is decreased attenuation of the liver consistent with  hepatic steatosis which is similar to the prior study. Given this  finding, patient's known metastatic disease liver is not well  evaluated on this study.  3. Diffuse edema  of the abdominal body wall soft tissue, pleural  effusions, and ascites which are consistent with volume overload  state.  4. Additional chronic findings as described above.    CT Abd/pelvis 11/7/24:  1. Mildly prominent fluid-filled small bowel, may represent ileus  and/or developing obstruction.  2. Diffuse hypodensity throughout the liver, consistent with fatty  infiltration.  3. Multiple foci of relative hyperdensity within the liver, may  represent masses and/or focal areas of fatty sparing. Further  evaluation with nonemergent MRI of the liver can be obtained, if  clinically warranted.        LABORATORY DATA:    Last CBC w. Diff.:    Lab Results   Component Value Date/Time    WBC 8.4 11/11/2024 0439    NRBC 0.0 11/11/2024 0439    RBC 2.41 (L) 11/11/2024 0439    HGB 7.2 (L) 11/11/2024 0439    HCT 21.2 (L) 11/11/2024 0439    MCV 88 11/11/2024 0439    MCH 29.9 11/11/2024 0439    MCHC 34.0 11/11/2024 0439    RDW 15.4 (H) 11/11/2024 0439     (L) 11/11/2024 0439    NEUTOPHILPCT 69.7 11/11/2024 0439    IGPCT 0.5 11/11/2024 0439    LYMPHOPCT 27.4 11/11/2024 0439    LYMPHOPCT 35.0 09/16/2024 1506    MONOPCT 2.2 11/11/2024 0439    MONOPCT 2.0 09/16/2024 1506    EOSPCT 0.1 11/11/2024 0439    EOSPCT 0.0 09/16/2024 1506    BASOPCT 0.1 11/11/2024 0439    BASOPCT 0.0 09/16/2024 1506    NEUTROABS 5.84 11/11/2024 0439    IGABSOL 0.04 11/11/2024 0439    LYMPHSABS 2.29 11/11/2024 0439    MONOSABS 0.18 11/11/2024 0439    EOSABS 0.01 11/11/2024 0439    EOSABS 0.00 09/16/2024 1506    BASOSABS 0.01 11/11/2024 0439    BASOSABS 0.00 09/16/2024 1506     Last CMP:     Lab Results   Component Value Date/Time    GLUCOSE 72 (L) 11/12/2024 0608     (L) 11/12/2024 0608    K 4.3 11/12/2024 0608     11/12/2024 0608    CO2 24 11/12/2024 0608    ANIONGAP <7 (L) 11/12/2024 0608    BUN 10 11/12/2024 0608    CREATININE 0.21 (L) 11/12/2024 0608    EGFR >90 11/12/2024 0608    CALCIUM 5.5 (LL) 11/12/2024 0608    ALBUMIN <1.5 (L)  11/09/2024 1210    ALKPHOS 102 11/09/2024 1210    PROT 3.0 (L) 11/09/2024 1210    AST 14 11/09/2024 1210    BILITOT 0.6 11/09/2024 1210    ALT 26 11/09/2024 1210       Carcinoembryonic AG   Date/Time Value Ref Range Status   10/18/2024 01:39 PM 3.5 ug/L Final   09/16/2024 03:06 PM 3.4 ug/L Final   08/05/2024 11:03 AM 2.8 ug/L Final   07/08/2024 02:52 PM 6.0 ug/L Final   06/17/2024 12:44 PM 52.4 ug/L Final   05/20/2024 11:17 AM 19.1 ug/L Final   05/02/2024 04:04 PM 6.7 ug/L Final     Signatera:  5/20/24: 538.67 MTM/mL  6/17/24: 654.58 MTM/mL  7/8/24: 98.06 MTM/mL  9/26/24: 19.25 MTM/mL    All pertinent pathology, imaging, and labs were personally reviewed and interpreted in clinic. Findings as per HPI and EMR.    ASSESSMENT:  Yon Lawrence is a 49 y.o. male with Adenocarcinoma of transverse colon (Multi), Clinical: Stage IVC (cTX, cNX, cM1c).      IHC revealed patient's tumor is dMMR with MLH1 and PMS2 loss and this was confirmed by NGS showing microsatellite instability and high tumor mutation burden as above. Recommended single agent PD-1 blockade with pembrolizumab in place of FOLFOX as first-line therapy. Reviewed side effects of pembrolizumab in detail, including but not limited to diarrhea, skin rash, colitis, thyroiditis, and rare but life-threatening immune-related adverse events including but not limited to myocarditis, pneumonitis, encephalitis, hypophysitis. Emphasized recommendation to call with any new symptoms.     With regard to travel, patient and wife asked previously about safety of traveling to [place] and [place]. Explained my main concern at this point is the risk of unexpected change in his health due to risk of colonic perforation and existing fistula. Emphasized importance of knowing where to seek emergency medical care if they are traveling out of town. With regard to the treatment with pembrolizumab, there is no specific contraindication to travel.    CT 8/22/24 showed decreased  burden of disease in the colon and some decrease in tumor burden in the liver. There is still fistulization between the primary tumor and the small bowel.     He is no longer having diarrhea, which resolved on prednisone 1mg/kg/day followed by a taper (approx 2 weeks total). Based on the increase of 4-6 stools per day over baseline, without abdominal pain or hematochezia, the patient had suspected grade 2 immune related diarrhea. However, discussed that the differential diagnosis includes infection, dietary change and/or increased diarrhea related to fistula and opening of the colon at the site of tumor response. Initially, decision was made to hold immunotherapy pending additional workup. However, the patient is increasingly symptomatic from his disease with worsening clinical parameters (energy, malaise, shortness of breath, anasarca) and laboratory parameters (hypoalbuminemia, elevated LFTs) as of 9/16/24. Suspect these changes are related to disease progression since patient has been off of treatment for greater than 2 months. We discussed the risks and benefits of proceeding with further immunotherapy. Specifically explained that immune-related colitis can be life-threatening. We agreed to proceed with further PD-1 blockade as scheduled (initially for 9/18/24, delayed to 9/26/24 due to hospitalization) given that patient is also at risk of life-threatening complication from his disease at this point and diarrhea has resolved. Discussed case with Dr. Ha Go in gastroenterology, who will see the patient next week, agreed no prophylactic steroids indicated given that the diagnosis of immune-related diarrhea/colitis was not confirmed.    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 It is unclear whether his malnutrition with hypoalbuminemia and weight loss hs contributing to the profound hepatic steatosis now seen on his  imaging.    11/14/24: I personally reviewed the images from the recent CT, which show decreased tumor burden in abdomen but with signfiicant enteritis. I called him earlier in the week to recommend starting steroids for palliation of symptoms and if symptoms are related to immunotherapy, then it may reverse the course. They did  the steroids, but he did not start them yet. I discussed with pt's wife on the phone that I recommend trial of this, as it is still consistent with his goals of palliation of symptoms. They are agreeable to trying this, and I will see him again next week to followup response. He continues to be enrolled in hospice.     PLAN:  #Adenocarcinoma of transverse colon, stage IVC, dMMR, MLH1 promoter hypermethylation detected, no BRAF mut detected:  - [facility] reviewed as above  - Signatera ctDNA monitoring to help assess response to treatment  - MMR IHC shows dMMR with MLH1 and PMS2 loss, +MLH1 promoter hypermethylation - explained low likelihood of Rashid syndrome but would still recommend genetics referral given young age at diagnosis  - Currently responding to pembrolizumab, but with severe enteritis likely d/t immunotherapy. Will add steroids for palliation & to attempt to reverse course.     #Hypoalbuminemia:  - Suspect combination of GI loss and malnutrition  - Encouraged increased protein intake    #Abdominal pain:  - LUQ pain much improved     #Anemia:  - Most likely related to chronic ongoing blood loss from primary tumor   - Folate and vitamin B12 normal on 6/17/24  - Transferrin saturation low at 5% with normal ferritin 291, overall likely mixed picture of iron deficiency and anemia of chronic inflammation--although mixed results and initially held off on iron supplementation, hgb back down to 8.3 on 6/20/24, decision made to proceed with IV iron 200mg x5  - Increased energy and improved color as of 7/1/24    #Elevated alk phos, AST, ALT:  - Given the patient's overall burden of  disease, with predominant alk phos elevation suggestive of an infiltrative process, suspect this is related to disease progression as patient only just started treatment less than 6 weeks ago  - Continue to monitor closely as the differential diagnosis includes immune-related adverse event (less likely based on pattern and timing) and vascular obstruction  - LFTs improving as of 7/12/24     #Frequent nocturnal urination:  - Urinalysis shows no evidence of UTI though significant bilirubin in the urine  - Referral to urology     #Leukocytosis:  - 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 Mckeon and at tumor board    #Early onset colorectal cancer:  - [facility] for germline testing due to young age at diagnosis, already referred for genetic counseling - virtual October 2024  - Recommend screening of first-degree relatives with colonoscopy 10 years prior to the patient's age of diagnosis (no later than age 36)  - Patient declined referral to Young Adult Oncology program/oncofertility at this time

## 2024-11-20 LAB
ACID FAST STN SPEC: NORMAL
MYCOBACTERIUM SPEC CULT: NORMAL

## 2024-11-22 ENCOUNTER — TELEPHONE (OUTPATIENT)
Dept: GENETICS | Facility: CLINIC | Age: 49
End: 2024-11-22
Payer: COMMERCIAL

## 2024-11-22 NOTE — TELEPHONE ENCOUNTER
I left a message for the patient regarding their outstanding genetic testing sample ordered by PASHA Trevino . I provided my direct line for the patient to confirm if they would like to proceed with testing.

## 2024-11-26 ENCOUNTER — TELEMEDICINE (OUTPATIENT)
Dept: HEMATOLOGY/ONCOLOGY | Facility: CLINIC | Age: 49
End: 2024-11-26
Payer: COMMERCIAL

## 2024-11-26 DIAGNOSIS — C18.4 ADENOCARCINOMA OF TRANSVERSE COLON (MULTI): ICD-10-CM

## 2024-11-26 LAB
ACID FAST STN SPEC: NORMAL
MYCOBACTERIUM SPEC CULT: NORMAL

## 2024-11-26 PROCEDURE — G2211 COMPLEX E/M VISIT ADD ON: HCPCS | Performed by: STUDENT IN AN ORGANIZED HEALTH CARE EDUCATION/TRAINING PROGRAM

## 2024-11-26 PROCEDURE — 99213 OFFICE O/P EST LOW 20 MIN: CPT | Performed by: STUDENT IN AN ORGANIZED HEALTH CARE EDUCATION/TRAINING PROGRAM

## 2024-11-26 ASSESSMENT — PAIN SCALES - GENERAL: PAINLEVEL_OUTOF10: 1

## 2024-11-26 NOTE — PROGRESS NOTES
MEDICAL ONCOLOGY INITIAL OUTPATIENT CONSULTATION NOTE  [facility], Gastrointestinal Oncology    Patient Name:  Yon Lawrence  MRN:  [id number]  :  1975    Referring Provider: Adry Carrera, *  Care Team: Patient Care Team:  Pedro Bazzi MD as PCP - General (Family Medicine)  Pedro Bazzi MD as PCP - MMO ACO PCP  Billie Finn MD as Consulting Physician (Hematology and Oncology)  Adry Carrera MD PhD as Consulting Physician (Hematology and Oncology)  Billie Finn MD as Referring Physician (Hematology and Oncology)  Date of Service: 2024     IDENTIFYING DATA:   Cancer Staging   Adenocarcinoma of transverse colon (Multi)  Staging form: Colon and Rectum, AJCC 8th Edition  - Clinical: Stage IVC (cTX, cNX, cM1c) - Signed by Billie Finn MD on 2024    Yon Lawrence is a 49 y.o.-year-old with metastatic distal transverse colon adenocarcinoma, dMMR, MLH1 promoter hypermethylation detected    INTERVAL HISTORY:  Patient presents for follow up accompanied by his wife. At the time of follow up on 24, he reported significant increase in his weakness and shortness of breath with exertion. Recommended at that time that he present to the ED for workup due to shortness of breath, borderline hypotension, weakness and hypocalcemia. He declined to present initially but then went in on 24 due to worsening shortness of breath. CT angio chest was negative for PE but CT abdomen/pelvis showed profound and diffuse decrease in hepatic parenchymal density with concern for filling defects in the branches of the hepatic artery. [place] [facility] [place] with albumin, lasix and PRBC transfusion.    He received C4 pembrolizumab on 24, but presented to the ED again 24 with weakness and orange urine. He was released but has been feeling poorly. He present today for follow up, hypotensive and unwell appearing. He is  cachectic and admits he has had little oral intake over the past few days and is not urinating. He is having difficulty caring for himself at home. He was much more lethargic over the past 2 days. Vitals notable for hypotension and patient is dizzy. EMS was called and patient brought to [facility].    11/14/24: Received pembrolizumab 10/18/24, and subsequently developed diarrhea, hypotension. Admitted to local hospital and discharged home on hospice.     11/26/24: Feeling ok, yesterday had some pain in his stomach that was new. He is not having diarrhea any more. He started the steroids that I sent in, but has not noticed an improvement.     ONCOLOGY HISTORY:  4/30/24: CT abdomen/pelvis showed a large necrotic mass occupying the left upper quadrant of the abdomen, likely arising from the distal transverse colon, as well as multiple mesenteric nodules consistent with metastatic lesions and multiple liver mass lesions consistent with metastasis  5/6/24: colonoscopy with malignant distal transverse colon mass measuring 10cm, invasive adenocarcinoma, MMR IHC pending  5/7/24: CT chest with no intrathoracic metastasis  5/16/24: CT abdomen/pelvis with stable to mild interval enlargement of the distal transverse colonic mass measuring 10.3 x 9.3 x 8.8 cm with invasion of and fistulization with an adjacent loop of jejunum with passage of oral contrast from the jejunum into the colon at the level of the mass. Mass noted to abut the anterior abdominal wall without evidence of direct invasion, mesenteric stranding and nodularity concerning for peritoneal carcinomatosis, increase in size and number of diffuse liver metastases from 4/30/24    REGIMEN #1: pembrolizumab  6/3/24: C1D1 pembrolizumab  6/24/24: C2D1 pembrolizumab  7/12/24: C3D1 pembrolizumab   -- Received C3 pembrolizumab on 7/12/24 and when he presented for follow up for consideration of C4 on 8/6/24, he reported up to 6 episodes/day of diarrhea and was started on  prednisone 1mg/kg/day x1 week followed by taper with improvement in the diarrhea. Subsequent CT 8/22/24 showed decreased burden of disease in the colon, decreased tumor burden in the liver and persistent fistulization between the primary tumor and small bowel. Plan as of 8/26/24 follow up was to schedule the patient with gastroenterology and hold pembrolizumab.  8/2024-9/26/24: treatment on hold for diarrhea  9/26/24: C4D1 pembrolizumab    PAST MEDICAL HISTORY:  Past Medical History:   Diagnosis Date    Cancer (Multi)     colon cancer       PAST SURGICAL HISTORY:  Past Surgical History:   Procedure Laterality Date    COLONOSCOPY  05/06/2024    DR VALDEZ    OTHER SURGICAL HISTORY  06/07/2022    Meniscectomy    TUNNELED VENOUS PORT PLACEMENT  05/31/2024    PLACEMENT OF TUNNELED CENTR VENOUS CATHETER W/SQ PORT/ DR VALDEZ       ALLERGIES:  No Known Allergies    MEDICATIONS:  Current Outpatient Medications   Medication Instructions    acetaminophen (TYLENOL) 650 mg, Every 4 hours PRN    diphenoxylate-atropine (Lomotil) 2.5-0.025 mg tablet 1 tablet, 3 times daily PRN    loperamide (Imodium) 2 mg capsule Take 2 capsules with first episode of loose, watery stool, then 1 capsule with each episode of loose stool thereafter. Do not exceed 8 capsules in 24 hours.    midodrine (PROAMATINE) 5 mg, oral, Every 8 hours scheduled    ondansetron ODT (ZOFRAN-ODT) 4 mg, Every 6 hours PRN    predniSONE (DELTASONE) 60 mg, oral, Daily       SOCIAL HISTORY:  Social History     Tobacco Use    Smoking status: Never    Smokeless tobacco: Never   Substance Use Topics    Alcohol use: Never     Social History     Social History Narrative    Lives with wife and 2 children. Works as a  at [employer]. Coaches basketball over the summer.      FAMILY HISTORY:  Family History   Problem Relation Name Age of Onset    Diabetes type II Mother      Hypertension Father      Heart disease Father          CABG    Macular  degeneration Father      Ulcers Father      Breast cancer Mother's Sister  50 - 59    Cancer Father's Sister          rare cancer unknown type    Cancer Paternal Grandmother      Stroke Paternal Grandfather      Other (MCAD) Daughter      No Known Problems Son          REVIEW OF SYSTEMS:  10-pt ROS reviewed and negative except as mentioned above.    PERFORMANCE STATUS:  Karnofsky Performance Score/ECO, Able to carry on normal activity; minor signs or symptoms of disease (ECOG equivalent 0)    PHYSICAL EXAMINATION:  There were no vitals taken for this visit.   Wt Readings from Last 5 Encounters:   24 69.9 kg (154 lb)   10/18/24 70.1 kg (154 lb 8.7 oz)   10/08/24 80.7 kg (178 lb)   10/04/24 79.8 kg (176 lb)   24 79.8 kg (176 lb)     PE - deferred, telephone visit.     PATHOLOGY:     Surgical pathology 24:  FINAL DIAGNOSIS   A. COLON - TRANSVERSE, MASS BIOPSY:   Invasive adenocarcinoma     MMR studies pending; separate report will follow     : The images have been reviewed at the GI consensus conference on May 15, 2024 and diagnosis of adenocarcinoma is confirmed.  Dr. MONROE Asif has been notified via secure chat on 5/15/2024 at 11:51 am     B. RECTUM POLYP, POLYPECTOMY:   Hyperplastic polyp   Electronically signed by Servando Lawton MD on 5/15/2024 at 1155        By the signature on this report, the individual or group listed as making the Final Interpretation/Diagnosis certifies that they have reviewed this case.    Addendum            MISMATCH REPAIR PROTEIN EXPRESSION                 Protein:  Result                MLH-1:   Expression Absent                                            PMS-2:  Expression Absent                                      MSH-2:  Expression Present                                   MSH-6:  Expression Present                                       Neoplasm with combined loss of MLH-1/PMS-2 mismatch repair protein expression.     Assessment for BRAF  mutation or methylation of the MLH1 promoter status has been ordered.     The loss of MLH-2/PMS-2 protein expression has been identified (normal internal controls stain appropriately).  Loss of expression of the MLH-1 gene and hence protein expression, is often associated with the concurrent loss of protein expression in PMS-2.     Reference Range: A result is reported as Expression Present if any tumor nuclei are stained positive.     MLH1 Promoter Methylation Result       Specimen:  FFPE [id number]  Estimated Tumor Content: 40%        RESULT: Positive for MLH1 Promoter Methylation       NGS:   ** Copied from [employer] on 18-Jun-2024 12:01PM by Billie Finn **    Test Name: [employer] (XT.V4)  Laboratory Name: [facility]  Specimen Source: Colon, transverse  Collection Date: 06-May-2024  Cancer Type: Invasive adenocarcinoma  Report Id: [id number]    Result Interpretation:  * Lab Notes: Tumor Percentage: 30%  * Germline Notes: No potential germline variants were found in the limited set of genes on which we report.    Molecular Findings:  * Gene: BRCA2, Variant: Frameshift - LOF, Potentially Actionable, p.I605fs (Allele Frequency - 7.9%)  * Gene: [place], Variant: Frameshift - LOF, Potentially Actionable, p.F357fs (Allele Frequency - 7.6%)  * Gene: PIK3CA, Variant: Missense variant (exon 1) - GOF, Potentially Actionable, p.R88Q (Allele Frequency - 6.5%)  * Gene: PIK3CA, Variant: Missense variant (exon 20) - GOF, Potentially Actionable, p.U0015S (Allele Frequency - 6.4%)  * Gene: CTNNB1, Variant: Missense variant - GOF, Biologically Relevant, p.T41A (Allele Frequency - 28.5%)  * Gene: MLH1, Variant: Frameshift - LOF, Biologically Relevant, p.R9fs (Allele Frequency - 26.4%)  * Gene: MSH3, Variant: Frameshift - LOF, Biologically Relevant, p.K383fs (Allele Frequency - 16.2%)  * Gene: HNF1A, Variant: Frameshift - LOF, Biologically Relevant, p.P291fs (Allele Frequency - 10.3%)  * Gene: PIK3R2, Variant: Missense  variant - GOF, Biologically Relevant, p.G373R (Allele Frequency - 8.8%)  * Gene: PTEN, Variant: Frameshift - LOF, Biologically Relevant, p.K267fs (Allele Frequency - 8.6%)  * Gene: PTEN, Variant: Frameshift - LOF, Biologically Relevant, p.C250fs (Allele Frequency - 6%)  * Gene: FBXW7, Variant: Stop gain - LOF, Biologically Relevant, p.R278*, Nonsense (Allele Frequency - 8.5%)  * Gene: PTCH1, Variant: Frameshift - LOF, Biologically Relevant, p.Y3404iq (Allele Frequency - 8.3%)  * Gene: PTCH1, Variant: Stop gain - LOF, Biologically Relevant, p.Q728*, Nonsense (Allele Frequency - 6.6%)  * Gene: XPY0LX0, Variant: Stop gain - LOF, Biologically Relevant, p.R216*, Nonsense (Allele Frequency - 8.1%)  * Gene: CHD2, Variant: Frameshift - LOF, Biologically Relevant, p.V175fs (Allele Frequency - 7.9%)  * Gene: PIK3R1, Variant: Frameshift - LOF, Biologically Relevant, p.S460fs (Allele Frequency - 7.7%)  * Gene: PMS2, Variant: Frameshift - LOF, Biologically Relevant, p.D414fs (Allele Frequency - 7.6%)  * Gene: NOTCH1, Variant: Frameshift - GOF, Biologically Relevant, p.H8466ak (Allele Frequency - 7.6%)  * Gene: APC, Variant: Frameshift - LOF, Biologically Relevant, p.A759fs (Allele Frequency - 7.5%)  * Gene: TP53, Variant: Frameshift - LOF, Biologically Relevant, p.P152fs (Allele Frequency - 7.4%)  * Gene: ARID1A, Variant: Frameshift - LOF, Biologically Relevant, p.E0812vw (Allele Frequency - 7.3%)  * Gene: ZFHX3, Variant: Frameshift - LOF, Biologically Relevant, p.D121fs (Allele Frequency - 6.8%)  * Gene: INPP4B, Variant: Frameshift - LOF, Biologically Relevant, p.N299fs (Allele Frequency - 6.5%)  * Gene: NBN, Variant: Frameshift - LOF, Biologically Relevant, p.R466fs (Allele Frequency - 6.5%)  * Gene: ACVR1B, Variant: Stop gain - LOF, Biologically Relevant, p.R485*, Nonsense (Allele Frequency - 6.5%)  * Gene: MAP2K4, Variant: Frameshift - LOF, Biologically Relevant, p.N233fs (Allele Frequency - 6.2%)  * Gene: NF1, Variant: Stop  gain - LOF, Biologically Relevant, p.*, Nonsense (Allele Frequency - 6.2%)  * Biomarker: Microsatellite Instability, Status: high  * Biomarker: Tumor Mutation [place] (51.6 Muts/Mb, Percentile: 99)  * Disease associated genes/variants with no reportable findings:     * BRAF    * KRAS    * NRAS  * 80 variants of unknown significance    ** End of copied information **    IMAGING DATA:   CT abdomen/pelvis 9/17/24:  IMPRESSION:  Global abnormal appearing liver is compatible with  ischemia/infarction and obscures the patient's known underlying  metastatic lesions. 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      Patient's known tumor of the distal transverse colon has a associated  mesenteric desmoplastic reaction and there are adjacent enlarged  mesenteric lymph nodes consistent with locally advanced and locally  metastatic disease.      Pleural effusions, ascites and diffuse subcutaneous edema      US liver with doppler 9/18/24:  IMPRESSION:  1. Hepatomegaly and echogenic hepatic parenchyma suggestive of fatty  infiltration. Several hepatic lesions are visualized and described  above, better characterized on recent CTs.  2. Poor visualization of the right hepatic artery and right hepatic  vein, potentially related to technique or patient condition, rather  than vascular abnormality. Otherwise normal patent hepatic  vasculature.  3. Right pleural effusion and small volume ascites.    CT enterography 9/19/24:  IMPRESSION:  1. Similar-appearing 5.0 x 4.8 cm mass at the splenic flexure, with  associated enlarged adjacent mesenteric lymph nodes. Full  characterization of the fistula tracts with the adjacent small bowel  is limited on this exam given lack of enteric contrast. No new fluid  collection.  2. There is decreased attenuation of the liver  consistent with  hepatic steatosis which is similar to the prior study. Given this  finding, patient's known metastatic disease liver is not well  evaluated on this study.  3. Diffuse edema of the abdominal body wall soft tissue, pleural  effusions, and ascites which are consistent with volume overload  state.  4. Additional chronic findings as described above.    CT Abd/pelvis 11/7/24:  1. Mildly prominent fluid-filled small bowel, may represent ileus  and/or developing obstruction.  2. Diffuse hypodensity throughout the liver, consistent with fatty  infiltration.  3. Multiple foci of relative hyperdensity within the liver, may  represent masses and/or focal areas of fatty sparing. Further  evaluation with nonemergent MRI of the liver can be obtained, if  clinically warranted.        LABORATORY DATA:    Last CBC w. Diff.:    Lab Results   Component Value Date/Time    WBC 8.4 11/11/2024 0439    NRBC 0.0 11/11/2024 0439    RBC 2.41 (L) 11/11/2024 0439    HGB 7.2 (L) 11/11/2024 0439    HCT 21.2 (L) 11/11/2024 0439    MCV 88 11/11/2024 0439    MCH 29.9 11/11/2024 0439    MCHC 34.0 11/11/2024 0439    RDW 15.4 (H) 11/11/2024 0439     (L) 11/11/2024 0439    NEUTOPHILPCT 69.7 11/11/2024 0439    IGPCT 0.5 11/11/2024 0439    LYMPHOPCT 27.4 11/11/2024 0439    LYMPHOPCT 35.0 09/16/2024 1506    MONOPCT 2.2 11/11/2024 0439    MONOPCT 2.0 09/16/2024 1506    EOSPCT 0.1 11/11/2024 0439    EOSPCT 0.0 09/16/2024 1506    BASOPCT 0.1 11/11/2024 0439    BASOPCT 0.0 09/16/2024 1506    NEUTROABS 5.84 11/11/2024 0439    IGABSOL 0.04 11/11/2024 0439    LYMPHSABS 2.29 11/11/2024 0439    MONOSABS 0.18 11/11/2024 0439    EOSABS 0.01 11/11/2024 0439    EOSABS 0.00 09/16/2024 1506    BASOSABS 0.01 11/11/2024 0439    BASOSABS 0.00 09/16/2024 1506     Last CMP:     Lab Results   Component Value Date/Time    GLUCOSE 72 (L) 11/12/2024 0608     (L) 11/12/2024 0608    K 4.3 11/12/2024 0608     11/12/2024 0608    CO2 24 11/12/2024  0608    ANIONGAP <7 (L) 11/12/2024 0608    BUN 10 11/12/2024 0608    CREATININE 0.21 (L) 11/12/2024 0608    EGFR >90 11/12/2024 0608    CALCIUM 5.5 (LL) 11/12/2024 0608    ALBUMIN <1.5 (L) 11/09/2024 1210    ALKPHOS 102 11/09/2024 1210    PROT 3.0 (L) 11/09/2024 1210    AST 14 11/09/2024 1210    BILITOT 0.6 11/09/2024 1210    ALT 26 11/09/2024 1210       Carcinoembry[employer] AG   Date/Time Value Ref Range Status   10/18/2024 01:39 PM 3.5 ug/L Final   09/16/2024 03:06 PM 3.4 ug/L Final   08/05/2024 11:03 AM 2.8 ug/L Final   07/08/2024 02:52 PM 6.0 ug/L Final   06/17/2024 12:44 PM 52.4 ug/L Final   05/20/2024 11:17 AM 19.1 ug/L Final   05/02/2024 04:04 PM 6.7 ug/L Final     Signatera:  5/20/24: 538.67 MTM/mL  6/17/24: 654.58 MTM/mL  7/8/24: 98.06 MTM/mL  9/26/24: 19.25 MTM/mL    All pertinent pathology, imaging, and labs were personally reviewed and interpreted in clinic. Findings as per HPI and EMR.    ASSESSMENT:  Yon Lawrence is a 49 y.o. male with Adenocarcinoma of transverse colon (Multi), Clinical: Stage IVC (cTX, cNX, cM1c).      IHC revealed patient's tumor is dMMR with MLH1 and PMS2 loss and this was confirmed by NGS showing microsatellite instability and high tumor mutation burden as above. Recommended single agent PD-1 blockade with pembrolizumab in place of FOLFOX as first-line therapy. Reviewed side effects of pembrolizumab in detail, including but not limited to diarrhea, skin rash, colitis, thyroiditis, and rare but life-threatening immune-related adverse events including but not limited to myocarditis, pneumonitis, encephalitis, hypophysitis. Emphasized recommendation to call with any new symptoms.     With regard to travel, patient and wife asked previously about safety of traveling to [place] and [place]. Explained my main concern at this point is the risk of unexpected change in his health due to risk of colonic perforation and existing fistula. Emphasized importance of knowing where to  seek emergency medical care if they are traveling out of town. With regard to the treatment with pembrolizumab, there is no specific contraindication to travel.    CT 8/22/24 showed decreased burden of disease in the colon and some decrease in tumor burden in the liver. There is still fistulization between the primary tumor and the small bowel.     He is no longer having diarrhea, which resolved on prednisone 1mg/kg/day followed by a taper (approx 2 weeks total). Based on the increase of 4-6 stools per day over baseline, without abdominal pain or hematochezia, the patient had suspected grade 2 immune related diarrhea. However, discussed that the differential diagnosis includes infection, dietary change and/or increased diarrhea related to fistula and opening of the colon at the site of tumor response. Initially, decision was made to hold immunotherapy pending additional workup. However, the patient is increasingly symptomatic from his disease with worsening clinical parameters (energy, malaise, shortness of breath, anasarca) and laboratory parameters (hypoalbuminemia, elevated LFTs) as of 9/16/24. Suspect these changes are related to disease progression since patient has been off of treatment for greater than 2 months. We discussed the risks and benefits of proceeding with further immunotherapy. Specifically explained that immune-related colitis can be life-threatening. We agreed to proceed with further PD-1 blockade as scheduled (initially for 9/18/24, delayed to 9/26/24 due to hospitalization) given that patient is also at risk of life-threatening complication from his disease at this point and diarrhea has resolved. Discussed case with Dr. Ha Go in gastroenterology, who will see the patient next week, agreed no prophylactic steroids indicated given that the diagnosis of immune-related diarrhea/colitis was not confirmed.    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 It is unclear whether his malnutrition with hypoalbuminemia and weight loss hs contributing to the profound hepatic steatosis now seen on his imaging.    11/14/24: I personally reviewed the images from the recent CT, which show decreased tumor burden in abdomen but with signfiicant enteritis. I called him earlier in the week to recommend starting steroids for palliation of symptoms and if symptoms are related to immunotherapy, then it may reverse the course. They did  the steroids, but he did not start them yet. I discussed with pt's wife on the phone that I recommend trial of this, as it is still consistent with his goals of palliation of symptoms. They are agreeable to trying this, and I will see him again next week to followup response. He continues to be enrolled in hospice.     11/26/24: Seems to be slowly improving, but yesterday had more abdominal pain. He has taken a few doses of prednisone, but not taking it routinely. I encouraged him to take the prednisone once daily for both symptom management & to see if there is any way we can potentially reverse the course in case any of his weakness and abdominal symptoms are related to immunotherapy complications. He is in agreement with this & I will see him again in 2-3 weeks virtually. If he does markedly improve, then we can discuss potential options in terms of surveillance, and he is asking about surgical options as well.     PLAN:  #Adenocarcinoma of transverse colon, stage IVC, dMMR, MLH1 promoter hypermethylation detected, no BRAF mut detected:  - [facility] reviewed as above  - Signatera ctDNA monitoring to help assess response to treatment  - MMR IHC shows dMMR with MLH1 and PMS2 loss, +MLH1 promoter hypermethylation - explained low likelihood of Rashid syndrome but would still recommend genetics referral given young age at diagnosis  - Currently responding to pembrolizumab, but with severe enteritis  likely d/t immunotherapy. Currently enrolled in hospice at home. Discontinue pembrolizumab, encouraged him to take prednisone 60mg daily. RTC 2-3 weeks virtual visit    #Hypoalbuminemia:  - Suspect combination of GI loss and malnutrition  - Encouraged increased protein intake    #Abdominal pain:  - LUQ pain much improved     #Anemia:  - Most likely related to chronic ongoing blood loss from primary tumor   - Folate and vitamin B12 normal on 6/17/24  - Transferrin saturation low at 5% with normal ferritin 291, overall likely mixed picture of iron deficiency and anemia of chronic inflammation--although mixed results and initially held off on iron supplementation, hgb back down to 8.3 on 6/20/24, decision made to proceed with IV iron 200mg x5  - Increased energy and improved color as of 7/1/24    #Elevated alk phos, AST, ALT:  - Given the patient's overall burden of disease, with predominant alk phos elevation suggestive of an infiltrative process, suspect this is related to disease progression as patient only just started treatment less than 6 weeks ago  - Continue to monitor closely as the differential diagnosis includes immune-related adverse event (less likely based on pattern and timing) and vascular obstruction  - LFTs improving as of 7/12/24     #Frequent nocturnal urination:  - Urinalysis shows no evidence of UTI though significant bilirubin in the urine  - Referral to urology     #Leukocytosis:  - 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 Mckeon and at tumor board    #Early onset colorectal cancer:  - [facility] for germline testing due to young age at diagnosis, already referred for genetic counseling - virtual October 2024  - Recommend screening of first-degree relatives with colonoscopy 10 years prior to the patient's age of diagnosis (no later than age 36)  - Patient declined referral to  Young Adult Oncology program/oncofertility at this time

## 2024-11-29 ENCOUNTER — APPOINTMENT (OUTPATIENT)
Dept: HEMATOLOGY/ONCOLOGY | Facility: HOSPITAL | Age: 49
End: 2024-11-29
Payer: COMMERCIAL

## 2024-12-02 ENCOUNTER — APPOINTMENT (OUTPATIENT)
Dept: HEMATOLOGY/ONCOLOGY | Facility: CLINIC | Age: 49
End: 2024-12-02
Payer: COMMERCIAL

## 2024-12-04 ENCOUNTER — OFFICE VISIT (OUTPATIENT)
Dept: HEMATOLOGY/ONCOLOGY | Facility: CLINIC | Age: 49
End: 2024-12-04
Payer: COMMERCIAL

## 2024-12-04 VITALS
DIASTOLIC BLOOD PRESSURE: 66 MMHG | BODY MASS INDEX: 20.45 KG/M2 | RESPIRATION RATE: 20 BRPM | WEIGHT: 155 LBS | HEART RATE: 86 BPM | TEMPERATURE: 96.3 F | SYSTOLIC BLOOD PRESSURE: 82 MMHG

## 2024-12-04 DIAGNOSIS — C18.4 ADENOCARCINOMA OF TRANSVERSE COLON (MULTI): Primary | ICD-10-CM

## 2024-12-04 PROCEDURE — 99215 OFFICE O/P EST HI 40 MIN: CPT | Performed by: INTERNAL MEDICINE

## 2024-12-04 RX ORDER — MORPHINE SULFATE 20 MG/ML
SOLUTION ORAL EVERY 4 HOURS PRN
COMMUNITY
Start: 2024-11-13

## 2024-12-04 RX ORDER — HYOSCYAMINE SULFATE 0.12 MG/1
TABLET, ORALLY DISINTEGRATING ORAL
COMMUNITY
Start: 2024-11-12

## 2024-12-04 RX ORDER — LORAZEPAM 1 MG/1
0.5 TABLET ORAL EVERY 4 HOURS PRN
COMMUNITY
Start: 2024-11-12

## 2024-12-04 RX ORDER — HALOPERIDOL 2 MG/ML
SOLUTION ORAL
COMMUNITY
Start: 2024-11-12

## 2024-12-04 ASSESSMENT — PAIN SCALES - GENERAL: PAINLEVEL_OUTOF10: 3

## 2024-12-04 ASSESSMENT — NCCN CANCER DISTRESS MANAGEMENT
NCCN EMOTIONAL CONCERNS: 5
NCCN EMOTIONAL CONCERNS: 2
NCCN EMOTIONAL CONCERNS: 8
NCCN PRACTICAL CONCERNS: 1
NCCN PHYSICAL CONCERNS: 2
NCCN EMOTIONAL CONCERNS: 1
NCCN PHYSICAL CONCERNS: 9
NCCN EMOTIONAL CONCERNS: 3
NCCN PHYSICAL CONCERNS: 1
NCCN PHYSICAL CONCERNS: 3
NCCN PHYSICAL CONCERNS: 6
NCCN PHYSICAL CONCERNS: 8
NCCN SPIRITUAL CONCERNS: 3
NCCN SOCIAL CONCERNS: 3

## 2024-12-04 NOTE — PATIENT INSTRUCTIONS
Reviewed labs and recent medical history.  Discussed his current treatment with prednisone and reviewed that it is for inflammation.  Reviewed his options for further treatment for his inflammation. He will consider this....  Recommended that he continue the prednisone as directed by Dr. Carrera.  Dr. Dunne will talk with Dr. Tristan Marti  He does not need another appointment at this time but we are available for any questions or concerns.

## 2024-12-04 NOTE — PROGRESS NOTES
No appt needed at this time-rtc as needed  Reviewed AVS with patient- patient verbalizes understanding

## 2024-12-04 NOTE — PROGRESS NOTES
Patient ID:Yon Lawrence is a 49 y.o. year old male patient with metastatic colorectal cancer now on hospice     Referring Physician: He was self-referred, desiring another opinion.  Primary Care Provider: Pedro Bazzi MD    Chief Complaint  Chief Complaint   Patient presents with    Colon Cancer        History of the Present Illness  Yon Lawrence is a 49-year-old white male with mismatch repair protein deficient, MSI high colorectal cancer.  He was treated with immunotherapy and had severe reaction.  He is now on hospice patient.  Recently he has stabilized according to his hospice nurse and is seeking a second opinion.  He is here today with his wife Gabi, brother Ollie and hospice nurse Shanon.    Chronological History  6/7/2022.  Annual wellness examination with Dr. Bazzi.  Weight at that time was 230 pounds.  He is 6 feet 1 inch tall.    2/19/2024.  Presented to Dr. Lopez with dull aching right upper quadrant discomfort for about a week with occasional sharp intermittent pain just below the rib cage, worse with deep breathing, better with leaning forward, worse stretching out his back for which ibuprofen was recommended.  He was told to call back if the sensation does not improve.    3/14/2024.  Symptoms were better but continued.  He was given prednisone with a taper.  Weight was 210 pounds.    4/24/2024.  CBC showed a white count of 30,000 hemoglobin 11.2 hematocrit 37.5 and a platelet count of 626,000.  Prior CBC on 6/7/2022 had shown a white count of 9.9 hemoglobin 13.9 hematocrit 40.2 with a platelet count of 296,000.    4/30/2024.  CT scan of the abdomen with IV contrast showed multiple hepatic masses with the largest in the dome of the liver measuring 5 cm with poorly defined margins with the smaller lesions present throughout both lobes of the liver consistent with metastases.  The liver was enlarged measuring 22 cm.  There was mild enlargement of the spleen at 14 cm and normal adrenals.   There was a large solid mass with varying attenuation in the left upper quadrant with areas of central necrosis probably arising from the distal transverse colon and not separable from it measuring 11 x 8 x 8.6 cm extending close to the anterior abdominal wall with multiple nodules adjacent to the anterior aspect of the mass and stranding around the mass.  It impinged upon adjacent small bowel and extended into the mesenteric fat.  There was no free air or loculated intraperitoneal or retroperitoneal fluid collections there were soft tissues nodules in the mesentery consistent with metastatic disease and multiple nonenlarged retroperitoneal lymph nodes.    5/2/2024.  Referred to Dr. Asif at which time the patient said that he had been having left upper quadrant pain for several months which was constant and dull with night sweats and unintentional weight loss.  At the time of that visit he weighed 204 pounds.  CT was reviewed.  Patient has never had a colonoscopy and there is no family history thereof.  He is still having bowel movements every day she described a soft formed and without blood.  Colonoscopy was recommended and was to be done on 5/6/2024.  CEA was obtained, 6.7.    5/6/2024.  Colonoscopy showed 10 cm nearly circumferential mass in the distal transverse colon.  Biopsies were obtained showing invasive adenocarcinoma with deficiency and mismatch repair proteins with absence of expression of MLH1 and PMS2.  MSH-2 and MSH-6 were present.  Evaluation for MLH1 promoter methylation was positive suggesting that this mismatch repair protein deficiency was sporadic rather than Rashid associated.  Signatera testing showed MTM/mL 638.67 positive.  Microsatellite instability was high and tumor mutational burden was high.    5/7/2024.  Chest CT scan showed no acute intrathoracic pathology or metastatic disease.    5/14/2024.  Seen by Dr. Mckeon for colorectal surgery who discussed referral to medical oncology,  genetics, repeat CT scan with contrast and possible diversion prior to chemotherapy.    5/16/2024.  Repeat CT scan with contrast showed mild interval enlargement of the distal transverse colonic mass with invasion of any fistulization with adjacent loop of jejunum with passage of the oral contrast from the jejunum into the colon at the level of the mass.  There is increase in size and number diffuse liver metastatic disease deposits.    5/20/2024.  Referral to Dr. Billie Finn at which time the patient had been trying to go back to work.  He had increasing fatigue requiring much more sleep.  Weight was 193 pounds.  The treatable but not curable nature of metastatic colorectal cancer was discussed.  FOLFOX regimen and others were discussed awaiting MMR results.  Liver directed therapy was not recommended at that time based on the evidence of metastatic disease.    5/20/2024.  Tempus testing showed multiple other potentially actionable mutations including PIK3CA, [place]ST[place] and BRCA2.  Other laboratory tests included a normal PT/INR.  CBC showed a white count of 29.3 hemoglobin 9.8 hematocrit 30.9 with MCV of 76 and a platelet count 155,000.  White blood cell differential count showed 57% polys, 36 lymphs, 6 monos serum chemistry showed a sodium of 134 chloride 97 and normal renal function.  Albumin had dropped to 3.3 and alkaline phosphatase is elevated 223.  In April it had been 113.  Transaminases were normal.    5/22/2024.  Presented at tumor board at which time his MMR status was still pending    5/29/2024.  CBC showed a white count of 27,000 hemoglobin 8.3 hematocrit 27.8 and a platelet count of 639,000 with an MCV of 79.  Differential showed 61% polys, 31 lymphs, 5 monos.  He was hyponatremic with a sodium 131 normal BUN and creatinine.  Albumin is down to 3.2 and alkaline phosphatase is elevated at 255 with a total protein of 6.1 and AST of 49.    5/30/2024.  Pathology resulted mismatch repair proteins with  deficiency in MLH1 and PMS2.  Dr. Finn recommended first-line immune checkpoint blockade rather than chemotherapy.  Side effects were discussed.  The likelihood of ongoing blood loss was discussed and the need for repeat blood tests.    5/31/2024.  Port placed by Dr. Asif.  No pneumothorax.    6/3/2024.  Cycle 1 day 1 pembrolizumab.    6/17/2022.  Follow-up with Dr. Finn.  MLH1 promoter hyper methylation was confirmed.  The patient had been able to celebrate his daughter's graduation.  Appetite was variable and his weight was down to 181 pounds.  He had polyuria and nocturia even though he was stopped drinking liquids before 8 PM.  Hemoglobin was down to 8.9 with hematocrit of 28.9 with an MCV of 76.  White count was 29.1.  Platelets are 791.  Alk phos is up to 335.  Retesting was recommended after 4 cycles.  If inadequate response after 4 cycles consider ipilimumab/nivolumab combination.  If no response after that systemic chemotherapy should be considered.  IV iron was recommended.  6/24/2024.  Cycle 2 pembrolizumab    7/8/2024.  Okayed next chemo cycle planned for [place], 2024.  Consultation with Dr. Carson for urinary frequency.  UA and PSA were ordered.  He was started on Uroxatrol.    7/12/2024.  Cycle 3 pembrolizumab.    8/6/2024.  Follow-up with Wendy FIELD via telehealth.  He had new onset diarrhea for 2 and half weeks with 6-7 episodes of diarrhea on his worst day with watery stools waking up at night with loose stool with weight loss of 6 to 7 pounds abdominal cramping and fatigue.  Pembrolizumab held and prednisone was started at 1 mg/kg per day with PPI prophylaxis.  CEA was 2.8.  Follow-up within 1 week.    8/13/2024.  Follow-up with Loni FIELD via telehealth at which time the patient said that he was feeling much better and his diarrhea was almost under control.  He had 2 solid bowel movements per day with some loose stool mixed in and no nausea.  He had improved appetite.   Prednisone taper was begun with a decrease in his prednisone by 10 mg every day and then he would be off prednisone a week.    8/22/2024.  CAT scan of the chest abdomen and pelvis showed interval development of a long segment filling defect along the distal IVC and right atrium concerning for thrombus possibly related to heart placement.  Liver was measured at 19.5 cm with redemonstration of innumerable by lobar hypoenhancing metastatic lesions mostly decreased in size.  The mass in his bowel had also decreased in size.  Persistent fistulization was noted.  There was improved degree of contact with the anterior abdominal wall.  There is no ascites.  Left-sided decreased in size.    8/24/2024.  He was instructed to go to the emergency department by radiology because of the finding on CT scan.  Oncology was consulted and he was started on Eliquis.    8/26/2024.  Seen by Dr. Carrera at which time the patient said that he had not had any recurrence of his diarrhea since of being off prednisone for a week.  She recommended holding off immunotherapy and recommended evaluation by GI in terms of reintroduction of the pembrolizumab.    8/30/2024.  Seen by Jason Lepe due to laceration of chin after he had fallen at home landing on his chin.  Stitches were required, removed on 9/5.    9/6/2024 there was a acute occlusive deep venous thrombosis in the peroneal vein.  There is no abnormality in the right leg seen.    9/16/2024.  Follow-up by Dr. Finn at which time the patient was not feeling well with increasing weakness more so on the right side.  He said that he could not go back to work because of exhaustion, shortness of breath decreased exercise tolerance and hypotension with tachycardia CEA was 3.4.  Weight was 190 pounds.    9/17/2024 through 9/24/2024.  Admitted to the hospital after ED visit at [facility] due to increasing shortness of breath and anasarca with 15 pound weight gain.  Eliquis had been changed to  Lovenox.  CT scan ordered.  Peripheral edema was noted.  CBC showed a white count of 20,000 with hemoglobin 9.6 hematocrit 28 and platelet count 348,000 with 54% polys.  Serum sodium was down to 129 potassium was 3.0.  BUN and creatinine were normal.  Albumin was less than 1.5.  Total protein was less than 3.  ALT was elevated 120 AST was 20  Bilirubin was 0.5.  Prothrombin time was 16.2 with an INR of 1.4 and an activated PTT was 131 troponin was not elevated.    CTA showed no evidence of pulmonary emboli but bilateral pleural effusions were noted with dependent compressive atelectasis.  CT scan of the abdomen was compatible with ischemia/infarction and scared the patient is underlying known metastatic disease.  There is no evidence of portal or hepatic vein occlusion.  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  Ascites was also noted.  He was transferred to [facility].    On evaluation at [facility] he was not felt to be a surgical candidate.  Heparin drip was started given the patient's failure on Eliquis and then Lovenox.  Discussion was held about possible thrombectomy given his stability no acute intervention was recommended.  Albumin was given due to his extremely low level and his low blood pressures he was admitted to ICU stepdown.    9/19/2024.  Doppler of the liver showed hepatomegaly and findings suggestive of fatty liver.  There is poor visualization of the right hepatic artery and right hepatic vein potentially related to technique or patient condition rather than thrombus.  Right pleural effusion and small volume ascites is seen.  CT enterography was ordered to assess fistulization liver biopsy was discussed.  Hepatology was consulted and deferred liver biopsy.  They cleared the patient to resume pembrolizumab.  They recommended alpha 1 antitrypsin clearance test to evaluate  protein-losing gastroenteropathy.  Nutrition was also consulted.    Doppler evaluation showed that there was poor visualization of the right hepatic artery.  Portal vein was patent splenic vein was also patent.  Right hepatic vein was poorly visualized.  Middle and left hepatic veins were patent    9/19/2024.  CT enterography showed improved splenic flexure colitis and similar splenic flexure mass with enlarged lymph nodes, diffuse abdominal wall edema pleural effusions ascites and hepatic steatosis.  He continued on IV heparin.    9/20/2024.  Met with Dr. Finn to discuss his current situation.  He said that he was willing to resume immunotherapy.    9/21/2024.  Continued on high-protein diet with improved liver functions diuresis with Lasix was attempted with supplemental albumin.  Thoracentesis was avoided due to worsening protein loss.  He was discharged on 924.  He was to continue Lovenox every 12 hours.    9/26/2024.  Cycle 4 pembrolizumab given    9/27/2024.  Emergency department visit at [facility] for weakness after he had had his immunotherapy the day before.  He had bilateral 4+ pitting edema.  Ammonia was 62 which is elevated.  White count was 7.7 hemoglobin 7.8 MAC 23.171.  Differential showed 41 neutrophils 53 lymphs 4 monos.  Albumin is 1.7 with a total bilirubin of 1.1 and a direct of 0.5.  Alkaline phosphatase is 137 ALT was 57 AST was 10 total protein was 3.1.  BUN and creatinine were normal urinalysis was clear and yellow with 1+/bilirubin and over 4+ urobilinogen with the development of orange stools.  He could not tolerate liquid potassium and pills by mouth were ordered.  He was treated with lactulose because of his hyperammonemia and discharged from.  He was given a liter of normal saline.  A follow-up call on 929 found him about the same.  He is not confused and did not take lactulose.  Home services for physical therapy and nursing were discussed.  We also reported difficulty  urinating.    10/7/2024.  Follow-up with Dr. Finn at which time he admitted he was having difficulty with self-care and was not eating substantial amount of food or fluid.  He had increasing lethargy hypotension and dizziness because of these complaints he was brought by EMS to [facility].  EP was not able to be taken in the office    10/7/2024.  Admitted to the hospital after ED visit at [facility] with worsening shortness of breath and lightheadedness hemoglobin was 8.1 and hematocrit was 25 with a white count of 15 and a platelet count of 406,000.  Albumin is 1.6.  Total protein is 3.0.  Alkaline phosphatase was 151 AST was 20 total bilirubin 0.9 ALT was 53.  CT scans show increasing moderate to large bilateral layering pleural effusion with compressive atelectasis and groundglass opacities in the aerated upper lobes representing edema without pneumothorax.  In the abdomen there is marked steatosis of the liver similar to previous examination and no abdominal retroperitoneal adenopathy there is moderate air and fluid distention small bowel without thickening or obstruction.  Known tumor mass was the same.  There is minimal pelvic ascites.  Anasarca was seen through the soft tissues.  Infectious disease was consulted.  Interventional radiology was consulted and Echevarria was placed with urology consult.  Flomax was avoided due to hypotension.  He was treated with broad-spectrum IV antibiotics, IV albumin, nutrition consult frequent laboratory data, OT PT and 1 unit of packed red blood cells.    10/8/2024.  He was having improved symptoms after right sided thoracentesis removing 1100 cc.  Left thoracentesis on 10/9 removing 1100 cc of straw yellow fluid.  Results showed it was a transudate with a low white blood cell count.  Albumin was still 1.5.  IV Zosyn was discontinued on 10/10.  Cytology did show atypical cells that were felt to be reactive mesothelial cells.  LDH in the fluid was 62.  Neutrophil percentage was  14%.    10/10/2024.  Repeat thoracentesis on the right removing 550 cc.  Albumin was given discharge from the hospital on 10/18.  He was continued on Lovenox and was discharged to rehab at the nursing home.    11/5/2024.  Patient's wife got in touch with Dr. Finn because the patient was not eating and is pressure was very low.  He had difficulty keeping food down and also had diarrhea.  He had been going downhill over 5 days.  Therapy was 2-3 times a day.    11/5/2024 through 11/12/2024.  Admitted to the hospital from the emergency department.  They had planned to give him his immunotherapy patient was on midodrine for his blood pressure.  White count of 17,000 hemoglobin 9.4 hematocrit 27 platelets were 417,000.  BUN was 13 creatinine 0.24.  Calcium was 5.4.  Albumin was less than 1.5.  Total protein was 3.2.  AST and ALT were normal.  Cardiogram showed a sinus arrhythmia with a rate of 93 with QT prolongation IV fluids were recommended.  He was seen by Jackeline FRIED.  Fluid and electrolytes were supplemented and midodrine was increased.  Weight on 11/5/2024 was 154 pounds.    11/7/2024.  CAT scan of the abdomen and pelvis showed mildly prominent fluid-filled small bowel possibly representing ileus or developing obstruction with diffuse hypodensity throughout the liver consistent with fatty infiltration and multiple foci possibly representing his tumor masses.  His pleural effusions were said to be small.  Spleen was said to be normal in size.  He was seen by Dr. Lozano from surgery for question of bowel obstruction.  There is no cause for surgical intervention.  He was given hyperalimentation.    11/11/2024.  Seen by Dr. Tristan Marti for palliative care who noted that the patient looks very frail with significant sarcopenia and marked edema.  Electrolytes showed hyponatremia with sodium of 128 hemoglobin 7.2 hematocrit 21.  BUN and creatinine were normal.  Magnesium was low at 1.53.  He discussed  palliative and hospice care.  The patient said that he did not realize that his condition was terminal until the hospitalist let him know that.  He decided to go with hospice.  He said that he felt it was unfair to his family to go on the way he was.  He said that he could barely stand to be in a wheelchair and he has poor quality of life.  The patient was felt to be ready for discharge on 2024.  Follow-up with Dr. Carrera who noted that he was on hospice.  She noted that the CAT scan showed decrease tumor burden but significant enteritis continued and started him back on steroids for palliation of his symptoms he had not started steroids yet.  She strongly recommended that they do.    2024.  He said that he was not having diarrhea.  He had not noticed any significant improvement with recent course of steroids.  She reiterated that the steroid to be taken routinely and noted that he if he did improve markedly then they could discuss potential options in terms of surveillance and other options.    Interval Note  2024.  He is here today with his brother, wife and hospice nurse.  We reviewed his course as noted above and his current status.  He understands that his performance status is markedly low such that he needs assistance in all activities of daily living.  He cannot even move his legs himself change position.  He remains markedly edematous with anasarca.  Diarrhea has been controlled and in fact he had several days where he did not have a stool at all.  He has abdominal discomfort but not a lot of pain.  He has been told that his vital signs have been stabilizing and he wonders what he should do.  Several days ago he felt that he was dying and his  was called but he has not .  He is looking for some discussion of his options.    We talked about the various options for treatment and that they all have toxicity.  He knows that he does have options.  The least toxic  intervention at this time is for him to take his steroids regularly to see if that would impact his enteritis.  Verbalized understanding.  He is not sure that he wants to go on.  He knows that it is entirely his decision and his family is supportive of his choices.  If he wants to fight against his disease process, he will have to start moving more and working with physical therapy and trying to eat more.  His hospice nurse tells me that his skin is intact.    Many questions were answered to the best of my ability and to his satisfaction.  At this time he has decided to continue prednisone as directed by Dr. Carrera.  We have not made another appointment for him to see us but would be glad to see him if the need arises.    Comorbidities  Left knee medial meniscus tear 2016 treated with partial medial meniscectomy by Dr. Baumann  Arthroscopic right knee partial medial meniscectomy 2017    Monitoring Parameters  Histories and physical examinations and imaging as symptoms dictate.    Review of Systems - Oncology   Review of Systems   Constitutional:  Positive for appetite change, fatigue and unexpected weight change.   HENT:  Negative for dental problem, mouth sores and trouble swallowing.    Eyes:  Negative for visual disturbance.   Respiratory:  Positive for shortness of breath. Negative for cough.    Cardiovascular:  Positive for leg swelling.   Gastrointestinal:  Positive for abdominal pain, constipation and nausea. Negative for diarrhea, rectal pain and vomiting.   Endocrine: Negative for polyuria.   Genitourinary:  Negative for enuresis and urgency.   Musculoskeletal:  Positive for arthralgias and myalgias.   Skin:  Positive for pallor.   Neurological:  Positive for weakness and light-headedness. Negative for numbness and headaches.   Hematological:  Negative for adenopathy. Bruises/bleeds easily.   Psychiatric/Behavioral:  Negative for self-injury, sleep disturbance and suicidal ideas. The patient is not  nervous/anxious.         Past Medical History  Past Medical History:   Diagnosis Date    Cancer (Multi)     colon cancer        Surgical History  Past Surgical History:   Procedure Laterality Date    COLONOSCOPY  05/06/2024    DR VALDEZ    OTHER SURGICAL HISTORY  06/07/2022    Meniscectomy    TUNNELED VENOUS PORT PLACEMENT  05/31/2024    PLACEMENT OF TUNNELED CENTR VENOUS CATHETER W/SQ PORT/ DR VALDEZ       Social History  Social History     Tobacco Use    Smoking status: Never    Smokeless tobacco: Never   Substance Use Topics    Alcohol use: Never    Drug use: Never        Family History  family history includes Breast cancer (age of onset: 50 - 59) in his mother's sister; Cancer in his father's sister and paternal grandmother; Diabetes type II in his mother; Heart disease in his father; Hypertension in his father; MCAD in his daughter; Macular degeneration in his father; No Known Problems in his son; Stroke in his paternal grandfather; Ulcers in his father.     Oncology History  Oncology History   Adenocarcinoma of transverse colon (Multi)   5/20/2024 Initial Diagnosis    Adenocarcinoma of transverse colon (Multi)     5/21/2024 Cancer Staged    Staging form: Colon and Rectum, AJCC 8th Edition, Clinical: Stage IVC (cTX, cNX, cM1c) - Signed by Billie Finn MD on 5/21/2024     6/3/2024 - 6/3/2024 Chemotherapy    mFOLFOX6 (Fluorouracil Continuous Infusion / Leucovorin / Oxaliplatin), 14 Day Cycles     6/3/2024 -  Chemotherapy    Pembrolizumab, 21 Day Cycles          Current Medications  Current Outpatient Medications   Medication Instructions    haloperidol (Haldol) 2 mg/mL solution TAKE 1 MILLIGRAM (0.5ML) EVERY 4 HOURS AS NEEDED FOR AGITATION, R...  (REFER TO PRESCRIPTION NOTES).    hyoscyamine 0.125 mg disintegrating tablet take 1 tablet by mouth or under the tongue every 2 hours if needed for SECRETIONS    loperamide (Imodium) 2 mg capsule Take 2 capsules with first episode of loose, watery stool, then 1  capsule with each episode of loose stool thereafter. Do not exceed 8 capsules in 24 hours.    LORazepam (ATIVAN) 0.5 mg, Every 4 hours PRN    midodrine (PROAMATINE) 5 mg, oral, Every 8 hours scheduled    morphine 20 mg/mL concentrated oral solution Every 4 hours PRN    ondansetron ODT (ZOFRAN-ODT) 4 mg, Every 6 hours PRN    predniSONE (DELTASONE) 60 mg, oral, Daily           OARRS Review  I have personally reviewed the OARRS report for Yon Lawrence. I have considered the risks of abuse, dependence, addiction and diversion.  Review of this record shows that he has lorazepam and morphine.  Palliative care/hospice.    Vital Signs  BP 82/66   Pulse 86   Temp 35.7 °C (96.3 °F) (Skin)   Resp 20   Wt 70.3 kg (155 lb) Comment: stated weight from caregiver, pt unable to stand, last weighed 11/11/24  BMI 20.45 kg/m²      Physical Exam  Vitals and nursing note reviewed. Exam conducted with a chaperone present.   Constitutional:       General: He is not in acute distress.     Appearance: He is ill-appearing. He is not toxic-appearing.      Comments: Yon Lawrence is a tall cachectic white male who is brought in on a stretcher.  He is too weak to sit up and he is too weak to move his legs independently.  He is not jaundiced.  His hands have a violaceous hue.  He has anasarca.  He is bundled up in clothing including a hat on his head and socks on his feet without shoes   HENT:      Head: Normocephalic and atraumatic.      Comments: Bilateral temporal  wasting     Nose: Nose normal.      Mouth/Throat:      Mouth: Mucous membranes are dry.      Pharynx: Oropharynx is clear. No oropharyngeal exudate or posterior oropharyngeal erythema.   Eyes:      General: No scleral icterus.     Extraocular Movements: Extraocular movements intact.      Conjunctiva/sclera: Conjunctivae normal.      Pupils: Pupils are equal, round, and reactive to light.   Neck:      Comments: No palpable adenopathy in the head neck region, supraclavicular  axillary or inguinal regions bilaterally.  Cardiovascular:      Rate and Rhythm: Regular rhythm. Tachycardia present.   Pulmonary:      Effort: Pulmonary effort is normal. No respiratory distress.      Breath sounds: No wheezing or rales.   Abdominal:      General: There is no distension.      Palpations: There is no mass.      Tenderness: There is no abdominal tenderness. There is no guarding.      Comments: Prominence in the mid epigastrium and is non tender.   Musculoskeletal:         General: Swelling present.      Cervical back: Normal range of motion and neck supple. No rigidity.      Right lower leg: Edema present.      Left lower leg: Edema present.      Comments: Hands and legs are cold   Skin:     General: Skin is dry.      Findings: Erythema (hands bilaterally) present.      Comments: Skin is cool to the touch   Neurological:      Mental Status: He is alert and oriented to person, place, and time. Mental status is at baseline.      Cranial Nerves: No cranial nerve deficit.      Motor: Weakness present.      Gait: Gait abnormal.   Psychiatric:         Mood and Affect: Mood normal.         Behavior: Behavior normal.         Thought Content: Thought content normal.         Judgment: Judgment normal.      Comments: Alert, oriented and pleasant.  In good humor.        Current Laboratory Data  No results found for this or any previous visit (from the past week).       Recent Imaging  ECG 12 Lead    Result Date: 11/11/2024  Sinus rhythm with Premature atrial complexes Nonspecific T wave abnormality Prolonged QT Abnormal ECG When compared with ECG of 05-NOV-2024 18:12, Premature atrial complexes are now Present Confirmed by Dinesh Rivera ([id number]) on 11/11/2024 12:46:08 PM    XR abdomen 2 views w chest 1 view    Result Date: 11/9/2024  Interpreted By:  Jed French, STUDY: XR ABDOMEN 2 VIEWS WITH CHEST 1 VIEW; ;  11/9/2024 10:01 am   INDICATION: Signs/Symptoms:ileus.   COMPARISON: None.   ACCESSION NUMBER(S):  [id number]   ORDERING CLINICIAN: EMY CHOI   TECHNIQUE: Supine and upright AP views of the abdomen and a single PA view of the chest were obtained.   FINDINGS: Hazy consolidation is seen over the right lung base, and may represent atelectasis and/or pneumonia. No pneumothorax is seen. The cardiac silhouette is within normal limits for size.  Dilated air-filled loops of small bowel are seen throughout the abdomen, concerning for obstruction. No free intraperitoneal air or air-fluid levels are identified. No abnormal calcifications are seen over the abdomen.       1.  Right basilar airspace consolidation, as above. Clinical correlation and continued follow-up until clearing is recommended. 2. Dilated air-filled loops of small bowel throughout the abdomen, concerning for obstruction.   MACRO: None   Signed by: Jed French 11/9/2024 1:48 PM Dictation workstation:   [id number]    XR abdomen 2 views w chest 1 view    Result Date: 11/8/2024  Interpreted By:  Timmy Ahmadi, STUDY: XR ABDOMEN 2 VIEWS WITH CHEST 1 VIEW;  11/8/2024 7:42 am   INDICATION: Signs/Symptoms:ileus.     COMPARISON: None.   ACCESSION NUMBER(S): [id number]   ORDERING CLINICIAN: EMY CHOI   TECHNIQUE: Abdomen supine and upright views   Chest PA view   FINDINGS:     ABDOMEN: There is moderate-to-marked distention numerous small bowel segments, with some air-fluid levels on the upright projection. While there is not appear to be significant colonic distension, there does appear to be mucosal edema of the proximal descending colon suggesting colitis, and there may be fluid-filled distended colonic loops. Small bowel distention may be due to secondary ileus, although obstruction is possible.   No evidence of pneumoperitoneum.   Osseous structures demonstrate no acute bony abnormalities.   CHEST: Cardiomediastinal silhouette in normal in size and configuration.   Lungs are clear.   No acute osseous changes.       1.  Probable small bowel ileus  and colitis, although obstruction is possible. 2.  No evidence of acute cardiopulmonary process.     MACRO: None   Signed by: Timmy Ahmadi 11/8/2024 9:47 AM Dictation workstation:   [id number]    CT abdomen pelvis w IV contrast    Result Date: 11/7/2024  Interpreted By:  Jed French, STUDY: CT ABDOMEN PELVIS W IV CONTRAST; 11/7/2024 12:07 pm   INDICATION: Signs/Symptoms:pain diarrhea  Hx colon cancer with mets.   COMPARISON: CT of the chest, abdomen and pelvis dated 10/07/2024   ACCESSION NUMBER(S): [id number]   ORDERING CLINICIAN: MARILYN CABRALES   TECHNIQUE: Contiguous axial images were obtained at 3mm slice thickness through the abdomen and pelvis following intravenous contrast administration. Coronal and sagittal reconstructions at 3 mm slice thickness were performed. 70 ML of Omnipaque 350 was administered.   FINDINGS: LOWER CHEST: Evaluation of the visualized lung bases demonstrates small bilateral pleural effusions, significantly decreased in size when compared to the prior study. Is the heart is within normal limits for size.   ABDOMEN:   LIVER: There is severely decreased attenuation identified throughout the liver, consistent with fatty infiltration. Multiple irregular enhancing foci are seen within the liver, and may represent areas of relative fatty sparing and or masses.   BILE DUCTS: No definite intra or extrahepatic biliary dilatation is identified.   GALLBLADDER: The gallbladder is mildly dilated, measuring up to 4.5 cm in diameter.. No definite wall thickening or calcified gallstones are seen.   PANCREAS: The pancreas is within normal limits for appearance, without evidence of focal masses.   SPLEEN: The spleen is within normal limits for size. No focal splenic mass is seen.   ADRENAL GLANDS: No definite adrenal nodules or masses are seen bilaterally.   KIDNEYS AND URETERS: There is no hydronephrosis, hydroureter or renal/ ureteral calculus identified bilaterally. No definite focal renal  mass is seen.   PELVIS:   BLADDER: The bladder is decompressed, with a barksdale catheter is situ.   REPRODUCTIVE ORGANS: The prostate is within normal limits for appearance.   BOWEL: The small bowel is diffusely mildly prominent and fluid-filled, which may represent ileus and/or developing obstruction. The colon is within normal limits for course and caliber, without evidence of wall thickening or obstruction. The appendix is decompressed. No CT evidence of acute diverticulitis or appendicitis is seen.   VESSELS: The abdominal aorta is within normal limits for course, caliber and appearance, without evidence of aneurysm.   PERITONEUM/RETROPERITONEUM/LYMPH NODES: There is no free intraperitoneal air identified. Trace free fluid is seen within the abdomen. No gross mesenteric or retroperitoneal lymphadenopathy is identified.   BONE AND SOFT TISSUE: There is no evidence of acute fracture identified. No evidence of abdominal wall mass or hernia is identified.       1. Mildly prominent fluid-filled small bowel, may represent ileus and/or developing obstruction. 2. Diffuse hypodensity throughout the liver, consistent with fatty infiltration. 3. Multiple foci of relative hyperdensity within the liver, may represent masses and/or focal areas of fatty sparing. Further evaluation with nonemergent MRI of the liver can be obtained, if clinically warranted.   MACRO: None   Signed by: Jed French 11/7/2024 3:06 PM Dictation workstation:   [id number]    ECG 12 Lead    Result Date: 11/6/2024  Sinus rhythm with marked sinus arrhythmia T wave abnormality, consider anterior ischemia Prolonged QT Abnormal ECG See ED provider note for full interpretation and clinical correlation Confirmed by Randolph Wilkerson ([id number]) on 11/6/2024 10:28:06 AM    ECG 12 Lead    Result Date: 11/6/2024  Sinus rhythm with marked sinus arrhythmia Low voltage QRS Nonspecific T wave abnormality Abnormal ECG When compared with ECG of 05-NOV-2024 11:40,  (unconfirmed) No significant change was found Confirmed by Dinesh Rivera ([id number]) on 11/6/2024 9:11:35 AM    XR chest 1 view    Result Date: 11/5/2024  Interpreted By:  Cinthya Olson, STUDY: XR CHEST 1 VIEW 11/5/2024 6:14 pm   INDICATION: Signs/Symptoms:SOB history of colon cancer   COMPARISON: 10/10/2024   ACCESSION NUMBER(S): [id number]   ORDERING CLINICIAN: MARTA DAVALOS   TECHNIQUE: AP view   FINDINGS: There are low lung volumes. In the right mid lung laterally there is an 8 mm density and a 4 mm density similar to the prior exam. There are no infiltrates. Pulmonary vascularity and cardiac silhouette appear normal. A port overlies the left chest with the tip in the superior vena cava. Slightly blunted right lateral costophrenic angle likely due to a tiny pleural effusion. No large pleural effusions. No sign of pneumothorax       1. 2 nodular densities overlie the right mid chest laterally similar to the previous exam. A repeat CT of the chest following removal of the pleural effusions would be helpful to evaluate the pulmonary parenchyma. Consider follow-up CT of the chest after treatment.     Signed by: Cinthya Olson 11/5/2024 9:27 PM Dictation workstation:   [id number]        Pathology  Metastatic adenocarcinoma of the colon with liver involvement and fistulous formation involving small bowel.  Deficient mismatch repair proteins, high MSI, high tumor burden with multiple mutations that are potentially actionable.    Performance Status:  Bedridden    Assessment/Plan    1.  Metastatic colorectal cancer.  He was being treated with pembrolizumab.  His course has been complicated by deep vein thrombosis, probable hepatic infarct.  Severe hepatic insufficiency with elevated ammonia, markedly decreased total protein and albumin with anasarca, prolonged prothrombin time, anasarca and extreme debility.  He is bedridden.  His performance status precludes any significant cancer directed therapy.  I encouraged him to  stay on steroids as prescribed by Dr. Carrera because that is the most likely intervention to improve his gastrointestinal tract function and have the least amount of toxicity.  He knows that he can participate with OT and PT at home and potentially gain back some of the strength.  He was encouraged eat more.  He knows that we are available for questions and consultation if needed.  We have not made him a return appointment but would be glad to see him at any time.       Kelly Dunne MD       to if he

## 2024-12-05 ASSESSMENT — ENCOUNTER SYMPTOMS
NUMBNESS: 0
NERVOUS/ANXIOUS: 0
FATIGUE: 1
ADENOPATHY: 0
LIGHT-HEADEDNESS: 1
ARTHRALGIAS: 1
COUGH: 0
HEADACHES: 0
BRUISES/BLEEDS EASILY: 1
APPETITE CHANGE: 1
MYALGIAS: 1
UNEXPECTED WEIGHT CHANGE: 1
VOMITING: 0
TROUBLE SWALLOWING: 0
NAUSEA: 1
RECTAL PAIN: 0
SHORTNESS OF BREATH: 1
WEAKNESS: 1
CONSTIPATION: 1
ABDOMINAL PAIN: 1
DIARRHEA: 0
SLEEP DISTURBANCE: 0

## 2024-12-09 ENCOUNTER — APPOINTMENT (OUTPATIENT)
Facility: CLINIC | Age: 49
End: 2024-12-09
Payer: COMMERCIAL

## 2024-12-19 ENCOUNTER — APPOINTMENT (OUTPATIENT)
Dept: HEMATOLOGY/ONCOLOGY | Facility: CLINIC | Age: 49
End: 2024-12-19
Payer: COMMERCIAL

## 2024-12-30 ENCOUNTER — APPOINTMENT (OUTPATIENT)
Dept: UROLOGY | Facility: HOSPITAL | Age: 49
End: 2024-12-30
Payer: COMMERCIAL

## 2025-01-14 ENCOUNTER — TELEPHONE (OUTPATIENT)
Dept: PRIMARY CARE | Facility: CLINIC | Age: 50
End: 2025-01-14
Payer: COMMERCIAL

## 2025-01-21 ENCOUNTER — APPOINTMENT (OUTPATIENT)
Dept: CARDIOLOGY | Facility: HOSPITAL | Age: 50
End: 2025-01-21
Payer: COMMERCIAL

## 2025-02-25 ENCOUNTER — APPOINTMENT (OUTPATIENT)
Dept: CARDIOLOGY | Facility: HOSPITAL | Age: 50
End: 2025-02-25
Payer: COMMERCIAL

## (undated) DEVICE — STRIP, SKIN CLOSURE, STERI STRIP, REINFORCED, 0.5 X 4 IN

## (undated) DEVICE — DRAPE, SURGICAL, 100 X 124 X 76

## (undated) DEVICE — GLOVE, SURGICAL, PROTEXIS PI BLUE W/NEUTHERA, 6.5, PF, LF

## (undated) DEVICE — SUTURE, VICRYL, 3-0, 27 IN, SH

## (undated) DEVICE — SUTURE, VICRYL, 4-0, 18 IN, UNDYED BR PS-2

## (undated) DEVICE — GLOVE, PROTEXIS PI CLASSIC, SZ-6.5, PF, LF

## (undated) DEVICE — SYRINGE, 10 ML, 21 G X 1 0.5 IN, LUER LOK

## (undated) DEVICE — LINE, GAS SAMPLING, .05IN 1D 10FT, M/M CONNECTORS

## (undated) DEVICE — SUTURE, PROLENE, 2-0, 30 IN, SH, BLUE

## (undated) DEVICE — Device

## (undated) DEVICE — APPLICATOR, CHLORAPREP, W/ORANGE TINT, 26ML

## (undated) DEVICE — DRAPE, C-ARM, OEC 9800, W/CLIP, FOOTSWITCH/ 2 DOME

## (undated) DEVICE — STRAP, ARMBOARD, 3IN, BLUE/WHITE, SECURE HOOK

## (undated) DEVICE — DRESSING, TRANSPARENT, TEGADERM, 4 X 4-3/4 IN, NO LABEL

## (undated) DEVICE — DRESSING, GAUZE, SPONGE, 12 PLY, CURITY, 4 X 4 IN, STERILE

## (undated) DEVICE — NEEDLE, ECLIPSE, 25GA X 1-1/2 IN

## (undated) DEVICE — STRAP, KNEE AND BODY, SINGLE USE

## (undated) DEVICE — SOLUTION, IRRIGATION, SODIUM CHLORIDE 0.9%, 1000 ML, POUR BOTTLE